# Patient Record
Sex: MALE | Race: WHITE | NOT HISPANIC OR LATINO | URBAN - METROPOLITAN AREA
[De-identification: names, ages, dates, MRNs, and addresses within clinical notes are randomized per-mention and may not be internally consistent; named-entity substitution may affect disease eponyms.]

---

## 2018-06-05 ENCOUNTER — INPATIENT (INPATIENT)
Facility: HOSPITAL | Age: 68
LOS: 1 days | Discharge: HOME | End: 2018-06-07
Attending: INTERNAL MEDICINE | Admitting: INTERNAL MEDICINE
Payer: MEDICARE

## 2018-06-05 VITALS
TEMPERATURE: 98 F | SYSTOLIC BLOOD PRESSURE: 180 MMHG | HEART RATE: 124 BPM | OXYGEN SATURATION: 100 % | RESPIRATION RATE: 16 BRPM | DIASTOLIC BLOOD PRESSURE: 111 MMHG

## 2018-06-05 DIAGNOSIS — R74.8 ABNORMAL LEVELS OF OTHER SERUM ENZYMES: ICD-10-CM

## 2018-06-05 DIAGNOSIS — E11.9 TYPE 2 DIABETES MELLITUS WITHOUT COMPLICATIONS: ICD-10-CM

## 2018-06-05 DIAGNOSIS — Z90.79 ACQUIRED ABSENCE OF OTHER GENITAL ORGAN(S): Chronic | ICD-10-CM

## 2018-06-05 DIAGNOSIS — I10 ESSENTIAL (PRIMARY) HYPERTENSION: ICD-10-CM

## 2018-06-05 DIAGNOSIS — R74.0 NONSPECIFIC ELEVATION OF LEVELS OF TRANSAMINASE AND LACTIC ACID DEHYDROGENASE [LDH]: ICD-10-CM

## 2018-06-05 DIAGNOSIS — Z29.9 ENCOUNTER FOR PROPHYLACTIC MEASURES, UNSPECIFIED: ICD-10-CM

## 2018-06-05 LAB
ALBUMIN SERPL ELPH-MCNC: 4.2 G/DL — SIGNIFICANT CHANGE UP (ref 3.3–5)
ALP SERPL-CCNC: 39 U/L — LOW (ref 40–120)
ALT FLD-CCNC: 110 U/L — HIGH (ref 4–41)
APPEARANCE UR: CLEAR — SIGNIFICANT CHANGE UP
APTT BLD: 29.4 SEC — SIGNIFICANT CHANGE UP (ref 27.5–37.4)
AST SERPL-CCNC: 46 U/L — HIGH (ref 4–40)
BASOPHILS # BLD AUTO: 0.05 K/UL — SIGNIFICANT CHANGE UP (ref 0–0.2)
BASOPHILS NFR BLD AUTO: 0.8 % — SIGNIFICANT CHANGE UP (ref 0–2)
BILIRUB SERPL-MCNC: 0.8 MG/DL — SIGNIFICANT CHANGE UP (ref 0.2–1.2)
BILIRUB UR-MCNC: NEGATIVE — SIGNIFICANT CHANGE UP
BLOOD UR QL VISUAL: NEGATIVE — SIGNIFICANT CHANGE UP
BUN SERPL-MCNC: 13 MG/DL — SIGNIFICANT CHANGE UP (ref 7–23)
CALCIUM SERPL-MCNC: 8.8 MG/DL — SIGNIFICANT CHANGE UP (ref 8.4–10.5)
CHLORIDE SERPL-SCNC: 96 MMOL/L — LOW (ref 98–107)
CK MB BLD-MCNC: 102.5 NG/ML — HIGH (ref 1–6.6)
CK MB BLD-MCNC: 4.9 — HIGH (ref 0–2.5)
CK MB BLD-MCNC: 5.4 — HIGH (ref 0–2.5)
CK MB BLD-MCNC: 83.01 NG/ML — HIGH (ref 1–6.6)
CK SERPL-CCNC: 1711 U/L — HIGH (ref 30–200)
CK SERPL-CCNC: 1883 U/L — HIGH (ref 30–200)
CO2 SERPL-SCNC: 26 MMOL/L — SIGNIFICANT CHANGE UP (ref 22–31)
COLOR SPEC: YELLOW — SIGNIFICANT CHANGE UP
CREAT SERPL-MCNC: 0.52 MG/DL — SIGNIFICANT CHANGE UP (ref 0.5–1.3)
EOSINOPHIL # BLD AUTO: 0.37 K/UL — SIGNIFICANT CHANGE UP (ref 0–0.5)
EOSINOPHIL NFR BLD AUTO: 6 % — SIGNIFICANT CHANGE UP (ref 0–6)
GLUCOSE BLDC GLUCOMTR-MCNC: 184 MG/DL — HIGH (ref 70–99)
GLUCOSE BLDC GLUCOMTR-MCNC: 215 MG/DL — HIGH (ref 70–99)
GLUCOSE BLDC GLUCOMTR-MCNC: 227 MG/DL — HIGH (ref 70–99)
GLUCOSE SERPL-MCNC: 261 MG/DL — HIGH (ref 70–99)
GLUCOSE UR-MCNC: >1000 — SIGNIFICANT CHANGE UP
HBA1C BLD-MCNC: 8.6 % — HIGH (ref 4–5.6)
HCT VFR BLD CALC: 42.7 % — SIGNIFICANT CHANGE UP (ref 39–50)
HGB BLD-MCNC: 14.9 G/DL — SIGNIFICANT CHANGE UP (ref 13–17)
IMM GRANULOCYTES # BLD AUTO: 0.04 # — SIGNIFICANT CHANGE UP
IMM GRANULOCYTES NFR BLD AUTO: 0.7 % — SIGNIFICANT CHANGE UP (ref 0–1.5)
INR BLD: 1.04 — SIGNIFICANT CHANGE UP (ref 0.88–1.17)
KETONES UR-MCNC: SIGNIFICANT CHANGE UP
LEUKOCYTE ESTERASE UR-ACNC: NEGATIVE — SIGNIFICANT CHANGE UP
LYMPHOCYTES # BLD AUTO: 1.33 K/UL — SIGNIFICANT CHANGE UP (ref 1–3.3)
LYMPHOCYTES # BLD AUTO: 21.7 % — SIGNIFICANT CHANGE UP (ref 13–44)
MCHC RBC-ENTMCNC: 30 PG — SIGNIFICANT CHANGE UP (ref 27–34)
MCHC RBC-ENTMCNC: 34.9 % — SIGNIFICANT CHANGE UP (ref 32–36)
MCV RBC AUTO: 86.1 FL — SIGNIFICANT CHANGE UP (ref 80–100)
MONOCYTES # BLD AUTO: 0.58 K/UL — SIGNIFICANT CHANGE UP (ref 0–0.9)
MONOCYTES NFR BLD AUTO: 9.5 % — SIGNIFICANT CHANGE UP (ref 2–14)
MUCOUS THREADS # UR AUTO: SIGNIFICANT CHANGE UP
NEUTROPHILS # BLD AUTO: 3.75 K/UL — SIGNIFICANT CHANGE UP (ref 1.8–7.4)
NEUTROPHILS NFR BLD AUTO: 61.3 % — SIGNIFICANT CHANGE UP (ref 43–77)
NITRITE UR-MCNC: NEGATIVE — SIGNIFICANT CHANGE UP
NON-SQ EPI CELLS # UR AUTO: <1 — SIGNIFICANT CHANGE UP
NRBC # FLD: 0 — SIGNIFICANT CHANGE UP
PH UR: 6.5 — SIGNIFICANT CHANGE UP (ref 4.6–8)
PLATELET # BLD AUTO: 214 K/UL — SIGNIFICANT CHANGE UP (ref 150–400)
PMV BLD: 10.9 FL — SIGNIFICANT CHANGE UP (ref 7–13)
POTASSIUM SERPL-MCNC: 4 MMOL/L — SIGNIFICANT CHANGE UP (ref 3.5–5.3)
POTASSIUM SERPL-SCNC: 4 MMOL/L — SIGNIFICANT CHANGE UP (ref 3.5–5.3)
PROT SERPL-MCNC: 7.1 G/DL — SIGNIFICANT CHANGE UP (ref 6–8.3)
PROT UR-MCNC: 100 MG/DL — SIGNIFICANT CHANGE UP
PROTHROM AB SERPL-ACNC: 11.5 SEC — SIGNIFICANT CHANGE UP (ref 9.8–13.1)
RBC # BLD: 4.96 M/UL — SIGNIFICANT CHANGE UP (ref 4.2–5.8)
RBC # FLD: 13.2 % — SIGNIFICANT CHANGE UP (ref 10.3–14.5)
RBC CASTS # UR COMP ASSIST: SIGNIFICANT CHANGE UP (ref 0–?)
SODIUM SERPL-SCNC: 137 MMOL/L — SIGNIFICANT CHANGE UP (ref 135–145)
SP GR SPEC: 1.02 — SIGNIFICANT CHANGE UP (ref 1–1.04)
TROPONIN T SERPL-MCNC: 0.11 NG/ML — HIGH (ref 0–0.06)
TROPONIN T SERPL-MCNC: 0.12 NG/ML — HIGH (ref 0–0.06)
UROBILINOGEN FLD QL: NORMAL MG/DL — SIGNIFICANT CHANGE UP
WBC # BLD: 6.12 K/UL — SIGNIFICANT CHANGE UP (ref 3.8–10.5)
WBC # FLD AUTO: 6.12 K/UL — SIGNIFICANT CHANGE UP (ref 3.8–10.5)
WBC UR QL: SIGNIFICANT CHANGE UP (ref 0–?)

## 2018-06-05 PROCEDURE — 71045 X-RAY EXAM CHEST 1 VIEW: CPT | Mod: 26

## 2018-06-05 PROCEDURE — 76705 ECHO EXAM OF ABDOMEN: CPT | Mod: 26

## 2018-06-05 PROCEDURE — 70450 CT HEAD/BRAIN W/O DYE: CPT | Mod: 26

## 2018-06-05 RX ORDER — GLUCAGON INJECTION, SOLUTION 0.5 MG/.1ML
1 INJECTION, SOLUTION SUBCUTANEOUS ONCE
Qty: 0 | Refills: 0 | Status: DISCONTINUED | OUTPATIENT
Start: 2018-06-05 | End: 2018-06-07

## 2018-06-05 RX ORDER — DEXTROSE 50 % IN WATER 50 %
12.5 SYRINGE (ML) INTRAVENOUS ONCE
Qty: 0 | Refills: 0 | Status: DISCONTINUED | OUTPATIENT
Start: 2018-06-05 | End: 2018-06-07

## 2018-06-05 RX ORDER — DEXTROSE 50 % IN WATER 50 %
25 SYRINGE (ML) INTRAVENOUS ONCE
Qty: 0 | Refills: 0 | Status: DISCONTINUED | OUTPATIENT
Start: 2018-06-05 | End: 2018-06-07

## 2018-06-05 RX ORDER — INSULIN LISPRO 100/ML
VIAL (ML) SUBCUTANEOUS
Qty: 0 | Refills: 0 | Status: DISCONTINUED | OUTPATIENT
Start: 2018-06-05 | End: 2018-06-07

## 2018-06-05 RX ORDER — INSULIN LISPRO 100/ML
VIAL (ML) SUBCUTANEOUS AT BEDTIME
Qty: 0 | Refills: 0 | Status: DISCONTINUED | OUTPATIENT
Start: 2018-06-05 | End: 2018-06-07

## 2018-06-05 RX ORDER — SODIUM CHLORIDE 9 MG/ML
1000 INJECTION INTRAMUSCULAR; INTRAVENOUS; SUBCUTANEOUS ONCE
Qty: 0 | Refills: 0 | Status: COMPLETED | OUTPATIENT
Start: 2018-06-05 | End: 2018-06-05

## 2018-06-05 RX ORDER — ENOXAPARIN SODIUM 100 MG/ML
40 INJECTION SUBCUTANEOUS EVERY 24 HOURS
Qty: 0 | Refills: 0 | Status: DISCONTINUED | OUTPATIENT
Start: 2018-06-05 | End: 2018-06-07

## 2018-06-05 RX ORDER — METFORMIN HYDROCHLORIDE 850 MG/1
2000 TABLET ORAL
Qty: 0 | Refills: 0 | COMMUNITY

## 2018-06-05 RX ORDER — SODIUM CHLORIDE 9 MG/ML
1000 INJECTION, SOLUTION INTRAVENOUS
Qty: 0 | Refills: 0 | Status: DISCONTINUED | OUTPATIENT
Start: 2018-06-05 | End: 2018-06-07

## 2018-06-05 RX ORDER — ASPIRIN/CALCIUM CARB/MAGNESIUM 324 MG
324 TABLET ORAL ONCE
Qty: 0 | Refills: 0 | Status: COMPLETED | OUTPATIENT
Start: 2018-06-05 | End: 2018-06-05

## 2018-06-05 RX ORDER — DEXTROSE 50 % IN WATER 50 %
15 SYRINGE (ML) INTRAVENOUS ONCE
Qty: 0 | Refills: 0 | Status: DISCONTINUED | OUTPATIENT
Start: 2018-06-05 | End: 2018-06-07

## 2018-06-05 RX ORDER — ASPIRIN/CALCIUM CARB/MAGNESIUM 324 MG
81 TABLET ORAL DAILY
Qty: 0 | Refills: 0 | Status: DISCONTINUED | OUTPATIENT
Start: 2018-06-06 | End: 2018-06-07

## 2018-06-05 RX ORDER — ASPIRIN/CALCIUM CARB/MAGNESIUM 324 MG
325 TABLET ORAL ONCE
Qty: 0 | Refills: 0 | Status: DISCONTINUED | OUTPATIENT
Start: 2018-06-05 | End: 2018-06-05

## 2018-06-05 RX ADMIN — ENOXAPARIN SODIUM 40 MILLIGRAM(S): 100 INJECTION SUBCUTANEOUS at 11:30

## 2018-06-05 RX ADMIN — Medication 1: at 18:29

## 2018-06-05 RX ADMIN — SODIUM CHLORIDE 1000 MILLILITER(S): 9 INJECTION INTRAMUSCULAR; INTRAVENOUS; SUBCUTANEOUS at 06:00

## 2018-06-05 RX ADMIN — Medication 324 MILLIGRAM(S): at 07:56

## 2018-06-05 NOTE — PHYSICAL THERAPY INITIAL EVALUATION ADULT - ADDITIONAL COMMENTS
Patient lives in a condo with wife; patient did not use an Assistive Device prior to admission but reports he is unable to ambulate long distances due to bilateral Lower Extremity weakness

## 2018-06-05 NOTE — H&P ADULT - PROBLEM SELECTOR PLAN 1
Admit to tele  Appreciate neurology consultation - MRI Head w/o contract, MRA Head Without contrast and MRA Neck with contrast  ASA/Statin, A1C, Lipid profile  Passed dysphagia screening - will have official S/S  TTE ordered  PT/OT

## 2018-06-05 NOTE — H&P ADULT - MOTOR
Strength:     [] Upper extremity                      Delt       Bicep    Tricep                                                  R         5/5 5/5 5/5 4/5  R wrist extension 2/5                                               L          5/5 5/5 5/5 5/5    [] Lower extremity                       HF          KE          KF        DF         PF                                               R        5/5 5/5 5/5 5/5 5/5                                               L         5/5 5/5 5/5 5/5 5/5  Pronator drift: none                 No dysmetria.  Tremor: No resting, postural or action tremor.  No myoclonus.    Deep Tendon Reflexes: 1+ bilateral biceps, triceps, brachioradialis, knee and ankle.

## 2018-06-05 NOTE — H&P ADULT - HISTORY OF PRESENT ILLNESS
67 y/o male pt with PmHx of leg weakness, DM, HTN and AS presents with sudden onset of right handed weakness. Pt's hand was last normal last night at 12:00am, he woke up this morning at 4:30am and he was "not able to use the right hand." Pt woke up as he normally does to urinate. Pt was unable to move his wrist and had weak  strength. Pt did not have hand or wrist pain, abnormal sensation, or spasm. Hand was wrist were erythematous, slightly edematous and in slight flexion. Weakness was non-radiating was not alleviated or aggravated by position, palpation, or use. Pt has never had this happen before and did say this weakness was different from his leg weakness. The leg weakness comes with use, the hand weakness presented acutely and is not alleviated or aggravated by rest or use. Pt presented to [facility] ED for evaluation.   Pt denies facial droop, slurred speech, confusion, ataxia, dizziness, dysphagia, CP, palpitations, SOB, syncope, fever, malaise, night sweats, weight change and trauma. 67 y/o male pt with PmHx of leg weakness, DM, HTN and AS presents with sudden onset of right handed weakness. Pt's hand was last normal last night at 12:00am, he woke up this morning at 4:30am and he was "not able to use the right hand." Pt woke up as he normally does to urinate. Pt was unable to move his wrist and had weak  strength. Pt did not have hand or wrist pain, abnormal sensation, or spasm. Hand was wrist were erythematous, slightly edematous and in slight flexion. Weakness was non-radiating was not alleviated or aggravated by position, palpation, or use. Pt has never had this happen before and did say this weakness was different from his leg weakness. The leg weakness comes with use, the hand weakness presented acutely and is not alleviated or aggravated by rest or use. Pt presented to [facility] ED for evaluation.  Of note, patient states has had chronic elevation in CPK for which has an outpatient rheumatology appointment set up, in the past was told the elevation may have been from Lipitor but medication was discontinued and and elevation remains. He denies hip weakness, shoulder weakness, problems raising from seated positions.   Pt denies facial droop, slurred speech, confusion, ataxia, dizziness, dysphagia, CP, palpitations, SOB, syncope, fever, malaise, falls, night sweats, weight change and trauma.

## 2018-06-05 NOTE — H&P ADULT - RS GEN PE MLT RESP DETAILS PC
clear to auscultation bilaterally/no intercostal retractions/good air movement/airway patent/normal/no rales/no rhonchi/no subcutaneous emphysema/no chest wall tenderness/respirations non-labored/breath sounds equal/no wheezes

## 2018-06-05 NOTE — H&P ADULT - PMH
Asthma  denies any hospitalizations, denies any intubations. Stopped taking Advair several years ago.  Diabetes mellitus  Type 2 DM  Heart murmur  AS  Hypertension    Lipoma  right posterior neck/upper back  Prostate cancer  2013- s/p TURP

## 2018-06-05 NOTE — H&P ADULT - NEGATIVE GASTROINTESTINAL SYMPTOMS
no hematochezia/no steatorrhea/no hiccoughs/no nausea/no change in bowel habits/no vomiting/no constipation/no diarrhea/no abdominal pain/no melena/no jaundice/no flatulence

## 2018-06-05 NOTE — H&P ADULT - NEGATIVE GENERAL SYMPTOMS
no fatigue/no fever/no weight loss/no polyphagia/no chills/no sweating/no anorexia/no weight gain/no polyuria/no malaise/no polydipsia

## 2018-06-05 NOTE — H&P ADULT - NEGATIVE NEUROLOGICAL SYMPTOMS
no loss of sensation/no facial palsy/no confusion/no generalized seizures/no tremors/no vertigo/no headache/no loss of consciousness/no hemiparesis/no transient paralysis/no paresthesias/no focal seizures/no syncope

## 2018-06-05 NOTE — ED PROVIDER NOTE - OBJECTIVE STATEMENT
68M hx DM, HTN, AS, right handed c/o right hand  strength deficit noticed after awakening around 5AM, last normal 12AM when he went to sleep. No associated sensory deficits, No headache, visual changes, chest pain, palpitations, recent illness. On ASA 81mg daily. Has appt for rheumatologist for elevated CK and neuromuscular weakness of the lower extremities. No noted dysarthria per family, however right arm drift noted in triage.

## 2018-06-05 NOTE — H&P ADULT - NEGATIVE GENERAL GENITOURINARY SYMPTOMS
no gas in urine/no incontinence/no dysuria/no bladder infections/normal libido/no nocturia/no flank pain L/no hematuria/normal urinary frequency/no urinary hesitancy/no flank pain R/no urine discoloration/no renal colic

## 2018-06-05 NOTE — ED PROVIDER NOTE - ENMT, MLM
Airway patent, Nasal mucosa clear. Mouth with normal mucosa. Throat has no vesicles, no oropharyngeal exudates and uvula is midline. No JVD Airway patent, Nasal mucosa clear. Mouth with normal mucosa. Throat has no vesicles, no oropharyngeal exudates and uvula is midline. No JVD, ? R bruit

## 2018-06-05 NOTE — H&P ADULT - NEGATIVE PSYCHIATRIC SYMPTOMS
no suicidal ideation/no anxiety/no visual hallucinations/no depression/no insomnia/no paranoia/no mood swings/no memory loss/no auditory hallucinations/no hyperactivity/no agitation

## 2018-06-05 NOTE — H&P ADULT - NEGATIVE MUSCULOSKELETAL SYMPTOMS
no stiffness/no neck pain/no arm pain R/no leg pain R/no arthralgia/no arthritis/no arm pain L/no back pain/no muscle cramps/no muscle weakness/no myalgia/no joint swelling/no leg pain L

## 2018-06-05 NOTE — PHYSICAL THERAPY INITIAL EVALUATION ADULT - PERTINENT HX OF CURRENT PROBLEM, REHAB EVAL
69 y/o male pt with PmHx of leg weakness, DM, HTN and AS presents with sudden onset of right handed weakness.

## 2018-06-05 NOTE — OCCUPATIONAL THERAPY INITIAL EVALUATION ADULT - PERTINENT HX OF CURRENT PROBLEM, REHAB EVAL
69 y/o male pt with PmHx of leg weakness, DM, HTN and AS presents with sudden onset of right handed weakness. CT Head: No acute hemorrhage, mass effect, or evidence for acute territorial infarct.

## 2018-06-05 NOTE — ED ADULT NURSE NOTE - OBJECTIVE STATEMENT
rec'd pt aaox3 in room 4 c/o of a sudden weakness in his R hand that started at approximately 0430pm, last seen normal at 12 midnight.  Pt.'s R hand drip significantly weaker than the L, states he has weakness to lower extremities at baseline, ambulatory w/out difficulty.  Pt. denies CP/SOB, n/v/d, recent illness, fever/chills.  Pt. hypertensive and tachycardic, states he is complaint w/ all of his meds.  Sinus tachycardia on the cardiac monitor.  20G IV saline lock placed in the L AC, labs drawn and sent as ordered.  Pt. medicated as ordered.

## 2018-06-05 NOTE — PHYSICAL THERAPY INITIAL EVALUATION ADULT - MANUAL MUSCLE TESTING RESULTS, REHAB EVAL
except right Upper Extremity  strength , wrist ext 0-1/5. elbow flexion/ext < or = to 3/5 throughout/no strength deficits were identified

## 2018-06-05 NOTE — H&P ADULT - GEN GEN HX ROS MEA POS PC
weight loss/weight gain/polydipsia/fatigue/chills/sweating/fever/anorexia/polyphagia/polyuria/malaise

## 2018-06-05 NOTE — H&P ADULT - CHARACTER OF SYSTOLIC MURMUR
murmur loudness: II/VI/Right upper sternal border/murmur loudness: I/VI Right upper sternal border/murmur loudness: I/VI

## 2018-06-05 NOTE — H&P ADULT - FAMILY HISTORY
Family history of aneurysm, Father     Child  Still living? Yes, Estimated age: Age Unknown  Family history of heart disease, Age at diagnosis: Age Unknown  Family history of early CAD, Age at diagnosis: Age Unknown     Father  Still living? No  Family history of hypertension in father, Age at diagnosis: Age Unknown

## 2018-06-05 NOTE — H&P ADULT - NEUROLOGICAL SYMPTOMS
difficulty walking/Weakness in lower extremities when walking more than 1/2 block which causes difficulty walking./weakness

## 2018-06-05 NOTE — CONSULT NOTE ADULT - SUBJECTIVE AND OBJECTIVE BOX
Neurology Consult Note    Name  ROBINSON RIVERS    HPI:  Patient is a 68 year old Male w/ PMHx DM, HTN, AS, right handed c/o right hand  strength deficit noticed after awakening around 5AM, last normal 12AM when he went to sleep. No associated sensory deficits, No headache, visual changes, chest pain, palpitations, recent illness. On ASA 81mg daily. Has appt for rheumatologist for elevated CK and neuromuscular weakness of the lower extremities          Subjective:    Review of Systems:  Constitutional:        Eyes, Ears, Mouth, Throat:   Respiratory:                            Cardiovascular:   Gastrointestinal:                                     Genitourinary:   Musculoskeletal:                                    Dermatologic:   Neurological: as per above                                                                 Psychiatric:   Endocrine:              Hematologic/Lymphatic:     MEDICATIONS  (STANDING):    MEDICATIONS  (PRN):      Allergies    No Known Allergies    Intolerances      PAST MEDICAL & SURGICAL HISTORY:  Heart murmur  Lipoma: right posterior neck/upper back  Prostate cancer: 2013- s/p TURP  Diabetes mellitus  Hypertension  Asthma: denies any hospitalizations, denies any intubations  S/P TURP: 2013    FH:  SH:    Objective:   Vital Signs Last 24 Hrs  T(C): 36.8 (05 Jun 2018 08:48), Max: 36.8 (05 Jun 2018 08:48)  T(F): 98.3 (05 Jun 2018 08:48), Max: 98.3 (05 Jun 2018 08:48)  HR: 104 (05 Jun 2018 08:48) (104 - 124)  BP: 165/87 (05 Jun 2018 08:48) (165/87 - 180/111)  BP(mean): --  RR: 18 (05 Jun 2018 08:48) (16 - 20)  SpO2: 98% (05 Jun 2018 08:48) (98% - 100%)    General Exam:   General appearance: No acute distress                   Neurological Exam:  Mental Status: Orientated to self, date and place.  Attention intact.  No dysarthria, aphasia or neglect.  Knowledge intact.  Registration intact.  Short and long term memory grossly intact.      Cranial Nerves: CN I - not tested.  PERRL, EOMI, VFF, no nystagmus or diplopia.  No APD.  Fundi not visualized bilaterally.  CN V1-3 intact to light touch and pinprick.  No facial asymmetry.  Hearing intact to finger rub bilaterally.  Tongue, uvula and palate midline.  Sternocleidomastoid and Trapezius intact bilaterally.    Motor:   Tone: normal.                  Strength:     [] Upper extremity                      Delt       Bicep    Tricep                                                  R         5/5 5/5 5/5 5/5                                               L          5/5 5/5 5/5 5/5  [] Lower extremity                       HF          KE          KF        DF         PF                                               R        5/5 5/5 5/5 5/5 5/5                                               L         5/5 5/5 5/5 5/5 5/5  Pronator drift: none                 Dysmetria: None to finger-nose-finger or heel-shin-heel  No truncal ataxia.    Tremor: No resting, postural or action tremor.  No myoclonus.    Sensation: intact to light touch, pinprick, vibration and proprioception    Deep Tendon Reflexes: 1+ bilateral biceps, triceps, brachioradialis, knee and ankle  Toes flexor bilaterally    Gait: normal and stable.        Other:                        14.9   6.12  )-----------( 214      ( 05 Jun 2018 05:50 )             42.7     06-05    137  |  96<L>  |  13  ----------------------------<  261<H>  4.0   |  26  |  0.52    Ca    8.8      05 Jun 2018 05:50    TPro  7.1  /  Alb  4.2  /  TBili  0.8  /  DBili  x   /  AST  46<H>  /  ALT  110<H>  /  AlkPhos  39<L>  06-05    LIVER FUNCTIONS - ( 05 Jun 2018 05:50 )  Alb: 4.2 g/dL / Pro: 7.1 g/dL / ALK PHOS: 39 u/L / ALT: 110 u/L / AST: 46 u/L / GGT: x           PT/INR - ( 05 Jun 2018 05:50 )   PT: 11.5 SEC;   INR: 1.04          PTT - ( 05 Jun 2018 05:50 )  PTT:29.4 SEC    Radiology    EKG:  tele:  TTE:  EEG: Neurology Consult Note    Name  ROBINSON RIVERS    HPI:  Patient is a 68 year old Male w/ PMHx DM, HTN, AS, right handed c/o right hand  strength deficit noticed after awakening around 5AM, last normal 12AM when he went to sleep. No associated sensory deficits, No headache, visual changes, chest pain, palpitations, recent illness. On ASA 81mg daily. Has appt for rheumatologist for elevated CK and neuromuscular weakness of the lower extremities          Subjective:    Review of Systems:  Constitutional:        Eyes, Ears, Mouth, Throat:   Respiratory:                            Cardiovascular:   Gastrointestinal:                                     Genitourinary:   Musculoskeletal:                                    Dermatologic:   Neurological: as per above                                                                 Psychiatric:   Endocrine:              Hematologic/Lymphatic:     MEDICATIONS  (STANDING):    MEDICATIONS  (PRN):      Allergies    No Known Allergies    Intolerances      PAST MEDICAL & SURGICAL HISTORY:  Heart murmur  Lipoma: right posterior neck/upper back  Prostate cancer: 2013- s/p TURP  Diabetes mellitus  Hypertension  Asthma: denies any hospitalizations, denies any intubations  S/P TURP: 2013    FH:  SH:    Objective:   Vital Signs Last 24 Hrs  T(C): 36.8 (05 Jun 2018 08:48), Max: 36.8 (05 Jun 2018 08:48)  T(F): 98.3 (05 Jun 2018 08:48), Max: 98.3 (05 Jun 2018 08:48)  HR: 104 (05 Jun 2018 08:48) (104 - 124)  BP: 165/87 (05 Jun 2018 08:48) (165/87 - 180/111)  BP(mean): --  RR: 18 (05 Jun 2018 08:48) (16 - 20)  SpO2: 98% (05 Jun 2018 08:48) (98% - 100%)    General Exam:   General appearance: No acute distress                   Neurological Exam:  Mental Status: Orientated to self, date and place.  Attention intact.  No dysarthria, aphasia or neglect.  Knowledge intact.  Registration intact.  Short and long term memory grossly intact.      Cranial Nerves: CN I - not tested.  PERRL, EOMI, VFF, no nystagmus or diplopia.  No APD.  Fundi not visualized bilaterally.  CN V1-3 intact to light touch and pinprick.  No facial asymmetry.  Hearing intact to finger rub bilaterally.  Tongue, uvula and palate midline.  Sternocleidomastoid and Trapezius intact bilaterally.    Motor:   Tone: normal.                  Strength:     [] Upper extremity                      Delt       Bicep    Tricep                                                  R         5/5 5/5 5/5 4/5  R wrist extension 2/5                                               L          5/5 5/5 5/5 5/5    [] Lower extremity                       HF          KE          KF        DF         PF                                               R        5/5 5/5        5/5 5/5       5/5                                               L         5/5 5/5 5/5 5/5        5/5  Pronator drift: none                 Dysmetria: None to finger-nose-finger or heel-shin-heel  No truncal ataxia.    Tremor: No resting, postural or action tremor.  No myoclonus.    Sensation: intact to light touch, pinprick, vibration and proprioception    Deep Tendon Reflexes: 1+ bilateral biceps, triceps, brachioradialis, knee and ankle  Toes flexor bilaterally        Other:                        14.9   6.12  )-----------( 214      ( 05 Jun 2018 05:50 )             42.7     06-05    137  |  96<L>  |  13  ----------------------------<  261<H>  4.0   |  26  |  0.52    Ca    8.8      05 Jun 2018 05:50    TPro  7.1  /  Alb  4.2  /  TBili  0.8  /  DBili  x   /  AST  46<H>  /  ALT  110<H>  /  AlkPhos  39<L>  06-05    LIVER FUNCTIONS - ( 05 Jun 2018 05:50 )  Alb: 4.2 g/dL / Pro: 7.1 g/dL / ALK PHOS: 39 u/L / ALT: 110 u/L / AST: 46 u/L / GGT: x           PT/INR - ( 05 Jun 2018 05:50 )   PT: 11.5 SEC;   INR: 1.04          PTT - ( 05 Jun 2018 05:50 )  PTT:29.4 SEC    Radiology    < from: CT Head No Cont (06.05.18 @ 06:50) >  No acute hemorrhage, mass effect, or evidencefor acute territorial   infarct.     < end of copied text >

## 2018-06-05 NOTE — H&P ADULT - NSHPSOCIALHISTORY_GEN_ALL_CORE
, living with wife, sexually active, retired .  Tobacco- Never.  ETOH- <1 beer/week.  Drug use- Never.

## 2018-06-05 NOTE — H&P ADULT - NEGATIVE CARDIOVASCULAR SYMPTOMS
no claudication/no dyspnea on exertion/no orthopnea/no paroxysmal nocturnal dyspnea/no chest pain/no peripheral edema/no palpitations

## 2018-06-05 NOTE — H&P ADULT - ATTENDING COMMENTS
67 y/o male pt with PmHx of leg weakness, DM, HTN and AS presents with sudden onset of right handed weakness.  Previously, rt hand was fine until the middle of the night.  He denies any other focal symptoms like slurred speech, aphasia, droopiness of face or unilateral tingling/numbness.  Exam was notable for a right hand whose fingers were all partially flexed, creating a claw-like appearance of the whole hand 69 y/o male pt with PmHx of leg weakness, DM, HTN and AS presents with sudden onset of right handed weakness.  Previously, rt hand was fine until the middle of the night.  He denies any other focal symptoms like slurred speech, aphasia, droopiness of face or unilateral tingling/numbness.  Exam was notable for a right hand whose fingers were all partially flexed, creating a claw-like appearance of the whole hand which is different from that seen with ulnar nerve palsy.  Rt wrist extension was weak compared to flexion and compared to left wrist extension.  His b/l knee flex/extension was 5/5 motor strength.  Cardiac/lung/abdominal exams were benign.  Agree with PA's assessment and plan.  Appreciate neurology input.  Await MRI brain along with MRA brain/neck.  Hgb1ac and lipid profiles were ordered.  OT.

## 2018-06-05 NOTE — H&P ADULT - NEGATIVE SKIN SYMPTOMS
no dryness/no rash/no hair loss/no itching/no change in size/color of mole/no tumor/no pitted nails/no brittle nails

## 2018-06-05 NOTE — H&P ADULT - ASSESSMENT
68 year old male pmh of DM, [place], AS presents with right hand weakness, chronic elevation of CPK and new elevated troponin admitted to tele to r/o ACS and Stroke

## 2018-06-05 NOTE — H&P ADULT - MUSCULOSKELETAL
details… detailed exam no joint swelling/no joint erythema/ROM intact/See Neuro details/decreased ROM due to pain/no joint warmth/no calf tenderness/diminished strength

## 2018-06-05 NOTE — ED ADULT NURSE NOTE - CHIEF COMPLAINT QUOTE
pt complains of R arm weakness, slight numbness and tingling. Pt unable to fully close hand and states cannot grab things. Pt states last normal was midnight, woke up with symptoms. Hx of HTN, DM, HLD. Pt has baseline lower extremity weakness for years, currently waiting to follow up with rheumatologist for elevated CPK. No change in speech, ability to ambulate, or facial droop. Pt was evaluated by MD Krishna, no stroke code called.

## 2018-06-05 NOTE — H&P ADULT - NSHPOUTPATIENTPROVIDERS_GEN_ALL_CORE
PCP- Dr. Nj Barkley  Endocrinologist- Dr. Polanco  Podiatrist- Dr. Beckwith  Ophthamologist- Jason  Cardiologist- Ralph

## 2018-06-05 NOTE — ED PROVIDER NOTE - MEDICAL DECISION MAKING DETAILS
68M with right extensor motor deficits of the hand, in radial nerve distribution without sensory loss, rule out tia vs stroke, check trops, labs, CTH, ekg sinus tachy

## 2018-06-05 NOTE — ED PROVIDER NOTE - ATTENDING CONTRIBUTION TO CARE
I was physically present for the E/M service provided. I agree with above history, physical, and plan which I have reviewed and edited where appropriate. I was physically present for the key portions of the service provided.    68M right-handed male with PMH of DM2, HTN, Aortic stenosis p/w right hand weakness which started this AM when he woke up. Last normal was 6 hours ago. Afebrile. NAD. Neurologic exam: A&O x3, speech clear, WILMAR, CN II-XII intact, motor strength +5/5 in all extremities with expection of right hand  strength +4/5, sensation equal bilaterally, finger-to-nose normal, gait steady. No neck pain. Lungs CTA. Heart tachycardia. I was physically present for the E/M service provided. I agree with above history, physical, and plan which I have reviewed and edited where appropriate. I was physically present for the key portions of the service provided.    68M retired right-handed male with PMH of DM2, HTN, Aortic stenosis p/w right hand weakness which started this AM when he woke up. Last normal was 6 hours ago. No associated vision, speech, or sensation disturbance. No recent trauma. Afebrile. NAD. Neurologic exam: A&O x3, speech clear, WILMAR, CN II-XII intact, motor strength +5/5 in all extremities with exsection of right hand  strength +4/5 more specifically weakness in wrist extension notable wrist drop on exam, sensation equal bilaterally, finger-to-nose normal, gait steady. No neck pain. Lungs CTA. Heart tachycardia. CT Head r/o acute cva vs radial neuropathy, neurology consult. IVF for tachycardia and hyperglycemia and reassess.

## 2018-06-05 NOTE — CHART NOTE - NSCHARTNOTEFT_GEN_A_CORE
Patient: ROBINSON ALBERTO    Date: 2017 10:50 AM    : 1950    Physician: CYDNEY SEPULVEDA D.O.    Echo Tech: Alex Gomez    Height (in): 70    Weight (lb): 200    Rhythm: NSR    Quality: Good    Mitral Valve Doppler Adult Normals Patient    E Wave velocity 0.75m/sec(+/- 13) 0.8    A Wave velocity 0.51m/sec(+/- 11) 1    Aortic Valve Doppler Normal Patient    Peak AV velocity (m/s) 3.3    Mean AV gradient < 25 MMHG 22    LVOT VTI (cm) 18-22 cm 30    LVOT DIAM (cm) 1.8-2.4 2.3    DEANDRE (cm^2) 1.5    DIMENSIONS Patient    Aortic Root 2.0-3.5 cm 3.2    LA diameter/area 0.9-4.0 cm 4.3    LVID diastole 3.0-5.6 cm 5.3    LVID systole 1.8-4.0 cm 3.2    Septal thickness >0.6-1.2 cm 1.1    Posterior wall 0.6-1.2 cm 1.1    DERIVED VARIABLES Patient    FS% 39%    EF% 69%    MITRAL VALVE    morphology: MILD MV SCLEROSIS WITH MILD MAC.    valve excursion: NORMAL    regurgitation: MILD    stenosis: NONE    LV    size: MILD CONCENTRIC LVH    Systolic function: NORMAL    Diastolic function: STAGE 1 DIASTOLOGY    Septal motion: NORMAL    TRICUSPID VALVE    regurgitation: MILD    PERICARDIUM: NORMAL    AORTIC VALVE    morphology: MODERATE AOV CALCIFICATION WITH REDUCED OPENING, MAX CHERIE 3.3 M/S, MEAN PG 22 MMHG AND MAX PG 43 MMHG    valve excursion: REDUCED    regurgitation: NONE    stenosis: MILD TO MODERATE    RV    size: NORMAL    function: NORMAL    regurgitation: TRACE    Conclusion:    MILD CONCENTRIC LVH. NORMAL LV SIZE WITH NORMAL SYSTOLIC FUNCTION AND WITHOUT SEGMENTAL WALL MOTION ABNORMALITLIES. ESTIMATED EF IS 69%.    STAGE 1 MITRAL INFLOW PATTERN.    NORMAL RIGHT ATRIAL AND MILD LEFT ATRIAL ENLARGEMENT.    NORMAL RV SIZE AND FUNCTION.    MODERATE AOV CALCIFICATION WITH REDUCED OPENING, MAX CHERIE 3.3 M/S, MEAN PG 22 MMHG AND MAX PG 43 MMHG. MILD TO MODERATE AS.    MILD SCLEROSIS OF MITRAL VALVE WITHOUT PROLAPSE. NORMAL MITRAL DIASTOLIC OPENING. MILD MR. MILD MAC.    NORMAL TRICUSPID VALVE. MILD TR.    NORMAL PULMONIC VALVE. TRACE PI.    NORMAL IVC DIMENSIONS WITH NORMAL RESPIRATORY VARIATION AND NO COLOR FLOW IS NOTED CROSSING THE INTERATRIAL SEPTUM. NORMAL PERICARDIUM WITHOUT EFFUSION.    NORMAL AORTIC ARCH AND AO ROOT.         NORMAL LV FUNCTION, EF 69%,    MILD CONCENTRIC LVH,    MILD MV SCLEROSIS WITH MILD MAC,    MODERATE AOV CALCIFICATION WITH REDUCED OPENING, MAX CHERIE 3.3 M/S, MEAN PG 22 MMHG AND MAX PG 43 MMHG,    MILD TO MODERATE AS,    MILD LAE,    MILD MR AND TR,    TRACE PI.                   Cydney Sepulveda D.O., FACC                                  Electronically Signed by Cydney Sepulveda D.O. on 2017 at 9:04 PM                                            CARDIOLITE ONE-DAY PERFUSION/GATED SPECT PROTOCOL    Date of Procedure: May 17, 2017    Patient: Robinson Alberto Gender: Male Phone#:[phone] :1950      Referring MD: Cydney Sepulveda D.O.    Indication: Dyspnea, Risk Stratification    Cardiac Risk: Hypertension, Hyperlipidemia, Diabetes    Medication:     LISINOPRIL 20 MG TABS (LISINOPRIL)     AMLODIPINE BESYLATE 10 MG TABS (AMLODIPINE BESYLATE)     JANUMET  MG TABS (SITAGLIPTIN-METFORMIN HCL)     ASPIRIN 81 MG TABS (ASPIRIN)       STRESS TEST: Current Weight: 200 lbs. Current Height: 70 in.    Protocol: Lexiscan     Resting HR: 91   Peak HR: 123   Resting BP: 146/82   Peak BP: 126/82       ECG At Rest: NSR    ECG At Peak: No significant ST segment changes     Arrhythmias: PVC and Mild Sinus Tachycardia were present.     Blood Pressure Response: Normal    Heart Rate Response: Normal    Symptoms during stress test: Dyspnea    Reason for Termination: End of Protocol    Conclusion: Normal         PERFUSION IMAGING:     Rest:     Approximately 45 minutes following the intravenous administration of 10.5 mCi of Tc99M Cardiolite, SPECT images of the heart were obtained. The data were reconstructed into vertical, horizontal, and short axis projections.     Data Quality: Excellent    Findings: The resting SPECT images demonstrate a small perfusion abnormality of the Inferior wall segment. The perfusion defect is of mild severity. There is normal lung activity.    Stress:     Lexiscan: 0.4 mg regadenoson was injected intravenously over 10 seconds followed by a 5 cc flush with normal saline. 32.8 mCi of Tc99M Cardiolite were injected intravenously immediately post Lexiscan infusion and 45 minutes later, SPECT images of the heart were obtained. The images were reconstructed into vertical, horizontal and short axis projections.     Data Quality: Excellent    Findings: The stress SPECT images demonstrate a small perfusion abnormality of the Inferior wall segment. The perfusin defect is of mild severity. There is evidence of normal lung activity.         Gated SPECT:     Rest EF 60%.    The left ventricular cavity is noted to be normal in size.    The right ventricular cavity is noted to be normal in size.         Stress EF 60%    The left ventricular cavity is noted to be normal in size.    The right ventricular cavity is noted to be normal in size.         A Gated SPECT myocardial wall motion and function study was obtained during the post stress imaging.     Gated wall motion analysis revealed: No Segmental Dysfunction.         IMPRESSION: Normal Myocardial Perfusion Study    Comparison of rest and stress imaging along with gated SPECT analysis reveals:    Small fixed defects in both the stress and rest images are noted consistent with diaphragmatic attenuation as analyzed in the raw data. There is normal Gated wall motion.       Stress Test Interpreted by and Electronically Signed by: Cydney Sepulveda D.O.    Date of Report: 2017                Electronically Signed by Cydney Sepulveda D.O. on 2017 at 10:07 PM

## 2018-06-05 NOTE — OCCUPATIONAL THERAPY INITIAL EVALUATION ADULT - RANGE OF MOTION EXAMINATION, UPPER EXTREMITY
Right UE--> shoulder/elbow AROM WFL, wrist /hand AAROM WFL/Left UE Active ROM was WFL (within functional limits)

## 2018-06-05 NOTE — H&P ADULT - NEGATIVE OPHTHALMOLOGIC SYMPTOMS
no lacrimation R/no blurred vision R/no discharge R/no blurred vision L/no irritation L/no scleral injection R/no pain R/no irritation R/no photophobia/no discharge L/no loss of vision L/no pain L/no loss of vision R/no scleral injection L/no diplopia/no lacrimation L

## 2018-06-05 NOTE — H&P ADULT - PROBLEM SELECTOR PLAN 2
Monitor on tele  Trend CE to r/o ACS  Likely chronically elevated in setting of mild Rhabdo/Elevated CPK  Dr. Rodríguez to be called by attending  [place] outpt follow up with Rheumatology as scheduled

## 2018-06-05 NOTE — H&P ADULT - NEGATIVE ENMT SYMPTOMS
no post-nasal discharge/no gum bleeding/no dry mouth/no throat pain/no dysphagia/no tinnitus/no sinus symptoms/no vertigo/no nasal discharge/no nasal congestion/no nasal obstruction/no nose bleeds/no recurrent cold sores/no abnormal taste sensation/no ear pain/no hearing difficulty

## 2018-06-05 NOTE — ED PROVIDER NOTE - NEUROLOGICAL, MLM
Alert and oriented, no focal deficits, no sensory deficits, CN II-XII intact, 3/5 strength right hand grib 2/5 strenght right hand extension, 5/5 strength all other extremities, no dysmetria, pronator drift right arm likely 2/2 to weakness, no dysarthria

## 2018-06-05 NOTE — H&P ADULT - NSHPLABSRESULTS_GEN_ALL_CORE
Vital Signs Last 24 Hrs  T(C): 36.8 (05 Jun 2018 08:48), Max: 36.8 (05 Jun 2018 08:48)  T(F): 98.3 (05 Jun 2018 08:48), Max: 98.3 (05 Jun 2018 08:48)  HR: 104 (05 Jun 2018 08:48) (104 - 124)  BP: 165/87 (05 Jun 2018 08:48) (165/87 - 180/111)  BP(mean): --  RR: 18 (05 Jun 2018 08:48) (16 - 20)  SpO2: 98% (05 Jun 2018 08:48) (98% - 100%)    EKG: sinus tachycardia    CXR:   IMPRESSION: Clear lungs. No pleural effusions or pneumothorax.  Faint aortic arch calcifications. Otherwise cardiac and mediastinal   silhouettes within normal limits.  Trachea midline.  Generalized osteopenia and mild spinal degenerative change.      Head/Neck CT:  FINDINGS: There is volume loss and atherosclerosis. White matter hypodensities   noted compatible with mild chronic microvascular ischemic change in this   age group. There is no midline shift, mass effect, extra axial   collection, intracranial hemorrhage, or ventriculomegaly.    The calvarium is intact. Mucosal thickening of paranasal sinuses. The   mastoid air cells are clear.    IMPRESSION: No acute hemorrhage, mass effect, or evidence for acute territorial   infarct.

## 2018-06-05 NOTE — ED PROVIDER NOTE - PMH
Asthma  denies any hospitalizations, denies any intubations  Diabetes mellitus    Heart murmur    Hypertension    Lipoma  right posterior neck/upper back  Prostate cancer  2013- s/p TURP

## 2018-06-05 NOTE — CONSULT NOTE ADULT - ASSESSMENT
68 year old Male w/ PMHx DM, HTN, AS, right handed c/o right hand  strength deficit noticed after awakening around 5AM.  Neurological exam significant for R wrist extension weakness w/ decreased  strength.  CTH showing no acute abnormalities.  In setting of patient with significant risk factors, will work up for possible stroke, NIHSS 0, MRS 0.    Plan:  - MRI brain w/o contrast  - MRA head w/o contrast. neck w/ contrast  - HbA1c, Lipid profile  - PT/OT

## 2018-06-06 LAB
ALBUMIN SERPL ELPH-MCNC: 4.3 G/DL — SIGNIFICANT CHANGE UP (ref 3.3–5)
ALP SERPL-CCNC: 40 U/L — SIGNIFICANT CHANGE UP (ref 40–120)
ALT FLD-CCNC: 106 U/L — HIGH (ref 4–41)
AST SERPL-CCNC: 42 U/L — HIGH (ref 4–40)
BILIRUB DIRECT SERPL-MCNC: 0.2 MG/DL — SIGNIFICANT CHANGE UP (ref 0.1–0.2)
BILIRUB SERPL-MCNC: 0.9 MG/DL — SIGNIFICANT CHANGE UP (ref 0.2–1.2)
BUN SERPL-MCNC: 13 MG/DL — SIGNIFICANT CHANGE UP (ref 7–23)
CALCIUM SERPL-MCNC: 8.9 MG/DL — SIGNIFICANT CHANGE UP (ref 8.4–10.5)
CHLORIDE SERPL-SCNC: 100 MMOL/L — SIGNIFICANT CHANGE UP (ref 98–107)
CHOLEST SERPL-MCNC: 165 MG/DL — SIGNIFICANT CHANGE UP (ref 120–199)
CK MB BLD-MCNC: 4.8 — HIGH (ref 0–2.5)
CK MB BLD-MCNC: 71.88 NG/ML — HIGH (ref 1–6.6)
CK SERPL-CCNC: 1512 U/L — HIGH (ref 30–200)
CO2 SERPL-SCNC: 23 MMOL/L — SIGNIFICANT CHANGE UP (ref 22–31)
CREAT SERPL-MCNC: 0.49 MG/DL — LOW (ref 0.5–1.3)
GLUCOSE BLDC GLUCOMTR-MCNC: 214 MG/DL — HIGH (ref 70–99)
GLUCOSE BLDC GLUCOMTR-MCNC: 219 MG/DL — HIGH (ref 70–99)
GLUCOSE BLDC GLUCOMTR-MCNC: 256 MG/DL — HIGH (ref 70–99)
GLUCOSE BLDC GLUCOMTR-MCNC: 262 MG/DL — HIGH (ref 70–99)
GLUCOSE SERPL-MCNC: 247 MG/DL — HIGH (ref 70–99)
HAV IGM SER-ACNC: NONREACTIVE — SIGNIFICANT CHANGE UP
HBA1C BLD-MCNC: 8.7 % — HIGH (ref 4–5.6)
HBV CORE IGM SER-ACNC: NONREACTIVE — SIGNIFICANT CHANGE UP
HBV SURFACE AG SER-ACNC: NONREACTIVE — SIGNIFICANT CHANGE UP
HCT VFR BLD CALC: 46.9 % — SIGNIFICANT CHANGE UP (ref 39–50)
HCV AB S/CO SERPL IA: 0.1 S/CO — SIGNIFICANT CHANGE UP
HCV AB SERPL-IMP: SIGNIFICANT CHANGE UP
HDLC SERPL-MCNC: 37 MG/DL — SIGNIFICANT CHANGE UP (ref 35–55)
HGB BLD-MCNC: 15.9 G/DL — SIGNIFICANT CHANGE UP (ref 13–17)
LIPID PNL WITH DIRECT LDL SERPL: 115 MG/DL — SIGNIFICANT CHANGE UP
MAGNESIUM SERPL-MCNC: 2 MG/DL — SIGNIFICANT CHANGE UP (ref 1.6–2.6)
MCHC RBC-ENTMCNC: 30.3 PG — SIGNIFICANT CHANGE UP (ref 27–34)
MCHC RBC-ENTMCNC: 33.9 % — SIGNIFICANT CHANGE UP (ref 32–36)
MCV RBC AUTO: 89.5 FL — SIGNIFICANT CHANGE UP (ref 80–100)
NRBC # FLD: 0 — SIGNIFICANT CHANGE UP
PLATELET # BLD AUTO: 219 K/UL — SIGNIFICANT CHANGE UP (ref 150–400)
PMV BLD: 10.6 FL — SIGNIFICANT CHANGE UP (ref 7–13)
POTASSIUM SERPL-MCNC: 4.2 MMOL/L — SIGNIFICANT CHANGE UP (ref 3.5–5.3)
POTASSIUM SERPL-SCNC: 4.2 MMOL/L — SIGNIFICANT CHANGE UP (ref 3.5–5.3)
PROT SERPL-MCNC: 7.2 G/DL — SIGNIFICANT CHANGE UP (ref 6–8.3)
RBC # BLD: 5.24 M/UL — SIGNIFICANT CHANGE UP (ref 4.2–5.8)
RBC # FLD: 13.1 % — SIGNIFICANT CHANGE UP (ref 10.3–14.5)
SODIUM SERPL-SCNC: 138 MMOL/L — SIGNIFICANT CHANGE UP (ref 135–145)
TRIGL SERPL-MCNC: 133 MG/DL — SIGNIFICANT CHANGE UP (ref 10–149)
TROPONIN T SERPL-MCNC: 0.15 NG/ML — HIGH (ref 0–0.06)
WBC # BLD: 5.55 K/UL — SIGNIFICANT CHANGE UP (ref 3.8–10.5)
WBC # FLD AUTO: 5.55 K/UL — SIGNIFICANT CHANGE UP (ref 3.8–10.5)

## 2018-06-06 PROCEDURE — 93306 TTE W/DOPPLER COMPLETE: CPT | Mod: 26

## 2018-06-06 PROCEDURE — 70553 MRI BRAIN STEM W/O & W/DYE: CPT | Mod: 26

## 2018-06-06 PROCEDURE — 99232 SBSQ HOSP IP/OBS MODERATE 35: CPT

## 2018-06-06 PROCEDURE — 70548 MR ANGIOGRAPHY NECK W/DYE: CPT | Mod: 26

## 2018-06-06 RX ORDER — SODIUM CHLORIDE 9 MG/ML
1000 INJECTION INTRAMUSCULAR; INTRAVENOUS; SUBCUTANEOUS
Qty: 0 | Refills: 0 | Status: DISCONTINUED | OUTPATIENT
Start: 2018-06-06 | End: 2018-06-07

## 2018-06-06 RX ORDER — AMLODIPINE BESYLATE 2.5 MG/1
10 TABLET ORAL DAILY
Qty: 0 | Refills: 0 | Status: DISCONTINUED | OUTPATIENT
Start: 2018-06-06 | End: 2018-06-07

## 2018-06-06 RX ORDER — LISINOPRIL 2.5 MG/1
20 TABLET ORAL DAILY
Qty: 0 | Refills: 0 | Status: DISCONTINUED | OUTPATIENT
Start: 2018-06-06 | End: 2018-06-07

## 2018-06-06 RX ADMIN — Medication 2: at 13:13

## 2018-06-06 RX ADMIN — Medication 3: at 17:32

## 2018-06-06 RX ADMIN — LISINOPRIL 20 MILLIGRAM(S): 2.5 TABLET ORAL at 19:44

## 2018-06-06 RX ADMIN — Medication 1: at 22:27

## 2018-06-06 RX ADMIN — ENOXAPARIN SODIUM 40 MILLIGRAM(S): 100 INJECTION SUBCUTANEOUS at 13:13

## 2018-06-06 RX ADMIN — SODIUM CHLORIDE 75 MILLILITER(S): 9 INJECTION INTRAMUSCULAR; INTRAVENOUS; SUBCUTANEOUS at 01:49

## 2018-06-06 RX ADMIN — Medication 81 MILLIGRAM(S): at 13:13

## 2018-06-06 RX ADMIN — AMLODIPINE BESYLATE 10 MILLIGRAM(S): 2.5 TABLET ORAL at 17:32

## 2018-06-06 RX ADMIN — Medication 2: at 09:03

## 2018-06-06 NOTE — CONSULT NOTE ADULT - ASSESSMENT
1. aortic stenosis moderae no sx  2. elvated CPK and troponin no evidence of acute MI angina or chf  3. right arm numbness R/O TIA symptoms resolved    Recommend  neuro eval  monitor HR  3. 2 D echo re: AS and LV function

## 2018-06-06 NOTE — PROGRESS NOTE ADULT - PROBLEM SELECTOR PLAN 1
Right hand weakness, unclear etiology  neurological vs. rheumatological cause of myopathy  (Chronically elevated, was thought to be due to Statins but been on hold for a while, awaiting outpt Rheum eval)  pending MRI Head w/o contract, MRA Head Without contrast and MRA Neck with contrast (to be done today)  ASA/Statin, A1C, Lipid profile  Passed dysphagia screening on admission.   official S/S pending  TTE pending.   PT/OT

## 2018-06-06 NOTE — SWALLOW BEDSIDE ASSESSMENT ADULT - SWALLOW EVAL: DIAGNOSIS
Functional oral and pharyngeal stages of swallowing characterized by adequate bolus collection, manipulation and transfer, timely pharyngeal trigger, adequate hyolaryngeal excursion upon digital palpation, and no overt, clinical s/s of laryngeal penetration/aspiration across all trials.

## 2018-06-06 NOTE — SWALLOW BEDSIDE ASSESSMENT ADULT - ASR SWALLOW ASPIRATION MONITOR
oral hygiene/position upright (90Y)/gurgly voice/change of breathing pattern/cough/fever/pneumonia/throat clearing/upper respiratory infection

## 2018-06-06 NOTE — PROGRESS NOTE ADULT - PROBLEM SELECTOR PLAN 2
Trend CE to r/o ACS  Likely chronically elevated in setting of mild Rhabdo/Elevated CPK  Dr. Rodríguez consulted.   Pt has outpt rheumatology appointment with Dr. Holm at [facility].   will consider consulting Rheum here based on the result of MRI.

## 2018-06-06 NOTE — SWALLOW BEDSIDE ASSESSMENT ADULT - COMMENTS
MD orders received. Chart reviewed. As per MD and 2 family members at bedside, patient presently off unit at MRI. SLP to follow up for full dysphagia evaluation as schedule permits.
Patient is a 68 year old male PMHx of DM, Asthma, AS, presents with right hand weakness, chronic elevation of CPK and new elevated troponin admitted to tele to r/o ACS and Stroke. CTH negative. MRI results pending. Patient was received alert, communicating with family members and sitting upright on edge of bed.

## 2018-06-06 NOTE — PROGRESS NOTE ADULT - SUBJECTIVE AND OBJECTIVE BOX
· Subjective and Objective: 	  Neurology Consult Note    Name  ROBINSON RIVERS    HPI:  Patient is a 68 year old Male w/ PMHx DM, HTN, AS, right handed c/o right hand  strength deficit noticed after awakening around 5AM, last normal 12AM when he went to sleep. No associated sensory deficits, No headache, visual changes, chest pain, palpitations, recent illness. On ASA 81mg daily. Has appt for rheumatologist for elevated CK and neuromuscular weakness of the lower extremities          Subjective:    Review of Systems:  Constitutional:        Eyes, Ears, Mouth, Throat:   Respiratory:                            Cardiovascular:   Gastrointestinal:                                     Genitourinary:   Musculoskeletal:                                    Dermatologic:   Neurological: as per above                                                                 Psychiatric:   Endocrine:              Hematologic/Lymphatic:     MEDICATIONS  (STANDING):    MEDICATIONS  (PRN):      Allergies    No Known Allergies    Intolerances      PAST MEDICAL & SURGICAL HISTORY:  Heart murmur  Lipoma: right posterior neck/upper back  Prostate cancer: 2013- s/p TURP  Diabetes mellitus  Hypertension  Asthma: denies any hospitalizations, denies any intubations  S/P TURP: 2013    FH:  SH:    Objective:   Vital Signs Last 24 Hrs  T(C): 36.6  HR: 804 (05 Jun 2018 08:48) (104 - 124)  BP: 95860 (05 Jun 2018 08:48) (165/87 - 180/111)  BP(mean): --  RR: 18 (05 Jun 2018 08:48) (16 - 20)  SpO2: 98% (05 Jun 2018 08:48) (98% - 100%)    General Exam:   General appearance: No acute distress                   Neurological Exam:  Mental Status: Orientated to self, date and place.  Attention intact.  No dysarthria, aphasia or neglect.  Knowledge intact.  Registration intact.  Short and long term memory grossly intact.      Cranial Nerves: CN I - not tested.  PERRL, EOMI, VFF, no nystagmus or diplopia.  No APD.  Fundi not visualized bilaterally.  CN V1-3 intact to light touch and pinprick.  No facial asymmetry.  Hearing intact to finger rub bilaterally.  Tongue, uvula and palate midline.  Sternocleidomastoid and Trapezius intact bilaterally.    Motor:   Tone: normal.                  Strength:     [] Upper extremity                      Delt       Bicep    Tricep                                                  R         5/5        5/5        5/5       4/5  R wrist extension 2/5                                               L          5/5        5/5        5/5       5/5    [] Lower extremity                       HF          KE          KF        DF         PF                                               R        5/5        5/5        5/5       5/5       5/5                                               L         5/5 5/5 5/5 5/5 5/5  Pronator drift: none                 Dysmetria: None to finger-nose-finger or heel-shin-heel  No truncal ataxia.    Tremor: No resting, postural or action tremor.  No myoclonus.    Sensation: intact to light touch, pinprick, vibration and proprioception    Deep Tendon Reflexes: 1+ bilateral biceps, triceps, brachioradialis, knee and ankle  Toes flexor bilaterally        Other:                        14.9   6.12  )-----------( 214      ( 05 Jun 2018 05:50 )             42.7     06-05    137  |  96<L>  |  13  ----------------------------<  261<H>  4.0   |  26  |  0.52    Ca    8.8      05 Jun 2018 05:50    TPro  7.1  /  Alb  4.2  /  TBili  0.8  /  DBili  x   /  AST  46<H>  /  ALT  110<H>  /  AlkPhos  39<L>  06-05    LIVER FUNCTIONS - ( 05 Jun 2018 05:50 )  Alb: 4.2 g/dL / Pro: 7.1 g/dL / ALK PHOS: 39 u/L / ALT: 110 u/L / AST: 46 u/L / GGT: x           PT/INR - ( 05 Jun 2018 05:50 )   PT: 11.5 SEC;   INR: 1.04          PTT - ( 05 Jun 2018 05:50 )  PTT:29.4 SEC    Radiology    < from: CT Head No Cont (06.05.18 @ 06:50) >  No acute hemorrhage, mass effect, or evidencefor acute territorial   infarct.     < end of copied text >      Assessment and Recommendation:   · Assessment		  68 year old Male w/ PMHx DM, HTN, AS, right handed c/o right hand  strength deficit noticed after awakening around 5AM.  Neurological exam significant for R wrist extension weakness w/ decreased  strength.  CTH showing no acute abnormalities.  In setting of patient with significant risk factors, will work up for possible stroke, NIHSS 0, MRS 0.    Plan:  - MRI brain w/o contrast  - MRA head w/o contrast. neck w/ contrast    - PT/OT    Schoenberg, Laura Gray (MD)

## 2018-06-06 NOTE — CONSULT NOTE ADULT - SUBJECTIVE AND OBJECTIVE BOX
CHIEF COMPLAINT:  right arm numbness  HISTORY OF PRESENT ILLNESS:  HPI:  67 y/o male pt with PmHx of leg weakness, DM, HTN and AS presents with sudden onset of right handed weakness. Pt's hand was last normal last night at 12:00am, he woke up this morning at 4:30am and he was "not able to use the right hand." Pt woke up as he normally does to urinate. Pt was unable to move his wrist and had weak  strength. Pt did not have hand or wrist pain, abnormal sensation, or spasm. Hand was wrist were erythematous, slightly edematous and in slight flexion. Weakness was non-radiating was not alleviated or aggravated by position, palpation, or use. Pt has never had this happen before and did say this weakness was different from his leg weakness. The leg weakness comes with use, the hand weakness presented acutely and is not alleviated or aggravated by rest or use. Pt presented to [facility] ED for evaluation.  Of note, patient states has had chronic elevation in CPK for which has an outpatient rheumatology appointment set up, in the past was told the elevation may have been from Lipitor but medication was discontinued and and elevation remains. He denies hip weakness, shoulder weakness, problems raising from seated positions.   Pt denies facial droop, slurred speech, confusion, ataxia, dizziness, dysphagia, CP, palpitations, SOB, syncope, fever, malaise, falls, night sweats, weight change and trauma.   patent has mild exertional sob and fatigue walking long distances but no cp or dizziness    PAST MEDICAL & SURGICAL HISTORY:  Heart murmur: AS  Lipoma: right posterior neck/upper back  Prostate cancer: 2013- s/p TURP  Diabetes mellitus: Type 2 DM  Hypertension  Asthma: denies any hospitalizations, denies any intubations. Stopped taking Advair several years ago.  S/P TURP: 2013          MEDICATIONS:  aspirin  chewable 81 milliGRAM(s) Oral daily  enoxaparin Injectable 40 milliGRAM(s) SubCutaneous every 24 hours            dextrose 40% Gel 15 Gram(s) Oral once PRN  dextrose 50% Injectable 12.5 Gram(s) IV Push once  dextrose 50% Injectable 25 Gram(s) IV Push once  dextrose 50% Injectable 25 Gram(s) IV Push once  glucagon  Injectable 1 milliGRAM(s) IntraMuscular once PRN  insulin lispro (HumaLOG) corrective regimen sliding scale   SubCutaneous three times a day before meals  insulin lispro (HumaLOG) corrective regimen sliding scale   SubCutaneous at bedtime    dextrose 5%. 1000 milliLiter(s) IV Continuous <Continuous>  sodium chloride 0.9%. 1000 milliLiter(s) IV Continuous <Continuous>      FAMILY HISTORY:  Family history of early CAD (Child)  Family history of hypertension in father (Father)  Family history of heart disease (Child)  Family history of aneurysm: Father      Allergies    No Known Allergies    Intolerances    Lipitor (Muscle Pain)  	    PHYSICAL EXAM:  T(C): 36.6 (06-06-18 @ 06:00), Max: 36.6 (06-06-18 @ 06:00)  HR: 98 (06-06-18 @ 06:00) (83 - 98)  BP: 177/102 (06-06-18 @ 06:00) (139/86 - 177/102)  RR: 18 (06-06-18 @ 06:00) (16 - 18)  SpO2: 98% (06-06-18 @ 06:00) (97% - 100%)  Wt(kg): --  I&O's Summary    05 Jun 2018 07:01  -  06 Jun 2018 07:00  --------------------------------------------------------  IN: 220 mL / OUT: 500 mL / NET: -280 mL        Appearance: Normal	NAD  HEENT:   Normal oral mucosa, PERRL, EOMI	  Cardiovascular: Normal S1 S2, 2/6 MINDY mid peaking no dastolic murmur No JVD,   Respiratory: Lungs clear to auscultation	  Psychiatry: A & O x 3, Mood & affect appropriate  Gastrointestinal:  Soft, Non-tender, + BS	  Skin: No rashes, No ecchymoses, No cyanosis	  Neurologic: Non-focal  Extremities: No clubbing, cyanosis or edema  Vascular: Peripheral pulses palpable 2+ bilaterally    TELEMETRY: 	    ECG:  	  RADIOLOGY:  OTHER: 	  	  LABS:	 	    CARDIAC MARKERS:  Troponin T, Serum: 0.15 ng/mL (06-05 @ 23:00)  Troponin T, Serum: 0.12 ng/mL (06-05 @ 14:50)  Troponin T, Serum: 0.11 ng/mL (06-05 @ 05:50)      CKMB: 71.88 ng/mL (06-05 @ 23:00)  CKMB: 83.01 ng/mL (06-05 @ 14:50)  CKMB: 102.50 ng/mL (06-05 @ 05:50)    CKMB Relative Index: 4.8 (06-05 @ 23:00)  CKMB Relative Index: 4.9 (06-05 @ 14:50)  CKMB Relative Index: 5.4 (06-05 @ 05:50)                            15.9   5.55  )-----------( 219      ( 06 Jun 2018 06:23 )             46.9     06-06    138  |  100  |  13  ----------------------------<  247<H>  4.2   |  23  |  0.49<L>    Ca    8.9      06 Jun 2018 06:23  Mg     2.0     06-06    TPro  7.2  /  Alb  4.3  /  TBili  0.9  /  DBili  0.2  /  AST  42<H>  /  ALT  106<H>  /  AlkPhos  40  06-06    proBNP:   Lipid Profile:   HgA1c: Hemoglobin A1C, Whole Blood: 8.7 % (06-06 @ 06:23)  Hemoglobin A1C, Whole Blood: 8.6 % (06-05 @ 14:50)  < from: CT Head No Cont (06.05.18 @ 06:50) >  INDINGS:    There is volume loss and atherosclerosis. White matter hypodensities   noted compatible with mild chronic microvascular ischemic change in this   age group. There is no midline shift, mass effect, extra axial   collection, intracranial hemorrhage, or ventriculomegaly.    The calvarium is intact. Mucosal thickening of paranasal sinuses. The   mastoid air cells are clear.    IMPRESSION:    No acute hemorrhage, mass effect, or evidencefor acute territorial   infarct.       < end of copied text >    TSH:

## 2018-06-06 NOTE — SWALLOW BEDSIDE ASSESSMENT ADULT - SWALLOW EVAL: RECOMMENDED FEEDING/EATING TECHNIQUES
maintain upright posture during/after eating for 30 mins/no straws/position upright (90 degrees)/allow for swallow between intakes/small sips/bites

## 2018-06-06 NOTE — PROGRESS NOTE ADULT - ASSESSMENT
68 year old male pmh of DM, Asthma, AS presents with right hand weakness, chronic elevation of CPK and new elevated troponin admitted to tele to r/o ACS and Stroke.

## 2018-06-06 NOTE — PROGRESS NOTE ADULT - SUBJECTIVE AND OBJECTIVE BOX
Patient is a 68y old  Male who presents with a chief complaint of Right hand weakness (2018 10:36)      SUBJECTIVE / OVERNIGHT EVENTS:  Pt seen and examined at bedside.   No overnight event.  no cp, no sob, no n/v/d.   no pain.  the son and the wife at bedside.       Vital Signs Last 24 Hrs  T(C): 36.6 (2018 06:00), Max: 36.6 (2018 06:00)  T(F): 97.8 (2018 06:00), Max: 97.8 (2018 06:00)  HR: 98 (:00) (83 - 98)  BP: 177/102 (:00) (139/86 - 177/102)  BP(mean): --  RR: 18 (2018 06:00) (16 - 18)  SpO2: 98% (2018 06:00) (97% - 100%)  I&O's Summary    2018 07:01  -  2018 07:00  --------------------------------------------------------  IN: 220 mL / OUT: 500 mL / NET: -280 mL    2018 07:01  -  2018 10:42  --------------------------------------------------------  IN: 100 mL / OUT: 0 mL / NET: 100 mL        PHYSICAL EXAM:  GENERAL: NAD, Comfortable  HEAD:  Atraumatic, Normocephalic  EYES: EOMI, PERRLA, conjunctiva and sclera clear  NECK: Supple, No JVD  CHEST/LUNG: Clear to auscultation bilaterally; No wheeze  HEART: Regular rate and rhythm; No murmurs, rubs, or gallops  ABDOMEN: Soft, Nontender, Nondistended; Bowel sounds present  Neuro: AAO x 3, no focal deficit, 5/5 b/l extremities  EXTREMITIES:  2+ Peripheral Pulses, No clubbing, cyanosis, or edema  SKIN: No rashes or lesions    LABS:                        15.9   5.55  )-----------( 219      ( 2018 06:23 )             46.9     06-    138  |  100  |  13  ----------------------------<  247<H>  4.2   |  23  |  0.49<L>    Ca    8.9      2018 06:23  Mg     2.0     -    TPro  7.2  /  Alb  4.3  /  TBili  0.9  /  DBili  0.2  /  AST  42<H>  /  ALT  106<H>  /  AlkPhos  40  06-06    PT/INR - ( 2018 05:50 )   PT: 11.5 SEC;   INR: 1.04          PTT - ( 2018 05:50 )  PTT:29.4 SEC  CAPILLARY BLOOD GLUCOSE      POCT Blood Glucose.: 214 mg/dL (2018 08:57)  POCT Blood Glucose.: 227 mg/dL (2018 21:35)  POCT Blood Glucose.: 184 mg/dL (2018 18:22)  POCT Blood Glucose.: 215 mg/dL (2018 13:30)    CARDIAC MARKERS ( 2018 23:00 )  x     / 0.15 ng/mL / 1512 u/L / 71.88 ng/mL / x      CARDIAC MARKERS ( 2018 14:50 )  x     / 0.12 ng/mL / 1711 u/L / 83.01 ng/mL / x      CARDIAC MARKERS ( 2018 05:50 )  x     / 0.11 ng/mL / 1883 u/L / 102.50 ng/mL / x          Urinalysis Basic - ( 2018 19:00 )    Color: YELLOW / Appearance: CLEAR / S.022 / pH: 6.5  Gluc: >1000 / Ketone: MODERATE  / Bili: NEGATIVE / Urobili: NORMAL mg/dL   Blood: NEGATIVE / Protein: 100 mg/dL / Nitrite: NEGATIVE   Leuk Esterase: NEGATIVE / RBC: 2-5 / WBC 0-2   Sq Epi: x / Non Sq Epi: x / Bacteria: x        RADIOLOGY & ADDITIONAL TESTS:    Imaging Personally Reviewed:  [x] YES  [ ] NO    Consultant(s) Notes Reviewed:  [x] YES  [ ] NO      MEDICATIONS  (STANDING):  aspirin  chewable 81 milliGRAM(s) Oral daily  dextrose 5%. 1000 milliLiter(s) (50 mL/Hr) IV Continuous <Continuous>  dextrose 50% Injectable 12.5 Gram(s) IV Push once  dextrose 50% Injectable 25 Gram(s) IV Push once  dextrose 50% Injectable 25 Gram(s) IV Push once  enoxaparin Injectable 40 milliGRAM(s) SubCutaneous every 24 hours  insulin lispro (HumaLOG) corrective regimen sliding scale   SubCutaneous three times a day before meals  insulin lispro (HumaLOG) corrective regimen sliding scale   SubCutaneous at bedtime  sodium chloride 0.9%. 1000 milliLiter(s) (75 mL/Hr) IV Continuous <Continuous>    MEDICATIONS  (PRN):  dextrose 40% Gel 15 Gram(s) Oral once PRN Blood Glucose LESS THAN 70 milliGRAM(s)/deciliter  glucagon  Injectable 1 milliGRAM(s) IntraMuscular once PRN Glucose LESS THAN 70 milligrams/deciliter      Care Discussed with Consultants/Other Providers [x] YES  [ ] NO    HEALTH ISSUES - PROBLEM Dx:  Prophylactic measure: Prophylactic measure  Diabetes mellitus: Diabetes mellitus  Essential hypertension: Essential hypertension  Transaminitis: Transaminitis  Elevated troponin: Elevated troponin

## 2018-06-07 ENCOUNTER — TRANSCRIPTION ENCOUNTER (OUTPATIENT)
Age: 68
End: 2018-06-07

## 2018-06-07 VITALS
SYSTOLIC BLOOD PRESSURE: 156 MMHG | HEART RATE: 87 BPM | OXYGEN SATURATION: 100 % | DIASTOLIC BLOOD PRESSURE: 89 MMHG | RESPIRATION RATE: 18 BRPM

## 2018-06-07 LAB
ALBUMIN SERPL ELPH-MCNC: 4.2 G/DL — SIGNIFICANT CHANGE UP (ref 3.3–5)
ALP SERPL-CCNC: 38 U/L — LOW (ref 40–120)
ALT FLD-CCNC: 99 U/L — HIGH (ref 4–41)
AST SERPL-CCNC: 38 U/L — SIGNIFICANT CHANGE UP (ref 4–40)
BILIRUB SERPL-MCNC: 0.8 MG/DL — SIGNIFICANT CHANGE UP (ref 0.2–1.2)
BUN SERPL-MCNC: 17 MG/DL — SIGNIFICANT CHANGE UP (ref 7–23)
BUN SERPL-MCNC: 17 MG/DL — SIGNIFICANT CHANGE UP (ref 7–23)
CALCIUM SERPL-MCNC: 9 MG/DL — SIGNIFICANT CHANGE UP (ref 8.4–10.5)
CALCIUM SERPL-MCNC: 9 MG/DL — SIGNIFICANT CHANGE UP (ref 8.4–10.5)
CHLORIDE SERPL-SCNC: 100 MMOL/L — SIGNIFICANT CHANGE UP (ref 98–107)
CHLORIDE SERPL-SCNC: 100 MMOL/L — SIGNIFICANT CHANGE UP (ref 98–107)
CK SERPL-CCNC: 1275 U/L — HIGH (ref 30–200)
CO2 SERPL-SCNC: 24 MMOL/L — SIGNIFICANT CHANGE UP (ref 22–31)
CO2 SERPL-SCNC: 24 MMOL/L — SIGNIFICANT CHANGE UP (ref 22–31)
CREAT SERPL-MCNC: 0.55 MG/DL — SIGNIFICANT CHANGE UP (ref 0.5–1.3)
CREAT SERPL-MCNC: 0.55 MG/DL — SIGNIFICANT CHANGE UP (ref 0.5–1.3)
GLUCOSE BLDC GLUCOMTR-MCNC: 212 MG/DL — HIGH (ref 70–99)
GLUCOSE BLDC GLUCOMTR-MCNC: 224 MG/DL — HIGH (ref 70–99)
GLUCOSE BLDC GLUCOMTR-MCNC: 246 MG/DL — HIGH (ref 70–99)
GLUCOSE SERPL-MCNC: 248 MG/DL — HIGH (ref 70–99)
GLUCOSE SERPL-MCNC: 248 MG/DL — HIGH (ref 70–99)
POTASSIUM SERPL-MCNC: 4.5 MMOL/L — SIGNIFICANT CHANGE UP (ref 3.5–5.3)
POTASSIUM SERPL-MCNC: 4.5 MMOL/L — SIGNIFICANT CHANGE UP (ref 3.5–5.3)
POTASSIUM SERPL-SCNC: 4.5 MMOL/L — SIGNIFICANT CHANGE UP (ref 3.5–5.3)
POTASSIUM SERPL-SCNC: 4.5 MMOL/L — SIGNIFICANT CHANGE UP (ref 3.5–5.3)
PROT SERPL-MCNC: 7.1 G/DL — SIGNIFICANT CHANGE UP (ref 6–8.3)
SODIUM SERPL-SCNC: 138 MMOL/L — SIGNIFICANT CHANGE UP (ref 135–145)
SODIUM SERPL-SCNC: 138 MMOL/L — SIGNIFICANT CHANGE UP (ref 135–145)
TROPONIN T SERPL-MCNC: 0.12 NG/ML — HIGH (ref 0–0.06)

## 2018-06-07 PROCEDURE — 99223 1ST HOSP IP/OBS HIGH 75: CPT | Mod: GC

## 2018-06-07 RX ORDER — EZETIMIBE 10 MG/1
1 TABLET ORAL
Qty: 0 | Refills: 0 | COMMUNITY

## 2018-06-07 RX ADMIN — ENOXAPARIN SODIUM 40 MILLIGRAM(S): 100 INJECTION SUBCUTANEOUS at 13:20

## 2018-06-07 RX ADMIN — Medication 2: at 13:20

## 2018-06-07 RX ADMIN — AMLODIPINE BESYLATE 10 MILLIGRAM(S): 2.5 TABLET ORAL at 05:47

## 2018-06-07 RX ADMIN — Medication 2: at 18:17

## 2018-06-07 RX ADMIN — LISINOPRIL 20 MILLIGRAM(S): 2.5 TABLET ORAL at 05:47

## 2018-06-07 RX ADMIN — Medication 2: at 08:52

## 2018-06-07 RX ADMIN — Medication 81 MILLIGRAM(S): at 13:20

## 2018-06-07 NOTE — CONSULT NOTE ADULT - ASSESSMENT
69 yo M with hx of uncontrolled DM, statin induced myopathy, now with acute R wrist drop with no sensory deficits   Wrist drop may be due to positional etiology (compression of radial nerve), vs ischemic neuropathy (given hx of vasculopathy - DM/HTN)   Although myositis such as inclusion body myositis may be asymmetrical presentation, in this patient, the acute presentation with only wrist drop without any interosseous  muscle weakness (finger weakness) makes it less likely to be due to IBM.    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    Plan:  d/c zetia - considering high risk of CVD and need for lipid lowering agent would advise to use Repatha (evolocumab) as it is not known to cause myopathy  HMGCoA ab was sent  would obtain neurology evaluation and EMG to evaluate for neuropathy - to be done as outpatient  May need a tissue bx (muscle bx) to diagnose pt with IIM - to be discussed outpatient      Dr. 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    Plan:  d/c zetia - considering high risk of CVD and need for lipid lowering agent would advise to use a newer agent Repatha (evolocumab) as it is not known to cause myopathy  HMGCoA ab was sent  would obtain neurology evaluation and EMG to evaluate for neuropathy - to be done as outpatient  May need a tissue bx (muscle bx) to diagnose pt with IIM - to be discussed outpatient    Dr. Ramon ([phone])  Rheumatology Fellow

## 2018-06-07 NOTE — DISCHARGE NOTE ADULT - ADDITIONAL INSTRUCTIONS
follow up with your PMD in one week - call for appointment  follow up with Rheumatology in one week - call for appointment

## 2018-06-07 NOTE — DISCHARGE NOTE ADULT - MEDICATION SUMMARY - MEDICATIONS TO STOP TAKING
I will STOP taking the medications listed below when I get home from the hospital:    Zetia 10 mg oral tablet  -- 1 tab(s) by mouth once a day

## 2018-06-07 NOTE — DISCHARGE NOTE ADULT - CARE PROVIDER_API CALL
Saskia Bruce (), Internal Medicine  [address] [place] [address]  Phone: [phone]  Fax: [phone]    Rheumatology,   call for appointment  Phone: (   )    -  Fax: (   )    - Nicolle Holm), Internal Medicine; Rheumatology  [address] [facility]  [address]  [place] [address]  Phone: [phone]  Fax: [phone]    Chata Barkley), Internal Medicine  [address] [place] [address] [address]  [place] [address]  Phone: [phone]  Fax: [phone]    Francis Sepulveda (), Cardiology; Internal Medicine; Nuclear Cardiology  [address] [place] [address]  Phone: [phone]  Fax: [phone]

## 2018-06-07 NOTE — DISCHARGE NOTE ADULT - CARE PROVIDERS DIRECT ADDRESSES
,DirectAddress_Unknown,DirectAddress_Unknown ,DirectAddress_Unknown,DirectAddress_Unknown,[email or web address]

## 2018-06-07 NOTE — PROGRESS NOTE ADULT - ASSESSMENT
1. Moderate aortic stenosis  asymptomatic  2. No neurologic issues  3. Hemodynamically stable    Recommend  Proceed with rheumatological workup  No further cardiac workup or therapy needed  Discharge planning as per primary team

## 2018-06-07 NOTE — CONSULT NOTE ADULT - SUBJECTIVE AND OBJECTIVE BOX
[place]  [id number]    HISTORY OF PRESENT ILLNESS:    PAST MEDICAL & SURGICAL HISTORY:  Heart murmur: AS  Lipoma: right posterior neck/upper back  Prostate cancer: 2013- s/p TURP  Diabetes mellitus: Type 2 DM  Hypertension  Asthma: denies any hospitalizations, denies any intubations. Stopped taking Advair several years ago.  S/P TURP:       Review of Systems:  Gen:  No fevers/chills, weight loss  HEENT: No blurry vision, no difficulty swallowing  CVS: No chest pain/palpitations  Resp: No SOB/wheezing  GI: No N/V/C/D/abdominal pain  MSK:  Skin: No new rashes  Neuro: No headaches    MEDICATIONS  (STANDING):  amLODIPine   Tablet 10 milliGRAM(s) Oral daily  aspirin  chewable 81 milliGRAM(s) Oral daily  dextrose 5%. 1000 milliLiter(s) (50 mL/Hr) IV Continuous <Continuous>  dextrose 50% Injectable 12.5 Gram(s) IV Push once  dextrose 50% Injectable 25 Gram(s) IV Push once  dextrose 50% Injectable 25 Gram(s) IV Push once  enoxaparin Injectable 40 milliGRAM(s) SubCutaneous every 24 hours  insulin lispro (HumaLOG) corrective regimen sliding scale   SubCutaneous three times a day before meals  insulin lispro (HumaLOG) corrective regimen sliding scale   SubCutaneous at bedtime  lisinopril 20 milliGRAM(s) Oral daily  sodium chloride 0.9%. 1000 milliLiter(s) (75 mL/Hr) IV Continuous <Continuous>    MEDICATIONS  (PRN):  dextrose 40% Gel 15 Gram(s) Oral once PRN Blood Glucose LESS THAN 70 milliGRAM(s)/deciliter  glucagon  Injectable 1 milliGRAM(s) IntraMuscular once PRN Glucose LESS THAN 70 milligrams/deciliter      Allergies    No Known Allergies    Intolerances    Lipitor (Muscle Pain)      PERTINENT MEDICATION HISTORY:    SOCIAL HISTORY:  OCCUPATION:  TRAVEL HISTORY:    FAMILY HISTORY:  Family history of early CAD (Child)  Family history of hypertension in father (Father)  Family history of heart disease (Child)  Family history of aneurysm: Father      Vital Signs Last 24 Hrs  T(C): 36.7 (2018 05:42), Max: 36.7 (2018 19:40)  T(F): 98.1 (2018 05:42), Max: 98.1 (2018 05:42)  HR: 87 (2018 13:21) (86 - 90)  BP: 156/89 (2018 13:21) (156/89 - 178/102)  BP(mean): --  RR: 18 (2018 13:21) (18 - 18)  SpO2: 100% (2018 13:21) (98% - 100%)    Daily     Daily     Physical Exam:  General: No apparent distress  HEENT: EOMI, MMM  CVS: +S1/S2, RRR, no murmurs/rubs/gallops  Resp: CTA b/l. No crackles/wheezing  GI: Soft, NT/ND +BS  MSK:  Shoulders: wnl  Elbows: wnl  Wrists: wnl  MCPs: wnl  PIPs: wnl  DIPs: wnl   Hips: wnl  Knees: wnl   Ankle: wnl  Neuro: AAOx3  Skin: no visible rashes    LABS:                        15.9   5.55  )-----------( 219      ( 2018 06:23 )             46.9     06-07    138  |  100  |  17  ----------------------------<  248<H>  4.5   |  24  |  0.55    Ca    9.0      2018 06:30  Mg     2.0     06-    TPro  7.1  /  Alb  4.2  /  TBili  0.8  /  DBili  x   /  AST  38  /  ALT  99<H>  /  AlkPhos  38<L>  06-07      Urinalysis Basic - ( 2018 19:00 )    Color: YELLOW / Appearance: CLEAR / S.022 / pH: 6.5  Gluc: >1000 / Ketone: MODERATE  / Bili: NEGATIVE / Urobili: NORMAL mg/dL   Blood: NEGATIVE / Protein: 100 mg/dL / Nitrite: NEGATIVE   Leuk Esterase: NEGATIVE / RBC: 2-5 / WBC 0-2   Sq Epi: x / Non Sq Epi: x / Bacteria: x        RADIOLOGY & ADDITIONAL STUDIES: [place]  [id number]    HISTORY OF PRESENT ILLNESS:    67 yo M with hx of uncontorlled DM, HTN now admitted for acute right hand weakness.  Pt states as of last Tuesday morning he woke up with inability to move his hand - states Monday night he went to bed well.  Denies any loss of sensation.  Denies any trauma, tick or insect bites.  Denies any joint pain, weakness of any other limbs.   On ROS, denies any recent infection, dysphagia, HA, rash, joint pain, am stiffness of any joints, chest pain, SOB, abd pain, n/v, GI or  complaints.  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  He was monitored off of therapy - his muscle strength improved but not back to his baseline - to date he still has weakness and is only able to ambulate about one city block  His CK also have been high since although he is not able to recall the general range.    States recently his cholesterol increased thus he was started on zetia.      PAST MEDICAL & SURGICAL HISTORY:  Heart murmur: AS  Lipoma: right posterior neck/upper back  Prostate cancer: 2013- s/p TURP  Diabetes mellitus: Type 2 DM  Hypertension  Asthma: denies any hospitalizations, denies any intubations. Stopped taking Advair several years ago.  S/P TURP:       Review of Systems:  Gen:  No fevers/chills, weight loss  HEENT: No blurry vision, no difficulty swallowing  CVS: No chest pain/palpitations  Resp: No SOB/wheezing  GI: No N/V/C/D/abdominal pain  MSK: right hand weakness x 3 days   Skin: No rash  Neuro: No headaches    MEDICATIONS  (STANDING):  amLODIPine   Tablet 10 milliGRAM(s) Oral daily  aspirin  chewable 81 milliGRAM(s) Oral daily  dextrose 5%. 1000 milliLiter(s) (50 mL/Hr) IV Continuous <Continuous>  dextrose 50% Injectable 12.5 Gram(s) IV Push once  dextrose 50% Injectable 25 Gram(s) IV Push once  dextrose 50% Injectable 25 Gram(s) IV Push once  enoxaparin Injectable 40 milliGRAM(s) SubCutaneous every 24 hours  insulin lispro (HumaLOG) corrective regimen sliding scale   SubCutaneous three times a day before meals  insulin lispro (HumaLOG) corrective regimen sliding scale   SubCutaneous at bedtime  lisinopril 20 milliGRAM(s) Oral daily  sodium chloride 0.9%. 1000 milliLiter(s) (75 mL/Hr) IV Continuous <Continuous>    MEDICATIONS  (PRN):  dextrose 40% Gel 15 Gram(s) Oral once PRN Blood Glucose LESS THAN 70 milliGRAM(s)/deciliter  glucagon  Injectable 1 milliGRAM(s) IntraMuscular once PRN Glucose LESS THAN 70 milligrams/deciliter      Allergies    No Known Allergies    Intolerances    Lipitor (Muscle Pain)      PERTINENT MEDICATION HISTORY:    SOCIAL HISTORY: retired    FAMILY HISTORY:  Family history of early CAD (Child)  Family history of hypertension in father (Father)  Family history of heart disease (Child)  Family history of aneurysm: Father      Vital Signs Last 24 Hrs  T(C): 36.7 (2018 05:42), Max: 36.7 (2018 19:40)  T(F): 98.1 (2018 05:42), Max: 98.1 (2018 05:42)  HR: 87 (2018 13:21) (86 - 90)  BP: 156/89 (2018 13:21) (156/89 - 178/102)  BP(mean): --  RR: 18 (2018 13:21) (18 - 18)  SpO2: 100% (2018 13:21) (98% - 100%)    Daily     Daily     Physical Exam:  General: No apparent distress  HEENT: EOMI, MMM  CVS: +S1/S2, RRR   Resp: CTA b/l   GI: Soft, NT/ND +BS  MSK: right with dec ability to extend at wrist;   3/5 in b/l leg abductors, 4/5 b/l LE flexors; otherwise MS 5/5 in UE prox and distally and LE distally  Neuro: AAOx3; normal sensation throughout  Skin: no rash    LABS:                        15.9   5.55  )-----------( 219      ( 2018 06:23 )             46.9     06-07    138  |  100  |  17  ----------------------------<  248<H>  4.5   |  24  |  0.55    Ca    9.0      2018 06:30  Mg     2.0         TPro  7.1  /  Alb  4.2  /  TBili  0.8  /  DBili  x   /  AST  38  /  ALT  99<H>  /  AlkPhos  38<L>        Urinalysis Basic - ( 2018 19:00 )    Color: YELLOW / Appearance: CLEAR / S.022 / pH: 6.5  Gluc: >1000 / Ketone: MODERATE  / Bili: NEGATIVE / Urobili: NORMAL mg/dL   Blood: NEGATIVE / Protein: 100 mg/dL / Nitrite: NEGATIVE   Leuk Esterase: NEGATIVE / RBC: 2-5 / WBC 0-2   Sq Epi: x / Non Sq Epi: x / Bacteria: x        RADIOLOGY & ADDITIONAL STUDIES:    < from: CT Head No Cont (18 @ 06:50) >  FINDINGS:    There is volume loss and atherosclerosis. White matter hypodensities   noted compatible with mild chronic microvascular ischemic change in this   age group. There is no midline shift, mass effect, extra axial   collection, intracranial hemorrhage, or ventriculomegaly.    The calvarium is intact. Mucosal thickening of paranasal sinuses. The   mastoid air cells are clear.    IMPRESSION:    No acute hemorrhage, mass effect, or evidencefor acute territorial   infarct.       < from: MR Head No Cont (18 @ 12:55) >    Brain MRI:    There is no evidence for enhancing intracranial mass, acute infarct,   acute hemorrhage, or hydrocephalus.    Minimal patchy nonenhancing increased T2 and FLAIR signal involves the   white matter, most consistent with chronic microvascular ischemic changes   given the patient's age.    A linear branching vessel is present in the right parietal lobe in a   configuration most consistent with developmental venous anomaly.    Loss of the left vertebral artery flow-void is noted most consistent with   a moderate stenosis.    Mild to moderate mucosal thickening involves the paranasal sinuses   without associated air-fluid levels.    Brain MRA:    A moderate stenosis involves the intracranial segment of the left   vertebral artery.    A right posterior communicating artery is present with associated   hypoplastic P1 segment, a normal variant. Mild stenosis involves the M1   segment of the left middle cerebral artery. There is no major vessel   occlusion. There is no gross evidence for aneurysm.    Neck MRA:    Mild stenoses involve the origins of both internal carotid arteries,   measuring roughly 30% via NASCET criteria.    The right vertebral artery is dominant. The left vertebral artery is   small in caliber but patent. Moderate stenosis of the intracranial   segment of the left vertebral artery is noted.    There is no common carotid artery stenosis.    There is no gross evidence for carotid or vertebral artery dissection.    IMPRESSION:    1. On the brain MRI, there is no evidence for acute infarct or acute   hemorrhage. Mild chronic white matter changes are present.  2. On the brain MRA, a moderate stenosis involves the intracranial   segment of the left vertebral artery.  3. On the neck MRA, mild stenoses involve the origins of the internal   carotid arteries. [place]  [id number]    HISTORY OF PRESENT ILLNESS:    69 yo M with hx of uncontorlled DM, HTN now admitted for acute right hand weakness.  Pt states as of last Tuesday morning he woke up with inability to move his hand - states Monday night he went to bed well.  Denies any loss of sensation.  Denies any trauma, tick or insect bites.  Denies any joint pain, weakness of any other limbs.   On ROS, denies any recent infection, dysphagia, HA, rash, joint pain, am stiffness of any joints, chest pain, SOB, abd pain, n/v, GI or  complaints.  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  He was monitored off of therapy - his muscle strength improved but not back to his baseline - to date he still has weakness and is only able to ambulate about one city block  His CK also have been high since although he is not able to recall the general range.    States recently his cholesterol increased thus he was started on zetia.      PAST MEDICAL & SURGICAL HISTORY:  Heart murmur: AS  Lipoma: right posterior neck/upper back  Prostate cancer: 2013- s/p TURP  Diabetes mellitus: Type 2 DM  Hypertension  Asthma: denies any hospitalizations, denies any intubations. Stopped taking Advair several years ago.  S/P TURP:       Review of Systems:  Gen:  No fevers/chills, weight loss  HEENT: No blurry vision, no difficulty swallowing  CVS: No chest pain/palpitations  Resp: No SOB/wheezing  GI: No N/V/C/D/abdominal pain  MSK: right hand weakness x 3 days   Skin: No rash  Neuro: No headaches    MEDICATIONS  (STANDING):  amLODIPine   Tablet 10 milliGRAM(s) Oral daily  aspirin  chewable 81 milliGRAM(s) Oral daily  dextrose 5%. 1000 milliLiter(s) (50 mL/Hr) IV Continuous <Continuous>  dextrose 50% Injectable 12.5 Gram(s) IV Push once  dextrose 50% Injectable 25 Gram(s) IV Push once  dextrose 50% Injectable 25 Gram(s) IV Push once  enoxaparin Injectable 40 milliGRAM(s) SubCutaneous every 24 hours  insulin lispro (HumaLOG) corrective regimen sliding scale   SubCutaneous three times a day before meals  insulin lispro (HumaLOG) corrective regimen sliding scale   SubCutaneous at bedtime  lisinopril 20 milliGRAM(s) Oral daily  sodium chloride 0.9%. 1000 milliLiter(s) (75 mL/Hr) IV Continuous <Continuous>    MEDICATIONS  (PRN):  dextrose 40% Gel 15 Gram(s) Oral once PRN Blood Glucose LESS THAN 70 milliGRAM(s)/deciliter  glucagon  Injectable 1 milliGRAM(s) IntraMuscular once PRN Glucose LESS THAN 70 milligrams/deciliter      Allergies    No Known Allergies    Intolerances    Lipitor (Muscle Pain)      PERTINENT MEDICATION HISTORY:    SOCIAL HISTORY: retired    FAMILY HISTORY:  Family history of early CAD (Child)  Family history of hypertension in father (Father)  Family history of heart disease (Child)  Family history of aneurysm: Father      Vital Signs Last 24 Hrs  T(C): 36.7 (2018 05:42), Max: 36.7 (2018 19:40)  T(F): 98.1 (2018 05:42), Max: 98.1 (2018 05:42)  HR: 87 (2018 13:21) (86 - 90)  BP: 156/89 (2018 13:21) (156/89 - 178/102)  BP(mean): --  RR: 18 (2018 13:21) (18 - 18)  SpO2: 100% (2018 13:21) (98% - 100%)    Daily     Daily     Physical Exam:  General: No apparent distress  HEENT: EOMI, MMM  CVS: +S1/S2, RRR   Resp: CTA b/l   GI: Soft, NT/ND +BS  MSK: right with dec ability to extend at wrist; weakness of wrist dorsiflexion  3/5 in b/l leg abductors, 4/5 b/l LE flexors; otherwise MS 5/5 in UE prox and distally and LE distally  Neuro: AAOx3; normal sensation throughout  Skin: no rash    LABS:                        15.9   5.55  )-----------( 219      ( 2018 06:23 )             46.9     06-07    138  |  100  |  17  ----------------------------<  248<H>  4.5   |  24  |  0.55    Ca    9.0      2018 06:30  Mg     2.0         TPro  7.1  /  Alb  4.2  /  TBili  0.8  /  DBili  x   /  AST  38  /  ALT  99<H>  /  AlkPhos  38<L>        Urinalysis Basic - ( 2018 19:00 )    Color: YELLOW / Appearance: CLEAR / S.022 / pH: 6.5  Gluc: >1000 / Ketone: MODERATE  / Bili: NEGATIVE / Urobili: NORMAL mg/dL   Blood: NEGATIVE / Protein: 100 mg/dL / Nitrite: NEGATIVE   Leuk Esterase: NEGATIVE / RBC: 2-5 / WBC 0-2   Sq Epi: x / Non Sq Epi: x / Bacteria: x        RADIOLOGY & ADDITIONAL STUDIES:    < from: CT Head No Cont (18 @ 06:50) >  FINDINGS:    There is volume loss and atherosclerosis. White matter hypodensities   noted compatible with mild chronic microvascular ischemic change in this   age group. There is no midline shift, mass effect, extra axial   collection, intracranial hemorrhage, or ventriculomegaly.    The calvarium is intact. Mucosal thickening of paranasal sinuses. The   mastoid air cells are clear.    IMPRESSION:    No acute hemorrhage, mass effect, or evidencefor acute territorial   infarct.       < from: MR Head No Cont (18 @ 12:55) >    Brain MRI:    There is no evidence for enhancing intracranial mass, acute infarct,   acute hemorrhage, or hydrocephalus.    Minimal patchy nonenhancing increased T2 and FLAIR signal involves the   white matter, most consistent with chronic microvascular ischemic changes   given the patient's age.    A linear branching vessel is present in the right parietal lobe in a   configuration most consistent with developmental venous anomaly.    Loss of the left vertebral artery flow-void is noted most consistent with   a moderate stenosis.    Mild to moderate mucosal thickening involves the paranasal sinuses   without associated air-fluid levels.    Brain MRA:    A moderate stenosis involves the intracranial segment of the left   vertebral artery.    A right posterior communicating artery is present with associated   hypoplastic P1 segment, a normal variant. Mild stenosis involves the M1   segment of the left middle cerebral artery. There is no major vessel   occlusion. There is no gross evidence for aneurysm.    Neck MRA:    Mild stenoses involve the origins of both internal carotid arteries,   measuring roughly 30% via NASCET criteria.    The right vertebral artery is dominant. The left vertebral artery is   small in caliber but patent. Moderate stenosis of the intracranial   segment of the left vertebral artery is noted.    There is no common carotid artery stenosis.    There is no gross evidence for carotid or vertebral artery dissection.    IMPRESSION:    1. On the brain MRI, there is no evidence for acute infarct or acute   hemorrhage. Mild chronic white matter changes are present.  2. On the brain MRA, a moderate stenosis involves the intracranial   segment of the left vertebral artery.  3. On the neck MRA, mild stenoses involve the origins of the internal   carotid arteries. [place]  [id number]    HISTORY OF PRESENT ILLNESS:    69 yo M with hx of uncontorlled DM, HTN now admitted for acute right hand weakness.  Pt states as of last Tuesday morning he woke up with inability to move his hand - states Monday night he went to bed well.  Denies any loss of sensation.  Denies any trauma, tick or insect bites.  Denies any joint pain, weakness of any other limbs.   On ROS, denies any recent infection, dysphagia, HA, rash, joint pain, am stiffness of any joints, chest pain, SOB, abd pain, n/v, GI or  complaints.  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  He was monitored off of therapy - his muscle strength improved but not back to his baseline - to date he still has weakness and is only able to ambulate about one city block  His CK also have been high since although he is not able to recall the general range.    States recently his cholesterol increased thus he was started on zetia.      PAST MEDICAL & SURGICAL HISTORY:  Heart murmur: AS  Lipoma: right posterior neck/upper back  Prostate cancer: 2013- s/p TURP  Diabetes mellitus: Type 2 DM  Hypertension  Asthma: denies any hospitalizations, denies any intubations. Stopped taking Advair several years ago.  S/P TURP:       Review of Systems:  Gen:  No fevers/chills, weight loss  HEENT: No blurry vision, no difficulty swallowing  CVS: No chest pain/palpitations  Resp: No SOB/wheezing  GI: No N/V/C/D/abdominal pain  MSK: right hand weakness x 3 days   Skin: No rash  Neuro: No headaches    MEDICATIONS  (STANDING):  amLODIPine   Tablet 10 milliGRAM(s) Oral daily  aspirin  chewable 81 milliGRAM(s) Oral daily  dextrose 5%. 1000 milliLiter(s) (50 mL/Hr) IV Continuous <Continuous>  dextrose 50% Injectable 12.5 Gram(s) IV Push once  dextrose 50% Injectable 25 Gram(s) IV Push once  dextrose 50% Injectable 25 Gram(s) IV Push once  enoxaparin Injectable 40 milliGRAM(s) SubCutaneous every 24 hours  insulin lispro (HumaLOG) corrective regimen sliding scale   SubCutaneous three times a day before meals  insulin lispro (HumaLOG) corrective regimen sliding scale   SubCutaneous at bedtime  lisinopril 20 milliGRAM(s) Oral daily  sodium chloride 0.9%. 1000 milliLiter(s) (75 mL/Hr) IV Continuous <Continuous>    MEDICATIONS  (PRN):  dextrose 40% Gel 15 Gram(s) Oral once PRN Blood Glucose LESS THAN 70 milliGRAM(s)/deciliter  glucagon  Injectable 1 milliGRAM(s) IntraMuscular once PRN Glucose LESS THAN 70 milligrams/deciliter      Allergies    No Known Allergies    Intolerances    Lipitor (Muscle Pain)      PERTINENT MEDICATION HISTORY:    SOCIAL HISTORY: retired    FAMILY HISTORY:  Family history of early CAD (Child)  Family history of hypertension in father (Father)  Family history of heart disease (Child)  Family history of aneurysm: Father      Vital Signs Last 24 Hrs  T(C): 36.7 (2018 05:42), Max: 36.7 (2018 19:40)  T(F): 98.1 (2018 05:42), Max: 98.1 (2018 05:42)  HR: 87 (2018 13:21) (86 - 90)  BP: 156/89 (2018 13:21) (156/89 - 178/102)  BP(mean): --  RR: 18 (2018 13:21) (18 - 18)  SpO2: 100% (2018 13:21) (98% - 100%)    Daily     Daily     Physical Exam:  General: No apparent distress  HEENT: EOMI, MMM  CVS: +S1/S2, RRR   Resp: CTA b/l   GI: Soft, NT/ND +BS  MSK: weakness of wrist extensors 2/5;  3/5 in b/l leg abductors, 4/5 b/l hip flexors; otherwise MS 5/5 in UE prox and distally and LE distally  Neuro: AAOx3; normal sensation throughout  Skin: no rash    LABS:                        15.9   5.55  )-----------( 219      ( 2018 06:23 )             46.9     06-07    138  |  100  |  17  ----------------------------<  248<H>  4.5   |  24  |  0.55    Ca    9.0      2018 06:30  Mg     2.0         TPro  7.1  /  Alb  4.2  /  TBili  0.8  /  DBili  x   /  AST  38  /  ALT  99<H>  /  AlkPhos  38<L>        Urinalysis Basic - ( 2018 19:00 )    Color: YELLOW / Appearance: CLEAR / S.022 / pH: 6.5  Gluc: >1000 / Ketone: MODERATE  / Bili: NEGATIVE / Urobili: NORMAL mg/dL   Blood: NEGATIVE / Protein: 100 mg/dL / Nitrite: NEGATIVE   Leuk Esterase: NEGATIVE / RBC: 2-5 / WBC 0-2   Sq Epi: x / Non Sq Epi: x / Bacteria: x        RADIOLOGY & ADDITIONAL STUDIES:    < from: CT Head No Cont (18 @ 06:50) >  FINDINGS:    There is volume loss and atherosclerosis. White matter hypodensities   noted compatible with mild chronic microvascular ischemic change in this   age group. There is no midline shift, mass effect, extra axial   collection, intracranial hemorrhage, or ventriculomegaly.    The calvarium is intact. Mucosal thickening of paranasal sinuses. The   mastoid air cells are clear.    IMPRESSION:    No acute hemorrhage, mass effect, or evidencefor acute territorial   infarct.       < from: MR Head No Cont (18 @ 12:55) >    Brain MRI:    There is no evidence for enhancing intracranial mass, acute infarct,   acute hemorrhage, or hydrocephalus.    Minimal patchy nonenhancing increased T2 and FLAIR signal involves the   white matter, most consistent with chronic microvascular ischemic changes   given the patient's age.    A linear branching vessel is present in the right parietal lobe in a   configuration most consistent with developmental venous anomaly.    Loss of the left vertebral artery flow-void is noted most consistent with   a moderate stenosis.    Mild to moderate mucosal thickening involves the paranasal sinuses   without associated air-fluid levels.    Brain MRA:    A moderate stenosis involves the intracranial segment of the left   vertebral artery.    A right posterior communicating artery is present with associated   hypoplastic P1 segment, a normal variant. Mild stenosis involves the M1   segment of the left middle cerebral artery. There is no major vessel   occlusion. There is no gross evidence for aneurysm.    Neck MRA:    Mild stenoses involve the origins of both internal carotid arteries,   measuring roughly 30% via NASCET criteria.    The right vertebral artery is dominant. The left vertebral artery is   small in caliber but patent. Moderate stenosis of the intracranial   segment of the left vertebral artery is noted.    There is no common carotid artery stenosis.    There is no gross evidence for carotid or vertebral artery dissection.    IMPRESSION:    1. On the brain MRI, there is no evidence for acute infarct or acute   hemorrhage. Mild chronic white matter changes are present.  2. On the brain MRA, a moderate stenosis involves the intracranial   segment of the left vertebral artery.  3. On the neck MRA, mild stenoses involve the origins of the internal   carotid arteries.

## 2018-06-07 NOTE — DISCHARGE NOTE ADULT - PROVIDER TOKENS
TOKEN:'[id number]',FREE:[LAST:[Rheumatology],PHONE:[(   )    -],FAX:[(   )    -],ADDRESS:[call for appointment]] TOKEN:'[id number]',TOKEN:'[id number]',TOKEN:'[id number]'

## 2018-06-07 NOTE — DISCHARGE NOTE ADULT - PATIENT PORTAL LINK FT
You can access the [facility] Patient Portal, offered by [facility], by registering with the following website: [email or web address]

## 2018-06-07 NOTE — PROGRESS NOTE ADULT - SUBJECTIVE AND OBJECTIVE BOX
Subjective: Patient seen and examined. No new events except as noted.   no further clumsiness  or difficult movement of his right hand  Neurologic workup unremarkable  2-D echo compatible with moderate aortic stenosis with normal LV function  MEDICATIONS:  MEDICATIONS  (STANDING):  amLODIPine   Tablet 10 milliGRAM(s) Oral daily  aspirin  chewable 81 milliGRAM(s) Oral daily  dextrose 5%. 1000 milliLiter(s) (50 mL/Hr) IV Continuous <Continuous>  dextrose 50% Injectable 12.5 Gram(s) IV Push once  dextrose 50% Injectable 25 Gram(s) IV Push once  dextrose 50% Injectable 25 Gram(s) IV Push once  enoxaparin Injectable 40 milliGRAM(s) SubCutaneous every 24 hours  insulin lispro (HumaLOG) corrective regimen sliding scale   SubCutaneous three times a day before meals  insulin lispro (HumaLOG) corrective regimen sliding scale   SubCutaneous at bedtime  lisinopril 20 milliGRAM(s) Oral daily  sodium chloride 0.9%. 1000 milliLiter(s) (75 mL/Hr) IV Continuous <Continuous>      PHYSICAL EXAM:  T(C): 36.7 (06-07-18 @ 05:42), Max: 36.7 (06-06-18 @ 19:40)  HR: 87 (06-07-18 @ 13:21) (86 - 90)  BP: 156/89 (06-07-18 @ 13:21) (156/89 - 178/102)  RR: 18 (06-07-18 @ 13:21) (18 - 18)  SpO2: 100% (06-07-18 @ 13:21) (98% - 100%)  Wt(kg): --  I&O's Summary    06 Jun 2018 07:01  -  07 Jun 2018 07:00  --------------------------------------------------------  IN: 760 mL / OUT: 300 mL / NET: 460 mL    07 Jun 2018 07:01  -  07 Jun 2018 15:14  --------------------------------------------------------  IN: 725 mL / OUT: 0 mL / NET: 725 mL          Appearance: Normal	  HEENT:   Normal oral mucosa, PERRL, EOMI	  Cardiovascular: Normal S1 S2,2/6 systolic ejection murmur at the right base no diastolic murmur No JVD, No murmurs ,  Respiratory: Lungs clear to auscultation, normal effort 	  Ext: No edema  Peripheral pulses palpable 2+ bilaterally      LABS:    CARDIAC MARKERS:  CARDIAC MARKERS ( 07 Jun 2018 06:30 )  x     / 0.12 ng/mL / 1275 u/L / x     / x      CARDIAC MARKERS ( 05 Jun 2018 23:00 )  x     / 0.15 ng/mL / 1512 u/L / 71.88 ng/mL / x      CARDIAC MARKERS ( 05 Jun 2018 14:50 )  x     / 0.12 ng/mL / 1711 u/L / 83.01 ng/mL / x      CARDIAC MARKERS ( 05 Jun 2018 05:50 )  x     / 0.11 ng/mL / 1883 u/L / 102.50 ng/mL / x                                    15.9   5.55  )-----------( 219      ( 06 Jun 2018 06:23 )             46.9     06-07    138  |  100  |  17  ----------------------------<  248<H>  4.5   |  24  |  0.55    Ca    9.0      07 Jun 2018 06:30  Mg     2.0     06-06    TPro  7.1  /  Alb  4.2  /  TBili  0.8  /  DBili  x   /  AST  38  /  ALT  99<H>  /  AlkPhos  38<L>  06-07      < from: TTE with Doppler (w/Cont) (06.06.18 @ 14:33) >  CONCLUSIONS:  1. Mitral annular calcification, otherwise normal mitral  valve. Minimal mitral regurgitation.  2. Aortic valve leaflet morphology not well visualized.  The valve is calcified. Peak transaortic valve gradient  equals 43 mm Hg, mean transaortic valve gradient equals 21  mm Hg, estimated aortic valve area equals 1.2 sqcm (by  continuity equation), consistent with moderate aortic  stenosis.  3. Mildly dilated left atrium.  LA volume index = 39 cc/m2.  4. Normal left ventricular internal dimensions and wall  thicknesses.  5. Normal left ventricular systolic function. No segmental  wall motion abnormalities.  6. Normal right ventricular size and function.  7. A bubble study was performed with the intravenous  injection of agitated saline contrast and was inconclusive  regarding the presence of an interatrial shunt.  No obvious cardiac source of embolus was identified on this  transthoracic study.  If clinical suspicion is high,  consider LYNNE for further evaluation.  ------------------------------------------------------------------------  Confirmed on  6/6/2018 - 15:49:55 by Malcolm Evans M.D.  ------------------------------------------------------------------------    < end of copied text >

## 2018-06-07 NOTE — DISCHARGE NOTE ADULT - CARE PLAN
Principal Discharge DX:	Weakness  Goal:	prevent further episodes  Assessment and plan of treatment:	follow up with Rheumatology in one week - call for appointment  Secondary Diagnosis:	Diabetes mellitus

## 2018-06-07 NOTE — DISCHARGE NOTE ADULT - HOSPITAL COURSE
68 year old male pmh of DM, Ashtma, AS presents with right hand weakness, crhonic elevation of CPK and new elevated troponin admitted to [facility] to r/o ACS and Stroke  R/O CVA - NIHS 0, MRS 0  EKG ST @ 116  CK 1883 > 1711 CKMB 102.50  > 83 Trop 0.11 > 0.12  CXR No emergent findings  CT head: No acute findings, hemorrhage or cva.   Abd US Small gallstone. Hepatic steatosis.  Swallow eval: reg with thin liquids    6/6 MRI/A head Neck: On the brain MRI, there is no evidence for acute infarct or acute   hemorrhage. Mild chronic white matter changes are present.  2. On the brain MRA, a moderate stenosis involves the intracranial   segment of the left vertebral artery.  3. On the neck MRA, mild stenoses involve the origins of the internal   carotid arteries.    6/6 Echo: Mitral annular calcification, otherwise normal mitral valve. Minimal mitral regurgitation.  2. Aortic valve leaflet morphology not well visualized. The valve is calcified. Peak transaortic valve gradient equals 43 mm Hg, mean transaortic valve gradient equals 21 mm Hg, estimated aortic valve area equals 1.2 sqcm (by continuity equation), consistent with moderate aortic stenosis.  3. Mildly dilated left atrium.  LA volume index = 39 cc/m2.  4. Normal left ventricular internal dimensions and wall thicknesses.  5. Normal left ventricular systolic function. No segmental wall motion abnormalities.  6. Normal right ventricular size and function.  7. A bubble study was performed with the intravenous injection of agitated saline contrast and was inconclusive regarding the presence of an interatrial shunt.  No obvious cardiac source of embolus was identified on this  transthoracic study.  If clinical suspicion is high,  consider LYNNE for further evaluation.    Discussed with MD - pt stable for discharge home, pt with no complaints, discharge medications reviewed with MD. 68 year old male pmh of DM, Ashtma, AS presents with right hand weakness, crhonic elevation of CPK and new elevated troponin admitted to [facility] to r/o ACS and Stroke  R/O CVA - NIHS 0, MRS 0  EKG ST @ 116  CK 1883 > 1711 CKMB 102.50  > 83 Trop 0.11 > 0.12  CXR No emergent findings  CT head: No acute findings, hemorrhage or cva.   Abd US Small gallstone. Hepatic steatosis.  Swallow eval: reg with thin liquids    6/6 MRI/A head Neck: On the brain MRI, there is no evidence for acute infarct or acute   hemorrhage. Mild chronic white matter changes are present.  2. On the brain MRA, a moderate stenosis involves the intracranial   segment of the left vertebral artery.  3. On the neck MRA, mild stenoses involve the origins of the internal   carotid arteries.  (nothing to do per neuro Dr. Schoenberg) case discussed     6/6 Echo: Mitral annular calcification, otherwise normal mitral valve. Minimal mitral regurgitation.  2. Aortic valve leaflet morphology not well visualized. The valve is calcified. Peak transaortic valve gradient equals 43 mm Hg, mean transaortic valve gradient equals 21 mm Hg, estimated aortic valve area equals 1.2 sqcm (by continuity equation), consistent with moderate aortic stenosis.  3. Mildly dilated left atrium.  LA volume index = 39 cc/m2.  4. Normal left ventricular internal dimensions and wall thicknesses.  5. Normal left ventricular systolic function. No segmental wall motion abnormalities.  6. Normal right ventricular size and function.  7. A bubble study was performed with the intravenous injection of agitated saline contrast and was inconclusive regarding the presence of an interatrial shunt.  No obvious cardiac source of embolus was identified on this  transthoracic study.  If clinical suspicion is high,  consider LYNNE for further evaluation.    Discussed with MD - pt stable for discharge home, pt with no complaints, discharge medications reviewed with MD.  Rheum work up sent.  Advised to hold cholesterol medications including Zetia give elevated CPKs.     HMG CoA reductase Ab sent, pending result.  Pt can be follow up with either house Rheum or with [facility] Reginald Holm.   Have appointment tomorrow. 68 year old male pmh of DM, Ashtma, AS presents with right hand weakness, crhonic elevation of CPK and new elevated troponin admitted to [facility] to r/o ACS and Stroke  R/O CVA - NIHS 0, MRS 0  EKG ST @ 116  CK 1883 > 1711 CKMB 102.50  > 83 Trop 0.11 > 0.12  CXR No emergent findings  CT head: No acute findings, hemorrhage or cva.   Abd US Small gallstone. Hepatic steatosis.  Swallow eval: reg with thin liquids    6/6 MRI/A head Neck: On the brain MRI, there is no evidence for acute infarct or acute   hemorrhage. Mild chronic white matter changes are present.  2. On the brain MRA, a moderate stenosis involves the intracranial   segment of the left vertebral artery.  3. On the neck MRA, mild stenoses involve the origins of the internal   carotid arteries.  (nothing to do per neuro Dr. Schoenberg) case discussed     6/6 Echo: Mitral annular calcification, otherwise normal mitral valve. Minimal mitral regurgitation.  2. Aortic valve leaflet morphology not well visualized. The valve is calcified. Peak transaortic valve gradient equals 43 mm Hg, mean transaortic valve gradient equals 21 mm Hg, estimated aortic valve area equals 1.2 sqcm (by continuity equation), consistent with moderate aortic stenosis.  3. Mildly dilated left atrium.  LA volume index = 39 cc/m2.  4. Normal left ventricular internal dimensions and wall thicknesses.  5. Normal left ventricular systolic function. No segmental wall motion abnormalities.  6. Normal right ventricular size and function.  7. A bubble study was performed with the intravenous injection of agitated saline contrast and was inconclusive regarding the presence of an interatrial shunt.  No obvious cardiac source of embolus was identified on this  transthoracic study.  If clinical suspicion is high,  consider LYNNE for further evaluation.    Discussed with MD - pt stable for discharge home, pt with no complaints, discharge medications reviewed with MD.  Rheum work up sent.  Advised to hold cholesterol medications including Zetia give elevated CPKs.     HMG CoA reductase Ab sent, pending result.  Pt can be follow up with either house Rheum or with [place] [facility] Holm.   Have appointment tomorrow.     I discussed with pt, pt's wife and the son at bedside. all questions answered.  I discussed with House Rheum.

## 2018-06-07 NOTE — DISCHARGE NOTE ADULT - MEDICATION SUMMARY - MEDICATIONS TO TAKE
I will START or STAY ON the medications listed below when I get home from the hospital:    aspirin 81 mg oral tablet, chewable  -- 1 tab(s) by mouth once a day  -- Indication: For CAD prevention    lisinopril 20 mg oral tablet  -- 1 tab(s) by mouth once a day  -- Indication: For HTN    metFORMIN  -- 1000 milligram(s) by mouth 2 times a day. 1000 mg in morning and 1000 mg with dinner.  -- Indication: For DM    amLODIPine 10 mg oral tablet  -- 1 tab(s) by mouth once a day  -- Indication: For HTN    Vitamin D3 2000 intl units oral capsule  -- 1 cap(s) by mouth once a day  -- Indication: For supplement

## 2018-06-19 LAB — MISCELLANEOUS - CHEM: SIGNIFICANT CHANGE UP

## 2020-11-21 ENCOUNTER — INPATIENT (INPATIENT)
Facility: HOSPITAL | Age: 70
LOS: 2 days | Discharge: HOME | End: 2020-11-24
Attending: INTERNAL MEDICINE | Admitting: INTERNAL MEDICINE
Payer: MEDICARE

## 2020-11-21 VITALS
TEMPERATURE: 98 F | HEART RATE: 104 BPM | HEIGHT: 70 IN | DIASTOLIC BLOOD PRESSURE: 89 MMHG | SYSTOLIC BLOOD PRESSURE: 154 MMHG | RESPIRATION RATE: 16 BRPM | OXYGEN SATURATION: 99 %

## 2020-11-21 DIAGNOSIS — J45.20 MILD INTERMITTENT ASTHMA, UNCOMPLICATED: ICD-10-CM

## 2020-11-21 DIAGNOSIS — I10 ESSENTIAL (PRIMARY) HYPERTENSION: ICD-10-CM

## 2020-11-21 DIAGNOSIS — I35.0 NONRHEUMATIC AORTIC (VALVE) STENOSIS: ICD-10-CM

## 2020-11-21 DIAGNOSIS — R09.89 OTHER SPECIFIED SYMPTOMS AND SIGNS INVOLVING THE CIRCULATORY AND RESPIRATORY SYSTEMS: ICD-10-CM

## 2020-11-21 DIAGNOSIS — E11.65 TYPE 2 DIABETES MELLITUS WITH HYPERGLYCEMIA: ICD-10-CM

## 2020-11-21 DIAGNOSIS — K62.5 HEMORRHAGE OF ANUS AND RECTUM: ICD-10-CM

## 2020-11-21 DIAGNOSIS — Z90.79 ACQUIRED ABSENCE OF OTHER GENITAL ORGAN(S): Chronic | ICD-10-CM

## 2020-11-21 DIAGNOSIS — R60.0 LOCALIZED EDEMA: ICD-10-CM

## 2020-11-21 DIAGNOSIS — Z29.9 ENCOUNTER FOR PROPHYLACTIC MEASURES, UNSPECIFIED: ICD-10-CM

## 2020-11-21 LAB
ALBUMIN SERPL ELPH-MCNC: 4 G/DL — SIGNIFICANT CHANGE UP (ref 3.3–5)
ALP SERPL-CCNC: 28 U/L — LOW (ref 40–120)
ALT FLD-CCNC: 50 U/L — HIGH (ref 4–41)
ANION GAP SERPL CALC-SCNC: 14 MMO/L — SIGNIFICANT CHANGE UP (ref 7–14)
AST SERPL-CCNC: 31 U/L — SIGNIFICANT CHANGE UP (ref 4–40)
BASE EXCESS BLDV CALC-SCNC: 2.1 MMOL/L — SIGNIFICANT CHANGE UP
BASOPHILS # BLD AUTO: 0.04 K/UL — SIGNIFICANT CHANGE UP (ref 0–0.2)
BASOPHILS NFR BLD AUTO: 0.5 % — SIGNIFICANT CHANGE UP (ref 0–2)
BILIRUB SERPL-MCNC: 0.4 MG/DL — SIGNIFICANT CHANGE UP (ref 0.2–1.2)
BLD GP AB SCN SERPL QL: NEGATIVE — SIGNIFICANT CHANGE UP
BLOOD GAS VENOUS - CREATININE: 0.48 MG/DL — LOW (ref 0.5–1.3)
BLOOD GAS VENOUS - FIO2: 21 — SIGNIFICANT CHANGE UP
BUN SERPL-MCNC: 18 MG/DL — SIGNIFICANT CHANGE UP (ref 7–23)
CALCIUM SERPL-MCNC: 9.3 MG/DL — SIGNIFICANT CHANGE UP (ref 8.4–10.5)
CHLORIDE BLDV-SCNC: 106 MMOL/L — SIGNIFICANT CHANGE UP (ref 96–108)
CHLORIDE SERPL-SCNC: 100 MMOL/L — SIGNIFICANT CHANGE UP (ref 98–107)
CO2 SERPL-SCNC: 23 MMOL/L — SIGNIFICANT CHANGE UP (ref 22–31)
CREAT SERPL-MCNC: 0.48 MG/DL — LOW (ref 0.5–1.3)
EOSINOPHIL # BLD AUTO: 0.23 K/UL — SIGNIFICANT CHANGE UP (ref 0–0.5)
EOSINOPHIL NFR BLD AUTO: 2.6 % — SIGNIFICANT CHANGE UP (ref 0–6)
GAS PNL BLDV: 136 MMOL/L — SIGNIFICANT CHANGE UP (ref 136–146)
GLUCOSE BLDC GLUCOMTR-MCNC: 189 MG/DL — HIGH (ref 70–99)
GLUCOSE BLDV-MCNC: 222 MG/DL — HIGH (ref 70–99)
GLUCOSE SERPL-MCNC: 253 MG/DL — HIGH (ref 70–99)
HCO3 BLDV-SCNC: 26 MMOL/L — SIGNIFICANT CHANGE UP (ref 20–27)
HCT VFR BLD CALC: 33.9 % — LOW (ref 39–50)
HCT VFR BLD CALC: 35.3 % — LOW (ref 39–50)
HCT VFR BLDV CALC: 37.9 % — LOW (ref 39–51)
HGB BLD-MCNC: 11.4 G/DL — LOW (ref 13–17)
HGB BLD-MCNC: 11.7 G/DL — LOW (ref 13–17)
HGB BLDV-MCNC: 12.3 G/DL — LOW (ref 13–17)
IMM GRANULOCYTES NFR BLD AUTO: 0.5 % — SIGNIFICANT CHANGE UP (ref 0–1.5)
LACTATE BLDV-MCNC: 1.7 MMOL/L — SIGNIFICANT CHANGE UP (ref 0.5–2)
LYMPHOCYTES # BLD AUTO: 1.27 K/UL — SIGNIFICANT CHANGE UP (ref 1–3.3)
LYMPHOCYTES # BLD AUTO: 14.5 % — SIGNIFICANT CHANGE UP (ref 13–44)
MCHC RBC-ENTMCNC: 29.6 PG — SIGNIFICANT CHANGE UP (ref 27–34)
MCHC RBC-ENTMCNC: 29.8 PG — SIGNIFICANT CHANGE UP (ref 27–34)
MCHC RBC-ENTMCNC: 33.1 % — SIGNIFICANT CHANGE UP (ref 32–36)
MCHC RBC-ENTMCNC: 33.6 % — SIGNIFICANT CHANGE UP (ref 32–36)
MCV RBC AUTO: 88.7 FL — SIGNIFICANT CHANGE UP (ref 80–100)
MCV RBC AUTO: 89.4 FL — SIGNIFICANT CHANGE UP (ref 80–100)
MONOCYTES # BLD AUTO: 0.72 K/UL — SIGNIFICANT CHANGE UP (ref 0–0.9)
MONOCYTES NFR BLD AUTO: 8.2 % — SIGNIFICANT CHANGE UP (ref 2–14)
NEUTROPHILS # BLD AUTO: 6.47 K/UL — SIGNIFICANT CHANGE UP (ref 1.8–7.4)
NEUTROPHILS NFR BLD AUTO: 73.7 % — SIGNIFICANT CHANGE UP (ref 43–77)
NRBC # FLD: 0 K/UL — SIGNIFICANT CHANGE UP (ref 0–0)
NRBC # FLD: 0 K/UL — SIGNIFICANT CHANGE UP (ref 0–0)
OB PNL STL: POSITIVE — SIGNIFICANT CHANGE UP
PCO2 BLDV: 39 MMHG — LOW (ref 41–51)
PH BLDV: 7.44 PH — HIGH (ref 7.32–7.43)
PLATELET # BLD AUTO: 238 K/UL — SIGNIFICANT CHANGE UP (ref 150–400)
PLATELET # BLD AUTO: 250 K/UL — SIGNIFICANT CHANGE UP (ref 150–400)
PMV BLD: 11.3 FL — SIGNIFICANT CHANGE UP (ref 7–13)
PMV BLD: 11.5 FL — SIGNIFICANT CHANGE UP (ref 7–13)
PO2 BLDV: 55 MMHG — HIGH (ref 35–40)
POTASSIUM BLDV-SCNC: 3.8 MMOL/L — SIGNIFICANT CHANGE UP (ref 3.4–4.5)
POTASSIUM SERPL-MCNC: 4.1 MMOL/L — SIGNIFICANT CHANGE UP (ref 3.5–5.3)
POTASSIUM SERPL-SCNC: 4.1 MMOL/L — SIGNIFICANT CHANGE UP (ref 3.5–5.3)
PROT SERPL-MCNC: 7 G/DL — SIGNIFICANT CHANGE UP (ref 6–8.3)
RBC # BLD: 3.82 M/UL — LOW (ref 4.2–5.8)
RBC # BLD: 3.95 M/UL — LOW (ref 4.2–5.8)
RBC # FLD: 13.7 % — SIGNIFICANT CHANGE UP (ref 10.3–14.5)
RBC # FLD: 13.7 % — SIGNIFICANT CHANGE UP (ref 10.3–14.5)
RH IG SCN BLD-IMP: POSITIVE — SIGNIFICANT CHANGE UP
SAO2 % BLDV: 88.7 % — HIGH (ref 60–85)
SARS-COV-2 RNA SPEC QL NAA+PROBE: SIGNIFICANT CHANGE UP
SODIUM SERPL-SCNC: 137 MMOL/L — SIGNIFICANT CHANGE UP (ref 135–145)
WBC # BLD: 8.77 K/UL — SIGNIFICANT CHANGE UP (ref 3.8–10.5)
WBC # BLD: 8.88 K/UL — SIGNIFICANT CHANGE UP (ref 3.8–10.5)
WBC # FLD AUTO: 8.77 K/UL — SIGNIFICANT CHANGE UP (ref 3.8–10.5)
WBC # FLD AUTO: 8.88 K/UL — SIGNIFICANT CHANGE UP (ref 3.8–10.5)

## 2020-11-21 PROCEDURE — 99223 1ST HOSP IP/OBS HIGH 75: CPT

## 2020-11-21 PROCEDURE — 99285 EMERGENCY DEPT VISIT HI MDM: CPT

## 2020-11-21 PROCEDURE — 93970 EXTREMITY STUDY: CPT | Mod: 26

## 2020-11-21 RX ORDER — ALBUTEROL 90 UG/1
2 AEROSOL, METERED ORAL EVERY 6 HOURS
Refills: 0 | Status: DISCONTINUED | OUTPATIENT
Start: 2020-11-21 | End: 2020-11-24

## 2020-11-21 RX ORDER — SODIUM CHLORIDE 9 MG/ML
500 INJECTION INTRAMUSCULAR; INTRAVENOUS; SUBCUTANEOUS ONCE
Refills: 0 | Status: COMPLETED | OUTPATIENT
Start: 2020-11-21 | End: 2020-11-21

## 2020-11-21 RX ORDER — SODIUM CHLORIDE 9 MG/ML
1000 INJECTION, SOLUTION INTRAVENOUS
Refills: 0 | Status: DISCONTINUED | OUTPATIENT
Start: 2020-11-21 | End: 2020-11-24

## 2020-11-21 RX ORDER — DEXTROSE 50 % IN WATER 50 %
25 SYRINGE (ML) INTRAVENOUS ONCE
Refills: 0 | Status: DISCONTINUED | OUTPATIENT
Start: 2020-11-21 | End: 2020-11-24

## 2020-11-21 RX ORDER — DEXTROSE 50 % IN WATER 50 %
12.5 SYRINGE (ML) INTRAVENOUS ONCE
Refills: 0 | Status: DISCONTINUED | OUTPATIENT
Start: 2020-11-21 | End: 2020-11-24

## 2020-11-21 RX ORDER — METOPROLOL TARTRATE 50 MG
25 TABLET ORAL DAILY
Refills: 0 | Status: DISCONTINUED | OUTPATIENT
Start: 2020-11-21 | End: 2020-11-24

## 2020-11-21 RX ORDER — AMLODIPINE BESYLATE 2.5 MG/1
5 TABLET ORAL DAILY
Refills: 0 | Status: DISCONTINUED | OUTPATIENT
Start: 2020-11-21 | End: 2020-11-24

## 2020-11-21 RX ORDER — LISINOPRIL 2.5 MG/1
20 TABLET ORAL DAILY
Refills: 0 | Status: DISCONTINUED | OUTPATIENT
Start: 2020-11-21 | End: 2020-11-24

## 2020-11-21 RX ORDER — DEXTROSE 50 % IN WATER 50 %
15 SYRINGE (ML) INTRAVENOUS ONCE
Refills: 0 | Status: DISCONTINUED | OUTPATIENT
Start: 2020-11-21 | End: 2020-11-24

## 2020-11-21 RX ORDER — METFORMIN HYDROCHLORIDE 850 MG/1
1000 TABLET ORAL
Qty: 0 | Refills: 0 | DISCHARGE

## 2020-11-21 RX ORDER — ASPIRIN/CALCIUM CARB/MAGNESIUM 324 MG
81 TABLET ORAL DAILY
Refills: 0 | Status: DISCONTINUED | OUTPATIENT
Start: 2020-11-21 | End: 2020-11-24

## 2020-11-21 RX ORDER — INSULIN LISPRO 100/ML
VIAL (ML) SUBCUTANEOUS EVERY 6 HOURS
Refills: 0 | Status: DISCONTINUED | OUTPATIENT
Start: 2020-11-21 | End: 2020-11-23

## 2020-11-21 RX ORDER — GLUCAGON INJECTION, SOLUTION 0.5 MG/.1ML
1 INJECTION, SOLUTION SUBCUTANEOUS ONCE
Refills: 0 | Status: DISCONTINUED | OUTPATIENT
Start: 2020-11-21 | End: 2020-11-24

## 2020-11-21 RX ORDER — PANTOPRAZOLE SODIUM 20 MG/1
40 TABLET, DELAYED RELEASE ORAL
Refills: 0 | Status: DISCONTINUED | OUTPATIENT
Start: 2020-11-21 | End: 2020-11-24

## 2020-11-21 RX ADMIN — SODIUM CHLORIDE 500 MILLILITER(S): 9 INJECTION INTRAMUSCULAR; INTRAVENOUS; SUBCUTANEOUS at 22:50

## 2020-11-21 NOTE — ED PROVIDER NOTE - OBJECTIVE STATEMENT
71yo male with pmh of prostate ca, asthma, htn, dM presents from pcp for rectal bleeding. States he has had a few episodes of brbpr went to pcp and was guiac positive. Sent to ER for evaluation. Denies hx of GI bleed, anticoagluation use, hx of pud, hx of daily dirnking, sob, pierre, palpitations, dizziness and other associated sx. 69yo male with pmh of prostate ca, asthma, htn, dM presents from pcp for rectal bleeding. States he has had a few episodes of brbpr went to pcp and was guiac positive. Sent to ER for evaluation. Denies hx of GI bleed, anticoagluation use, hx of pud, hx of daily dirnking, sob, pierre, palpitations, dizziness and other associated sx.    Attendinyo male presents with rectal bleeding.  had bright red blood in the stool since last night.  no blood thinner use.  no shortness of breath or near syncope or syncope.

## 2020-11-21 NOTE — ED ADULT NURSE REASSESSMENT NOTE - NS ED NURSE REASSESS COMMENT FT1
Patient is awake, A&O x 3, NAD, ambulates with cane, presents to ED for rectal bleeding.  Experiencing several episodes of rectal bleeding, seen by PCP and referred to ED for further eval.  Initial hemoglobin 11.7 and repeat hemoglobin 11.4.  Denies SOB, dizziness, weakness or chest pain.  Vitals taken, admitted to med and awaiting a bed.  Will continue to monitor patient.

## 2020-11-21 NOTE — H&P ADULT - NSICDXPASTMEDICALHX_GEN_ALL_CORE_FT
PAST MEDICAL HISTORY:  Asthma denies any hospitalizations, denies any intubations. Stopped taking Advair several years ago.    Diabetes mellitus Type 2 DM    Heart murmur AS    Hypertension     Lipoma right posterior neck/upper back    Prostate cancer 2013- s/p TURP

## 2020-11-21 NOTE — H&P ADULT - NSHPPHYSICALEXAM_GEN_ALL_CORE
Physical exam:  Vital signs reviewed as below:  T(C): 36.9 (11-21-20 @ 17:27), Max: 36.9 (11-21-20 @ 17:27)  HR: 116 (11-21-20 @ 17:47) (104 - 116)  BP: 176/92 (11-21-20 @ 17:47) (154/89 - 176/92)  RR: 18 (11-21-20 @ 17:47) (16 - 18)  SpO2: 99% (11-21-20 @ 17:47) (99% - 99%)  Constitutional-awake, alert, not in acute distress  Neuro-awake, alert, appropriately interactive, moving all extremities,   face symmetric  Eyes-pupils equally round and reactive to light, non icteric, no discharge noted  Ears, nose, mouth, throat-no abnormal discharge, moist mucous membranes, oropharynx clear without noted exudate  CV-regular rate and rhythm, 3/6 holosystolic, LLSB, possible radiation to axilla, 1+ b/l lower extremity edema slightly worse on right  Resp-clear to auscultation bilaterally, normal respiratory effort,   GI-abdomen soft and nontender, no rebound or guarding present  -not examined  MSK-normal range of motion of bilateral elbows and knees, no obvious deformities   Skin-warm, dry, no rash appreciated  Psych-calm, cooperative, normal affect, not attending to internal stimuli    Rectal performed by ED and not repeated. Per ED note, melanotic stool, positive rectal tone.

## 2020-11-21 NOTE — ED PROVIDER NOTE - CLINICAL SUMMARY MEDICAL DECISION MAKING FREE TEXT BOX
71yo male with pmh of prostate ca, asthma, htn, dM presents from pcp for rectal bleeding. no anemia symptoms. No AC. Concerning for GI bled. Will get basic labs and guiac. Consider admission if guiac positive.

## 2020-11-21 NOTE — H&P ADULT - ASSESSMENT
70 year old man with a history of prostate cancer s/p prostatectomy, asthma, HTN, DM who presents for rectal bleeding.

## 2020-11-21 NOTE — ED PROVIDER NOTE - NS ED ROS FT
Constitutional: (-) Fever, (-) Anorexia, (-) Generalized Malaise  Eyes: (-)Discharge, (-) Irritation,  (-) Visual changes  EARS: (-) Ear Pain, (-) Apparent hearing changes  NOSE: (-) Congestion, (-) Bloody nose  MOUTH/THROAT: (-) Vocal Changes, (-) Drooling, (-) Sore throat  NECK: (-) Lumps, (-) Stiffness, (-) Pain  CV: (-) Chest Pain, (-) Palpitations, (-) Edema   RESP:  (-) Cough, (-) SOB, (-) WITT,  (-) Wheezing  GI: (-) Nausea, (-) Vomiting, (-) Abdominal Pain, (-) Diarrhea, (-) Constipation, (+) Bloody stools  : (-) Dysuria, (-) Frequency, (-) Hematuria, (-) Incontinence  MSK: (-) Joint Pain, (-) Back Pain, (-) Deformities  SKIN: (-) Wounds, (-) Color change, (-)Rash, (-) Swelling  NEURO:(-) Headache, (-) Dizziness, (-) Numbness/Tingling,  (-)LOC

## 2020-11-21 NOTE — H&P ADULT - PROBLEM SELECTOR PLAN 2
-Incidentally noted asymmetric lower extremity edema R>L. Patient himself denies edema so likely long standing.   -Will obtain lower extremity dopplers to rule out clot.

## 2020-11-21 NOTE — ED ADULT NURSE NOTE - OBJECTIVE STATEMENT
Pt aa&ox4 PMH DM, HTN, Prostate CA presenting to ED for 2 episodes BRBPR. Pt states he was seen a urgent care and told to come to ED for further evaluation. Pt denies abdominal pain, sob, weakness, fatigue. Pt denies AC usage. 20g IV placed in RT AC labs sent. Will monitor

## 2020-11-21 NOTE — H&P ADULT - HISTORY OF PRESENT ILLNESS
Owen Alberto is a 70 year old man with a history of prostate cancer s/p prostatectomy, asthma, HTN, DM who presents for rectal bleeding.    He had one episode of bright red blood per rectum yesterday. He did not have any associated abdominal pain and reports a moderate amount in the toilet bowl and on the toilet paper. He denies any black tarry stools. He takes a baby aspirin for possible prior CVA. Today, he had 2 more episodes, so presented to [facility] and was then directed to [facility] ED.     In the ED, he was afebrile, hypertensive, mildly tachycardic to 110s, with otherwise normal vital signs. Diagnostics revealed a hgb of 11.7 and positive FOBT. Of note, ED provider performed rectal exam with noted "melanotic stool." He was admitted for further management without medications given by the ED.     On evaluation, he gave the above history. He denied any chest pain, shortness of breath, lightheadedness, dizziness, cough, fevers, chills. He has never had a colonoscopy before. Owen Alberto is a 70 year old man with a history of prostate cancer s/p prostatectomy, asthma, HTN, DM who presents for rectal bleeding.    He had one episode of bright red blood per rectum yesterday. He did not have any associated abdominal pain and reports a moderate amount in the toilet bowl and on the toilet paper. He denies any black tarry stools. He takes a baby aspirin for possible prior CVA. Today, he had 2 more episodes, so presented to [facility] and was then directed to [facility] ED.     In the ED, he was afebrile, hypertensive, mildly tachycardic to 110s, with otherwise normal vital signs. Diagnostics revealed a hgb of 11.7 and positive FOBT. Of note, ED provider performed rectal exam with noted "melanotic stool." He was admitted for further management without medications given by the ED.     On evaluation, he gave the above history. He denied any chest pain, shortness of breath, lightheadedness, dizziness, cough, fevers, chills. He has never had a colonoscopy before.  He denies new lower extremity swelling.

## 2020-11-21 NOTE — ED PROVIDER NOTE - PHYSICAL EXAMINATION
PE:   GEN: Awake, alert, interactive, NAD, non-toxic appearing.   HEAD AND NECK: NC/AT. Airway patent. Neck supple.   EYES: Clear b/l. PERRL  CARDIAC: RRR. S1, S2. No evident pedal edema.    RESP: Normal respiratory effort with no use of accessory muscles or retractions. Clear throughout on auscultation.  ABD: soft, non-distended, non-tender. No rebound, no guarding.   RECTAL: Melanotic stool, no lesions. + rectal tone.   NEURO: AOx3, CN II-XII grossly intact, no focal deficits.   MSK: Moving all extremities with no apparent deformities.   SKIN: Warm, dry, intact normal color

## 2020-11-21 NOTE — H&P ADULT - PROBLEM SELECTOR PLAN 1
-Patient reports 3 episodes of bright red blood per rectum and denies melena. No BUN elevation. Overall higher suspicion for lower GI bleed. Differential includes diverticuli, hemorrhoids, AVM, malignancy.   -Of note, ED rectal exam with noted "melanotic stool" though.  -Given ED report of possible melena, will place on IV PPI BID for possible Upper GI bleed as well.  -Type and screen, consented for transfusion, transfuse for hgb<7, maintain PIV x2, serial CBC, SCDs and avoid pharmacologic anticoagulation. Continue ASA for now though given possible prior CVA.  -Consult GI for possible colonoscopy (email sent overnight)  -NPO at midnight

## 2020-11-21 NOTE — H&P ADULT - NSICDXFAMILYHX_GEN_ALL_CORE_FT
FAMILY HISTORY:  Family history of aneurysm, Father    Father  Still living? No  Family history of hypertension in father, Age at diagnosis: Age Unknown    Child  Still living? Yes, Estimated age: Age Unknown  Family history of early CAD, Age at diagnosis: Age Unknown  Family history of heart disease, Age at diagnosis: Age Unknown

## 2020-11-21 NOTE — H&P ADULT - NSHPREVIEWOFSYSTEMS_GEN_ALL_CORE
ROS:  Constitutional:  (+) none; (-) fevers, chills, sweats, unintentional weight loss, fatigue, sick contacts, recent travel, trauma  Ears/Nose/Mouth/Throat: (+) none; (-) throat pain, rhinorrhea, dysphagia/odynophagia  CV: (+) none; (-) chest pain, palpitations, lower extremity edema, orthopnea, PND  Resp: (+) none; (-) shortness of breath, cough  GI: (+) hematochezia, denied melena but ED rectal reported melena(-) abdominal pain, nausea, vomiting, diarrhea, constipation, melena,   : (+) none; (-) dysuria, hematuria  MSK: (+) none; (-) joint pain, joint swelling  Skin: (+) none; (-) rash, new yellowing/darkening of skin  Neuro: (+) none; (-) HA, vision changes, weakness, confusion, lightheadedness/dizziness  Endo: (+) none; (-) heat/cold intolerance, recent skin/hair/nail changes  Heme/Lymph: (+) none; (-) swollen lymph nodes, night sweats

## 2020-11-21 NOTE — H&P ADULT - NSHPLABSRESULTS_GEN_ALL_CORE
Diagnostics reviewed and notable for:  CBC w/ Hgb 11.7  FOBT Positive  CMP w/ glucose 253, alk phos 28, ALT 50  COVID pending  EKG personally reviewed and sinus tachycardia to 109, occasional PVC, TWI in I, AVL, . TWI new compared to 2018    Repeat CBC. T/S, VBG ordered but not yet obtained. Diagnostics reviewed and notable for:  CBC w/ Hgb 11.7  FOBT Positive  CMP w/ glucose 253, alk phos 28, ALT 50  COVID pending  EKG personally reviewed and sinus tachycardia to 109, occasional PVC, TWI in I, AVL, . TWI new compared to 2018    Repeat CBC. T/S, VBG ordered but not yet obtained.    TTE 2018 normal EF, moderate AS.

## 2020-11-22 LAB
ALBUMIN SERPL ELPH-MCNC: 4 G/DL — SIGNIFICANT CHANGE UP (ref 3.3–5)
ALP SERPL-CCNC: 25 U/L — LOW (ref 40–120)
ALT FLD-CCNC: 48 U/L — HIGH (ref 4–41)
ANION GAP SERPL CALC-SCNC: 11 MMO/L — SIGNIFICANT CHANGE UP (ref 7–14)
AST SERPL-CCNC: 29 U/L — SIGNIFICANT CHANGE UP (ref 4–40)
BILIRUB SERPL-MCNC: 0.6 MG/DL — SIGNIFICANT CHANGE UP (ref 0.2–1.2)
BUN SERPL-MCNC: 21 MG/DL — SIGNIFICANT CHANGE UP (ref 7–23)
CALCIUM SERPL-MCNC: 9.1 MG/DL — SIGNIFICANT CHANGE UP (ref 8.4–10.5)
CHLORIDE SERPL-SCNC: 103 MMOL/L — SIGNIFICANT CHANGE UP (ref 98–107)
CO2 SERPL-SCNC: 25 MMOL/L — SIGNIFICANT CHANGE UP (ref 22–31)
CREAT SERPL-MCNC: 0.47 MG/DL — LOW (ref 0.5–1.3)
GLUCOSE BLDC GLUCOMTR-MCNC: 155 MG/DL — HIGH (ref 70–99)
GLUCOSE BLDC GLUCOMTR-MCNC: 169 MG/DL — HIGH (ref 70–99)
GLUCOSE BLDC GLUCOMTR-MCNC: 201 MG/DL — HIGH (ref 70–99)
GLUCOSE BLDC GLUCOMTR-MCNC: 310 MG/DL — HIGH (ref 70–99)
GLUCOSE SERPL-MCNC: 199 MG/DL — HIGH (ref 70–99)
HBA1C BLD-MCNC: 7.3 % — HIGH (ref 4–5.6)
HCT VFR BLD CALC: 33 % — LOW (ref 39–50)
HCT VFR BLD CALC: 34.8 % — LOW (ref 39–50)
HCV AB S/CO SERPL IA: 0.15 S/CO — SIGNIFICANT CHANGE UP (ref 0–0.99)
HCV AB SERPL-IMP: SIGNIFICANT CHANGE UP
HGB BLD-MCNC: 10.8 G/DL — LOW (ref 13–17)
HGB BLD-MCNC: 11.6 G/DL — LOW (ref 13–17)
MAGNESIUM SERPL-MCNC: 1.8 MG/DL — SIGNIFICANT CHANGE UP (ref 1.6–2.6)
MCHC RBC-ENTMCNC: 29.4 PG — SIGNIFICANT CHANGE UP (ref 27–34)
MCHC RBC-ENTMCNC: 29.9 PG — SIGNIFICANT CHANGE UP (ref 27–34)
MCHC RBC-ENTMCNC: 32.7 % — SIGNIFICANT CHANGE UP (ref 32–36)
MCHC RBC-ENTMCNC: 33.3 % — SIGNIFICANT CHANGE UP (ref 32–36)
MCV RBC AUTO: 89.7 FL — SIGNIFICANT CHANGE UP (ref 80–100)
MCV RBC AUTO: 89.9 FL — SIGNIFICANT CHANGE UP (ref 80–100)
NRBC # FLD: 0 K/UL — SIGNIFICANT CHANGE UP (ref 0–0)
NRBC # FLD: 0 K/UL — SIGNIFICANT CHANGE UP (ref 0–0)
PHOSPHATE SERPL-MCNC: 3.6 MG/DL — SIGNIFICANT CHANGE UP (ref 2.5–4.5)
PLATELET # BLD AUTO: 265 K/UL — SIGNIFICANT CHANGE UP (ref 150–400)
PLATELET # BLD AUTO: 283 K/UL — SIGNIFICANT CHANGE UP (ref 150–400)
PMV BLD: 11 FL — SIGNIFICANT CHANGE UP (ref 7–13)
PMV BLD: 11.4 FL — SIGNIFICANT CHANGE UP (ref 7–13)
POTASSIUM SERPL-MCNC: 3.9 MMOL/L — SIGNIFICANT CHANGE UP (ref 3.5–5.3)
POTASSIUM SERPL-SCNC: 3.9 MMOL/L — SIGNIFICANT CHANGE UP (ref 3.5–5.3)
PROT SERPL-MCNC: 7.1 G/DL — SIGNIFICANT CHANGE UP (ref 6–8.3)
RBC # BLD: 3.67 M/UL — LOW (ref 4.2–5.8)
RBC # BLD: 3.88 M/UL — LOW (ref 4.2–5.8)
RBC # FLD: 13.7 % — SIGNIFICANT CHANGE UP (ref 10.3–14.5)
RBC # FLD: 13.9 % — SIGNIFICANT CHANGE UP (ref 10.3–14.5)
SARS-COV-2 IGG SERPL QL IA: NEGATIVE — SIGNIFICANT CHANGE UP
SARS-COV-2 IGM SERPL IA-ACNC: 0.1 INDEX — SIGNIFICANT CHANGE UP
SODIUM SERPL-SCNC: 139 MMOL/L — SIGNIFICANT CHANGE UP (ref 135–145)
WBC # BLD: 10.17 K/UL — SIGNIFICANT CHANGE UP (ref 3.8–10.5)
WBC # BLD: 8.33 K/UL — SIGNIFICANT CHANGE UP (ref 3.8–10.5)
WBC # FLD AUTO: 10.17 K/UL — SIGNIFICANT CHANGE UP (ref 3.8–10.5)
WBC # FLD AUTO: 8.33 K/UL — SIGNIFICANT CHANGE UP (ref 3.8–10.5)

## 2020-11-22 PROCEDURE — 99222 1ST HOSP IP/OBS MODERATE 55: CPT | Mod: GC

## 2020-11-22 RX ORDER — SOD SULF/SODIUM/NAHCO3/KCL/PEG
2000 SOLUTION, RECONSTITUTED, ORAL ORAL
Refills: 0 | Status: COMPLETED | OUTPATIENT
Start: 2020-11-22 | End: 2020-11-22

## 2020-11-22 RX ORDER — SODIUM CHLORIDE 9 MG/ML
1000 INJECTION INTRAMUSCULAR; INTRAVENOUS; SUBCUTANEOUS
Refills: 0 | Status: DISCONTINUED | OUTPATIENT
Start: 2020-11-22 | End: 2020-11-24

## 2020-11-22 RX ADMIN — Medication 1: at 00:00

## 2020-11-22 RX ADMIN — Medication 81 MILLIGRAM(S): at 12:27

## 2020-11-22 RX ADMIN — Medication 2000 MILLILITER(S): at 21:01

## 2020-11-22 RX ADMIN — Medication 4: at 12:27

## 2020-11-22 RX ADMIN — PANTOPRAZOLE SODIUM 40 MILLIGRAM(S): 20 TABLET, DELAYED RELEASE ORAL at 17:50

## 2020-11-22 RX ADMIN — PANTOPRAZOLE SODIUM 40 MILLIGRAM(S): 20 TABLET, DELAYED RELEASE ORAL at 05:48

## 2020-11-22 RX ADMIN — Medication 2: at 05:53

## 2020-11-22 RX ADMIN — Medication 2000 MILLILITER(S): at 17:50

## 2020-11-22 RX ADMIN — Medication 1: at 23:19

## 2020-11-22 RX ADMIN — Medication 1: at 17:55

## 2020-11-22 RX ADMIN — AMLODIPINE BESYLATE 5 MILLIGRAM(S): 2.5 TABLET ORAL at 05:48

## 2020-11-22 RX ADMIN — LISINOPRIL 20 MILLIGRAM(S): 2.5 TABLET ORAL at 05:48

## 2020-11-22 RX ADMIN — Medication 20 MILLIGRAM(S): at 21:02

## 2020-11-22 RX ADMIN — Medication 25 MILLIGRAM(S): at 05:48

## 2020-11-22 NOTE — PATIENT PROFILE ADULT - NSPRONUTRITIONRISK_GEN_A_NUR
Quality 431: Preventive Care And Screening: Unhealthy Alcohol Use - Screening: Patient screened for unhealthy alcohol use using a single question and scores less than 2 times per year Detail Level: Detailed No indicators present Quality 110: Preventive Care And Screening: Influenza Immunization: Influenza Immunization previously received during influenza season Quality 130: Documentation Of Current Medications In The Medical Record: Current Medications Documented Quality 111:Pneumonia Vaccination Status For Older Adults: Pneumococcal Vaccination not Administered or Previously Received, Reason not Otherwise Specified Quality 226: Preventive Care And Screening: Tobacco Use: Screening And Cessation Intervention: Patient screened for tobacco and never smoked

## 2020-11-22 NOTE — CONSULT NOTE ADULT - ASSESSMENT
70M w/ hx of prostate cancer s/p prostatectomy w/o radiation or brachytherapy, possible CVA on ASA, statin induced myositis on IVIG infusion, ibuprofen ingestion, presenting w/ hematochezia. VS stable, Hb stable in 11s. No prior hx of endoscopy.    Impression:  #Hematochezia - d/dx diverticular bleed, hemorrhoids, bleeding colon polyp, colon cancer, UGI source less likely, angioectasia possible  #Prostate cancer - denies hx of radiation which would make radiation proctitis more likely  #Statin induced myositis  #Possible CVA on ASA    Recommendations:  - trend Hb, transfuse if Hb < 7  - IV PPI BID  - clear liquid diet today  - prep ordered  - NPO @ MN  - Colonoscopy +/- EGD tomorrow    William Osborn  Gastroenterology Fellow  NON-URGENT CONSULTS:  Please email [email or web address]  AT NIGHT AND ON WEEKENDS:  Contact on-call GI fellow via answering service ([phone]) from 5pm-8am and on weekends/holidays  MONDAY-FRIDAY 8AM-5PM:  Available via [employer] Teams  Pager# [phone] ([facility]) or [phone] ([facility])  GI Phone# [phone] ([facility])

## 2020-11-22 NOTE — PATIENT PROFILE ADULT - DOES PATIENT HAVE ADVANCE DIRECTIVE
Calling patient per Julianne López CNM to let know of negative results of recent GC/CT.  Patient verbally acknowledged understanding of these results.   No

## 2020-11-22 NOTE — CONSULT NOTE ADULT - SUBJECTIVE AND OBJECTIVE BOX
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 Pt was feeling well - denies pain, light-headedness, syncope. Never happened before.    GI ROS negative for weight loss, f/c, dysphagia, odynophagia, early satiety, poor PO intake, abd pain, nausea, vomiting, diarrhea, change in stool caliber, family history of GI cancers.    No prior hx of EGD/Colonoscopy.     PMHX/PSHX:  Heart murmur    Lipoma    Prostate cancer    Diabetes mellitus    Hypertension    Asthma    S/P TURP      Allergies:  Lipitor (Muscle Pain)  No Known Allergies      Home Medications: reviewed  Hospital Medications:  ALBUTerol    90 MICROgram(s) HFA Inhaler 2 Puff(s) Inhalation every 6 hours PRN  amLODIPine   Tablet 5 milliGRAM(s) Oral daily  aspirin enteric coated 81 milliGRAM(s) Oral daily  dextrose 40% Gel 15 Gram(s) Oral once  dextrose 5%. 1000 milliLiter(s) IV Continuous <Continuous>  dextrose 5%. 1000 milliLiter(s) IV Continuous <Continuous>  dextrose 50% Injectable 25 Gram(s) IV Push once  dextrose 50% Injectable 12.5 Gram(s) IV Push once  dextrose 50% Injectable 25 Gram(s) IV Push once  glucagon  Injectable 1 milliGRAM(s) IntraMuscular once  insulin lispro (ADMELOG) corrective regimen sliding scale   SubCutaneous every 6 hours  lisinopril 20 milliGRAM(s) Oral daily  metoprolol succinate ER 25 milliGRAM(s) Oral daily  pantoprazole  Injectable 40 milliGRAM(s) IV Push two times a day      Social History:   Tob: Denies  EtOH: Denies  Illicit Drugs: Denies    Family history:  Family history of early CAD (Child)    Family history of hypertension in father (Father)    Family history of heart disease (Child)    Family history of aneurysm      Denies family history of colon cancer/polyps, stomach cancer/polyps, pancreatic cancer/masses, liver cancer/disease, ovarian cancer and endometrial cancer.    ROS:   General:  No  fevers, chills, night sweats, fatigue  Eyes:  Good vision, no reported pain  ENT:  No sore throat, pain, runny nose  CV:  No pain, palpitations  Pulm:  No dyspnea, cough  GI:  See HPI, otherwise negative  :  No  incontinence, nocturia  Muscle:  No pain, weakness  Neuro:  No memory problems  Psych:  No insomnia, mood problems, depression  Endocrine:  No polyuria, polydipsia, cold/heat intolerance  Heme:  No petechiae, ecchymosis, easy bruisability  Skin:  No rash    PHYSICAL EXAM:   Vital Signs:  Vital Signs Last 24 Hrs  T(C): 36.7 (22 Nov 2020 05:37), Max: 36.9 (21 Nov 2020 17:27)  T(F): 98 (22 Nov 2020 05:37), Max: 98.4 (21 Nov 2020 17:27)  HR: 88 (22 Nov 2020 05:37) (81 - 116)  BP: 138/62 (22 Nov 2020 05:37) (135/87 - 176/92)  BP(mean): --  RR: 16 (22 Nov 2020 05:37) (16 - 18)  SpO2: 99% (22 Nov 2020 05:37) (97% - 99%)  Daily Height in cm: 177.8 (22 Nov 2020 00:48)    Daily     GENERAL: no acute distress  NEURO: alert, no asterixis  HEENT: anicteric sclera, no conjunctival pallor appreciated  CHEST: no respiratory distress, no accessory muscle use  CARDIAC: regular rate, rhythm  ABDOMEN: soft, non-tender, non-distended, no rebound or guarding  EXTREMITIES: warm, well perfused, no edema  SKIN: no lesions noted  Red blood w/ clots in toilet    LABS: reviewed                        11.6   8.33  )-----------( 265      ( 22 Nov 2020 06:28 )             34.8     11-22    139  |  103  |  21  ----------------------------<  199<H>  3.9   |  25  |  0.47<L>    Ca    9.1      22 Nov 2020 06:25  Phos  3.6     11-22  Mg     1.8     11-22    TPro  7.1  /  Alb  4.0  /  TBili  0.6  /  DBili  x   /  AST  29  /  ALT  48<H>  /  AlkPhos  25<L>  11-22    LIVER FUNCTIONS - ( 22 Nov 2020 06:25 )  Alb: 4.0 g/dL / Pro: 7.1 g/dL / ALK PHOS: 25 u/L / ALT: 48 u/L / AST: 29 u/L / GGT: x               Diagnostic Studies: see sunrise for full report         Chief Complaint:  Patient is a 70y old  Male who presents with a chief complaint of CC: Rectal bleeding (21 Nov 2020 20:15)      HPI:ROBINSON RVIERS is a 70y Male w/ hx of prostate cancer s/p prostatectomy (denies XRT/brachytherapy), on ASA for possible hx of CVA, 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 Pt was feeling well - denies pain, light-headedness, syncope. Never happened before.    GI ROS negative for weight loss, f/c, dysphagia, odynophagia, early satiety, poor PO intake, abd pain, nausea, vomiting, diarrhea, change in stool caliber, family history of GI cancers. He denies any radiation or seeds therapy for the prostate CA.    No prior hx of EGD/Colonoscopy.     PMHX/PSHX:  Heart murmur    Lipoma    Prostate cancer    Diabetes mellitus    Hypertension    Asthma    S/P TURP      Allergies:  Lipitor (Muscle Pain)  No Known Allergies      Home Medications: reviewed  Hospital Medications:  ALBUTerol    90 MICROgram(s) HFA Inhaler 2 Puff(s) Inhalation every 6 hours PRN  amLODIPine   Tablet 5 milliGRAM(s) Oral daily  aspirin enteric coated 81 milliGRAM(s) Oral daily  dextrose 40% Gel 15 Gram(s) Oral once  dextrose 5%. 1000 milliLiter(s) IV Continuous <Continuous>  dextrose 5%. 1000 milliLiter(s) IV Continuous <Continuous>  dextrose 50% Injectable 25 Gram(s) IV Push once  dextrose 50% Injectable 12.5 Gram(s) IV Push once  dextrose 50% Injectable 25 Gram(s) IV Push once  glucagon  Injectable 1 milliGRAM(s) IntraMuscular once  insulin lispro (ADMELOG) corrective regimen sliding scale   SubCutaneous every 6 hours  lisinopril 20 milliGRAM(s) Oral daily  metoprolol succinate ER 25 milliGRAM(s) Oral daily  pantoprazole  Injectable 40 milliGRAM(s) IV Push two times a day      Social History:   Tob: Denies  EtOH: Denies  Illicit Drugs: Denies    Family history:  Family history of early CAD (Child)    Family history of hypertension in father (Father)    Family history of heart disease (Child)    Family history of aneurysm      Denies family history of colon cancer/polyps, stomach cancer/polyps, pancreatic cancer/masses, liver cancer/disease, ovarian cancer and endometrial cancer.    ROS:   General:  No  fevers, chills, night sweats, fatigue  Eyes:  Good vision, no reported pain  ENT:  No sore throat, pain, runny nose  CV:  No pain, palpitations  Pulm:  No dyspnea, cough  GI:  See HPI, otherwise negative  :  No  incontinence, nocturia  Muscle:  No pain, weakness  Neuro:  No memory problems  Psych:  No insomnia, mood problems, depression  Endocrine:  No polyuria, polydipsia, cold/heat intolerance  Heme:  No petechiae, ecchymosis, easy bruisability  Skin:  No rash    PHYSICAL EXAM:   Vital Signs:  Vital Signs Last 24 Hrs  T(C): 36.7 (22 Nov 2020 05:37), Max: 36.9 (21 Nov 2020 17:27)  T(F): 98 (22 Nov 2020 05:37), Max: 98.4 (21 Nov 2020 17:27)  HR: 88 (22 Nov 2020 05:37) (81 - 116)  BP: 138/62 (22 Nov 2020 05:37) (135/87 - 176/92)  BP(mean): --  RR: 16 (22 Nov 2020 05:37) (16 - 18)  SpO2: 99% (22 Nov 2020 05:37) (97% - 99%)  Daily Height in cm: 177.8 (22 Nov 2020 00:48)    Daily     GENERAL: no acute distress  NEURO: alert, no asterixis  HEENT: anicteric sclera, no conjunctival pallor appreciated  CHEST: no respiratory distress, no accessory muscle use  CARDIAC: regular rate, rhythm  ABDOMEN: soft, non-tender, non-distended, no rebound or guarding  EXTREMITIES: warm, well perfused, no edema  SKIN: no lesions noted  Red blood w/ clots in toilet    LABS: reviewed                        11.6   8.33  )-----------( 265      ( 22 Nov 2020 06:28 )             34.8     11-22    139  |  103  |  21  ----------------------------<  199<H>  3.9   |  25  |  0.47<L>    Ca    9.1      22 Nov 2020 06:25  Phos  3.6     11-22  Mg     1.8     11-22    TPro  7.1  /  Alb  4.0  /  TBili  0.6  /  DBili  x   /  AST  29  /  ALT  48<H>  /  AlkPhos  25<L>  11-22    LIVER FUNCTIONS - ( 22 Nov 2020 06:25 )  Alb: 4.0 g/dL / Pro: 7.1 g/dL / ALK PHOS: 25 u/L / ALT: 48 u/L / AST: 29 u/L / GGT: x               Diagnostic Studies: see sunrise for full report

## 2020-11-22 NOTE — PROGRESS NOTE ADULT - SUBJECTIVE AND OBJECTIVE BOX
Reports seeing BRBPR in toilet everytime he goes  No melena or hematemesis  No dizziness, palpitations, CP or SOB    Vital Signs Last 24 Hrs  T(C): 36.7 (22 Nov 2020 05:37), Max: 36.9 (21 Nov 2020 17:27)  T(F): 98 (22 Nov 2020 05:37), Max: 98.4 (21 Nov 2020 17:27)  HR: 88 (22 Nov 2020 05:37) (81 - 116)  BP: 138/62 (22 Nov 2020 05:37) (135/87 - 176/92)  BP(mean): --  RR: 16 (22 Nov 2020 05:37) (16 - 18)  SpO2: 99% (22 Nov 2020 05:37) (97% - 99%)    I&O's Summary      Constitutional-awake, alert, not in acute distress  Neuro-awake, alert, appropriately interactive, moving all extremities,   face symmetric  Eyes-pupils equally round and reactive to light, non icteric, no discharge noted  Ears, nose, mouth, throat-no abnormal discharge, moist mucous membranes, oropharynx clear without noted exudate  CV-regular rate and rhythm, 3/6 holosystolic, LLSB, possible radiation to axilla, 1+ b/l lower extremity edema slightly worse on right  Resp-clear to auscultation bilaterally, normal respiratory effort,   GI-abdomen soft and nontender, no rebound or guarding present  MSK-normal range of motion of bilateral elbows and knees, no obvious deformities   Skin-warm, dry, no rash appreciated  Psych-calm, cooperative, normal affect, not attending to internal stimuli    LABS:                        11.6   8.33  )-----------( 265      ( 22 Nov 2020 06:28 )             34.8     11-22    139  |  103  |  21  ----------------------------<  199<H>  3.9   |  25  |  0.47<L>    Ca    9.1      22 Nov 2020 06:25  Phos  3.6     11-22  Mg     1.8     11-22    TPro  7.1  /  Alb  4.0  /  TBili  0.6  /  DBili  x   /  AST  29  /  ALT  48<H>  /  AlkPhos  25<L>  11-22      CAPILLARY BLOOD GLUCOSE      POCT Blood Glucose.: 201 mg/dL (22 Nov 2020 05:52)  POCT Blood Glucose.: 189 mg/dL (21 Nov 2020 23:45)            RADIOLOGY & ADDITIONAL TESTS:    Imaging Personally Reviewed:  [x] YES  [ ] NO    Consultant(s) Notes Reviewed:  [x] YES  [ ] NO

## 2020-11-22 NOTE — PROGRESS NOTE ADULT - PROBLEM SELECTOR PLAN 1
-Differential includes diverticuli, hemorrhoids, AVM, malignancy.   -Of note, ED rectal exam with noted "melanotic stool" though.  -IV PPi bid  -Type and screen, consented for transfusion, transfuse for hgb<7, maintain PIV x2, serial CBC, SCDs and avoid pharmacologic anticoagulation. Continue ASA for now though given possible prior CVA.  -CBC bid  -Can have clears today  -NPO after midnight for colonoscopy 11/23/20  -Appreciate GI

## 2020-11-23 ENCOUNTER — RESULT REVIEW (OUTPATIENT)
Age: 70
End: 2020-11-23

## 2020-11-23 LAB
ALBUMIN SERPL ELPH-MCNC: 3.6 G/DL — SIGNIFICANT CHANGE UP (ref 3.3–5)
ALP SERPL-CCNC: 21 U/L — LOW (ref 40–120)
ALT FLD-CCNC: 51 U/L — HIGH (ref 4–41)
ANION GAP SERPL CALC-SCNC: 11 MMO/L — SIGNIFICANT CHANGE UP (ref 7–14)
APTT BLD: 27.7 SEC — SIGNIFICANT CHANGE UP (ref 27–36.3)
AST SERPL-CCNC: 32 U/L — SIGNIFICANT CHANGE UP (ref 4–40)
BILIRUB SERPL-MCNC: 0.6 MG/DL — SIGNIFICANT CHANGE UP (ref 0.2–1.2)
BLD GP AB SCN SERPL QL: NEGATIVE — SIGNIFICANT CHANGE UP
BUN SERPL-MCNC: 16 MG/DL — SIGNIFICANT CHANGE UP (ref 7–23)
CALCIUM SERPL-MCNC: 8.3 MG/DL — LOW (ref 8.4–10.5)
CHLORIDE SERPL-SCNC: 103 MMOL/L — SIGNIFICANT CHANGE UP (ref 98–107)
CO2 SERPL-SCNC: 24 MMOL/L — SIGNIFICANT CHANGE UP (ref 22–31)
CREAT SERPL-MCNC: 0.43 MG/DL — LOW (ref 0.5–1.3)
GLUCOSE BLDC GLUCOMTR-MCNC: 176 MG/DL — HIGH (ref 70–99)
GLUCOSE BLDC GLUCOMTR-MCNC: 183 MG/DL — HIGH (ref 70–99)
GLUCOSE BLDC GLUCOMTR-MCNC: 271 MG/DL — HIGH (ref 70–99)
GLUCOSE SERPL-MCNC: 163 MG/DL — HIGH (ref 70–99)
HCT VFR BLD CALC: 27.2 % — LOW (ref 39–50)
HGB BLD-MCNC: 9 G/DL — LOW (ref 13–17)
INR BLD: 1.21 — HIGH (ref 0.88–1.16)
MAGNESIUM SERPL-MCNC: 1.8 MG/DL — SIGNIFICANT CHANGE UP (ref 1.6–2.6)
MCHC RBC-ENTMCNC: 29.9 PG — SIGNIFICANT CHANGE UP (ref 27–34)
MCHC RBC-ENTMCNC: 33.1 % — SIGNIFICANT CHANGE UP (ref 32–36)
MCV RBC AUTO: 90.4 FL — SIGNIFICANT CHANGE UP (ref 80–100)
NRBC # FLD: 0 K/UL — SIGNIFICANT CHANGE UP (ref 0–0)
PHOSPHATE SERPL-MCNC: 3.1 MG/DL — SIGNIFICANT CHANGE UP (ref 2.5–4.5)
PLATELET # BLD AUTO: 207 K/UL — SIGNIFICANT CHANGE UP (ref 150–400)
PMV BLD: 11.3 FL — SIGNIFICANT CHANGE UP (ref 7–13)
POTASSIUM SERPL-MCNC: 3.3 MMOL/L — LOW (ref 3.5–5.3)
POTASSIUM SERPL-SCNC: 3.3 MMOL/L — LOW (ref 3.5–5.3)
PROT SERPL-MCNC: 6.2 G/DL — SIGNIFICANT CHANGE UP (ref 6–8.3)
PROTHROM AB SERPL-ACNC: 13.7 SEC — HIGH (ref 10.6–13.6)
RBC # BLD: 3.01 M/UL — LOW (ref 4.2–5.8)
RBC # FLD: 13.7 % — SIGNIFICANT CHANGE UP (ref 10.3–14.5)
RH IG SCN BLD-IMP: POSITIVE — SIGNIFICANT CHANGE UP
SODIUM SERPL-SCNC: 138 MMOL/L — SIGNIFICANT CHANGE UP (ref 135–145)
WBC # BLD: 6.41 K/UL — SIGNIFICANT CHANGE UP (ref 3.8–10.5)
WBC # FLD AUTO: 6.41 K/UL — SIGNIFICANT CHANGE UP (ref 3.8–10.5)

## 2020-11-23 PROCEDURE — 93306 TTE W/DOPPLER COMPLETE: CPT | Mod: 26

## 2020-11-23 PROCEDURE — 71260 CT THORAX DX C+: CPT | Mod: 26

## 2020-11-23 PROCEDURE — 88305 TISSUE EXAM BY PATHOLOGIST: CPT | Mod: 26

## 2020-11-23 PROCEDURE — 45380 COLONOSCOPY AND BIOPSY: CPT | Mod: GC

## 2020-11-23 PROCEDURE — 74177 CT ABD & PELVIS W/CONTRAST: CPT | Mod: 26

## 2020-11-23 RX ORDER — POTASSIUM CHLORIDE 20 MEQ
10 PACKET (EA) ORAL
Refills: 0 | Status: DISCONTINUED | OUTPATIENT
Start: 2020-11-23 | End: 2020-11-23

## 2020-11-23 RX ORDER — INSULIN LISPRO 100/ML
VIAL (ML) SUBCUTANEOUS
Refills: 0 | Status: DISCONTINUED | OUTPATIENT
Start: 2020-11-23 | End: 2020-11-24

## 2020-11-23 RX ADMIN — Medication 3: at 21:03

## 2020-11-23 RX ADMIN — PANTOPRAZOLE SODIUM 40 MILLIGRAM(S): 20 TABLET, DELAYED RELEASE ORAL at 05:15

## 2020-11-23 RX ADMIN — Medication 25 MILLIGRAM(S): at 05:15

## 2020-11-23 RX ADMIN — PANTOPRAZOLE SODIUM 40 MILLIGRAM(S): 20 TABLET, DELAYED RELEASE ORAL at 19:23

## 2020-11-23 RX ADMIN — SODIUM CHLORIDE 75 MILLILITER(S): 9 INJECTION INTRAMUSCULAR; INTRAVENOUS; SUBCUTANEOUS at 00:00

## 2020-11-23 RX ADMIN — Medication 1: at 05:15

## 2020-11-23 NOTE — CHART NOTE - NSCHARTNOTEFT_GEN_A_CORE
PT w/ K of 3.3, PT refused IV and po K. Patient states his wife felt ill after taking it and he would like to recheck K after he eats.

## 2020-11-23 NOTE — PROVIDER CONTACT NOTE (OTHER) - ASSESSMENT
Patient has been NPO since midnight for a colonoscopy. His BP is 163/95, HR is 107. He is scheduled to receive Amlodipine, Lisinopril, and Metoprolol. Should he receive them despite being NPO?

## 2020-11-23 NOTE — PROVIDER CONTACT NOTE (OTHER) - ACTION/TREATMENT ORDERED:
Will recheck BMP
Give the Metoprolol for his HR. Hold the Amlodipine and Lisinopril; if the patient's HR and BP don't improve, give those meds

## 2020-11-24 ENCOUNTER — TRANSCRIPTION ENCOUNTER (OUTPATIENT)
Age: 70
End: 2020-11-24

## 2020-11-24 VITALS
HEART RATE: 92 BPM | OXYGEN SATURATION: 99 % | TEMPERATURE: 99 F | SYSTOLIC BLOOD PRESSURE: 147 MMHG | RESPIRATION RATE: 18 BRPM | DIASTOLIC BLOOD PRESSURE: 80 MMHG

## 2020-11-24 DIAGNOSIS — N28.9 DISORDER OF KIDNEY AND URETER, UNSPECIFIED: ICD-10-CM

## 2020-11-24 LAB
ANION GAP SERPL CALC-SCNC: 12 MMO/L — SIGNIFICANT CHANGE UP (ref 7–14)
BUN SERPL-MCNC: 9 MG/DL — SIGNIFICANT CHANGE UP (ref 7–23)
CALCIUM SERPL-MCNC: 8.7 MG/DL — SIGNIFICANT CHANGE UP (ref 8.4–10.5)
CHLORIDE SERPL-SCNC: 103 MMOL/L — SIGNIFICANT CHANGE UP (ref 98–107)
CO2 SERPL-SCNC: 22 MMOL/L — SIGNIFICANT CHANGE UP (ref 22–31)
CREAT SERPL-MCNC: 0.42 MG/DL — LOW (ref 0.5–1.3)
GLUCOSE BLDC GLUCOMTR-MCNC: 203 MG/DL — HIGH (ref 70–99)
GLUCOSE BLDC GLUCOMTR-MCNC: 240 MG/DL — HIGH (ref 70–99)
GLUCOSE SERPL-MCNC: 188 MG/DL — HIGH (ref 70–99)
HCT VFR BLD CALC: 27.6 % — LOW (ref 39–50)
HGB BLD-MCNC: 9.1 G/DL — LOW (ref 13–17)
MAGNESIUM SERPL-MCNC: 1.8 MG/DL — SIGNIFICANT CHANGE UP (ref 1.6–2.6)
MCHC RBC-ENTMCNC: 29.8 PG — SIGNIFICANT CHANGE UP (ref 27–34)
MCHC RBC-ENTMCNC: 33 % — SIGNIFICANT CHANGE UP (ref 32–36)
MCV RBC AUTO: 90.5 FL — SIGNIFICANT CHANGE UP (ref 80–100)
NRBC # FLD: 0 K/UL — SIGNIFICANT CHANGE UP (ref 0–0)
PHOSPHATE SERPL-MCNC: 2.7 MG/DL — SIGNIFICANT CHANGE UP (ref 2.5–4.5)
PLATELET # BLD AUTO: 219 K/UL — SIGNIFICANT CHANGE UP (ref 150–400)
PMV BLD: 11.2 FL — SIGNIFICANT CHANGE UP (ref 7–13)
POTASSIUM SERPL-MCNC: 3.4 MMOL/L — LOW (ref 3.5–5.3)
POTASSIUM SERPL-SCNC: 3.4 MMOL/L — LOW (ref 3.5–5.3)
RBC # BLD: 3.05 M/UL — LOW (ref 4.2–5.8)
RBC # FLD: 13.6 % — SIGNIFICANT CHANGE UP (ref 10.3–14.5)
SODIUM SERPL-SCNC: 137 MMOL/L — SIGNIFICANT CHANGE UP (ref 135–145)
WBC # BLD: 8.6 K/UL — SIGNIFICANT CHANGE UP (ref 3.8–10.5)
WBC # FLD AUTO: 8.6 K/UL — SIGNIFICANT CHANGE UP (ref 3.8–10.5)

## 2020-11-24 PROCEDURE — 99232 SBSQ HOSP IP/OBS MODERATE 35: CPT | Mod: GC

## 2020-11-24 RX ORDER — ASPIRIN/CALCIUM CARB/MAGNESIUM 324 MG
1 TABLET ORAL
Qty: 0 | Refills: 0 | DISCHARGE

## 2020-11-24 RX ORDER — METOPROLOL TARTRATE 50 MG
1 TABLET ORAL
Qty: 0 | Refills: 0 | DISCHARGE

## 2020-11-24 RX ORDER — METOPROLOL TARTRATE 50 MG
1 TABLET ORAL
Qty: 30 | Refills: 0
Start: 2020-11-24 | End: 2020-12-23

## 2020-11-24 RX ORDER — SITAGLIPTIN AND METFORMIN HYDROCHLORIDE 500; 50 MG/1; MG/1
1 TABLET, FILM COATED ORAL
Qty: 60 | Refills: 0
Start: 2020-11-24 | End: 2020-12-23

## 2020-11-24 RX ORDER — SITAGLIPTIN AND METFORMIN HYDROCHLORIDE 500; 50 MG/1; MG/1
1 TABLET, FILM COATED ORAL
Qty: 0 | Refills: 0 | DISCHARGE

## 2020-11-24 RX ORDER — ALBUTEROL 90 UG/1
2 AEROSOL, METERED ORAL
Qty: 0 | Refills: 0 | DISCHARGE
Start: 2020-11-24

## 2020-11-24 RX ORDER — LISINOPRIL 2.5 MG/1
1 TABLET ORAL
Qty: 30 | Refills: 0
Start: 2020-11-24 | End: 2020-12-23

## 2020-11-24 RX ORDER — AMLODIPINE BESYLATE 2.5 MG/1
1 TABLET ORAL
Qty: 30 | Refills: 0
Start: 2020-11-24 | End: 2020-12-23

## 2020-11-24 RX ORDER — AMLODIPINE BESYLATE 2.5 MG/1
1 TABLET ORAL
Qty: 0 | Refills: 0 | DISCHARGE

## 2020-11-24 RX ORDER — ASPIRIN/CALCIUM CARB/MAGNESIUM 324 MG
1 TABLET ORAL
Qty: 30 | Refills: 0
Start: 2020-11-24 | End: 2020-12-23

## 2020-11-24 RX ORDER — POTASSIUM CHLORIDE 20 MEQ
40 PACKET (EA) ORAL EVERY 4 HOURS
Refills: 0 | Status: COMPLETED | OUTPATIENT
Start: 2020-11-24 | End: 2020-11-24

## 2020-11-24 RX ORDER — LISINOPRIL 2.5 MG/1
1 TABLET ORAL
Qty: 0 | Refills: 0 | DISCHARGE

## 2020-11-24 RX ADMIN — LISINOPRIL 20 MILLIGRAM(S): 2.5 TABLET ORAL at 05:32

## 2020-11-24 RX ADMIN — Medication 40 MILLIEQUIVALENT(S): at 14:39

## 2020-11-24 RX ADMIN — Medication 2: at 12:31

## 2020-11-24 RX ADMIN — PANTOPRAZOLE SODIUM 40 MILLIGRAM(S): 20 TABLET, DELAYED RELEASE ORAL at 05:32

## 2020-11-24 RX ADMIN — Medication 40 MILLIEQUIVALENT(S): at 11:07

## 2020-11-24 RX ADMIN — Medication 81 MILLIGRAM(S): at 11:07

## 2020-11-24 RX ADMIN — Medication 25 MILLIGRAM(S): at 05:32

## 2020-11-24 RX ADMIN — Medication 2: at 08:20

## 2020-11-24 RX ADMIN — AMLODIPINE BESYLATE 5 MILLIGRAM(S): 2.5 TABLET ORAL at 05:32

## 2020-11-24 NOTE — DISCHARGE NOTE PROVIDER - NSDCFUADDAPPT_GEN_ALL_CORE_FT
***Repeat labs with your primary care doctor Dr. Barkley within 1 week (CBC, BMP, LFTs).   If you are in need of a general medicine physician and post-discharge medical follow-up for further care/recommendations you may contact the [facility] for an appointment [phone]    ***You will need follow up with Colorectal Surgery Dr. Jesus Madrigal next week, call for an appointment *******    **Close follow up with your cardiologist Dr. Gonzalez for cardiac clearance and to discuss results about echo.

## 2020-11-24 NOTE — DISCHARGE NOTE PROVIDER - NSDCCPCAREPLAN_GEN_ALL_CORE_FT
PRINCIPAL DISCHARGE DIAGNOSIS  Diagnosis: Rectal bleeding  Assessment and Plan of Treatment: You were found to have a gastrointestinal bleed for which your blood counts were monitored closely. Your blood counts are stable.   You were evaluated by the gastroentrologist (GI) in the hospital.   You underwent a colonoscopy on 11/23/2020 which showed malignant tumor in ascending colon, which was biopsied. Also One 4 mm polyp which was removed; Non-bleeding internal hemorrhoids GI doctor will contact you with pathology results*****  -Your CAT scan was performed for staging showing masslike wall thickening in the proximal ascending colon. You were evaluated by colorectal surgery doctor - who recommend that you closely follow up with Dr. Jesus Madrigal next week for further evaluation and management, Please CALL office to make appointment for next week, [phone]***  You will need Cardiology clearance from your outpatient cardiologist Dr. Gonzalez if any procedure planned, please follow up with Dr. Gonzalez at your scheduled appt. **  **Please follow-up with gastrointestinal medicine as outpatient for further surveillance (your outpatient [facility] or GI Clinic [phone]). If you have persistent bleeding resulting in dark/black stools, bright red blood per rectum, or vomiting blood you should return to the emergency department. Monitor your blood counts with your primary care provider as outpatient.  *****You should see your PCP Dr. Chata Barkley within 1 week to repeat bloodwork***      SECONDARY DISCHARGE DIAGNOSES  Diagnosis: Nonrheumatic aortic valve stenosis  Assessment and Plan of Treatment: Your repeat echocardiogram showed ejection fraction of 61%, normal left/right ventricular function, moderate pulmonary hypertension, Severe aortic stenosis**   *****You will need close follow up with outpatient cardiologist Dr. Coyd within 1-2 weeks and for cardiac clearance if you need any colorectal procedure**    Diagnosis: Renal lesion  Assessment and Plan of Treatment: Your CAT scan showed an Indeterminant right renal lesion.  **Closely follow up with your PCP Dr. Barkley for further evaluation and if you would need further imaging (such as MRI or renal ultrasound)****    Diagnosis: Mild intermittent asthma without complication  Assessment and Plan of Treatment: stable Continue your inhalers and annual pulmonary function tests with your outpatient provider. Monitor for asthma exacerbation, such as, shortness of breath, hyperventilation, or any other difficulties breathing and report to the emergency room in the event that your rescue inhaler has not resolved your symptoms.  ***Your CAT scan Chest revealed Subcentimeter subpleural pulmonary nodules and cardiophrenic lymph nodes which are nonspecific. You will need close follow up with your outpatient PCP/pulmonologist for further management ****    Diagnosis: Type 2 diabetes mellitus with hyperglycemia, without long-term current use of insulin  Assessment and Plan of Treatment: A1C 7.2, Continue your medication regimen and a consistent carbohydrate diet (Meaning eating the same amount of carbohydrates at the same time each day). Monitor blood glucose levels throughout the day before meals and at bedtime. Record blood sugars and bring to outpatient providers appointment in order to be reviewed by your doctor for management modifications. If your sugars are more than 400 or less than 70 you should contact your PCP immediately. Monitor for signs/symptoms of low blood glucose, such as, dizziness, altered mental status, or cool/clammy skin. In addition, monitor for signs/symptoms of high blood glucose, such as, feeling hot, dry, fatigued, or with increased thirst/urination. Make regular podiatry appointments in order to have feet checked for wounds and uncontrolled toe nail growth to prevent infections, as well as, appointments with an ophthalmologist to monitor your vision.    Diagnosis: Lower extremity edema  Assessment and Plan of Treatment: duplex ultrasound negative for clot   elevate legs    Diagnosis: Essential hypertension  Assessment and Plan of Treatment: stable Continue blood pressure medication regimen as directed. Follow a low salt/cholesterol diet. Monitor for any visual changes, headaches or dizziness.  Monitor blood pressure regularly.  Follow up with your primary care provider for further management for high blood pressure.

## 2020-11-24 NOTE — PROGRESS NOTE ADULT - ASSESSMENT
70M w/ hx of prostate cancer s/p prostatectomy w/o radiation or brachytherapy, possible CVA on ASA, statin induced myositis on IVIG infusion, ibuprofen ingestion, presenting w/ hematochezia. VS stable, Hb stable in 11s. No prior hx of endoscopy.    Impression:  #Hematochezia 2nd to colon cancer s/p colonoscopy with finding ascending colon mass s/p biopsies  - Likely malignant tumor in the proximal ascending colon. Biopsied. Tattooed.- One 4 mm polyp in the cecum, removed with a cold biopsy forceps. Resected and retrieved.  #Prostate cancer - denies hx of radiation which would make radiation proctitis more likely  #Statin induced myositis  #Possible CVA on ASA    Recommendations:  - Follow up path result   - Colorectal surgery consult   - Call us back with questions       Janessa Samano   Gastroenterology Fellow  Pager: [phone]  Please call answering service [phone] / on-call GI fellow after 5pm and before 8am, and on weekends.

## 2020-11-24 NOTE — DISCHARGE NOTE PROVIDER - CARE PROVIDERS DIRECT ADDRESSES
[email or web address]

## 2020-11-24 NOTE — DISCHARGE NOTE NURSING/CASE MANAGEMENT/SOCIAL WORK - NSTRANSFERBELONGINGSDISPO_GEN_A_NUR
with patient H Plasty Text: Given the location of the defect, shape of the defect and the proximity to free margins a H-plasty was deemed most appropriate for repair.  Using a sterile surgical marker, the appropriate advancement arms of the H-plasty were drawn incorporating the defect and placing the expected incisions within the relaxed skin tension lines where possible. The area thus outlined was incised deep to adipose tissue with a #15 scalpel blade. The skin margins were undermined to an appropriate distance in all directions utilizing iris scissors.  The opposing advancement arms were then advanced into place in opposite direction and anchored with interrupted buried subcutaneous sutures.

## 2020-11-24 NOTE — DISCHARGE NOTE PROVIDER - NSDCACTIVITY_GEN_ALL_CORE
Do not drive or operate machinery/No heavy lifting/straining/Showering allowed/Bathing allowed/Walking - Indoors allowed/Walking - Outdoors allowed

## 2020-11-24 NOTE — CONSULT NOTE ADULT - SUBJECTIVE AND OBJECTIVE BOX
CC: 70y old Male admitted with a chief complaint of CC: Rectal bleeding, now s/p colonoscopy with mass in the ascending colon found    Patient seen and examined at bedside, resting comfortably denies pain or any other complaints at this time    PMHx: Heart murmur    Lipoma    Prostate cancer    Diabetes mellitus    Hypertension    Asthma      PSHx: S/P TURP      Medications (inpatient): amLODIPine   Tablet 5 milliGRAM(s) Oral daily  aspirin enteric coated 81 milliGRAM(s) Oral daily  dextrose 40% Gel 15 Gram(s) Oral once  dextrose 5%. 1000 milliLiter(s) IV Continuous <Continuous>  dextrose 5%. 1000 milliLiter(s) IV Continuous <Continuous>  dextrose 50% Injectable 25 Gram(s) IV Push once  dextrose 50% Injectable 12.5 Gram(s) IV Push once  dextrose 50% Injectable 25 Gram(s) IV Push once  glucagon  Injectable 1 milliGRAM(s) IntraMuscular once  insulin lispro (ADMELOG) corrective regimen sliding scale   SubCutaneous Before meals and at bedtime  lisinopril 20 milliGRAM(s) Oral daily  metoprolol succinate ER 25 milliGRAM(s) Oral daily  pantoprazole  Injectable 40 milliGRAM(s) IV Push two times a day  potassium chloride    Tablet ER 40 milliEquivalent(s) Oral every 4 hours  sodium chloride 0.9%. 1000 milliLiter(s) IV Continuous <Continuous>    Medications (PRN):ALBUTerol    90 MICROgram(s) HFA Inhaler 2 Puff(s) Inhalation every 6 hours PRN    Allergies: No Known Allergies  (Intolerances: Lipitor (Muscle Pain)  )  Social Hx:   Family Hx: Family history of early CAD (Child)    Family history of hypertension in father (Father)    Family history of heart disease (Child)    Family history of aneurysm  Father        Physical Exam  T(C): 37  HR: 94 (94 - 103)  BP: 140/74 (140/74 - 153/70)  RR: 18 (18 - 18)  SpO2: 99% (97% - 99%)  Tmax: T(C): , Max: 37 (11-24-20 @ 05:32)    General: well developed, well nourished, NAD  Abdomen: soft, nontender, nondistended      Labs:                        9.1    8.60  )-----------( 219      ( 24 Nov 2020 06:34 )             27.6     PT/INR - ( 23 Nov 2020 06:30 )   PT: 13.7 SEC;   INR: 1.21          PTT - ( 23 Nov 2020 06:30 )  PTT:27.7 SEC  11-24    137  |  103  |  9   ----------------------------<  188<H>  3.4<L>   |  22  |  0.42<L>    Ca    8.7      24 Nov 2020 06:34  Phos  2.7     11-24  Mg     1.8     11-24    TPro  6.2  /  Alb  3.6  /  TBili  0.6  /  DBili  x   /  AST  32  /  ALT  51<H>  /  AlkPhos  21<L>  11-23      Imaging and other studies:  colonoscopy: Impression:          - Likely malignant tumor in the proximal ascending colon. Biopsied. Tattooed.                       - One 4 mm polyp in the cecum, removed with a cold biopsy forceps. Resected and retrieved.                       - Non-bleeding internal hemorrhoids.  Recommendation:      - [facility] for [facility] care.                       - Resume regular diet.                       - Perform CT scan (computed tomography) of the chest,                        abdomen and pelvis with contrast for staging.                       - Consult colorectal surgery team.

## 2020-11-24 NOTE — DISCHARGE NOTE PROVIDER - NSDCMRMEDTOKEN_GEN_ALL_CORE_FT
albuterol 90 mcg/inh inhalation aerosol: 2 puff(s) inhaled every 6 hours, As needed, Shortness of Breath and/or Wheezing  amLODIPine 10 mg oral tablet: 1 tab(s) orally once a day  hold for SBP&lt;110   aspirin 81 mg oral tablet, chewable: 1 tab(s) orally once a day  Janumet 50 mg-1000 mg oral tablet: 1 tab(s) orally 2 times a day  lisinopril 20 mg oral tablet: 1 tab(s) orally once a day  hold for SBP&lt;110  metoprolol succinate 25 mg oral tablet, extended release: 1 tab(s) orally once a day  hold for SBP&lt;110 or HR&lt;60  Vitamin B12 1000 mcg oral tablet: 1 tab(s) orally once a day  Vitamin D3 2000 intl units oral capsule: 1 cap(s) orally once a day

## 2020-11-24 NOTE — CHART NOTE - NSCHARTNOTEFT_GEN_A_CORE
Colorectal surgery called this morning concerning colonoscopy results- as patient insists on being discharged-> patient can follow up next week with Dr. Madrigal.  GI to contact patient for pathology results, no need for PPI per attending.     CT results, labs, echo results discussed with Dr. Romero, patient is medically cleared and optimized for discharge home today. All medications were reviewed with attending, and sent to mutually agreed upon pharmacy (patient states he has all home meds).   Patient informed to closely follow up with his [facility], PCP Dr. Barkley within 1 week, and Colorectal Surgery Dr. Eckert in 1 week.   Patient needs close follow up Cardiologist Dr. Gonzalez for severe aortic stenosis on Echo, patient states he has upcoming appt soon, will need cardiac clearance per colorectal surgery if procedure planned. Patient verbalized understanding; wife to provide transport. Discussed plan of care with [facility]

## 2020-11-24 NOTE — PROGRESS NOTE ADULT - SUBJECTIVE AND OBJECTIVE BOX
Interval Events:   No abdominal pain  No nausea /vomiting / diarrhea   No melena / bloody bm     Hospital Medications:  ALBUTerol    90 MICROgram(s) HFA Inhaler 2 Puff(s) Inhalation every 6 hours PRN  amLODIPine   Tablet 5 milliGRAM(s) Oral daily  aspirin enteric coated 81 milliGRAM(s) Oral daily  dextrose 40% Gel 15 Gram(s) Oral once  dextrose 5%. 1000 milliLiter(s) IV Continuous <Continuous>  dextrose 5%. 1000 milliLiter(s) IV Continuous <Continuous>  dextrose 50% Injectable 25 Gram(s) IV Push once  dextrose 50% Injectable 12.5 Gram(s) IV Push once  dextrose 50% Injectable 25 Gram(s) IV Push once  glucagon  Injectable 1 milliGRAM(s) IntraMuscular once  insulin lispro (ADMELOG) corrective regimen sliding scale   SubCutaneous Before meals and at bedtime  lisinopril 20 milliGRAM(s) Oral daily  metoprolol succinate ER 25 milliGRAM(s) Oral daily  pantoprazole  Injectable 40 milliGRAM(s) IV Push two times a day  potassium chloride    Tablet ER 40 milliEquivalent(s) Oral every 4 hours  sodium chloride 0.9%. 1000 milliLiter(s) IV Continuous <Continuous>        ROS:   General:  No fevers, chills or night sweats  ENT:  No sore throat or dysphagia  CV:  No pain or palpitations  Resp:  No dyspnea, cough or  wheezing  GI:  as above  Skin:  No rash or edema  Neuro: no weakness   Hematologic: no bleeding  Musculoskeletal: no muscle pain or join pain  Psych: no agitation      PHYSICAL EXAM:   Vital Signs:  Vital Signs Last 24 Hrs  T(C): 37 (24 Nov 2020 05:32), Max: 37 (24 Nov 2020 05:32)  T(F): 98.6 (24 Nov 2020 05:32), Max: 98.6 (24 Nov 2020 05:32)  HR: 94 (24 Nov 2020 05:32) (85 - 105)  BP: 140/74 (24 Nov 2020 05:32) (136/74 - 154/77)  BP(mean): --  RR: 18 (24 Nov 2020 05:32) (18 - 22)  SpO2: 99% (24 Nov 2020 05:32) (97% - 100%)  Daily     Daily     GENERAL:  NAD, Appears stated age  HEENT:  NC/AT,  conjunctivae clear and pink, sclera -anicteric  CHEST:  Normal Effort, Breath sounds clear  HEART:  RRR, S1 + S2, no murmurs  ABDOMEN:  Soft, non-tender, non-distended, normoactive bowel sounds,  no masses  EXTREMITIES:  no cyanosis or edema  SKIN:  Warm & Dry. No rash or erythema  NEURO:  Alert, oriented, no focal deficit    LABS:                        9.1    8.60  )-----------( 219      ( 24 Nov 2020 06:34 )             27.6     Mean Cell Volume: 90.5 fL (11-24-20 @ 06:34)    11-24    137  |  103  |  9   ----------------------------<  188<H>  3.4<L>   |  22  |  0.42<L>    Ca    8.7      24 Nov 2020 06:34  Phos  2.7     11-24  Mg     1.8     11-24    TPro  6.2  /  Alb  3.6  /  TBili  0.6  /  DBili  x   /  AST  32  /  ALT  51<H>  /  AlkPhos  21<L>  11-23    LIVER FUNCTIONS - ( 23 Nov 2020 06:30 )  Alb: 3.6 g/dL / Pro: 6.2 g/dL / ALK PHOS: 21 u/L / ALT: 51 u/L / AST: 32 u/L / GGT: x           PT/INR - ( 23 Nov 2020 06:30 )   PT: 13.7 SEC;   INR: 1.21          PTT - ( 23 Nov 2020 06:30 )  PTT:27.7 SEC                            9.1    8.60  )-----------( 219      ( 24 Nov 2020 06:34 )             27.6                         9.0    6.41  )-----------( 207      ( 23 Nov 2020 06:30 )             27.2                         10.8   10.17 )-----------( 283      ( 22 Nov 2020 19:46 )             33.0                         11.6   8.33  )-----------( 265      ( 22 Nov 2020 06:28 )             34.8                         11.4   8.88  )-----------( 250      ( 21 Nov 2020 20:43 )             33.9       Imaging:

## 2020-11-24 NOTE — DISCHARGE NOTE NURSING/CASE MANAGEMENT/SOCIAL WORK - NSDCFUADDAPPT_GEN_ALL_CORE_FT
***Repeat labs with your primary care doctor Dr. Barkley within 1 week (CBC, BMP, LFTs).   If you are in need of a general medicine physician and post-discharge medical follow-up for further care/recommendations you may contact the [facility] for an appointment [phone]    ***You will need follow up with Colorectal Surgery Dr. Jesus Madrigal next week, call for an appointment *******    **Close follow up with your cardiologist Dr. Gonzalez for cardiac clearance and to discuss results about echo.

## 2020-11-24 NOTE — DISCHARGE NOTE NURSING/CASE MANAGEMENT/SOCIAL WORK - PATIENT PORTAL LINK FT
You can access the FollowMyHealth Patient Portal offered by [facility] by registering at the following website: [email or web address]. By joining [facility]’s FollowMyHealth portal, you will also be able to view your health information using other applications (apps) compatible with our system.

## 2020-11-24 NOTE — CONSULT NOTE ADULT - ASSESSMENT
A/P 70M with ascending colon mass seen on colonoscopy, biopsy pending  - f/u as an outpatient with Dr. Madrigal next week, call for an appointment   A/P 70M with ascending colon mass seen on colonoscopy, biopsy pending  - f/u as an outpatient with Dr. Madrigal next week, call for an appointment

## 2020-11-24 NOTE — DISCHARGE NOTE PROVIDER - HOSPITAL COURSE
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 No prior hx of endoscopy. GI consulted, s/p colonoscopy on 11/23 with malignant tumor, biopsy/pathology pending. Patient tolerating diet well. Colorectal surgery consulted, patient will need outpatient follow up with Dr. Madrigal next week, CT C/A/P performed for staging. Patient is medically cleared for discharge, close follow up with Cardiologist, PCP, [facility] Dr. Madrigal.     Hospital Course:   Rectal bleeding likely 2/2 colon cancer   -FOBT positive   -monitor CBC, transfuse for hgb<7, maintain PIV x2  -Continue ASA for now though given possible prior CVA.   -GI consutled  -s/p colonoscopy on 11/23- - Likely malignant tumor in the proximal ascending colon. Biopsied. Tattooed. One 4 mm polyp in the cecum, removed. Non-bleeding internal hemorrhoids; follow up pathology---   -CT scan C/A/P for staging 11/23- Subcentimeter subpleural pulmonary nodules and cardiophrenic lymph nodes are nonspecific.Masslike wall thickening of the proximal ascending colon likely corresponds to mass seen on colonoscopy.Indeterminant right renal lesion. MR or renal ultrasound can be performed for further evaluation.**  -colorectal surgery team-  f/u as an outpatient with Dr. Jesus Madrigal next week, call for an appointment ***  -Will need Cardiology clearance from his cardiologist Dr. Cody (has appt in 2-3 weeks)   -PCP Dr. Chata Barkley in 1 week  -GI clinic/Dr. Janice Bo or his GI doctor [facility]    Lower extremity edema.    -R>L-Duplex 11/22- neg    Type 2 diabetes mellitus   -A1c 7.2  -Hold home oral medications  -SSI, acck.     Nonrheumatic aortic valve stenosis.    -2018 Echo with moderate aortic stenosis  -Repeat TTE 11/23 -->EF- 61%, normal LV/RV function, mild MR, severe aortic stenosis*, mild AR, , mild - mod TR,  mod pulm HTN  -close follow up with outpatient cardiologist Dr. Cody (has upcoming appt)     Asthma   -stable, Albuterol prn.   -CT Chest with subpleural pulmonary nodules and cardiophrenic lymph nodes are nonspecific; close follow up with your outpatient pulmonologist for further management     Indeterminant right renal lesion.  -on CT; MR or renal ultrasound can be performed for further evaluation- outpatient FU    Essential hypertension.   -lisinopril, metoprolol. amlodipine  -c/w aspirin     Dispo: home    On 11/24/2020, case was discussed with Dr. Romero, patient is medically cleared and optimized for discharge home today. All medications were reviewed with attending, and sent to mutually agreed upon pharmacy (patient states he has all home meds, per GI/MD- no need for PPI). Discussed with colorectal surgery and GI- outpatient follow up with [facility] GI, PCP Dr. Barkley, Cardiologist Dr. Gonzalez and Colorectal Surgery Dr. Eckert in 1 week.

## 2020-11-24 NOTE — CHART NOTE - NSCHARTNOTEFT_GEN_A_CORE
Colorectal Surgery consult called for evaluation of ?malignant tumor in colon on Colonoscopy, will await official recs  CT C/A/P performed, pending results   Discussed with attending and RN

## 2020-11-24 NOTE — DISCHARGE NOTE PROVIDER - CARE PROVIDER_API CALL
Beni Gonzalez  CARDIOVASCULAR DISEASE  [address] [place] [address]  Phone: [phone]  Fax: [phone]  Established Patient  Follow Up Time: 1 week    Chata Barkley)  Internal Medicine  [address] [facility], [address]  [place] [address]  Phone: [phone]  Fax: [phone]  Established Patient  Follow Up Time: 1 week    Janice Bo)  Gastroenterology; Internal Medicine  [address], [address]  [place] [address]  Phone: [phone]  Fax: [phone]  Follow Up Time:     Jesus Madrigal  COLON/RECTAL SURGERY  [address], [address] [facility] [address]  [place] [address]  Phone: [phone]  Fax: [phone]  Established Patient  Follow Up Time: 1 week

## 2020-11-24 NOTE — DISCHARGE NOTE PROVIDER - NSFOLLOWUPCLINICS_GEN_ALL_ED_FT
[facility]  Gastroenterology  [address] [place] [address]  Phone: [phone]  Fax:   Follow Up Time:

## 2020-11-24 NOTE — DISCHARGE NOTE PROVIDER - PROVIDER TOKENS
PROVIDER:[TOKEN:[[id number]],FOLLOWUP:[1 week],ESTABLISHEDPATIENT:[T]],PROVIDER:[TOKEN:[[id number]],FOLLOWUP:[1 week],ESTABLISHEDPATIENT:[T]],PROVIDER:[TOKEN:[[id number]]],PROVIDER:[TOKEN:[[id number]],FOLLOWUP:[1 week],ESTABLISHEDPATIENT:[T]]

## 2020-11-25 LAB — SURGICAL PATHOLOGY STUDY: SIGNIFICANT CHANGE UP

## 2020-12-01 ENCOUNTER — NON-APPOINTMENT (OUTPATIENT)
Age: 70
End: 2020-12-01

## 2020-12-05 ENCOUNTER — APPOINTMENT (OUTPATIENT)
Dept: DISASTER EMERGENCY | Facility: CLINIC | Age: 70
End: 2020-12-05

## 2020-12-06 LAB — SARS-COV-2 N GENE NPH QL NAA+PROBE: NOT DETECTED

## 2020-12-08 ENCOUNTER — OUTPATIENT (OUTPATIENT)
Dept: OUTPATIENT SERVICES | Facility: HOSPITAL | Age: 70
LOS: 1 days | End: 2020-12-08
Payer: MEDICARE

## 2020-12-08 VITALS
TEMPERATURE: 98 F | HEART RATE: 104 BPM | RESPIRATION RATE: 18 BRPM | OXYGEN SATURATION: 99 % | WEIGHT: 199.96 LBS | HEIGHT: 70 IN | DIASTOLIC BLOOD PRESSURE: 93 MMHG | SYSTOLIC BLOOD PRESSURE: 183 MMHG

## 2020-12-08 VITALS
HEART RATE: 76 BPM | OXYGEN SATURATION: 96 % | RESPIRATION RATE: 16 BRPM | DIASTOLIC BLOOD PRESSURE: 80 MMHG | SYSTOLIC BLOOD PRESSURE: 164 MMHG

## 2020-12-08 DIAGNOSIS — Z90.79 ACQUIRED ABSENCE OF OTHER GENITAL ORGAN(S): Chronic | ICD-10-CM

## 2020-12-08 DIAGNOSIS — I35.0 NONRHEUMATIC AORTIC (VALVE) STENOSIS: ICD-10-CM

## 2020-12-08 LAB
ANION GAP SERPL CALC-SCNC: 13 MMOL/L — SIGNIFICANT CHANGE UP (ref 5–17)
BUN SERPL-MCNC: 11 MG/DL — SIGNIFICANT CHANGE UP (ref 7–23)
CALCIUM SERPL-MCNC: 9.3 MG/DL — SIGNIFICANT CHANGE UP (ref 8.4–10.5)
CHLORIDE SERPL-SCNC: 104 MMOL/L — SIGNIFICANT CHANGE UP (ref 96–108)
CO2 SERPL-SCNC: 24 MMOL/L — SIGNIFICANT CHANGE UP (ref 22–31)
CREAT SERPL-MCNC: 0.41 MG/DL — LOW (ref 0.5–1.3)
GLUCOSE BLDC GLUCOMTR-MCNC: 184 MG/DL — HIGH (ref 70–99)
GLUCOSE SERPL-MCNC: 181 MG/DL — HIGH (ref 70–99)
HCT VFR BLD CALC: 36 % — LOW (ref 39–50)
HGB BLD-MCNC: 11.7 G/DL — LOW (ref 13–17)
MCHC RBC-ENTMCNC: 29.7 PG — SIGNIFICANT CHANGE UP (ref 27–34)
MCHC RBC-ENTMCNC: 32.5 GM/DL — SIGNIFICANT CHANGE UP (ref 32–36)
MCV RBC AUTO: 91.4 FL — SIGNIFICANT CHANGE UP (ref 80–100)
NRBC # BLD: 0 /100 WBCS — SIGNIFICANT CHANGE UP (ref 0–0)
PLATELET # BLD AUTO: 315 K/UL — SIGNIFICANT CHANGE UP (ref 150–400)
POTASSIUM SERPL-MCNC: 3.9 MMOL/L — SIGNIFICANT CHANGE UP (ref 3.5–5.3)
POTASSIUM SERPL-SCNC: 3.9 MMOL/L — SIGNIFICANT CHANGE UP (ref 3.5–5.3)
RBC # BLD: 3.94 M/UL — LOW (ref 4.2–5.8)
RBC # FLD: 13.9 % — SIGNIFICANT CHANGE UP (ref 10.3–14.5)
SODIUM SERPL-SCNC: 141 MMOL/L — SIGNIFICANT CHANGE UP (ref 135–145)
WBC # BLD: 6.31 K/UL — SIGNIFICANT CHANGE UP (ref 3.8–10.5)
WBC # FLD AUTO: 6.31 K/UL — SIGNIFICANT CHANGE UP (ref 3.8–10.5)

## 2020-12-08 PROCEDURE — C1894: CPT

## 2020-12-08 PROCEDURE — 99152 MOD SED SAME PHYS/QHP 5/>YRS: CPT

## 2020-12-08 PROCEDURE — 82962 GLUCOSE BLOOD TEST: CPT

## 2020-12-08 PROCEDURE — 93460 R&L HRT ART/VENTRICLE ANGIO: CPT | Mod: 26

## 2020-12-08 PROCEDURE — 85027 COMPLETE CBC AUTOMATED: CPT

## 2020-12-08 PROCEDURE — 80048 BASIC METABOLIC PNL TOTAL CA: CPT

## 2020-12-08 PROCEDURE — 93010 ELECTROCARDIOGRAM REPORT: CPT

## 2020-12-08 PROCEDURE — C1887: CPT

## 2020-12-08 PROCEDURE — 93460 R&L HRT ART/VENTRICLE ANGIO: CPT

## 2020-12-08 PROCEDURE — 93005 ELECTROCARDIOGRAM TRACING: CPT

## 2020-12-08 PROCEDURE — C1769: CPT

## 2020-12-08 RX ORDER — SITAGLIPTIN AND METFORMIN HYDROCHLORIDE 500; 50 MG/1; MG/1
1 TABLET, FILM COATED ORAL
Qty: 60 | Refills: 0
Start: 2020-12-08 | End: 2021-01-06

## 2020-12-08 RX ORDER — SODIUM CHLORIDE 9 MG/ML
10 INJECTION INTRAMUSCULAR; INTRAVENOUS; SUBCUTANEOUS
Refills: 0 | Status: DISCONTINUED | OUTPATIENT
Start: 2020-12-08 | End: 2020-12-22

## 2020-12-08 NOTE — H&P CARDIOLOGY - PMH
Aortic valve stenosis, etiology of cardiac valve disease unspecified    Asthma  denies any hospitalizations, denies any intubations. Stopped taking Advair several years ago.  Diabetes mellitus  Type 2 DM  Fatty liver    Hyperlipidemia, unspecified hyperlipidemia type    Hypertension    Malignant neoplasm of colon, unspecified part of colon    Prostate cancer  2013- s/p TURP

## 2020-12-08 NOTE — H&P CARDIOLOGY - HISTORY OF PRESENT ILLNESS
69 y/o  male (denies implanted cardiac devices) with PMHx of Prostate CA s/p prostatectomy in 2013, OA, Fatty liver, HTN, T2DM (A1C 8.6), HLD (unable to tolerate meds), recent diagnosis colon cancer which may require surgery and known AS now progressed to severe on TTE - DEANDRE 0.7 who is now under evaluation for AVR prior to colon surgery.  Patient presents today for R/L HC.  Patient denies CP/dyspnea/syncope but endorses that he is very limited in his activity level.

## 2020-12-08 NOTE — H&P CARDIOLOGY - GASTROINTESTINAL DETAILS
no masses palpable/soft/bowel sounds normal/nontender/no distention bowel sounds normal/nontender/no masses palpable/soft

## 2020-12-08 NOTE — ASU DISCHARGE PLAN (ADULT/PEDIATRIC) - CARE PROVIDER_API CALL
Bandar Artis J  INTERVENTIONAL CARDIOLOGY  [address] [place] [address]  Phone: [phone]  Fax: [phone]  Follow Up Time:

## 2020-12-15 ENCOUNTER — APPOINTMENT (OUTPATIENT)
Dept: CARDIOTHORACIC SURGERY | Facility: CLINIC | Age: 70
End: 2020-12-15
Payer: MEDICARE

## 2020-12-15 ENCOUNTER — APPOINTMENT (OUTPATIENT)
Dept: CARDIOTHORACIC SURGERY | Facility: CLINIC | Age: 70
End: 2020-12-15

## 2020-12-15 ENCOUNTER — LABORATORY RESULT (OUTPATIENT)
Age: 70
End: 2020-12-15

## 2020-12-15 ENCOUNTER — NON-APPOINTMENT (OUTPATIENT)
Age: 70
End: 2020-12-15

## 2020-12-15 ENCOUNTER — APPOINTMENT (OUTPATIENT)
Dept: CARDIOLOGY | Facility: CLINIC | Age: 70
End: 2020-12-15
Payer: MEDICARE

## 2020-12-15 VITALS
TEMPERATURE: 97.6 F | RESPIRATION RATE: 13 BRPM | HEART RATE: 94 BPM | OXYGEN SATURATION: 99 % | SYSTOLIC BLOOD PRESSURE: 166 MMHG | DIASTOLIC BLOOD PRESSURE: 94 MMHG

## 2020-12-15 VITALS — WEIGHT: 195 LBS | BODY MASS INDEX: 28.88 KG/M2 | HEIGHT: 69 IN

## 2020-12-15 DIAGNOSIS — Z01.818 ENCOUNTER FOR OTHER PREPROCEDURAL EXAMINATION: ICD-10-CM

## 2020-12-15 DIAGNOSIS — R53.83 OTHER FATIGUE: ICD-10-CM

## 2020-12-15 DIAGNOSIS — E78.00 PURE HYPERCHOLESTEROLEMIA, UNSPECIFIED: ICD-10-CM

## 2020-12-15 PROBLEM — E78.5 HYPERLIPIDEMIA, UNSPECIFIED: Chronic | Status: ACTIVE | Noted: 2020-12-08

## 2020-12-15 PROBLEM — K76.0 FATTY (CHANGE OF) LIVER, NOT ELSEWHERE CLASSIFIED: Chronic | Status: ACTIVE | Noted: 2020-12-08

## 2020-12-15 PROBLEM — I35.0 NONRHEUMATIC AORTIC (VALVE) STENOSIS: Chronic | Status: ACTIVE | Noted: 2020-12-08

## 2020-12-15 PROCEDURE — 93000 ELECTROCARDIOGRAM COMPLETE: CPT

## 2020-12-15 PROCEDURE — 99205 OFFICE O/P NEW HI 60 MIN: CPT

## 2020-12-15 NOTE — REVIEW OF SYSTEMS
[Feeling Tired] : feeling tired [Heartburn] : heartburn [Melena] : melshawna [Joint Pain] : joint pain [Easy Bleeding] : a tendency for easy bleeding [Negative] : Endocrine [Eyesight Problems] : eyesight problems [Chest Pain] : no chest pain [Palpitations] : no palpitations [Lower Ext Edema] : no extremity edema [Shortness Of Breath] : no shortness of breath [SOB on Exertion] : no shortness of breath during exertion [Dizziness] : no dizziness [Fainting] : no fainting

## 2020-12-15 NOTE — CONSULT LETTER
[Dear  ___] : Dear ~HAZEL, [Courtesy Letter:] : I had the pleasure of seeing your patient, [unfilled], in my office today. [Please see my note below.] : Please see my note below. [Consult Closing:] : Thank you very much for allowing me to participate in the care of this patient.  If you have any questions, please do not hesitate to contact me. [Sincerely,] : Sincerely, [FreeTextEntry2] : Dr. Beni Gonzalez\par [address] [facility]\par [facility] [id number] [FreeTextEntry3] : Pantera Young MD\par \par Cardiovascular & Thoracic Surgery\par Professor\par [facility]\par \par

## 2020-12-15 NOTE — HISTORY OF PRESENT ILLNESS
[FreeTextEntry1] : Mr. Alberto is a 70 year old male with past medical history which includes remote history of asthma, DMT2, HTN, HLD, CVA, prostate cancer s/p radical prostatectomy in 2013, statin induced myositis (treated with IVIG x 3 years, now on hold for colon cancer) and aortic valve stenosis. \par \par He was hospitalized at [facility] from 11/21/2020-11/24/2020 for rectal bleeding. Inpatient colonoscopy revealed an ascending colon mass, with pathology reporting invasive adenocarcinoma. TTE while hospitalized demonstrated severe AS.  He was referred by Dr. Sanchez to valve clinic for consideration for LEEANNA.  He denies angina, SOB, WITT, or syncope. \par

## 2020-12-15 NOTE — PHYSICAL EXAM
[General Appearance - Alert] : alert [General Appearance - In No Acute Distress] : in no acute distress [Outer Ear] : the ears and nose were normal in appearance [Jugular Venous Distention Increased] : there was no jugular-venous distention [Respiration, Rhythm And Depth] : normal respiratory rhythm and effort [Auscultation Breath Sounds / Voice Sounds] : lungs were clear to auscultation bilaterally [III] : a grade 3 [No Pitting Edema] : no pitting edema present [Examination Of The Chest] : the chest was normal in appearance [Chest Visual Inspection Thoracic Asymmetry] : no chest asymmetry [Breast Appearance] : normal in appearance [Bowel Sounds] : normal bowel sounds [Abdomen Soft] : soft [Cervical Lymph Nodes Enlarged Posterior Bilaterally] : posterior cervical [Cervical Lymph Nodes Enlarged Anterior Bilaterally] : anterior cervical [No CVA Tenderness] : no ~M costovertebral angle tenderness [Abnormal Walk] : normal gait [Involuntary Movements] : no involuntary movements were seen [Skin Color & Pigmentation] : normal skin color and pigmentation [No Focal Deficits] : no focal deficits [Oriented To Time, Place, And Person] : oriented to person, place, and time [Impaired Insight] : insight and judgment were intact [Right Carotid Bruit] : no bruit heard over the right carotid [Left Carotid Bruit] : no bruit heard over the left carotid [FreeTextEntry1] : deferred

## 2020-12-15 NOTE — ASSESSMENT
[FreeTextEntry1] : Mr. Alberto is a 70 year old male with past medical history includes, asthma, DMT2, HTN, HLD, CVA, prostate cancer s/p radical prostatectomy in 2013, statin induced myositis (treated with IVIG) and aortic valve stenosis. He was hospitalized at [facility] from 11/21-11/24 for rectal bleeding. Inpatient colonoscopy revealed an ascending colon mass, with pathology reporting invasive adenocarcinoma. He has not followed up yet on the colon cancer. As per his wife, they would like to stay being treated in [facility] (Dr. Pierre). He was referred by Dr. Sanchez to valve clinic for consideration for LEEANNA. He denies angina, SOB, WITT or syncope. He reports fatigue and napping during the day.\par \par TTE reviewed.  Clearly severe AS.  Patient agreeable for a transcatheter valve so that he maqy pursue necessary colon surgery sooner.  Will need CTA prior to scheduling

## 2020-12-15 NOTE — DATA REVIEWED
[FreeTextEntry1] : Cardiac Cath: \par 12/8/2020\par The aortic valve leaflets exhibited mild calcification.\par The coronary circulation is right dominant. LM: Angiography showed minor luminal irregularities with no flow limiting lesions. LAD: Angiography showed mild atherosclerosis with no flow limiting lesions.-- D1: CX: -- Proximal circumflex: Normal.-- OM1: Angiography showed minor luminal irregularities with no flow limiting lesions.RCA: -- Proximal RCA: There was a 60 % stenosis.-- Distal RCA: There was a 30 % stenosis. \par Mod-severe RCA disease. Mild LCA disease. By cath gradients, DEANDRE approximately 1.1

## 2020-12-16 PROBLEM — R53.83 FATIGUE: Status: ACTIVE | Noted: 2020-12-16

## 2020-12-16 NOTE — ASSESSMENT
[FreeTextEntry1] : Severe Aortic Valve Stenosis\par --The patient has severe symptomatic aortic valve stenosis.  He was recently diagnosed with colon cancer.  Prior to any intervention for his malignancy it has been recommended by his medical team that he undergoes correction of his aortic valve stenosis.  Explained what is aortic valve stenosis.  The associated signs/symptoms were reviewed.  The natural pathophysiology of untreated severe aortic valve stenosis was explained.  The different treatment options were gone over including medical therapy, aortic balloon valvuloplasty, TAVR and SAVR.  The pros/cons and the risks/benefits of the different approaches was explained.  Due to the patients and comorbidities he is considered to be an appropriate patient to be worked up for TAVR.  The necessary workup was gone over which includes TAVR CTA, PFTs, carotid US and cardiac catheterization (completed on 12/8/2020, images were personally reviewed with 60% stenosis in [facility]).  Indications for the studies/procedure and their risks/benefits was reviewed.   Indications and details of the TAVR procedure were gone over.  Benefits and risks were reviewed.  Risks include but are not limited to infection, bleeding, arrhythmia/pacemaker, TIA/stroke, vascular injury, hemodynamic instability and death.  The patient would like to proceed.\par \par All questions and concerns of the patient and his wife were addressed.\par

## 2020-12-16 NOTE — REVIEW OF SYSTEMS
[Fever] : no fever [Headache] : no headache [Recent Weight Gain (___ Lbs)] : no recent weight gain [Chills] : no chills [Recent Weight Loss (___ Lbs)] : no recent weight loss [Blurry Vision] : no blurred vision [Seeing Double (Diplopia)] : no diplopia [Shortness Of Breath] : shortness of breath [Chest  Pressure] : no chest pressure [Chest Pain] : no chest pain [Lower Ext Edema] : no extremity edema [Leg Claudication] : no intermittent leg claudication [Palpitations] : no palpitations [Abdominal Pain] : no abdominal pain [Nausea] : no nausea [Vomiting] : no vomiting [Heartburn] : no heartburn [Change in Appetite] : no change in appetite [Change In The Stool] : no change in stool [Dysphagia] : no dysphagia [Joint Swelling] : no joint swelling [Joint Stiffness] : joint stiffness [Muscle Cramps] : no muscle cramps [Limb Weakness (Paresis)] : no limb weakness [Skin: A Rash] : no rash: [Skin Lesions] : no skin lesions [Dizziness] : no dizziness [Tremor] : no tremor was seen [Numbness (Hypesthesia)] : no numbness [Convulsions] : no convulsions [Tingling (Paresthesia)] : no tingling [Confusion] : no confusion was observed [Memory Lapses Or Loss] : no memory lapses or loss [Anxiety] : no anxiety [Under Stress] : not under stress [Suicidal] : not suicidal [Easy Bleeding] : no tendency for easy bleeding [Swollen Glands] : no swollen glands [Easy Bruising] : no tendency for easy bruising [Swollen Glands In The Neck] : no swollen glands in the neck

## 2020-12-16 NOTE — HISTORY OF PRESENT ILLNESS
[FreeTextEntry1] : Mr. Alberto is a 70 year old male, referred by Dr. Sanchez for evaluation of aortic stenosis. He was hospitalized at [facility] from 11/21-11/24 for rectal bleeding. Inpatient colonoscopy revealed an ascending colon mass, with pathology reporting invasive adenocarcinoma. TTE while inpatient demonstrated severe aortic stenosis. He was advised to be evaluated by our team in valve clinic prior to undergoing treatment for colon cancer. \par \par He reports feeling well, with the exception of dyspnea upon exertion and fatigue, although he admits he is not very active. He has no angina, PND, orthopnea, dizziness, or LE edema. His past medical history includes, asthma, T2DM, HTN, HLD, CVA, prostate cancer s/p radical prostatectomy in 2013, and statin induced myositis three years ago which has been treated with IVIG, treatment is now on hold due to his colon cancer diagnosis.\par \par The patient and his wife (who is a 1 year breast cancer survivor) report that Mr. Alberto has been experiencing increasing shortness of breath upon exertion and fatigue.  He ambulates with a cane.  It is noted that he is walking slower.  He denies any chest pain/tightness/discomfort, fevers, chills, sweats, light-headed sensation, dizziness or palpitations.

## 2020-12-16 NOTE — PHYSICAL EXAM
[Well Groomed] : well groomed [General Appearance - In No Acute Distress] : no acute distress [Normal S1] : normal S1 [Normal S2] : normal S2 [S3] : no S3 [S4] : no S4 [Click] : no click [Distant] : the heart sounds were ~L not distant [Pericardial Rub] : no pericardial rub [Right Carotid Bruit] : no bruit heard over the right carotid [Left Carotid Bruit] : no bruit heard over the left carotid [2+] : left 2+ [Rt] : no varicose veins of the right leg [Lt] : no varicose veins of the left leg [Nail Clubbing] : no clubbing of the fingernails [Nail Splinter Hemorrhages] : no splinter hemorrhages of the nails [Affect] : the affect was normal [Mood] : the mood was normal [Memory Recent] : recent memory was not impaired

## 2021-01-04 ENCOUNTER — OUTPATIENT (OUTPATIENT)
Dept: OUTPATIENT SERVICES | Facility: HOSPITAL | Age: 71
LOS: 1 days | End: 2021-01-04
Payer: MEDICARE

## 2021-01-04 ENCOUNTER — APPOINTMENT (OUTPATIENT)
Dept: CARDIOLOGY | Facility: CLINIC | Age: 71
End: 2021-01-04

## 2021-01-04 ENCOUNTER — RESULT REVIEW (OUTPATIENT)
Age: 71
End: 2021-01-04

## 2021-01-04 DIAGNOSIS — Z00.00 ENCOUNTER FOR GENERAL ADULT MEDICAL EXAMINATION WITHOUT ABNORMAL FINDINGS: ICD-10-CM

## 2021-01-04 DIAGNOSIS — Z90.79 ACQUIRED ABSENCE OF OTHER GENITAL ORGAN(S): Chronic | ICD-10-CM

## 2021-01-04 DIAGNOSIS — I35.9 NONRHEUMATIC AORTIC VALVE DISORDER, UNSPECIFIED: ICD-10-CM

## 2021-01-04 PROCEDURE — 75572 CT HRT W/3D IMAGE: CPT | Mod: 26

## 2021-01-04 PROCEDURE — 75572 CT HRT W/3D IMAGE: CPT

## 2021-01-06 ENCOUNTER — APPOINTMENT (OUTPATIENT)
Dept: ULTRASOUND IMAGING | Facility: HOSPITAL | Age: 71
End: 2021-01-06

## 2021-01-06 ENCOUNTER — RESULT REVIEW (OUTPATIENT)
Age: 71
End: 2021-01-06

## 2021-01-06 ENCOUNTER — OUTPATIENT (OUTPATIENT)
Dept: OUTPATIENT SERVICES | Facility: HOSPITAL | Age: 71
LOS: 1 days | End: 2021-01-06

## 2021-01-06 ENCOUNTER — OUTPATIENT (OUTPATIENT)
Dept: OUTPATIENT SERVICES | Facility: HOSPITAL | Age: 71
LOS: 1 days | End: 2021-01-06
Payer: MEDICARE

## 2021-01-06 VITALS
TEMPERATURE: 98 F | OXYGEN SATURATION: 97 % | RESPIRATION RATE: 16 BRPM | HEART RATE: 104 BPM | HEIGHT: 70 IN | SYSTOLIC BLOOD PRESSURE: 92 MMHG | DIASTOLIC BLOOD PRESSURE: 60 MMHG | WEIGHT: 197.98 LBS

## 2021-01-06 DIAGNOSIS — Z98.890 OTHER SPECIFIED POSTPROCEDURAL STATES: Chronic | ICD-10-CM

## 2021-01-06 DIAGNOSIS — Z29.9 ENCOUNTER FOR PROPHYLACTIC MEASURES, UNSPECIFIED: ICD-10-CM

## 2021-01-06 DIAGNOSIS — E11.9 TYPE 2 DIABETES MELLITUS WITHOUT COMPLICATIONS: ICD-10-CM

## 2021-01-06 DIAGNOSIS — Z20.828 CONTACT WITH AND (SUSPECTED) EXPOSURE TO OTHER VIRAL COMMUNICABLE DISEASES: ICD-10-CM

## 2021-01-06 DIAGNOSIS — I35.0 NONRHEUMATIC AORTIC (VALVE) STENOSIS: ICD-10-CM

## 2021-01-06 DIAGNOSIS — Z90.79 ACQUIRED ABSENCE OF OTHER GENITAL ORGAN(S): Chronic | ICD-10-CM

## 2021-01-06 DIAGNOSIS — Z01.818 ENCOUNTER FOR OTHER PREPROCEDURAL EXAMINATION: ICD-10-CM

## 2021-01-06 LAB
A1C WITH ESTIMATED AVERAGE GLUCOSE RESULT: 6.7 % — HIGH (ref 4–5.6)
ANION GAP SERPL CALC-SCNC: 16 MMOL/L — SIGNIFICANT CHANGE UP (ref 5–17)
BLD GP AB SCN SERPL QL: NEGATIVE — SIGNIFICANT CHANGE UP
BUN SERPL-MCNC: 12 MG/DL — SIGNIFICANT CHANGE UP (ref 7–23)
CALCIUM SERPL-MCNC: 9.9 MG/DL — SIGNIFICANT CHANGE UP (ref 8.4–10.5)
CHLORIDE SERPL-SCNC: 100 MMOL/L — SIGNIFICANT CHANGE UP (ref 96–108)
CO2 SERPL-SCNC: 22 MMOL/L — SIGNIFICANT CHANGE UP (ref 22–31)
CREAT SERPL-MCNC: 0.47 MG/DL — LOW (ref 0.5–1.3)
ESTIMATED AVERAGE GLUCOSE: 146 MG/DL — HIGH (ref 68–114)
GLUCOSE SERPL-MCNC: 224 MG/DL — HIGH (ref 70–99)
HCT VFR BLD CALC: 41.6 % — SIGNIFICANT CHANGE UP (ref 39–50)
HGB BLD-MCNC: 13.2 G/DL — SIGNIFICANT CHANGE UP (ref 13–17)
MCHC RBC-ENTMCNC: 27.5 PG — SIGNIFICANT CHANGE UP (ref 27–34)
MCHC RBC-ENTMCNC: 31.7 GM/DL — LOW (ref 32–36)
MCV RBC AUTO: 86.7 FL — SIGNIFICANT CHANGE UP (ref 80–100)
MRSA PCR RESULT.: SIGNIFICANT CHANGE UP
NRBC # BLD: 0 /100 WBCS — SIGNIFICANT CHANGE UP (ref 0–0)
PLATELET # BLD AUTO: 297 K/UL — SIGNIFICANT CHANGE UP (ref 150–400)
POTASSIUM SERPL-MCNC: 4.2 MMOL/L — SIGNIFICANT CHANGE UP (ref 3.5–5.3)
POTASSIUM SERPL-SCNC: 4.2 MMOL/L — SIGNIFICANT CHANGE UP (ref 3.5–5.3)
RBC # BLD: 4.8 M/UL — SIGNIFICANT CHANGE UP (ref 4.2–5.8)
RBC # FLD: 13.7 % — SIGNIFICANT CHANGE UP (ref 10.3–14.5)
RH IG SCN BLD-IMP: POSITIVE — SIGNIFICANT CHANGE UP
S AUREUS DNA NOSE QL NAA+PROBE: DETECTED
SARS-COV-2 RNA SPEC QL NAA+PROBE: SIGNIFICANT CHANGE UP
SODIUM SERPL-SCNC: 138 MMOL/L — SIGNIFICANT CHANGE UP (ref 135–145)
WBC # BLD: 8.41 K/UL — SIGNIFICANT CHANGE UP (ref 3.8–10.5)
WBC # FLD AUTO: 8.41 K/UL — SIGNIFICANT CHANGE UP (ref 3.8–10.5)

## 2021-01-06 PROCEDURE — 93880 EXTRACRANIAL BILAT STUDY: CPT | Mod: 26

## 2021-01-06 PROCEDURE — 71046 X-RAY EXAM CHEST 2 VIEWS: CPT | Mod: 26

## 2021-01-06 RX ORDER — CEFUROXIME AXETIL 250 MG
1500 TABLET ORAL ONCE
Refills: 0 | Status: DISCONTINUED | OUTPATIENT
Start: 2021-01-08 | End: 2021-01-12

## 2021-01-06 NOTE — H&P PST ADULT - ASSESSMENT
CAPRINI SCORE [CLOT updated 18]    AGE RELATED RISK FACTORS                                                       MOBILITY RELATED FACTORS  [ ] Age 41-60 years                                            (1 Point)                    [ ] Bed rest                                                        (1 Point)  [ ] Age: 61-74 years                                           (2 Points)                  [ ] Plaster cast                                                   (2 Points)  [ ] Age= 75 years                                              (3 Points)                    [ ] Bed bound for more than 72 hours                 (2 Points)    DISEASE RELATED RISK FACTORS                                               GENDER SPECIFIC FACTORS  [ ] Edema in the lower extremities                       (1 Point)              [ ] Pregnancy                                                     (1 Point)  [ ] Varicose veins                                               (1 Point)                     [ ] Post-partum < 6 weeks                                   (1 Point)             [ ] BMI > 25 Kg/m2                                            (1 Point)                     [ ] Hormonal therapy  or oral contraception          (1 Point)                 [ ] Sepsis (in the previous month)                        (1 Point)               [ ] History of pregnancy complications                 (1 point)  [ ] Pneumonia or serious lung disease                                               [ ] Unexplained or recurrent                     (1 Point)           (in the previous month)                               (1 Point)  [ ] Abnormal pulmonary function test                     (1 Point)                 SURGERY RELATED RISK FACTORS  [ ] Acute myocardial infarction                              (1 Point)               [ ]  Section                                             (1 Point)  [ ] Congestive heart failure (in the previous month)  (1 Point)      [ ] Minor surgery                                                  (1 Point)   [ ] Inflammatory bowel disease                             (1 Point)               [ ] Arthroscopic surgery                                        (2 Points)  [ ] Central venous access                                      (2 Points)                [ ] General surgery lasting more than 45 minutes (2 points)  [ ] Present or previous malignancy                     (2 Points)                [ ] Elective arthroplasty                                         (5 points)    [ ] Stroke (in the previous month)                          (5 Points)                                                                                                                                                           HEMATOLOGY RELATED FACTORS                                                 TRAUMA RELATED RISK FACTORS  [ ] Prior episodes of VTE                                     (3 Points)                [ ] Fracture of the hip, pelvis, or leg                       (5 Points)  [ ] Positive family history for VTE                         (3 Points)             [ ] Acute spinal cord injury (in the previous month)  (5 Points)  [ ] Prothrombin 08480 A                                     (3 Points)               [ ] Paralysis  (less than 1 month)                             (5 Points)  [ ] Factor V Leiden                                             (3 Points)                  [ ] Multiple Trauma within 1 month                        (5 Points)  [ ] Lupus anticoagulants                                     (3 Points)                                                           [ ] Anticardiolipin antibodies                               (3 Points)                                                       [ ] High homocysteine in the blood                      (3 Points)                                             [ ] Other congenital or acquired thrombophilia      (3 Points)                                                [ ] Heparin induced thrombocytopenia                  (3 Points)                                     Total Score [   7       ]

## 2021-01-06 NOTE — H&P PST ADULT - OTHER CARE PROVIDERS
Dr. Sanchez (cards)    Dr. Rosenthal (neuro) last seen in 11/2020   Dr. Alcantar (GI)   Dr. Alfaro (uro)

## 2021-01-06 NOTE — H&P PST ADULT - NEGATIVE MUSCULOSKELETAL SYMPTOMS
no arthralgia/no arthritis/no joint swelling/no myalgia/no muscle cramps/no stiffness/no neck pain/no arm pain L/no arm pain R/no back pain/no leg pain L/no leg pain R

## 2021-01-06 NOTE — H&P PST ADULT - NSICDXPROBLEM_GEN_ALL_CORE_FT
PROBLEM DIAGNOSES  Problem: Aortic valve stenosis, etiology of cardiac valve disease unspecified  Assessment and Plan: Scheduled TAVR  will take amlodipine with DOS  sent for chest xray and carotids after PST  nasal swabs collected      Problem: T2DM (type 2 diabetes mellitus)  Assessment and Plan: Last dose janumet in the AM on 1/7  fingerstick on admission  A1c ordered with PST labs     Problem: Need for prophylactic measure  Assessment and Plan: The Caprini score indicates that this patient is at high risk for a VTE event (score 6 or greater). Surgical patients in this group will benefit from both pharmacologic prophylaxis and intermittent compression devices.  The surgical team will determine the balance between VTE risk and bleeding risk, and other clinical considerations

## 2021-01-06 NOTE — H&P PST ADULT - HISTORY OF PRESENT ILLNESS
71 y/o male with pmhx of T2DM, HTN, HLD, prostate cancer s/p radical prostatectomy, statin induced myositis started IVIg infusions in 2018 with neuro, Dr. Rosenthal,  71 y/o male with pmhx of T2DM, HTN, HLD, prostate cancer s/p radical prostatectomy, statin induced myositis started IVIg infusions in 2018, with recently diagnosed colon cancer after an episode of hematochezia in 11/2020 and now with progression of known aortic stenosis. TTE, cath on 12/8 reveal severe AS. Pt reports feeling excessively tired and fatigued over the last year but denies CP, WITT, syncope, PND. Will require surgical intervention for colon cancer but needs to address valve issue first. Presents now for scheduled TAVR.     *covid 1/6 at [facility] 69 y/o male with pmhx of T2DM, HTN, HLD, prostate cancer s/p radical prostatectomy, statin induced myositis started IVIg infusions in 2018, with recently diagnosed colon cancer after an episode of rectal bleeding in 11/2020 and now with progression of known aortic stenosis. TTE, cath on 12/8 reveal severe AS. Pt reports feeling excessively tired and fatigued over the last year but denies CP, WITT, syncope, PND. Will require surgical intervention for colon cancer but needs to address valve issue first. Presents now for scheduled TAVR.     *covid 1/6 at [facility]

## 2021-01-07 ENCOUNTER — NON-APPOINTMENT (OUTPATIENT)
Age: 71
End: 2021-01-07

## 2021-01-07 DIAGNOSIS — Z22.321 CARRIER OR SUSPECTED CARRIER OF METHICILLIN SUSCEPTIBLE STAPHYLOCOCCUS AUREUS: ICD-10-CM

## 2021-01-07 PROBLEM — C18.9 MALIGNANT NEOPLASM OF COLON, UNSPECIFIED: Chronic | Status: ACTIVE | Noted: 2020-12-08

## 2021-01-07 PROBLEM — M60.9 MYOSITIS, UNSPECIFIED: Chronic | Status: ACTIVE | Noted: 2021-01-06

## 2021-01-07 PROBLEM — E11.9 TYPE 2 DIABETES MELLITUS WITHOUT COMPLICATIONS: Chronic | Status: ACTIVE | Noted: 2021-01-06

## 2021-01-08 ENCOUNTER — INPATIENT (INPATIENT)
Facility: HOSPITAL | Age: 71
LOS: 3 days | Discharge: HOME | DRG: 267 | End: 2021-01-12
Attending: THORACIC SURGERY (CARDIOTHORACIC VASCULAR SURGERY) | Admitting: THORACIC SURGERY (CARDIOTHORACIC VASCULAR SURGERY)
Payer: MEDICARE

## 2021-01-08 ENCOUNTER — APPOINTMENT (OUTPATIENT)
Dept: CARDIOTHORACIC SURGERY | Facility: HOSPITAL | Age: 71
End: 2021-01-08

## 2021-01-08 VITALS
DIASTOLIC BLOOD PRESSURE: 76 MMHG | OXYGEN SATURATION: 97 % | RESPIRATION RATE: 18 BRPM | WEIGHT: 197.98 LBS | SYSTOLIC BLOOD PRESSURE: 177 MMHG | HEART RATE: 93 BPM | HEIGHT: 70 IN | TEMPERATURE: 98 F

## 2021-01-08 DIAGNOSIS — I35.0 NONRHEUMATIC AORTIC (VALVE) STENOSIS: ICD-10-CM

## 2021-01-08 DIAGNOSIS — Z95.2 PRESENCE OF PROSTHETIC HEART VALVE: ICD-10-CM

## 2021-01-08 DIAGNOSIS — Z90.79 ACQUIRED ABSENCE OF OTHER GENITAL ORGAN(S): Chronic | ICD-10-CM

## 2021-01-08 DIAGNOSIS — Z98.890 OTHER SPECIFIED POSTPROCEDURAL STATES: Chronic | ICD-10-CM

## 2021-01-08 LAB
ALBUMIN SERPL ELPH-MCNC: 3.6 G/DL — SIGNIFICANT CHANGE UP (ref 3.3–5)
ALP SERPL-CCNC: 30 U/L — LOW (ref 40–120)
ALT FLD-CCNC: 48 U/L — HIGH (ref 10–45)
ANION GAP SERPL CALC-SCNC: 9 MMOL/L — SIGNIFICANT CHANGE UP (ref 5–17)
APTT BLD: 31.1 SEC — SIGNIFICANT CHANGE UP (ref 27.5–35.5)
AST SERPL-CCNC: 30 U/L — SIGNIFICANT CHANGE UP (ref 10–40)
BASOPHILS # BLD AUTO: 0.06 K/UL — SIGNIFICANT CHANGE UP (ref 0–0.2)
BASOPHILS NFR BLD AUTO: 0.9 % — SIGNIFICANT CHANGE UP (ref 0–2)
BILIRUB SERPL-MCNC: 0.4 MG/DL — SIGNIFICANT CHANGE UP (ref 0.2–1.2)
BUN SERPL-MCNC: 13 MG/DL — SIGNIFICANT CHANGE UP (ref 7–23)
CALCIUM SERPL-MCNC: 8.3 MG/DL — LOW (ref 8.4–10.5)
CHLORIDE SERPL-SCNC: 104 MMOL/L — SIGNIFICANT CHANGE UP (ref 96–108)
CO2 SERPL-SCNC: 28 MMOL/L — SIGNIFICANT CHANGE UP (ref 22–31)
CREAT SERPL-MCNC: 0.47 MG/DL — LOW (ref 0.5–1.3)
EOSINOPHIL # BLD AUTO: 0.23 K/UL — SIGNIFICANT CHANGE UP (ref 0–0.5)
EOSINOPHIL NFR BLD AUTO: 3.5 % — SIGNIFICANT CHANGE UP (ref 0–6)
GLUCOSE BLDC GLUCOMTR-MCNC: 164 MG/DL — HIGH (ref 70–99)
GLUCOSE BLDC GLUCOMTR-MCNC: 172 MG/DL — HIGH (ref 70–99)
GLUCOSE BLDC GLUCOMTR-MCNC: 178 MG/DL — HIGH (ref 70–99)
GLUCOSE BLDC GLUCOMTR-MCNC: 203 MG/DL — HIGH (ref 70–99)
GLUCOSE BLDC GLUCOMTR-MCNC: 226 MG/DL — HIGH (ref 70–99)
GLUCOSE SERPL-MCNC: 226 MG/DL — HIGH (ref 70–99)
HCT VFR BLD CALC: 33.7 % — LOW (ref 39–50)
HGB BLD-MCNC: 10.8 G/DL — LOW (ref 13–17)
IMM GRANULOCYTES NFR BLD AUTO: 0.8 % — SIGNIFICANT CHANGE UP (ref 0–1.5)
INR BLD: 1.13 RATIO — SIGNIFICANT CHANGE UP (ref 0.88–1.16)
LYMPHOCYTES # BLD AUTO: 0.92 K/UL — LOW (ref 1–3.3)
LYMPHOCYTES # BLD AUTO: 13.9 % — SIGNIFICANT CHANGE UP (ref 13–44)
MCHC RBC-ENTMCNC: 27.3 PG — SIGNIFICANT CHANGE UP (ref 27–34)
MCHC RBC-ENTMCNC: 32 GM/DL — SIGNIFICANT CHANGE UP (ref 32–36)
MCV RBC AUTO: 85.3 FL — SIGNIFICANT CHANGE UP (ref 80–100)
MONOCYTES # BLD AUTO: 0.51 K/UL — SIGNIFICANT CHANGE UP (ref 0–0.9)
MONOCYTES NFR BLD AUTO: 7.7 % — SIGNIFICANT CHANGE UP (ref 2–14)
NEUTROPHILS # BLD AUTO: 4.87 K/UL — SIGNIFICANT CHANGE UP (ref 1.8–7.4)
NEUTROPHILS NFR BLD AUTO: 73.2 % — SIGNIFICANT CHANGE UP (ref 43–77)
NRBC # BLD: 0 /100 WBCS — SIGNIFICANT CHANGE UP (ref 0–0)
PLATELET # BLD AUTO: 232 K/UL — SIGNIFICANT CHANGE UP (ref 150–400)
POTASSIUM SERPL-MCNC: 4 MMOL/L — SIGNIFICANT CHANGE UP (ref 3.5–5.3)
POTASSIUM SERPL-SCNC: 4 MMOL/L — SIGNIFICANT CHANGE UP (ref 3.5–5.3)
PROT SERPL-MCNC: 6.3 G/DL — SIGNIFICANT CHANGE UP (ref 6–8.3)
PROTHROM AB SERPL-ACNC: 13.5 SEC — SIGNIFICANT CHANGE UP (ref 10.6–13.6)
RBC # BLD: 3.95 M/UL — LOW (ref 4.2–5.8)
RBC # FLD: 13.4 % — SIGNIFICANT CHANGE UP (ref 10.3–14.5)
RH IG SCN BLD-IMP: POSITIVE — SIGNIFICANT CHANGE UP
SODIUM SERPL-SCNC: 141 MMOL/L — SIGNIFICANT CHANGE UP (ref 135–145)
WBC # BLD: 6.64 K/UL — SIGNIFICANT CHANGE UP (ref 3.8–10.5)
WBC # FLD AUTO: 6.64 K/UL — SIGNIFICANT CHANGE UP (ref 3.8–10.5)

## 2021-01-08 PROCEDURE — 93306 TTE W/DOPPLER COMPLETE: CPT | Mod: 26

## 2021-01-08 PROCEDURE — 93010 ELECTROCARDIOGRAM REPORT: CPT

## 2021-01-08 PROCEDURE — 93010 ELECTROCARDIOGRAM REPORT: CPT | Mod: 77

## 2021-01-08 PROCEDURE — 33361 REPLACE AORTIC VALVE PERQ: CPT | Mod: 62,Q0

## 2021-01-08 PROCEDURE — 33361 REPLACE AORTIC VALVE PERQ: CPT | Mod: Q0,62

## 2021-01-08 RX ORDER — CEFUROXIME AXETIL 250 MG
1500 TABLET ORAL EVERY 8 HOURS
Refills: 0 | Status: COMPLETED | OUTPATIENT
Start: 2021-01-08 | End: 2021-01-08

## 2021-01-08 RX ORDER — ONDANSETRON 8 MG/1
4 TABLET, FILM COATED ORAL ONCE
Refills: 0 | Status: DISCONTINUED | OUTPATIENT
Start: 2021-01-08 | End: 2021-01-12

## 2021-01-08 RX ORDER — SODIUM CHLORIDE 9 MG/ML
1000 INJECTION, SOLUTION INTRAVENOUS
Refills: 0 | Status: DISCONTINUED | OUTPATIENT
Start: 2021-01-08 | End: 2021-01-12

## 2021-01-08 RX ORDER — HYDRALAZINE HCL 50 MG
5 TABLET ORAL ONCE
Refills: 0 | Status: COMPLETED | OUTPATIENT
Start: 2021-01-08 | End: 2021-01-08

## 2021-01-08 RX ORDER — INSULIN LISPRO 100/ML
VIAL (ML) SUBCUTANEOUS
Refills: 0 | Status: DISCONTINUED | OUTPATIENT
Start: 2021-01-08 | End: 2021-01-08

## 2021-01-08 RX ORDER — DEXTROSE 50 % IN WATER 50 %
15 SYRINGE (ML) INTRAVENOUS ONCE
Refills: 0 | Status: DISCONTINUED | OUTPATIENT
Start: 2021-01-08 | End: 2021-01-12

## 2021-01-08 RX ORDER — ACETAMINOPHEN 500 MG
1000 TABLET ORAL ONCE
Refills: 0 | Status: DISCONTINUED | OUTPATIENT
Start: 2021-01-08 | End: 2021-01-12

## 2021-01-08 RX ORDER — INSULIN LISPRO 100/ML
3 VIAL (ML) SUBCUTANEOUS
Refills: 0 | Status: DISCONTINUED | OUTPATIENT
Start: 2021-01-08 | End: 2021-01-12

## 2021-01-08 RX ORDER — LISINOPRIL 2.5 MG/1
20 TABLET ORAL DAILY
Refills: 0 | Status: DISCONTINUED | OUTPATIENT
Start: 2021-01-08 | End: 2021-01-12

## 2021-01-08 RX ORDER — GLUCAGON INJECTION, SOLUTION 0.5 MG/.1ML
1 INJECTION, SOLUTION SUBCUTANEOUS ONCE
Refills: 0 | Status: DISCONTINUED | OUTPATIENT
Start: 2021-01-08 | End: 2021-01-12

## 2021-01-08 RX ORDER — DEXTROSE 50 % IN WATER 50 %
12.5 SYRINGE (ML) INTRAVENOUS ONCE
Refills: 0 | Status: DISCONTINUED | OUTPATIENT
Start: 2021-01-08 | End: 2021-01-12

## 2021-01-08 RX ORDER — DEXTROSE 50 % IN WATER 50 %
25 SYRINGE (ML) INTRAVENOUS ONCE
Refills: 0 | Status: DISCONTINUED | OUTPATIENT
Start: 2021-01-08 | End: 2021-01-12

## 2021-01-08 RX ORDER — ASPIRIN/CALCIUM CARB/MAGNESIUM 324 MG
81 TABLET ORAL DAILY
Refills: 0 | Status: DISCONTINUED | OUTPATIENT
Start: 2021-01-08 | End: 2021-01-12

## 2021-01-08 RX ORDER — CHLORHEXIDINE GLUCONATE 213 G/1000ML
1 SOLUTION TOPICAL ONCE
Refills: 0 | Status: DISCONTINUED | OUTPATIENT
Start: 2021-01-08 | End: 2021-01-08

## 2021-01-08 RX ORDER — LABETALOL HCL 100 MG
10 TABLET ORAL ONCE
Refills: 0 | Status: COMPLETED | OUTPATIENT
Start: 2021-01-08 | End: 2021-01-08

## 2021-01-08 RX ORDER — AMLODIPINE BESYLATE 2.5 MG/1
10 TABLET ORAL DAILY
Refills: 0 | Status: DISCONTINUED | OUTPATIENT
Start: 2021-01-08 | End: 2021-01-12

## 2021-01-08 RX ORDER — INSULIN LISPRO 100/ML
VIAL (ML) SUBCUTANEOUS AT BEDTIME
Refills: 0 | Status: DISCONTINUED | OUTPATIENT
Start: 2021-01-08 | End: 2021-01-08

## 2021-01-08 RX ORDER — TAMSULOSIN HYDROCHLORIDE 0.4 MG/1
0.4 CAPSULE ORAL AT BEDTIME
Refills: 0 | Status: DISCONTINUED | OUTPATIENT
Start: 2021-01-09 | End: 2021-01-12

## 2021-01-08 RX ORDER — FENTANYL CITRATE 50 UG/ML
25 INJECTION INTRAVENOUS
Refills: 0 | Status: DISCONTINUED | OUTPATIENT
Start: 2021-01-08 | End: 2021-01-10

## 2021-01-08 RX ORDER — LIDOCAINE HCL 20 MG/ML
0.2 VIAL (ML) INJECTION ONCE
Refills: 0 | Status: DISCONTINUED | OUTPATIENT
Start: 2021-01-08 | End: 2021-01-08

## 2021-01-08 RX ORDER — INSULIN LISPRO 100/ML
VIAL (ML) SUBCUTANEOUS
Refills: 0 | Status: DISCONTINUED | OUTPATIENT
Start: 2021-01-08 | End: 2021-01-12

## 2021-01-08 RX ORDER — SODIUM CHLORIDE 9 MG/ML
3 INJECTION INTRAMUSCULAR; INTRAVENOUS; SUBCUTANEOUS EVERY 8 HOURS
Refills: 0 | Status: DISCONTINUED | OUTPATIENT
Start: 2021-01-08 | End: 2021-01-08

## 2021-01-08 RX ORDER — TAMSULOSIN HYDROCHLORIDE 0.4 MG/1
0.8 CAPSULE ORAL ONCE
Refills: 0 | Status: COMPLETED | OUTPATIENT
Start: 2021-01-08 | End: 2021-01-08

## 2021-01-08 RX ADMIN — Medication 10 MILLIGRAM(S): at 14:05

## 2021-01-08 RX ADMIN — Medication 100 MILLIGRAM(S): at 22:52

## 2021-01-08 RX ADMIN — AMLODIPINE BESYLATE 10 MILLIGRAM(S): 2.5 TABLET ORAL at 17:17

## 2021-01-08 RX ADMIN — TAMSULOSIN HYDROCHLORIDE 0.8 MILLIGRAM(S): 0.4 CAPSULE ORAL at 18:57

## 2021-01-08 RX ADMIN — Medication 81 MILLIGRAM(S): at 17:17

## 2021-01-08 RX ADMIN — LISINOPRIL 20 MILLIGRAM(S): 2.5 TABLET ORAL at 17:17

## 2021-01-08 RX ADMIN — Medication 1: at 17:17

## 2021-01-08 RX ADMIN — Medication 5 MILLIGRAM(S): at 18:57

## 2021-01-08 RX ADMIN — Medication 100 MILLIGRAM(S): at 17:17

## 2021-01-08 NOTE — PRE-OP CHECKLIST - IDENTIFICATION BAND VERIFIED
Key Peripheral Vascular Disease Meds             warfarin (COUMADIN) 2.5 mg tablet (Taking) 1 to 2 tabs daily    warfarin (COUMADIN) 5 mg tablet (Taking) TAKE ONE TABLET BY MOUTH ONCE DAILY        Lab Results   Component Value Date/Time    WBC 5.3 09/05/2019 10:57 AM    HGB 14.3 09/05/2019 10:57 AM    HCT 42.8 09/05/2019 10:57 AM    PLATELET 155 59/53/4809 10:57 AM    Creatinine 1.00 09/05/2019 10:57 AM    Creatinine (POC) 1.0 09/05/2019 04:09 PM    BUN 19 09/05/2019 10:57 AM    INR 1.6 11/07/2018 01:20 PM    INR POC 3.1 02/11/2020 10:31 AM    Prothrombin time 15.7 11/07/2018 01:20 PM    Cholesterol, total 234 09/05/2019 10:57 AM    HDL Cholesterol 59 09/05/2019 10:57 AM    LDL, calculated 148 09/05/2019 10:57 AM    Triglyceride 133 09/05/2019 10:57 AM done

## 2021-01-08 NOTE — CHART NOTE - NSCHARTNOTEFT_GEN_A_CORE
Pt s/p TAVR with Perclose in RFA with slight oozing from site serosanguinous.  Pressure held for 5 min redressed no hematoma.    ECG 2 hours post procedure with QRS 80ms TVP removed by Dr. Rawls 8 FR venous sheath removed by undersigned with no hematoma/bleeding after 15 min manual pressure and DSD and Tegaderm placed.  2+ pedal pulses.  Pt tachycardia with distended bladder Tele  with occ PACs and PVCs.  James placed with ~600 cc clear yellow urine bladder decompressed.  Ajmes removed accurate I&O to be charted by RN.      Discussed with Dr. Rawls Labetalol 10mg IVP given now and will resume home medications.     Gisela Hernandez, [facility]-BC  Cardiology Pt s/p TAVR with Perclose in RFA with slight oozing from site serosanguinous.  Pressure held for 5 min redressed no hematoma.    ECG 2 hours post procedure with QRS 80ms TVP removed by Dr. Rawls 8 FR venous sheath removed by undersigned with no hematoma/bleeding after 15 min manual pressure and DSD and Tegaderm placed.  2+ pedal pulses.  Pt tachycardia with distended bladder Tele  with occ PACs and PVCs.  James placed with ~600 cc clear yellow urine bladder decompressed.  Accurate I&O to be charted by RN.      Discussed with Dr. Rawls Labetalol 10mg IVP given now and will resume home medications.     Reported to [facility] NP will maintain James and service will start Flomax to aid in successful trial void.  Pt to transfer to bed 273W on CM with RN    Gisela Hernandez, [facility]-BC  Cardiology

## 2021-01-08 NOTE — PROGRESS NOTE ADULT - SUBJECTIVE AND OBJECTIVE BOX
Subjective " Hello I feel pretty good'    VITAL SIGNS    Telemetry: NSR 82     Vital Signs Last 24 Hrs  T(C): 36.7 (01-08-21 @ 14:45), Max: 36.8 (01-08-21 @ 06:32)  T(F): 98.1 (01-08-21 @ 14:45), Max: 98.2 (01-08-21 @ 06:32)  HR: 92 (01-08-21 @ 14:45) (67 - 103)  BP: 172/84 (01-08-21 @ 14:45) (108/58 - 177/76)  RR: 18 (01-08-21 @ 14:45) (16 - 19)  SpO2: 97% (01-08-21 @ 14:45) (93% - 98%)           01-08 @ 07:01  -  01-08 @ 17:33  --------------------------------------------------------  IN: 0 mL / OUT: 800 mL / NET: -800 mL    MEDICATIONS  (STANDING):  acetaminophen  IVPB .. 1000 milliGRAM(s) IV Intermittent once  amLODIPine   Tablet 10 milliGRAM(s) Oral daily  aspirin enteric coated 81 milliGRAM(s) Oral daily  cefuroxime  IVPB 1500 milliGRAM(s) IV Intermittent once  cefuroxime  IVPB 1500 milliGRAM(s) IV Intermittent every 8 hours  glucagon  Injectable 1 milliGRAM(s) IntraMuscular once  insulin lispro (ADMELOG) corrective regimen sliding scale   SubCutaneous three times a day before meals  insulin lispro Injectable (ADMELOG) 3 Unit(s) SubCutaneous before breakfast  insulin lispro Injectable (ADMELOG) 3 Unit(s) SubCutaneous before lunch  insulin lispro Injectable (ADMELOG) 3 Unit(s) SubCutaneous before dinner  lisinopril 20 milliGRAM(s) Oral daily  tamsulosin 0.8 milliGRAM(s) Oral once    MEDICATIONS  (PRN):  fentaNYL    Injectable 25 MICROGram(s) IV Push every 5 minutes PRN Moderate Pain (4 - 6)  ondansetron Injectable 4 milliGRAM(s) IV Push once PRN Nausea and/or Vomiting  )    CAPILLARY BLOOD GLUCOSE      POCT Blood Glucose.: 164 mg/dL (08 Jan 2021 17:13)  POCT Blood Glucose.: 172 mg/dL (08 Jan 2021 13:48)  POCT Blood Glucose.: 226 mg/dL (08 Jan 2021 09:31)  POCT Blood Glucose.: 203 mg/dL (08 Jan 2021 06:26)      PHYSICAL EXAM  Neurology: alert and oriented x 3, nonfocal, no gross deficits  CV : S1 S2 RRR  Lungs: B/l breath sounds on room air      Abdomen: soft, nontender, nondistended, positive bowel sounds, last bowel movement preoo    :  danielson for urinary retention placed 1/8             Extremities:  equal strength throughout   B/lle Warm  well perfused+ DP    Right groin nontender CDI no hemotoma  left groin benign CDI no hemotoma                                          Discussed with Cardiothoracic Team at  rounds.

## 2021-01-08 NOTE — PROGRESS NOTE ADULT - PROBLEM SELECTOR PLAN 1
1/8 TAVR JESSI   No PLAVIX risk of for Bleed  ECHO in am  EKG in am   James for urinary retention-  Flomax  started  TOV in am  Consider  TOPROL in am  Disposition to home with KELLY Sunday 1/8 TAVR JESSI   No PLAVIX risk of for Bleed  Norvasc 20 qd  Lisinopril  20 mg qd  ECHO in am  EKG in am   James for urinary retention-  Flomax  started  TOV in am  Consider  TOPROL in am  Disposition to home with [facility] Sunday

## 2021-01-08 NOTE — BRIEF OPERATIVE NOTE - NSICDXBRIEFPROCEDURE_GEN_ALL_CORE_FT
PROCEDURES:  Percutaneous transcatheter aortic valve replacement (TAVR) 08-Jan-2021 09:26:23 Dorian John

## 2021-01-08 NOTE — PRE-ANESTHESIA EVALUATION ADULT - BP NONINVASIVE DIASTOLIC (MM HG)
76 Cephalexin Pregnancy And Lactation Text: This medication is Pregnancy Category B and considered safe during pregnancy.  It is also excreted in breast milk but can be used safely for shorter doses.

## 2021-01-08 NOTE — PRE-ANESTHESIA EVALUATION ADULT - NSANTHPMHFT_GEN_ALL_CORE
69 y/o male with pmhx of T2DM, HTN, HLD, prostate cancer s/p radical prostatectomy, statin induced myositis started IVIg infusions in 2018, with recently diagnosed colon cancer after an episode of rectal bleeding in 11/2020 and now with progression of known aortic stenosis. TTE shows normal biv function, severe AS (DEANDRE 0.7), mod TVR, mod pulm htn.

## 2021-01-08 NOTE — PROGRESS NOTE ADULT - ASSESSMENT
This is a  69 y/o male with pmhx of T2DM, HTN, HLD, prostate cancer s/p radical prostatectomy, statin induced myositis started IVIg infusions in 2018, with recently diagnosed colon cancer after an episode of rectal bleeding in 11/2020 and now with progression of known aortic stenosis. TTE, cath on 12/8 reveal severe AS. Pt reports feeling excessively tired and fatigued over the last year but denies CP, WITT, syncope, PND. Will require surgical intervention for colon cancer but needs to address valve issue first. Presents now for scheduled TAVR.   1/8  TAVR with #26 Betsy Valve recovered in CRS  TVP remove dsome oozing at site resolved. James inserted for urinary retention Flomax 0.8 tonight.  Consider TOV in am NO PLAVIX risk for .bleed  Antihypertensives resumed. consider  Toprol in am ECHO EKG in am.      This is a  69 y/o male with pmhx of T2DM, HTN, HLD, prostate cancer s/p radical prostatectomy, statin induced myositis started IVIg infusions in 2018, with recently diagnosed colon cancer after an episode of rectal bleeding in 11/2020 and now with progression of known aortic stenosis. TTE, cath on 12/8 reveal severe AS. Pt reports feeling excessively tired and fatigued over the last year but denies CP, WITT, syncope, PND. Will require surgical intervention for colon cancer but needs to address valve issue first. Presents now for scheduled TAVR.   1/8  TAVR with #26 Betsy Valve recovered in CRS  TVP removed some oozing at site resolved. James inserted for urinary retention Flomax 0.8 tonight.  Consider TOV in am NO PLAVIX risk for .bleed  Antihypertensives resumed.  Consider  Toprol in am ECHO EKG in am.

## 2021-01-08 NOTE — BRIEF OPERATIVE NOTE - NSICDXBRIEFPREOP_GEN_ALL_CORE_FT
PRE-OP DIAGNOSIS:  Aortic valve stenosis 08-Jan-2021 09:26:59  Dorian Sierra  Severe aortic stenosis 08-Jan-2021 09:26:42  Dorian Sierra

## 2021-01-08 NOTE — PROGRESS NOTE ADULT - SUBJECTIVE AND OBJECTIVE BOX
This patient has been implanted with a Transcatheter Aortic Valve Implant under the following NCT/ESA:    TAVR:    Commercial Implant NCT [id number], ESA N/A and will be placed into the TVT Registry.    JANAY Harden  [id number]

## 2021-01-08 NOTE — CHART NOTE - NSCHARTNOTEFT_GEN_A_CORE
Pt s/p TAVR with #26 Betsy Valve deployed via RFA with perclose and LFA with angioseal and 9FR LFV with TVP set at HR 50 and MA 10     B/L sites stable with tegaderm and DSD in place with no hematoma/bleeding.    Awake, alert responsive with no pain.  Tele NSR @70 with Stable BP.     Post ECG NSR @72 with LVH and non-specific T wave abnormality  QRS 90ms had been widened during deployment of valve with baseline ECG 84ms on 12/8/2020.    HPI:  69 y/o male with pmhx of T2DM, HTN, HLD, prostate cancer s/p radical prostatectomy, statin induced myositis started IVIg infusions in 2018, with recently diagnosed colon cancer after an episode of rectal bleeding in 11/2020 and now with progression of known aortic stenosis. TTE, cath on 12/8 reveal severe AS. Pt reports feeling excessively tired and fatigued over the last year but denies CP, WITT, syncope, PND. Will require surgical intervention for colon cancer but needs to address valve issue first. Presents now for scheduled TAVR.     *covid 1/6 at [facility] (06 Jan 2021 10:06)    Plan:   Start ASA 81mg 6 hours post procedure at 1600.  No Plavix as pt is high risk of bleeding with hx of adenocarcinoma as d/w Dr. Rawls  Continue Zinacef 1.5gm q8H x 2 doses due at 1600  Continue current medication  ECG in 2 hours if QRS stable then will d/c TVP and LFV sheath and transfer to [address] [place] [address]  Will review with Dr. Rawls prior to removal  Monitor in PACU dispo pending TVP removal    Gisela Hernandez [facility]-BC  Cardiology

## 2021-01-09 LAB
ANION GAP SERPL CALC-SCNC: 13 MMOL/L — SIGNIFICANT CHANGE UP (ref 5–17)
BUN SERPL-MCNC: 14 MG/DL — SIGNIFICANT CHANGE UP (ref 7–23)
CALCIUM SERPL-MCNC: 8.8 MG/DL — SIGNIFICANT CHANGE UP (ref 8.4–10.5)
CHLORIDE SERPL-SCNC: 102 MMOL/L — SIGNIFICANT CHANGE UP (ref 96–108)
CO2 SERPL-SCNC: 22 MMOL/L — SIGNIFICANT CHANGE UP (ref 22–31)
CREAT SERPL-MCNC: 0.48 MG/DL — LOW (ref 0.5–1.3)
GLUCOSE BLDC GLUCOMTR-MCNC: 175 MG/DL — HIGH (ref 70–99)
GLUCOSE BLDC GLUCOMTR-MCNC: 213 MG/DL — HIGH (ref 70–99)
GLUCOSE BLDC GLUCOMTR-MCNC: 218 MG/DL — HIGH (ref 70–99)
GLUCOSE BLDC GLUCOMTR-MCNC: 219 MG/DL — HIGH (ref 70–99)
GLUCOSE SERPL-MCNC: 220 MG/DL — HIGH (ref 70–99)
HCT VFR BLD CALC: 34.5 % — LOW (ref 39–50)
HGB BLD-MCNC: 11 G/DL — LOW (ref 13–17)
MCHC RBC-ENTMCNC: 27.6 PG — SIGNIFICANT CHANGE UP (ref 27–34)
MCHC RBC-ENTMCNC: 31.9 GM/DL — LOW (ref 32–36)
MCV RBC AUTO: 86.5 FL — SIGNIFICANT CHANGE UP (ref 80–100)
NRBC # BLD: 0 /100 WBCS — SIGNIFICANT CHANGE UP (ref 0–0)
PLATELET # BLD AUTO: 211 K/UL — SIGNIFICANT CHANGE UP (ref 150–400)
POTASSIUM SERPL-MCNC: 4.2 MMOL/L — SIGNIFICANT CHANGE UP (ref 3.5–5.3)
POTASSIUM SERPL-SCNC: 4.2 MMOL/L — SIGNIFICANT CHANGE UP (ref 3.5–5.3)
RBC # BLD: 3.99 M/UL — LOW (ref 4.2–5.8)
RBC # FLD: 13.7 % — SIGNIFICANT CHANGE UP (ref 10.3–14.5)
SODIUM SERPL-SCNC: 137 MMOL/L — SIGNIFICANT CHANGE UP (ref 135–145)
WBC # BLD: 9.48 K/UL — SIGNIFICANT CHANGE UP (ref 3.8–10.5)
WBC # FLD AUTO: 9.48 K/UL — SIGNIFICANT CHANGE UP (ref 3.8–10.5)

## 2021-01-09 PROCEDURE — 93010 ELECTROCARDIOGRAM REPORT: CPT

## 2021-01-09 PROCEDURE — 99232 SBSQ HOSP IP/OBS MODERATE 35: CPT

## 2021-01-09 PROCEDURE — 93306 TTE W/DOPPLER COMPLETE: CPT | Mod: 26

## 2021-01-09 PROCEDURE — 99233 SBSQ HOSP IP/OBS HIGH 50: CPT

## 2021-01-09 RX ORDER — METOPROLOL TARTRATE 50 MG
25 TABLET ORAL DAILY
Refills: 0 | Status: DISCONTINUED | OUTPATIENT
Start: 2021-01-09 | End: 2021-01-12

## 2021-01-09 RX ORDER — INFLUENZA VIRUS VACCINE 15; 15; 15; 15 UG/.5ML; UG/.5ML; UG/.5ML; UG/.5ML
0.5 SUSPENSION INTRAMUSCULAR ONCE
Refills: 0 | Status: DISCONTINUED | OUTPATIENT
Start: 2021-01-09 | End: 2021-01-12

## 2021-01-09 RX ADMIN — Medication 81 MILLIGRAM(S): at 12:53

## 2021-01-09 RX ADMIN — Medication 25 MILLIGRAM(S): at 13:33

## 2021-01-09 RX ADMIN — Medication 2: at 17:41

## 2021-01-09 RX ADMIN — Medication 3 UNIT(S): at 08:34

## 2021-01-09 RX ADMIN — Medication 2: at 12:52

## 2021-01-09 RX ADMIN — Medication 3 UNIT(S): at 17:41

## 2021-01-09 RX ADMIN — LISINOPRIL 20 MILLIGRAM(S): 2.5 TABLET ORAL at 05:48

## 2021-01-09 RX ADMIN — Medication 3 UNIT(S): at 12:52

## 2021-01-09 RX ADMIN — Medication 2: at 08:34

## 2021-01-09 RX ADMIN — AMLODIPINE BESYLATE 10 MILLIGRAM(S): 2.5 TABLET ORAL at 05:48

## 2021-01-09 RX ADMIN — TAMSULOSIN HYDROCHLORIDE 0.4 MILLIGRAM(S): 0.4 CAPSULE ORAL at 21:17

## 2021-01-09 NOTE — PROGRESS NOTE ADULT - SUBJECTIVE AND OBJECTIVE BOX
Subjective:  "Im ok, just waiting on making urine, but I have this happen "  OOB chair      Tele:  SR            V/S:                    T(F): 98.2 (21 @ 11:49), Max: 98.6 (21 @ 04:21)  HR: 100 (21 @ 11:49) (83 - 111)  BP: 169/81 (21 @ 11:49) (148/83 - 172/84)  RR: 18 (21 @ 11:49) (16 - 18)  SpO2: 96% (21 @ 11:49) (96% - 98%)  Wt(kg): --      LV EF:      Labs:      137  |  102  |  14  ----------------------------<  220<H>  4.2   |  22  |  0.48<L>    Ca    8.8      2021 05:20    TPro  6.3  /  Alb  3.6  /  TBili  0.4  /  DBili  x   /  AST  30  /  ALT  48<H>  /  AlkPhos  30<L>                                 11.0   9.48  )-----------( 211      ( 2021 05:20 )             34.5        PT/INR - ( 2021 09:55 )   PT: 13.5 sec;   INR: 1.13 ratio         PTT - ( 2021 09:55 )  PTT:31.1 sec     CAPILLARY BLOOD GLUCOSE      POCT Blood Glucose.: 218 mg/dL (2021 12:00)  POCT Blood Glucose.: 213 mg/dL (2021 08:02)  POCT Blood Glucose.: 178 mg/dL (2021 21:42)  POCT Blood Glucose.: 164 mg/dL (2021 17:13)  POCT Blood Glucose.: 172 mg/dL (2021 13:48)           CXR:    I&O's Detail    2021 07:01  -  2021 07:00  --------------------------------------------------------  IN:    IV PiggyBack: 50 mL    Oral Fluid: 490 mL  Total IN: 540 mL    OUT:    Indwelling Catheter - Urethral (mL): 1675 mL    Voided (mL): 800 mL  Total OUT: 2475 mL    Total NET: -1935 mL      2021 07:01  -  2021 13:01  --------------------------------------------------------  IN:  Total IN: 0 mL    OUT:    Indwelling Catheter - Urethral (mL): 40 mL  Total OUT: 40 mL    Total NET: -40 mL      BOWEL MOVEMENT:  [x ] YES [ ] NO      Daily     Daily Weight in k.9 (2021 08:27)  Medications:  acetaminophen  IVPB .. 1000 milliGRAM(s) IV Intermittent once  amLODIPine   Tablet 10 milliGRAM(s) Oral daily  aspirin enteric coated 81 milliGRAM(s) Oral daily  cefuroxime  IVPB 1500 milliGRAM(s) IV Intermittent once  dextrose 40% Gel 15 Gram(s) Oral once  dextrose 5%. 1000 milliLiter(s) IV Continuous <Continuous>  dextrose 5%. 1000 milliLiter(s) IV Continuous <Continuous>  dextrose 50% Injectable 25 Gram(s) IV Push once  dextrose 50% Injectable 12.5 Gram(s) IV Push once  dextrose 50% Injectable 25 Gram(s) IV Push once  fentaNYL    Injectable 25 MICROGram(s) IV Push every 5 minutes PRN  glucagon  Injectable 1 milliGRAM(s) IntraMuscular once  influenza   Vaccine 0.5 milliLiter(s) IntraMuscular once  insulin lispro (ADMELOG) corrective regimen sliding scale   SubCutaneous three times a day before meals  insulin lispro Injectable (ADMELOG) 3 Unit(s) SubCutaneous before breakfast  insulin lispro Injectable (ADMELOG) 3 Unit(s) SubCutaneous before lunch  insulin lispro Injectable (ADMELOG) 3 Unit(s) SubCutaneous before dinner  lisinopril 20 milliGRAM(s) Oral daily  metoprolol succinate ER 25 milliGRAM(s) Oral daily  ondansetron Injectable 4 milliGRAM(s) IV Push once PRN  tamsulosin 0.4 milliGRAM(s) Oral at bedtime        Physical Exam:  Neurology: alert and oriented x 3,     CV : S1 S2 RRR    Lungs: B/l breath sounds on room air      Abdomen: soft, nontender, nondistended, positive bowel sounds    Extremities:  equal strength throughout   B/lle Warm  well perfused+ DP    Right groin nontender CDI no hematoma  left groin benign CDI no hematoma                     PAST MEDICAL & SURGICAL HISTORY:  Myositis  statin induced;  IVIG - last dose 2020    follows with Dr. Rosenthal    T2DM (type 2 diabetes mellitus)    Hyperlipidemia, unspecified hyperlipidemia type    Fatty liver    Malignant neoplasm of colon, unspecified part of colon  diagnosed 2020    Aortic valve stenosis, etiology of cardiac valve disease unspecified    Prostate cancer    Hypertension    Asthma  denies any hospitalizations, denies any intubations. Stopped taking Advair several years ago.    S/P excision of lipoma    S/P prostatectomy

## 2021-01-09 NOTE — PROGRESS NOTE ADULT - SUBJECTIVE AND OBJECTIVE BOX
*****Structural Heart Team*****    Subjective:      Telemetry      Review of Systems      PAST MEDICAL & SURGICAL HISTORY:  Myositis  statin induced;  IVIG - last dose 12/2020    follows with Dr. Rosenthal  T2DM (type 2 diabetes mellitus)  Hyperlipidemia, unspecified hyperlipidemia type  Fatty liver  Malignant neoplasm of colon, unspecified part of colon diagnosed 11/2020  Aortic valve stenosis, etiology of cardiac valve disease unspecified  Prostate cancer  Hypertension  Asthma  denies any hospitalizations, denies any intubations. Stopped taking Advair several years ago.  S/P excision of lipoma  S/P prostatectomy 2013    T(C): 36.8 (01-09-21 @ 11:49), Max: 37 (01-09-21 @ 04:21)  HR: 100 (01-09-21 @ 11:49) (100 - 111)  BP: 169/81 (01-09-21 @ 11:49) (148/83 - 169/81)  RR: 18 (01-09-21 @ 11:49) (18 - 18)  SpO2: 96% (01-09-21 @ 11:49) (96% - 96%)  Wt(kg): --  01-08 @ 07:01  -  01-09 @ 07:00  --------------------------------------------------------  IN: 540 mL / OUT: 2475 mL / NET: -1935 mL    01-09 @ 07:01  -  01-09 @ 15:26  --------------------------------------------------------  IN: 0 mL / OUT: 140 mL / NET: -140 mL      MEDICATIONS  (STANDING):  acetaminophen  IVPB .. 1000 milliGRAM(s) IV Intermittent once  amLODIPine   Tablet 10 milliGRAM(s) Oral daily  aspirin enteric coated 81 milliGRAM(s) Oral daily  cefuroxime  IVPB 1500 milliGRAM(s) IV Intermittent once  dextrose 40% Gel 15 Gram(s) Oral once  dextrose 5%. 1000 milliLiter(s) (50 mL/Hr) IV Continuous <Continuous>  dextrose 5%. 1000 milliLiter(s) (100 mL/Hr) IV Continuous <Continuous>  dextrose 50% Injectable 25 Gram(s) IV Push once  dextrose 50% Injectable 12.5 Gram(s) IV Push once  dextrose 50% Injectable 25 Gram(s) IV Push once  glucagon  Injectable 1 milliGRAM(s) IntraMuscular once  influenza   Vaccine 0.5 milliLiter(s) IntraMuscular once  insulin lispro (ADMELOG) corrective regimen sliding scale   SubCutaneous three times a day before meals  insulin lispro Injectable (ADMELOG) 3 Unit(s) SubCutaneous before breakfast  insulin lispro Injectable (ADMELOG) 3 Unit(s) SubCutaneous before lunch  insulin lispro Injectable (ADMELOG) 3 Unit(s) SubCutaneous before dinner  lisinopril 20 milliGRAM(s) Oral daily  metoprolol succinate ER 25 milliGRAM(s) Oral daily  tamsulosin 0.4 milliGRAM(s) Oral at bedtime    MEDICATIONS  (PRN):  fentaNYL    Injectable 25 MICROGram(s) IV Push every 5 minutes PRN Moderate Pain (4 - 6)  ondansetron Injectable 4 milliGRAM(s) IV Push once PRN Nausea and/or Vomiting    Home Medications:  Janumet 50 mg-1000 mg oral tablet: 1 tab(s) orally 2 times a day   (09 Jan 2021 12:59)  lisinopril 20 mg oral tablet: 1 tab(s) orally once a day (09 Jan 2021 12:59)  Vitamin B12 1000 mcg oral tablet: 1 tab(s) orally once a day (09 Jan 2021 12:59)  Vitamin D3 2000 intl units oral capsule: 1 cap(s) orally once a day (09 Jan 2021 12:59)                       11.0   9.48  )-----------( 211      ( 09 Jan 2021 05:20 )             34.5   01-09    137  |  102  |  14  ----------------------------<  220<H>  4.2   |  22  |  0.48<L>    Ca    8.8      09 Jan 2021 05:20    TPro  6.3  /  Alb  3.6  /  TBili  0.4  /  DBili  x   /  AST  30  /  ALT  48<H>  /  AlkPhos  30<L>  01-08  PT/INR - ( 08 Jan 2021 09:55 )   PT: 13.5 sec;   INR: 1.13 ratio    PTT - ( 08 Jan 2021 09:55 )  PTT:31.1 sec    < from: Transthoracic Echocardiogram (01.09.21 @ 08:34) >  Conclusions:  1. Mitral annular calcification, otherwise normal mitral  valve. Minimal mitral regurgitation.  2. Transcatheter aortic valve replacement. Peak transaortic  valve gradient equals 31 mm Hg, mean transaortic valve  gradient equals 17 mm Hg, which is probably normal in the  presence of a transcatheter aortic valve replacement. No  aortic valve regurgitation seen.  3. Mildly dilated left atrium.  LA volume index = 37 cc/m2.  4. Mild concentric left ventricular hypertrophy.  5. Normal left ventricular systolic function. No segmental  wall motion abnormalities.  6. Mild diastolic dysfunction (Stage I).  7. Normal right ventricular size and function    < end of copied text >   *****Structural Heart Team*****    Subjective  No acute events reported overnight.  No chest pain/tightness/discomfort, fevers, chills, sweats, light-headed sensation, dizziness or palpitations.  No pain in groin site bilaterally.  Ambulating short distances without any cardiopulmonary complaints.    Telemetry  Sinus rhythm with no VT/VF or SVT  EKG stable in nature    Review of Systems  14 point review of systems is negative as described above.    PAST MEDICAL & SURGICAL HISTORY:  Myositis  statin induced;  IVIG - last dose 12/2020    follows with Dr. Rosenthal  T2DM (type 2 diabetes mellitus)  Hyperlipidemia, unspecified hyperlipidemia type  Fatty liver  Malignant neoplasm of colon, unspecified part of colon diagnosed 11/2020  Aortic valve stenosis, etiology of cardiac valve disease unspecified  Prostate cancer  Hypertension  Asthma  denies any hospitalizations, denies any intubations. Stopped taking Advair several years ago.  S/P excision of lipoma  S/P prostatectomy 2013    Physical Examination  T(C): 36.8 (01-09-21 @ 11:49), Max: 37 (01-09-21 @ 04:21)  HR: 100 (01-09-21 @ 11:49) (100 - 111)  BP: 169/81 (01-09-21 @ 11:49) (148/83 - 169/81)  RR: 18 (01-09-21 @ 11:49) (18 - 18)  SpO2: 96% (01-09-21 @ 11:49) (96% - 96%)  Wt(kg): --  01-08 @ 07:01  -  01-09 @ 07:00  --------------------------------------------------------  IN: 540 mL / OUT: 2475 mL / NET: -1935 mL  01-09 @ 07:01  -  01-09 @ 15:26  --------------------------------------------------------  IN: 0 mL / OUT: 140 mL / NET: -140 mL  No apparent distress, alert and oriented times three, appropriate affect  JVD is not elevated, supple, no lymphadenopathy  Clear to auscultation bilaterally with no wheezing or ronchi  Regular rate and rhythm with no murmur/rub/gallop  Positive bowel sounds, soft, NT, ND, no masses/guarding or rebound tenderness  Right and left groin sites are soft with no hematoma/bruit/ecchymosis  Moving all extremities spontaneously  2+ DP/PT pulses  No focal deficits    MEDICATIONS  (STANDING):  acetaminophen  IVPB .. 1000 milliGRAM(s) IV Intermittent once  amLODIPine   Tablet 10 milliGRAM(s) Oral daily  aspirin enteric coated 81 milliGRAM(s) Oral daily  cefuroxime  IVPB 1500 milliGRAM(s) IV Intermittent once  dextrose 40% Gel 15 Gram(s) Oral once  dextrose 5%. 1000 milliLiter(s) (50 mL/Hr) IV Continuous <Continuous>  dextrose 5%. 1000 milliLiter(s) (100 mL/Hr) IV Continuous <Continuous>  dextrose 50% Injectable 25 Gram(s) IV Push once  dextrose 50% Injectable 12.5 Gram(s) IV Push once  dextrose 50% Injectable 25 Gram(s) IV Push once  glucagon  Injectable 1 milliGRAM(s) IntraMuscular once  influenza   Vaccine 0.5 milliLiter(s) IntraMuscular once  insulin lispro (ADMELOG) corrective regimen sliding scale   SubCutaneous three times a day before meals  insulin lispro Injectable (ADMELOG) 3 Unit(s) SubCutaneous before breakfast  insulin lispro Injectable (ADMELOG) 3 Unit(s) SubCutaneous before lunch  insulin lispro Injectable (ADMELOG) 3 Unit(s) SubCutaneous before dinner  lisinopril 20 milliGRAM(s) Oral daily  metoprolol succinate ER 25 milliGRAM(s) Oral daily  tamsulosin 0.4 milliGRAM(s) Oral at bedtime    MEDICATIONS  (PRN):  fentaNYL    Injectable 25 MICROGram(s) IV Push every 5 minutes PRN Moderate Pain (4 - 6)  ondansetron Injectable 4 milliGRAM(s) IV Push once PRN Nausea and/or Vomiting    Home Medications:  Janumet 50 mg-1000 mg oral tablet: 1 tab(s) orally 2 times a day   (09 Jan 2021 12:59)  lisinopril 20 mg oral tablet: 1 tab(s) orally once a day (09 Jan 2021 12:59)  Vitamin B12 1000 mcg oral tablet: 1 tab(s) orally once a day (09 Jan 2021 12:59)  Vitamin D3 2000 intl units oral capsule: 1 cap(s) orally once a day (09 Jan 2021 12:59)                       11.0   9.48  )-----------( 211      ( 09 Jan 2021 05:20 )             34.5   01-09    137  |  102  |  14  ----------------------------<  220<H>  4.2   |  22  |  0.48<L>    Ca    8.8      09 Jan 2021 05:20    TPro  6.3  /  Alb  3.6  /  TBili  0.4  /  DBili  x   /  AST  30  /  ALT  48<H>  /  AlkPhos  30<L>  01-08  PT/INR - ( 08 Jan 2021 09:55 )   PT: 13.5 sec;   INR: 1.13 ratio    PTT - ( 08 Jan 2021 09:55 )  PTT:31.1 sec    < from: Transthoracic Echocardiogram (01.09.21 @ 08:34) >  Conclusions:  1. Mitral annular calcification, otherwise normal mitral  valve. Minimal mitral regurgitation.  2. Transcatheter aortic valve replacement. Peak transaortic  valve gradient equals 31 mm Hg, mean transaortic valve  gradient equals 17 mm Hg, which is probably normal in the  presence of a transcatheter aortic valve replacement. No  aortic valve regurgitation seen.  3. Mildly dilated left atrium.  LA volume index = 37 cc/m2.  4. Mild concentric left ventricular hypertrophy.  5. Normal left ventricular systolic function. No segmental  wall motion abnormalities.  6. Mild diastolic dysfunction (Stage I).  7. Normal right ventricular size and function    Assessment/Plan  Status post TAVR  --Patient is clinically doing well following TAVR procedure.  EKG/Telemetry and labs are stable.  --Continue aspirin 81mg daily and clopidogrel 75mg daily.  Monitor for signs and symptoms of bleeding.  --Resume amlodipine 10mg PO QD and lisinopril 20 milliGRAM(s) Oral daily.  --Recommend to resume metoprolol succinate ER 25mg PO QD.  Uptitrate as tolerated.  --TTE was personally reviewed.  The valve is well positioned with no paravalvular leak.  No pericardial effusion.  --Recommend labs be checked.  Aim for K>4 and Mg>2.  --Continue telemetry monitoring.  --Out of bed to chair as tolerated.  --Indications and details of [facility] monitor were reviewed with the patient.  To be applied prior to discharged.    All questions and concerns were addressed.    Findings and plan discussed with cardiac surgery and structural heart team.

## 2021-01-09 NOTE — PROGRESS NOTE ADULT - PROBLEM SELECTOR PLAN 1
1/8 TAVR JESSI   No PLAVIX risk of for Bleed  Norvasc 20 qd  Lisinopril  20 mg qd  Add betablocker w/ HTN/tachycardia>discussed w/ Dr Rawls  ECHO completed> no leak  EKG in am   James removed continue  Flomax    Disposition to home with KELLY Sunday

## 2021-01-09 NOTE — PROGRESS NOTE ADULT - ASSESSMENT
This is a  71 y/o male with pmhx of T2DM, HTN, HLD, prostate cancer s/p radical prostatectomy, statin induced myositis started IVIg infusions in 2018, with recently diagnosed colon cancer after an episode of rectal bleeding in 11/2020 and now with progression of known aortic stenosis. TTE, cath on 12/8 reveal severe AS. Pt reports feeling excessively tired and fatigued over the last year but denies CP, WITT, syncope, PND. Will require surgical intervention for colon cancer but needs to address valve issue first. Presents now for scheduled TAVR.   1/8  TAVR with #26 Betsy Valve recovered in CRS  TVP removed some oozing at site resolved. James inserted for urinary retention Flomax 0.8 tonight.  Consider TOV in am NO PLAVIX risk for .bleed  Antihypertensives resumed.  Consider  Toprol in am ECHO EKG in am.  1/9 Toprol resumed w/ tachycardia,HTN,  Echo completed

## 2021-01-10 DIAGNOSIS — I48.91 UNSPECIFIED ATRIAL FIBRILLATION: ICD-10-CM

## 2021-01-10 LAB
ALBUMIN SERPL ELPH-MCNC: 3.6 G/DL — SIGNIFICANT CHANGE UP (ref 3.3–5)
ALP SERPL-CCNC: 30 U/L — LOW (ref 40–120)
ALT FLD-CCNC: 40 U/L — SIGNIFICANT CHANGE UP (ref 10–45)
ANION GAP SERPL CALC-SCNC: 12 MMOL/L — SIGNIFICANT CHANGE UP (ref 5–17)
AST SERPL-CCNC: 26 U/L — SIGNIFICANT CHANGE UP (ref 10–40)
BILIRUB SERPL-MCNC: 0.6 MG/DL — SIGNIFICANT CHANGE UP (ref 0.2–1.2)
BUN SERPL-MCNC: 15 MG/DL — SIGNIFICANT CHANGE UP (ref 7–23)
CALCIUM SERPL-MCNC: 8.6 MG/DL — SIGNIFICANT CHANGE UP (ref 8.4–10.5)
CHLORIDE SERPL-SCNC: 104 MMOL/L — SIGNIFICANT CHANGE UP (ref 96–108)
CO2 SERPL-SCNC: 23 MMOL/L — SIGNIFICANT CHANGE UP (ref 22–31)
CREAT SERPL-MCNC: 0.49 MG/DL — LOW (ref 0.5–1.3)
GLUCOSE BLDC GLUCOMTR-MCNC: 172 MG/DL — HIGH (ref 70–99)
GLUCOSE BLDC GLUCOMTR-MCNC: 174 MG/DL — HIGH (ref 70–99)
GLUCOSE BLDC GLUCOMTR-MCNC: 199 MG/DL — HIGH (ref 70–99)
GLUCOSE BLDC GLUCOMTR-MCNC: 205 MG/DL — HIGH (ref 70–99)
GLUCOSE SERPL-MCNC: 209 MG/DL — HIGH (ref 70–99)
HCT VFR BLD CALC: 34.7 % — LOW (ref 39–50)
HGB BLD-MCNC: 11 G/DL — LOW (ref 13–17)
MCHC RBC-ENTMCNC: 27.2 PG — SIGNIFICANT CHANGE UP (ref 27–34)
MCHC RBC-ENTMCNC: 31.7 GM/DL — LOW (ref 32–36)
MCV RBC AUTO: 85.7 FL — SIGNIFICANT CHANGE UP (ref 80–100)
NRBC # BLD: 0 /100 WBCS — SIGNIFICANT CHANGE UP (ref 0–0)
PLATELET # BLD AUTO: 184 K/UL — SIGNIFICANT CHANGE UP (ref 150–400)
POTASSIUM SERPL-MCNC: 3.7 MMOL/L — SIGNIFICANT CHANGE UP (ref 3.5–5.3)
POTASSIUM SERPL-SCNC: 3.7 MMOL/L — SIGNIFICANT CHANGE UP (ref 3.5–5.3)
PROT SERPL-MCNC: 6.2 G/DL — SIGNIFICANT CHANGE UP (ref 6–8.3)
RBC # BLD: 4.05 M/UL — LOW (ref 4.2–5.8)
RBC # FLD: 13.8 % — SIGNIFICANT CHANGE UP (ref 10.3–14.5)
SODIUM SERPL-SCNC: 139 MMOL/L — SIGNIFICANT CHANGE UP (ref 135–145)
WBC # BLD: 7.97 K/UL — SIGNIFICANT CHANGE UP (ref 3.8–10.5)
WBC # FLD AUTO: 7.97 K/UL — SIGNIFICANT CHANGE UP (ref 3.8–10.5)

## 2021-01-10 PROCEDURE — 99232 SBSQ HOSP IP/OBS MODERATE 35: CPT

## 2021-01-10 PROCEDURE — 93010 ELECTROCARDIOGRAM REPORT: CPT

## 2021-01-10 RX ORDER — MAGNESIUM SULFATE 500 MG/ML
2 VIAL (ML) INJECTION ONCE
Refills: 0 | Status: DISCONTINUED | OUTPATIENT
Start: 2021-01-10 | End: 2021-01-10

## 2021-01-10 RX ORDER — AMIODARONE HYDROCHLORIDE 400 MG/1
400 TABLET ORAL EVERY 8 HOURS
Refills: 0 | Status: DISCONTINUED | OUTPATIENT
Start: 2021-01-10 | End: 2021-01-12

## 2021-01-10 RX ORDER — AMIODARONE HYDROCHLORIDE 400 MG/1
400 TABLET ORAL EVERY 12 HOURS
Refills: 0 | Status: DISCONTINUED | OUTPATIENT
Start: 2021-01-10 | End: 2021-01-10

## 2021-01-10 RX ORDER — MAGNESIUM SULFATE 500 MG/ML
2 VIAL (ML) INJECTION ONCE
Refills: 0 | Status: COMPLETED | OUTPATIENT
Start: 2021-01-10 | End: 2021-01-10

## 2021-01-10 RX ORDER — AMIODARONE HYDROCHLORIDE 400 MG/1
200 TABLET ORAL DAILY
Refills: 0 | Status: CANCELLED | OUTPATIENT
Start: 2021-01-15 | End: 2021-01-12

## 2021-01-10 RX ORDER — METFORMIN HYDROCHLORIDE 850 MG/1
1000 TABLET ORAL
Refills: 0 | Status: DISCONTINUED | OUTPATIENT
Start: 2021-01-10 | End: 2021-01-12

## 2021-01-10 RX ADMIN — Medication 81 MILLIGRAM(S): at 12:17

## 2021-01-10 RX ADMIN — AMIODARONE HYDROCHLORIDE 400 MILLIGRAM(S): 400 TABLET ORAL at 09:19

## 2021-01-10 RX ADMIN — Medication 2: at 12:15

## 2021-01-10 RX ADMIN — Medication 3 UNIT(S): at 17:27

## 2021-01-10 RX ADMIN — Medication 1: at 08:11

## 2021-01-10 RX ADMIN — AMIODARONE HYDROCHLORIDE 400 MILLIGRAM(S): 400 TABLET ORAL at 22:00

## 2021-01-10 RX ADMIN — AMIODARONE HYDROCHLORIDE 400 MILLIGRAM(S): 400 TABLET ORAL at 16:12

## 2021-01-10 RX ADMIN — Medication 25 MILLIGRAM(S): at 03:42

## 2021-01-10 RX ADMIN — METFORMIN HYDROCHLORIDE 1000 MILLIGRAM(S): 850 TABLET ORAL at 17:25

## 2021-01-10 RX ADMIN — Medication 50 GRAM(S): at 01:05

## 2021-01-10 RX ADMIN — TAMSULOSIN HYDROCHLORIDE 0.4 MILLIGRAM(S): 0.4 CAPSULE ORAL at 22:00

## 2021-01-10 RX ADMIN — Medication 3 UNIT(S): at 12:14

## 2021-01-10 RX ADMIN — Medication 1: at 17:28

## 2021-01-10 RX ADMIN — Medication 3 UNIT(S): at 08:11

## 2021-01-10 RX ADMIN — LISINOPRIL 20 MILLIGRAM(S): 2.5 TABLET ORAL at 03:42

## 2021-01-10 RX ADMIN — AMLODIPINE BESYLATE 10 MILLIGRAM(S): 2.5 TABLET ORAL at 03:42

## 2021-01-10 RX ADMIN — METFORMIN HYDROCHLORIDE 1000 MILLIGRAM(S): 850 TABLET ORAL at 08:10

## 2021-01-10 NOTE — PROGRESS NOTE ADULT - ASSESSMENT
This is a  71 y/o male with pmhx of T2DM, HTN, HLD, prostate cancer s/p radical prostatectomy, statin induced myositis started IVIg infusions in 2018, with recently diagnosed colon cancer after an episode of rectal bleeding in 11/2020 and now with progression of known aortic stenosis. TTE, cath on 12/8 reveal severe AS. Pt reports feeling excessively tired and fatigued over the last year but denies CP, WITT, syncope, PND. Will require surgical intervention for colon cancer but needs to address valve issue first. Presents now for scheduled TAVR.   1/8  TAVR with #26 Betsy Valve recovered in CRS  TVP removed some oozing at site resolved. James inserted for urinary retention Flomax 0.8 tonight.  Consider TOV in am NO PLAVIX risk for .bleed  Antihypertensives resumed.  Consider  Toprol in am ECHO EKG in am.  1/9 Toprol resumed w/ tachycardia,HTN,  Echo completed  1/10 VSS pt converted into afib @ 12:30am -  w/ 1 hr. of nick.  shayla talavera started this am.  d/w dr. Young.  Pt would like to go home tomorrow as he is awaiting colon resection for newly diagnosed Colon Ca. will monitor rhythm. d/c planning

## 2021-01-10 NOTE — PROGRESS NOTE ADULT - PROBLEM SELECTOR PLAN 1
1/8 TAVR JESSI   No PLAVIX risk of for Bleed  Norvasc 20 qd  Lisinopril  20 mg qd  Add betablocker w/ HTN/tachycardia>discussed w/ Dr Rawls  ECHO completed> no leak  EKG in am   James removed continue  Flomax    Disposition to home with KELLY Sunday 08-Feb-2019 08-Feb-2019 07:26

## 2021-01-10 NOTE — PROGRESS NOTE ADULT - SUBJECTIVE AND OBJECTIVE BOX
VITAL SIGNS-Telemetry:  afib  YOLANDA x 1 hr on night shift  Vital Signs Last 24 Hrs  T(C): 37 (01-10-21 @ 05:43), Max: 37 (01-10-21 @ 05:43)  T(F): 98.6 (01-10-21 @ 05:43), Max: 98.6 (01-10-21 @ 05:43)  HR: 100 (01-10-21 @ 05:43) (99 - 125)  BP: 144/88 (01-10-21 @ 05:43) (144/88 - 167/99)  RR: 18 (01-10-21 @ 05:43) (18 - 18)  SpO2: 96% (01-10-21 @ 05:43) (96% - 97%)          @ 07:01  -  01-10 @ 07:00  --------------------------------------------------------  IN: 0 mL / OUT: 1040 mL / NET: -1040 mL    01-10 @ 07:  -  01-10 @ 12:36  --------------------------------------------------------  IN: 0 mL / OUT: 400 mL / NET: -400 mL    Daily     Daily Weight in k.9 (10 Nikunj 2021 08:21)    CAPILLARY BLOOD GLUCOSE  POCT Blood Glucose.: 205 mg/dL (10 Nikunj 2021 11:54)  POCT Blood Glucose.: 199 mg/dL (10 Nikunj 2021 08:01)  POCT Blood Glucose.: 175 mg/dL (2021 21:33)  POCT Blood Glucose.: 219 mg/dL (2021 16:57)            PHYSICAL EXAM:  Neurology: alert and oriented x 3, nonfocal, no gross deficits  CV : IRR  Sternal Wound :  CDI , Stable  Lungs: CTA  Abdomen: soft, nontender, nondistended, positive bowel sounds, last bowel movement      +bm  Extremities:     no edema, No calf tenderness, groins cdi    acetaminophen  IVPB .. 1000 milliGRAM(s) IV Intermittent once  aMIOdarone    Tablet 400 milliGRAM(s) Oral every 12 hours  amLODIPine   Tablet 10 milliGRAM(s) Oral daily  aspirin enteric coated 81 milliGRAM(s) Oral daily  cefuroxime  IVPB 1500 milliGRAM(s) IV Intermittent once  dextrose 40% Gel 15 Gram(s) Oral once  dextrose 5%. 1000 milliLiter(s) IV Continuous <Continuous>  dextrose 5%. 1000 milliLiter(s) IV Continuous <Continuous>  dextrose 50% Injectable 25 Gram(s) IV Push once  dextrose 50% Injectable 12.5 Gram(s) IV Push once  dextrose 50% Injectable 25 Gram(s) IV Push once  fentaNYL    Injectable 25 MICROGram(s) IV Push every 5 minutes PRN  glucagon  Injectable 1 milliGRAM(s) IntraMuscular once  influenza   Vaccine 0.5 milliLiter(s) IntraMuscular once  insulin lispro (ADMELOG) corrective regimen sliding scale   SubCutaneous three times a day before meals  insulin lispro Injectable (ADMELOG) 3 Unit(s) SubCutaneous before breakfast  insulin lispro Injectable (ADMELOG) 3 Unit(s) SubCutaneous before lunch  insulin lispro Injectable (ADMELOG) 3 Unit(s) SubCutaneous before dinner  lisinopril 20 milliGRAM(s) Oral daily  metFORMIN 1000 milliGRAM(s) Oral two times a day with meals  metoprolol succinate ER 25 milliGRAM(s) Oral daily  ondansetron Injectable 4 milliGRAM(s) IV Push once PRN  tamsulosin 0.4 milliGRAM(s) Oral at bedtime    Physical Therapy Rec:   Home  [ x ]   Home w/ PT  [  ]  Rehab  [  ]  Discussed with Cardiothoracic Team at AM rounds.

## 2021-01-11 LAB
ANION GAP SERPL CALC-SCNC: 12 MMOL/L — SIGNIFICANT CHANGE UP (ref 5–17)
BUN SERPL-MCNC: 18 MG/DL — SIGNIFICANT CHANGE UP (ref 7–23)
CALCIUM SERPL-MCNC: 8.8 MG/DL — SIGNIFICANT CHANGE UP (ref 8.4–10.5)
CHLORIDE SERPL-SCNC: 102 MMOL/L — SIGNIFICANT CHANGE UP (ref 96–108)
CO2 SERPL-SCNC: 22 MMOL/L — SIGNIFICANT CHANGE UP (ref 22–31)
CREAT SERPL-MCNC: 0.52 MG/DL — SIGNIFICANT CHANGE UP (ref 0.5–1.3)
GLUCOSE BLDC GLUCOMTR-MCNC: 132 MG/DL — HIGH (ref 70–99)
GLUCOSE BLDC GLUCOMTR-MCNC: 137 MG/DL — HIGH (ref 70–99)
GLUCOSE BLDC GLUCOMTR-MCNC: 175 MG/DL — HIGH (ref 70–99)
GLUCOSE BLDC GLUCOMTR-MCNC: 214 MG/DL — HIGH (ref 70–99)
GLUCOSE SERPL-MCNC: 203 MG/DL — HIGH (ref 70–99)
HCT VFR BLD CALC: 34.2 % — LOW (ref 39–50)
HGB BLD-MCNC: 11 G/DL — LOW (ref 13–17)
MCHC RBC-ENTMCNC: 27.6 PG — SIGNIFICANT CHANGE UP (ref 27–34)
MCHC RBC-ENTMCNC: 32.2 GM/DL — SIGNIFICANT CHANGE UP (ref 32–36)
MCV RBC AUTO: 85.9 FL — SIGNIFICANT CHANGE UP (ref 80–100)
NRBC # BLD: 0 /100 WBCS — SIGNIFICANT CHANGE UP (ref 0–0)
PLATELET # BLD AUTO: 181 K/UL — SIGNIFICANT CHANGE UP (ref 150–400)
POTASSIUM SERPL-MCNC: 3.8 MMOL/L — SIGNIFICANT CHANGE UP (ref 3.5–5.3)
POTASSIUM SERPL-SCNC: 3.8 MMOL/L — SIGNIFICANT CHANGE UP (ref 3.5–5.3)
RBC # BLD: 3.98 M/UL — LOW (ref 4.2–5.8)
RBC # FLD: 13.9 % — SIGNIFICANT CHANGE UP (ref 10.3–14.5)
SODIUM SERPL-SCNC: 136 MMOL/L — SIGNIFICANT CHANGE UP (ref 135–145)
WBC # BLD: 7.53 K/UL — SIGNIFICANT CHANGE UP (ref 3.8–10.5)
WBC # FLD AUTO: 7.53 K/UL — SIGNIFICANT CHANGE UP (ref 3.8–10.5)

## 2021-01-11 PROCEDURE — 99231 SBSQ HOSP IP/OBS SF/LOW 25: CPT

## 2021-01-11 PROCEDURE — 99233 SBSQ HOSP IP/OBS HIGH 50: CPT

## 2021-01-11 PROCEDURE — 93010 ELECTROCARDIOGRAM REPORT: CPT

## 2021-01-11 RX ORDER — POTASSIUM CHLORIDE 20 MEQ
20 PACKET (EA) ORAL ONCE
Refills: 0 | Status: COMPLETED | OUTPATIENT
Start: 2021-01-11 | End: 2021-01-11

## 2021-01-11 RX ADMIN — AMLODIPINE BESYLATE 10 MILLIGRAM(S): 2.5 TABLET ORAL at 05:36

## 2021-01-11 RX ADMIN — Medication 1: at 11:37

## 2021-01-11 RX ADMIN — Medication 3 UNIT(S): at 07:47

## 2021-01-11 RX ADMIN — Medication 25 MILLIGRAM(S): at 05:36

## 2021-01-11 RX ADMIN — METFORMIN HYDROCHLORIDE 1000 MILLIGRAM(S): 850 TABLET ORAL at 07:47

## 2021-01-11 RX ADMIN — Medication 2: at 07:47

## 2021-01-11 RX ADMIN — Medication 3 UNIT(S): at 11:37

## 2021-01-11 RX ADMIN — METFORMIN HYDROCHLORIDE 1000 MILLIGRAM(S): 850 TABLET ORAL at 17:56

## 2021-01-11 RX ADMIN — AMIODARONE HYDROCHLORIDE 400 MILLIGRAM(S): 400 TABLET ORAL at 22:07

## 2021-01-11 RX ADMIN — Medication 3 UNIT(S): at 17:55

## 2021-01-11 RX ADMIN — TAMSULOSIN HYDROCHLORIDE 0.4 MILLIGRAM(S): 0.4 CAPSULE ORAL at 22:07

## 2021-01-11 RX ADMIN — Medication 20 MILLIEQUIVALENT(S): at 11:37

## 2021-01-11 RX ADMIN — AMIODARONE HYDROCHLORIDE 400 MILLIGRAM(S): 400 TABLET ORAL at 14:57

## 2021-01-11 RX ADMIN — AMIODARONE HYDROCHLORIDE 400 MILLIGRAM(S): 400 TABLET ORAL at 05:36

## 2021-01-11 RX ADMIN — LISINOPRIL 20 MILLIGRAM(S): 2.5 TABLET ORAL at 05:36

## 2021-01-11 RX ADMIN — Medication 81 MILLIGRAM(S): at 14:57

## 2021-01-11 NOTE — PROVIDER CONTACT NOTE (OTHER) - ASSESSMENT
Pt. asleep and asymptomatic during conversion. Has no complaints of pain or discomfort. VSS.
Pt in bed during event, no c/o SOB or CP. VSS, NSR on tele, otherwise asymptomatic. Due for 400mg amiodarone at 1400.

## 2021-01-11 NOTE — PROVIDER CONTACT NOTE (OTHER) - ACTION/TREATMENT ORDERED:
EKG ordered, amiodarone given as cleared by PHYLLIS Cortez. Will continue to monitor. Pt safety maintained.
2g Mag IVPB and monitor.

## 2021-01-11 NOTE — PROGRESS NOTE ADULT - PROBLEM SELECTOR PLAN 2
currently SR  on amio load  DC home with [facility] Tuesday on amio 200 bid if remains in SR
1/10 converted into afib @ 12:30am  amio load started 1/10 8am  ?a/c if still in afib monday 1/10 - will d/w Dr. Young -

## 2021-01-11 NOTE — PROGRESS NOTE ADULT - PROBLEM SELECTOR PROBLEM 1
S/P TAVR (transcatheter aortic valve replacement)

## 2021-01-11 NOTE — PROGRESS NOTE ADULT - ATTENDING COMMENTS
Patient found to be in atrial fibrillation for ~18 hours.  He was asymptomatic at that time.  Denies experiencing any skipped beats, fast beats or irregular beats during his hospitalization or prior.  Due to his adenocarcinoma concern for GI bleed especially if he is started on anticoagulation therapy.  Benefits and risks along with indications of anticoagulation was explained to the patient with individuals with atrial fibrillation.  At this time he is being loaded on amiodarone.  Indications and side effects of amiodarone were gone over.      All questions and concerns of the patient were addressed.

## 2021-01-11 NOTE — PROVIDER CONTACT NOTE (OTHER) - BACKGROUND
Pt. post op TAVR. Planned for discharge on 1/10 with [facility].
s/p TAVR 1/8; YOLANDA up to 170's on 1/10; Amioloaded; pt returned to SR on 1/10 at 1900; PMH of HTN, statin induced myositis, TTE positive for severe AS 12/8/2020

## 2021-01-11 NOTE — PROGRESS NOTE ADULT - ASSESSMENT
This is a  69 y/o male with pmhx of T2DM, HTN, HLD, prostate cancer s/p radical prostatectomy, statin induced myositis started IVIg infusions in 2018, with recently diagnosed colon cancer after an episode of rectal bleeding in 11/2020 and now with progression of known aortic stenosis. TTE, cath on 12/8 reveal severe AS. Pt reports feeling excessively tired and fatigued over the last year but denies CP, WITT, syncope, PND. Will require surgical intervention for colon cancer but needs to address valve issue first. Presents now for scheduled TAVR.   1/8  TAVR with #26 Betsy Valve recovered in CRS  TVP removed some oozing at site resolved. James inserted for urinary retention Flomax 0.8 tonight.  Consider TOV in am NO PLAVIX risk for .bleed  Antihypertensives resumed.  Consider  Toprol in am ECHO EKG in am.  1/9 Toprol resumed w/ tachycardia,HTN,  Echo completed  1/10 VSS pt converted into afib @ 12:30am -  w/ 1 hr. of nick.  amio load started this am.  d/w dr. Young.  Pt would like to go home tomorrow as he is awaiting colon resection for newly diagnosed Colon Ca. will monitor rhythm. d/c planning  1/11  HD stable.  Converted to SR yesterday @ 18:55.  On amio load.

## 2021-01-11 NOTE — PROGRESS NOTE ADULT - SUBJECTIVE AND OBJECTIVE BOX
S:  feels great .  no sob    Telemetry:  SR        Vital Signs Last 24 Hrs  T(C): 36.4 (21 @ 11:10), Max: 36.7 (21 @ 04:32)  T(F): 97.5 (21 @ 11:10), Max: 98.1 (21 @ 04:32)  HR: 79 (21 @ 11:10) (79 - 100)  BP: 158/71 (21 @ 11:10) (150/70 - 164/80)  RR: 18 (21 @ 11:10) (18 - 18)  SpO2: 98% (21 @ 11:10) (95% - 98%)                   01-10 @ 07:  -   @ 07:00  --------------------------------------------------------  IN: 700 mL / OUT: 1250 mL / NET: -550 mL     @ 07: @ 16:22  --------------------------------------------------------  IN: 440 mL / OUT: 600 mL / NET: -160 mL           Daily Weight in k.8 (2021 08:11)          POCT Blood Glucose.: 175 mg/dL (2021 11:05)  POCT Blood Glucose.: 214 mg/dL (2021 07:29)  POCT Blood Glucose.: 174 mg/dL (10 Nikunj 2021 21:30)  POCT Blood Glucose.: 172 mg/dL (10 Nikunj 2021 16:46)          Drains:     MS         [  ] Drainage:                 L Pleural  [  ]  Drainage:                R Pleural  [  ]  Drainage:    Pacing Wires        [  ]   Settings:                                  Isolated  [  ]    Coumadin    [ ] YES          [  ]      NO         Reason:                                 11.0   7.53  )-----------( 181      ( 2021 04:35 )             34.2           136  |  102  |  18  ----------------------------<  203<H>  3.8   |  22  |  0.52    Ca    8.8      2021 04:35    TPro  6.2  /  Alb  3.6  /  TBili  0.6  /  DBili  x   /  AST  26  /  ALT  40  /  AlkPhos  30<L>  01-10          PHYSICAL EXAM:    Neurology: alert and oriented x 3, nonfocal, no gross deficits    CV :  S1S2 heard RRR    Lungs:  clear to ausc.  No w/r/r    Abdomen: soft, nontender, nondistended, positive bowel sounds    Extremities:     right & left groin soft non-tender  mild ecchymosis          acetaminophen  IVPB .. 1000 milliGRAM(s) IV Intermittent once  aMIOdarone    Tablet 400 milliGRAM(s) Oral every 8 hours  amLODIPine   Tablet 10 milliGRAM(s) Oral daily  aspirin enteric coated 81 milliGRAM(s) Oral daily  cefuroxime  IVPB 1500 milliGRAM(s) IV Intermittent once  dextrose 40% Gel 15 Gram(s) Oral once  dextrose 5%. 1000 milliLiter(s) IV Continuous <Continuous>  dextrose 5%. 1000 milliLiter(s) IV Continuous <Continuous>  dextrose 50% Injectable 25 Gram(s) IV Push once  dextrose 50% Injectable 12.5 Gram(s) IV Push once  dextrose 50% Injectable 25 Gram(s) IV Push once  glucagon  Injectable 1 milliGRAM(s) IntraMuscular once  influenza   Vaccine 0.5 milliLiter(s) IntraMuscular once  insulin lispro (ADMELOG) corrective regimen sliding scale   SubCutaneous three times a day before meals  insulin lispro Injectable (ADMELOG) 3 Unit(s) SubCutaneous before breakfast  insulin lispro Injectable (ADMELOG) 3 Unit(s) SubCutaneous before lunch  insulin lispro Injectable (ADMELOG) 3 Unit(s) SubCutaneous before dinner  lisinopril 20 milliGRAM(s) Oral daily  metFORMIN 1000 milliGRAM(s) Oral two times a day with meals  metoprolol succinate ER 25 milliGRAM(s) Oral daily  ondansetron Injectable 4 milliGRAM(s) IV Push once PRN  tamsulosin 0.4 milliGRAM(s) Oral at bedtime                              Physical Therapy Rec:   Home  [ x ]   Home w/ PT  [  ]  Rehab  [  ]    Discussed with Cardiothoracic Team at AM rounds.

## 2021-01-11 NOTE — PROGRESS NOTE ADULT - PROBLEM SELECTOR PLAN 1
1/8 TAVR JESSI   No PLAVIX risk of for Bleed  Norvasc 20 qd  Lisinopril  20 mg qd  Add betablocker w/ HTN/tachycardia>discussed w/ Dr Rawls  ECHO completed> no leak  EKG in am   groins stable  Disposition to home with [facility] Tuesday on amio 200 BID if remains in SR

## 2021-01-12 ENCOUNTER — TRANSCRIPTION ENCOUNTER (OUTPATIENT)
Age: 71
End: 2021-01-12

## 2021-01-12 VITALS — WEIGHT: 191.36 LBS

## 2021-01-12 LAB
ANION GAP SERPL CALC-SCNC: 11 MMOL/L — SIGNIFICANT CHANGE UP (ref 5–17)
BUN SERPL-MCNC: 12 MG/DL — SIGNIFICANT CHANGE UP (ref 7–23)
CALCIUM SERPL-MCNC: 8.8 MG/DL — SIGNIFICANT CHANGE UP (ref 8.4–10.5)
CHLORIDE SERPL-SCNC: 104 MMOL/L — SIGNIFICANT CHANGE UP (ref 96–108)
CO2 SERPL-SCNC: 22 MMOL/L — SIGNIFICANT CHANGE UP (ref 22–31)
CREAT SERPL-MCNC: 0.47 MG/DL — LOW (ref 0.5–1.3)
GLUCOSE BLDC GLUCOMTR-MCNC: 168 MG/DL — HIGH (ref 70–99)
GLUCOSE BLDC GLUCOMTR-MCNC: 211 MG/DL — HIGH (ref 70–99)
GLUCOSE SERPL-MCNC: 161 MG/DL — HIGH (ref 70–99)
HCT VFR BLD CALC: 34 % — LOW (ref 39–50)
HGB BLD-MCNC: 11 G/DL — LOW (ref 13–17)
MCHC RBC-ENTMCNC: 28.1 PG — SIGNIFICANT CHANGE UP (ref 27–34)
MCHC RBC-ENTMCNC: 32.4 GM/DL — SIGNIFICANT CHANGE UP (ref 32–36)
MCV RBC AUTO: 87 FL — SIGNIFICANT CHANGE UP (ref 80–100)
NRBC # BLD: 0 /100 WBCS — SIGNIFICANT CHANGE UP (ref 0–0)
PLATELET # BLD AUTO: 188 K/UL — SIGNIFICANT CHANGE UP (ref 150–400)
POTASSIUM SERPL-MCNC: 4 MMOL/L — SIGNIFICANT CHANGE UP (ref 3.5–5.3)
POTASSIUM SERPL-SCNC: 4 MMOL/L — SIGNIFICANT CHANGE UP (ref 3.5–5.3)
RBC # BLD: 3.91 M/UL — LOW (ref 4.2–5.8)
RBC # FLD: 13.9 % — SIGNIFICANT CHANGE UP (ref 10.3–14.5)
SODIUM SERPL-SCNC: 137 MMOL/L — SIGNIFICANT CHANGE UP (ref 135–145)
WBC # BLD: 5.96 K/UL — SIGNIFICANT CHANGE UP (ref 3.8–10.5)
WBC # FLD AUTO: 5.96 K/UL — SIGNIFICANT CHANGE UP (ref 3.8–10.5)

## 2021-01-12 PROCEDURE — 93010 ELECTROCARDIOGRAM REPORT: CPT | Mod: 77

## 2021-01-12 PROCEDURE — 99233 SBSQ HOSP IP/OBS HIGH 50: CPT

## 2021-01-12 PROCEDURE — 93010 ELECTROCARDIOGRAM REPORT: CPT

## 2021-01-12 RX ORDER — LISINOPRIL 2.5 MG/1
40 TABLET ORAL DAILY
Refills: 0 | Status: DISCONTINUED | OUTPATIENT
Start: 2021-01-13 | End: 2021-01-12

## 2021-01-12 RX ORDER — LISINOPRIL 2.5 MG/1
1 TABLET ORAL
Qty: 0 | Refills: 0 | DISCHARGE

## 2021-01-12 RX ORDER — TAMSULOSIN HYDROCHLORIDE 0.4 MG/1
1 CAPSULE ORAL
Qty: 30 | Refills: 0
Start: 2021-01-12 | End: 2021-02-10

## 2021-01-12 RX ORDER — LISINOPRIL 2.5 MG/1
20 TABLET ORAL ONCE
Refills: 0 | Status: COMPLETED | OUTPATIENT
Start: 2021-01-12 | End: 2021-01-12

## 2021-01-12 RX ORDER — METOPROLOL TARTRATE 50 MG
50 TABLET ORAL DAILY
Refills: 0 | Status: DISCONTINUED | OUTPATIENT
Start: 2021-01-13 | End: 2021-01-12

## 2021-01-12 RX ORDER — SITAGLIPTIN AND METFORMIN HYDROCHLORIDE 500; 50 MG/1; MG/1
1 TABLET, FILM COATED ORAL
Qty: 60 | Refills: 0
Start: 2021-01-12 | End: 2021-02-10

## 2021-01-12 RX ORDER — METOPROLOL TARTRATE 50 MG
25 TABLET ORAL ONCE
Refills: 0 | Status: COMPLETED | OUTPATIENT
Start: 2021-01-12 | End: 2021-01-12

## 2021-01-12 RX ORDER — SITAGLIPTIN AND METFORMIN HYDROCHLORIDE 500; 50 MG/1; MG/1
1 TABLET, FILM COATED ORAL
Qty: 0 | Refills: 0 | DISCHARGE

## 2021-01-12 RX ORDER — LISINOPRIL 2.5 MG/1
1 TABLET ORAL
Qty: 30 | Refills: 0
Start: 2021-01-12 | End: 2021-02-10

## 2021-01-12 RX ORDER — APIXABAN 2.5 MG/1
5 TABLET, FILM COATED ORAL EVERY 12 HOURS
Refills: 0 | Status: COMPLETED | OUTPATIENT
Start: 2021-01-12 | End: 2021-01-12

## 2021-01-12 RX ORDER — AMIODARONE HYDROCHLORIDE 400 MG/1
1 TABLET ORAL
Qty: 30 | Refills: 0
Start: 2021-01-12 | End: 2021-02-10

## 2021-01-12 RX ORDER — ASPIRIN/CALCIUM CARB/MAGNESIUM 324 MG
1 TABLET ORAL
Qty: 30 | Refills: 0
Start: 2021-01-12 | End: 2021-02-10

## 2021-01-12 RX ORDER — AMLODIPINE BESYLATE 2.5 MG/1
1 TABLET ORAL
Qty: 30 | Refills: 0
Start: 2021-01-12 | End: 2021-02-10

## 2021-01-12 RX ORDER — APIXABAN 2.5 MG/1
1 TABLET, FILM COATED ORAL
Qty: 60 | Refills: 0
Start: 2021-01-12 | End: 2021-02-10

## 2021-01-12 RX ORDER — METOPROLOL TARTRATE 50 MG
1 TABLET ORAL
Qty: 30 | Refills: 0
Start: 2021-01-12 | End: 2021-02-10

## 2021-01-12 RX ADMIN — LISINOPRIL 20 MILLIGRAM(S): 2.5 TABLET ORAL at 06:30

## 2021-01-12 RX ADMIN — Medication 1: at 08:07

## 2021-01-12 RX ADMIN — APIXABAN 5 MILLIGRAM(S): 2.5 TABLET, FILM COATED ORAL at 12:40

## 2021-01-12 RX ADMIN — Medication 25 MILLIGRAM(S): at 05:53

## 2021-01-12 RX ADMIN — LISINOPRIL 20 MILLIGRAM(S): 2.5 TABLET ORAL at 05:53

## 2021-01-12 RX ADMIN — AMLODIPINE BESYLATE 10 MILLIGRAM(S): 2.5 TABLET ORAL at 05:54

## 2021-01-12 RX ADMIN — AMIODARONE HYDROCHLORIDE 400 MILLIGRAM(S): 400 TABLET ORAL at 05:53

## 2021-01-12 RX ADMIN — Medication 3 UNIT(S): at 08:07

## 2021-01-12 RX ADMIN — Medication 2: at 12:06

## 2021-01-12 RX ADMIN — Medication 25 MILLIGRAM(S): at 06:30

## 2021-01-12 RX ADMIN — METFORMIN HYDROCHLORIDE 1000 MILLIGRAM(S): 850 TABLET ORAL at 08:00

## 2021-01-12 RX ADMIN — Medication 3 UNIT(S): at 12:07

## 2021-01-12 RX ADMIN — Medication 81 MILLIGRAM(S): at 12:08

## 2021-01-12 NOTE — DISCHARGE NOTE NURSING/CASE MANAGEMENT/SOCIAL WORK - PATIENT PORTAL LINK FT
You can access the FollowMyHealth Patient Portal offered by [facility] by registering at the following website: [email or web address]. By joining [facility]’s FollowMyHealth portal, you will also be able to view your health information using other applications (apps) compatible with our system.

## 2021-01-12 NOTE — DISCHARGE NOTE PROVIDER - CARE PROVIDER_API CALL
Pantera Young)  Thoracic and Cardiac Surgery  [address] [place] [id number]  Phone: [phone]  Fax: [phone]  Follow Up Time:     Chata Barkley)  Internal Medicine  [address] [place] [address], [address] [address]  Phone: [phone]  Fax: [phone]  Follow Up Time:

## 2021-01-12 NOTE — DISCHARGE NOTE NURSING/CASE MANAGEMENT/SOCIAL WORK - NSDCFUADDAPPT_GEN_ALL_CORE_FT
follow up appt with Dr. Pantera Young on Tuesday, January 19 @ 9:15am  follow up appt with your Cardiologist, Dr. Sanchez in 2 -3 weeks  follow up appt with your PCP DR. Chata Barkley in 1 month

## 2021-01-12 NOTE — DISCHARGE NOTE PROVIDER - NSDCMRMEDTOKEN_GEN_ALL_CORE_FT
amiodarone 200 mg oral tablet: 1 tab(s) orally once a day  amLODIPine 10 mg oral tablet: 1 tab(s) orally once a day  hold for SBP&lt;110   aspirin 81 mg oral delayed release tablet: 1 tab(s) orally once a day  Janumet 50 mg-1000 mg oral tablet: 1 tab(s) orally 2 times a day    lisinopril 40 mg oral tablet: 1 tab(s) orally once a day  metoprolol succinate 25 mg oral tablet, extended release: 1 tab(s) orally once a day    tamsulosin 0.4 mg oral capsule: 1 cap(s) orally once a day (at bedtime)  Vitamin B12 1000 mcg oral tablet: 1 tab(s) orally once a day  Vitamin D3 2000 intl units oral capsule: 1 cap(s) orally once a day   amLODIPine 10 mg oral tablet: 1 tab(s) orally once a day  hold for SBP&lt;110   aspirin 81 mg oral delayed release tablet: 1 tab(s) orally once a day  Eliquis 5 mg oral tablet: 1 tab(s) orally 2 times a day   Janumet 50 mg-1000 mg oral tablet: 1 tab(s) orally 2 times a day    lisinopril 40 mg oral tablet: 1 tab(s) orally once a day  metoprolol succinate 25 mg oral tablet, extended release: 1 tab(s) orally once a day    tamsulosin 0.4 mg oral capsule: 1 cap(s) orally once a day (at bedtime)  Vitamin B12 1000 mcg oral tablet: 1 tab(s) orally once a day  Vitamin D3 2000 intl units oral capsule: 1 cap(s) orally once a day

## 2021-01-12 NOTE — DISCHARGE NOTE PROVIDER - NSDCPNSUBOBJ_GEN_ALL_CORE
PHYSICAL EXAM:  neuro: a& o x 3 hunt  cor S1S2  lungs cta  abd soft nt/nd + bs + bm  ext no edema, no calf tenderness. b/l groins cdi    35 min spent on this discharge

## 2021-01-12 NOTE — DISCHARGE NOTE PROVIDER - HOSPITAL COURSE
This is a  69 y/o male with pmhx of T2DM, HTN, HLD, prostate cancer s/p radical prostatectomy, statin induced myositis started IVIg infusions in 2018, with recently diagnosed colon cancer after an episode of rectal bleeding in 11/2020 and now with progression of known aortic stenosis. TTE, cath on 12/8 reveal severe AS. Pt reports feeling excessively tired and fatigued over the last year but denies CP, WITT, syncope, PND. Will require surgical intervention for colon cancer but needs to address valve issue first. Presents now for scheduled TAVR.   1/8  TAVR with #26 Betsy Valve recovered in CRS  TVP removed some oozing at site resolved. James inserted for urinary retention Flomax 0.8 tonight.  Consider TOV in am NO PLAVIX risk for .bleed  Antihypertensives resumed.  Consider  Toprol in am ECHO EKG in am.  1/9 Toprol resumed w/ tachycardia,HTN,  Echo completed  1/10 VSS pt converted into afib @ 12:30am -  w/ 1 hr. of nick.  amio load started this am.  d/w dr. Young.  Pt would like to go home tomorrow as he is awaiting colon resection for newly diagnosed Colon Ca. will monitor rhythm. d/c planning  1/11  HD stable.  Converted to SR yesterday @ 18:55.  On amio load.    1/12 . VSS - bp slighty elevated - will increase Lisinopril to 40 daily.  pt remains in SR x 40 hrs.  will d/c home on MCOT.  This is a  71 y/o male with pmhx of T2DM, HTN, HLD, prostate cancer s/p radical prostatectomy, statin induced myositis started IVIg infusions in 2018, with recently diagnosed colon cancer after an episode of rectal bleeding in 11/2020 and now with progression of known aortic stenosis. TTE, cath on 12/8 reveal severe AS. Pt reports feeling excessively tired and fatigued over the last year but denies CP, WITT, syncope, PND. Will require surgical intervention for colon cancer but needs to address valve issue first. Presents now for scheduled TAVR.   1/8  TAVR with #26 Betsy Valve recovered in CRS  TVP removed some oozing at site resolved. James inserted for urinary retention Flomax 0.8 tonight.  Consider TOV in am NO PLAVIX risk for .bleed  Antihypertensives resumed.  Consider  Toprol in am ECHO EKG in am.  1/9 Toprol resumed w/ tachycardia,HTN,  Echo completed  1/10 VSS pt converted into afib @ 12:30am -  w/ 1 hr. of nick.  amio load started this am.  d/w dr. Young.  Pt would like to go home tomorrow as he is awaiting colon resection for newly diagnosed Colon Ca. will monitor rhythm. d/c planning  1/11  HD stable.  Converted to SR yesterday @ 18:55.  On amio load.    1/12 . VSS - bp slighty elevated - will increase Lisinopril to 40 daily.  pt remains in SR x 40 hrs.  will d/c home on MCOT.  - amio d/c'd started on Eliquis 5mg po bid in case pt coverts back into afib

## 2021-01-12 NOTE — DISCHARGE NOTE NURSING/CASE MANAGEMENT/SOCIAL WORK - NSDCPEELIQUIS_GEN_ALL_CORE
Progress Notes by Eddie Chavis MD at 17 09:15 AM     Author:  Eddie Chavis MD Service:  (none) Author Type:  Physician     Filed:  17 02:18 PM Encounter Date:  2017 Status:  Signed     :  Eddie Chavis MD (Physician)            Dear[GS1.1T] Dr Annita Almonte, DO[GS1.2M],    Today I saw Mady Funez for a visit to review her ultrasound. As you know, patient is a 35 year old at 30w1d gestation with pregnancy complicated by[GS1.1T] advanced maternal age, h/o PTD at 35 W, and increased BMI[GS1.1C] .  Today she is without complaints and review of systems is negative.      Vitals today were:[GS1.1T]  BP[GS1.1M] 118/64[GS1.2M]  Weight[GS1.1M] 261 pounds[GS1.2M]  LMP 2016     Her ultrasound today showed EFW[GS1.1T] 1639[GS1.1M] grams[GS1.1T] (62%)[GS1.1M], DVP[GS1.1T] 5.1[GS1.1M] cm, and FHR[GS1.1T] 143[GS1.1M]  bpm. A detailed US report will be provided.    Fetal growth is appropriate for gestational age.[GS1.1T] The fetus is in breech position.[GS1.2M]     There is right fetal renal pelviectasis of 0.54 cm.[GS1.1M] During today's visit I counseled Ms. Funez that pelviectasis may be secondary to physiological causes, reflux, UPJ junction obstruction, or blockage further downstream in the genitourinary system.  In most cases unilateral pelviectasis does resolve; however if it does persist there needs to be a  ultrasound after delivery.  We discussed that pelviectasis does increase the risk of chromosomal abnormality such as Down Syndrome.   Pelviectasis increases the risk by[GS1.1T] a[GS1.1M]  likelihood factor of 1:8.  The patient[GS1.1T] did not desire cell free DNA testing or amniocentesis[GS1.2M]    Recommendations:  1. Repeat US in[GS1.1T] 6 weeks.  2. Fetal kick counts daily   3. Weekly NST and DONNY starting at 36 W  4. 17-P weekly injections until 35 W[GS1.2M]      All questions and concerns were addressed and patient stated full understanding.  Thank you for allowing me  to participate in the care of this patient. Please feel free to contact me with any further questions.  This was a 15 minute visit with 50% of time spent face-to-face discussing plan of care.      Eddie Chavis MD  Maternal Fetal Medicine    Revision History        User Key Date/Time User Provider Type Action    > GS1.2 02/27/17 02:18 PM Eddie Chavis MD Physician Sign     GS1.1 02/27/17 09:15 AM Eddie Chavis MD Physician     C - Copied, M - Manual, T - Template             Apixaban/Eliquis - Compliance/Apixaban/Eliquis - Dietary Advice/Apixaban/Eliquis - Follow up monitoring/Apixaban/Eliquis - Potential for adverse drug reactions and interactions

## 2021-01-12 NOTE — PROGRESS NOTE ADULT - PROVIDER SPECIALTY LIST ADULT
Structural Heart
CT Surgery
CT Surgery
Structural Heart
CT Surgery
CT Surgery

## 2021-01-12 NOTE — DISCHARGE NOTE PROVIDER - NSDCCPCAREPLAN_GEN_ALL_CORE_FT
PRINCIPAL DISCHARGE DIAGNOSIS  Diagnosis: S/P TAVR (transcatheter aortic valve replacement)  Assessment and Plan of Treatment: Refer to your TAVR post-discharge instructions  shower daily- keep groin area dry  follow up appt with DR. Pantera Young on Tuesday, January 19 @ 9:15am  follow up appt with your Cardiologist and Pcp in 3-4 weeks  take medications as prescribed  maintain glucose levels < 150  diabetic diet

## 2021-01-12 NOTE — PROGRESS NOTE ADULT - SUBJECTIVE AND OBJECTIVE BOX
Subjective  No acute events.  The patient is not having any cardiopulmonary complaints at rest or upon exertion.  No chest pain/tightness/discomfort, shortness of breath, light-headed sensation, fevers, chills, sweats, dizziness or palpitations.    Review of Systems  14 point review of systems is negative except what is described above in the history of present illness    Telemetry   Sinus rhythm with rates in the 70s to 80s  QTc demonstrates prolongation   Frequent PVCs seen on telemetry/EKGs    Physical Examination  No apparent distress, alert and oriented times three, appropriate affect  JVD is not elevated, supple, no lymphadenopathy  Clear to auscultation bilaterally with no wheezing or ronchi  Regular rate and rhythm with no murmur/rub/gallop  Positive bowel sounds, soft, NT, ND, no masses/guarding or rebound tenderness  Right and left groin sites are soft with no hematoma/bruit/ecchymosis  Moving all extremities spontaneously  2+ DP/PT pulses  No focal deficits  T(C): 36.9 (01-12-21 @ 05:56), Max: 36.9 (01-12-21 @ 05:56)  HR: 52 (01-12-21 @ 05:56) (52 - 89)  BP: 167/88 (01-12-21 @ 05:56) (167/88 - 176/80)  RR: 18 (01-12-21 @ 05:56) (18 - 18)  SpO2: 96% (01-12-21 @ 05:56) (96% - 98%)  Wt(kg): --  01-11 @ 07:01 - 01-12 @ 07:00  --------------------------------------------------------  IN: 1580 mL / OUT: 1275 mL / NET: 305 mL    01-12 @ 07:01  -  01-12 @ 16:52  --------------------------------------------------------  IN: 240 mL / OUT: 500 mL / NET: -260 mL      MEDICATIONS  (STANDING):  acetaminophen  IVPB .. 1000 milliGRAM(s) IV Intermittent once  aMIOdarone    Tablet 400 milliGRAM(s) Oral every 8 hours  amLODIPine   Tablet 10 milliGRAM(s) Oral daily  aspirin enteric coated 81 milliGRAM(s) Oral daily  cefuroxime  IVPB 1500 milliGRAM(s) IV Intermittent once  dextrose 40% Gel 15 Gram(s) Oral once  dextrose 5%. 1000 milliLiter(s) (50 mL/Hr) IV Continuous <Continuous>  dextrose 5%. 1000 milliLiter(s) (100 mL/Hr) IV Continuous <Continuous>  dextrose 50% Injectable 25 Gram(s) IV Push once  dextrose 50% Injectable 12.5 Gram(s) IV Push once  dextrose 50% Injectable 25 Gram(s) IV Push once  glucagon  Injectable 1 milliGRAM(s) IntraMuscular once  influenza   Vaccine 0.5 milliLiter(s) IntraMuscular once  insulin lispro (ADMELOG) corrective regimen sliding scale   SubCutaneous three times a day before meals  insulin lispro Injectable (ADMELOG) 3 Unit(s) SubCutaneous before dinner  insulin lispro Injectable (ADMELOG) 3 Unit(s) SubCutaneous before breakfast  insulin lispro Injectable (ADMELOG) 3 Unit(s) SubCutaneous before lunch  metFORMIN 1000 milliGRAM(s) Oral two times a day with meals  tamsulosin 0.4 milliGRAM(s) Oral at bedtime    MEDICATIONS  (PRN):  ondansetron Injectable 4 milliGRAM(s) IV Push once PRN Nausea and/or Vomiting    Home Medications:  Vitamin B12 1000 mcg oral tablet: 1 tab(s) orally once a day (09 Jan 2021 12:59)  Vitamin D3 2000 intl units oral capsule: 1 cap(s) orally once a day (09 Jan 2021 12:59)                        11.0   5.96  )-----------( 188      ( 12 Jan 2021 06:11 )             34.0   01-12    137  |  104  |  12  ----------------------------<  161<H>  4.0   |  22  |  0.47<L>    Ca    8.8      12 Jan 2021 06:10      Assesment/Plan:  .Mr Alberto is POD 4 s/p TF TAVR (Betsy) for severe symptomatic AS  - afib two days ago for ~18 hours.  The patient will be transitioned to Eliquis 5mg PO BID.  Side effects were reviewed.  - due to increased risk of bleeding aspirin will be discontinued  - amiodarone discontinued secondary to prolonged QTc  - post TAVR TTE was done and personally reviewed  - patient cleared for discharge later today  - continue amlodipine  - will d/c home with an MCOT to monitor for post TAVR conduction delays and arrhythmias for 30 days.  Indications and details of monitor were discussed with the patient.    - Discharge plan: follow up with Dr. Young in one week and follow up with Structural Heart Team in one month.  Echo will be done at 1 month follow up visit.  Plan discussed with patient     All questions and concerns of the patient were addressed.    Findings and plan discussed with cardiac surgery/Dr. Young and structural heart team.

## 2021-01-13 ENCOUNTER — NON-APPOINTMENT (OUTPATIENT)
Age: 71
End: 2021-01-13

## 2021-01-13 RX ORDER — CHOLECALCIFEROL (VITAMIN D3) 50 MCG
50 MCG TABLET ORAL DAILY
Refills: 0 | Status: ACTIVE | COMMUNITY
Start: 2020-12-15

## 2021-01-13 RX ORDER — SITAGLIPTIN AND METFORMIN HYDROCHLORIDE 50; 1000 MG/1; MG/1
50-1000 TABLET, FILM COATED ORAL TWICE DAILY
Qty: 60 | Refills: 0 | Status: ACTIVE | COMMUNITY
Start: 2020-12-15

## 2021-01-13 RX ORDER — CYANOCOBALAMIN (VITAMIN B-12) 100 MCG
100 TABLET ORAL DAILY
Refills: 0 | Status: ACTIVE | COMMUNITY
Start: 2020-12-15

## 2021-01-14 ENCOUNTER — APPOINTMENT (OUTPATIENT)
Dept: CARE COORDINATION | Facility: HOME HEALTH | Age: 71
End: 2021-01-14

## 2021-01-14 NOTE — PHYSICAL EXAM
[Sclera] : the sclera and conjunctiva were normal [Neck Appearance] : the appearance of the neck was normal [] : no respiratory distress [Abnormal Walk] : normal gait [Skin Color & Pigmentation] : normal skin color and pigmentation [FreeTextEntry1] : no visualized hematoma or bleeding to groins [No Focal Deficits] : no focal deficits [Oriented To Time, Place, And Person] : oriented to person, place, and time

## 2021-01-14 NOTE — ADDENDUM
[FreeTextEntry1] : given hx radical prostatectomy- assured pt he can stop flomax and follow up with PCP/Urology

## 2021-01-14 NOTE — ASSESSMENT
[FreeTextEntry1] : This is a  69 y/o male with pmhx of T2DM, HTN, HLD, prostate cancer s/p radical prostatectomy, statin induced myositis started IVIg infusions in 2018, with recently diagnosed colon cancer after an episode of rectal bleeding in \par 11/2020\par On 1/8  TAVR with #26 Betsy Valve with Dr Young/Chandra\par post op afib/rona cardia-s/p amio load.\par Discharged off amio>sent home with KELLY, on Eliquis

## 2021-01-18 RX ORDER — LISINOPRIL AND HYDROCHLOROTHIAZIDE TABLETS 20; 12.5 MG/1; MG/1
20-12.5 TABLET ORAL
Qty: 90 | Refills: 0 | Status: DISCONTINUED | COMMUNITY
Start: 2020-12-26

## 2021-01-18 RX ORDER — CLOTRIMAZOLE AND BETAMETHASONE DIPROPIONATE 10; .5 MG/G; MG/G
1-0.05 CREAM TOPICAL
Qty: 15 | Refills: 0 | Status: DISCONTINUED | COMMUNITY
Start: 2020-09-29

## 2021-01-18 RX ORDER — AMIODARONE HYDROCHLORIDE 200 MG/1
200 TABLET ORAL
Qty: 30 | Refills: 0 | Status: DISCONTINUED | COMMUNITY
Start: 2021-01-12

## 2021-01-18 RX ORDER — MUPIROCIN 20 MG/G
2 OINTMENT TOPICAL TWICE DAILY
Qty: 1 | Refills: 0 | Status: COMPLETED | COMMUNITY
Start: 2021-01-07 | End: 2021-01-18

## 2021-01-18 RX ORDER — IMMUNE GLOBULIN (HUMAN) 10 G/100ML
5 INJECTION INTRAVENOUS; SUBCUTANEOUS
Qty: 1050 | Refills: 0 | Status: DISCONTINUED | COMMUNITY
Start: 2020-10-27

## 2021-01-19 ENCOUNTER — APPOINTMENT (OUTPATIENT)
Dept: CARDIOTHORACIC SURGERY | Facility: CLINIC | Age: 71
End: 2021-01-19
Payer: MEDICARE

## 2021-01-19 VITALS
OXYGEN SATURATION: 98 % | TEMPERATURE: 98 F | RESPIRATION RATE: 13 BRPM | HEART RATE: 90 BPM | DIASTOLIC BLOOD PRESSURE: 82 MMHG | SYSTOLIC BLOOD PRESSURE: 176 MMHG

## 2021-01-19 VITALS — BODY MASS INDEX: 28.88 KG/M2 | HEIGHT: 69 IN | WEIGHT: 195 LBS

## 2021-01-19 PROCEDURE — 99214 OFFICE O/P EST MOD 30 MIN: CPT

## 2021-01-20 PROCEDURE — 86850 RBC ANTIBODY SCREEN: CPT

## 2021-01-20 PROCEDURE — 87640 STAPH A DNA AMP PROBE: CPT

## 2021-01-20 PROCEDURE — 93306 TTE W/DOPPLER COMPLETE: CPT

## 2021-01-20 PROCEDURE — 80053 COMPREHEN METABOLIC PANEL: CPT

## 2021-01-20 PROCEDURE — 85027 COMPLETE CBC AUTOMATED: CPT

## 2021-01-20 PROCEDURE — 86901 BLOOD TYPING SEROLOGIC RH(D): CPT

## 2021-01-20 PROCEDURE — C9803: CPT

## 2021-01-20 PROCEDURE — 85610 PROTHROMBIN TIME: CPT

## 2021-01-20 PROCEDURE — 86900 BLOOD TYPING SEROLOGIC ABO: CPT

## 2021-01-20 PROCEDURE — G0463: CPT

## 2021-01-20 PROCEDURE — 82962 GLUCOSE BLOOD TEST: CPT

## 2021-01-20 PROCEDURE — C1887: CPT

## 2021-01-20 PROCEDURE — 87641 MR-STAPH DNA AMP PROBE: CPT

## 2021-01-20 PROCEDURE — 93005 ELECTROCARDIOGRAM TRACING: CPT

## 2021-01-20 PROCEDURE — C1889: CPT

## 2021-01-20 PROCEDURE — C1760: CPT

## 2021-01-20 PROCEDURE — U0003: CPT

## 2021-01-20 PROCEDURE — 93880 EXTRACRANIAL BILAT STUDY: CPT

## 2021-01-20 PROCEDURE — 83036 HEMOGLOBIN GLYCOSYLATED A1C: CPT

## 2021-01-20 PROCEDURE — 85025 COMPLETE CBC W/AUTO DIFF WBC: CPT

## 2021-01-20 PROCEDURE — 76000 FLUOROSCOPY <1 HR PHYS/QHP: CPT

## 2021-01-20 PROCEDURE — 80048 BASIC METABOLIC PNL TOTAL CA: CPT

## 2021-01-20 PROCEDURE — 71046 X-RAY EXAM CHEST 2 VIEWS: CPT

## 2021-01-20 PROCEDURE — 86923 COMPATIBILITY TEST ELECTRIC: CPT

## 2021-01-20 PROCEDURE — U0005: CPT

## 2021-01-20 PROCEDURE — C1894: CPT

## 2021-01-20 PROCEDURE — C1769: CPT

## 2021-01-20 PROCEDURE — 85730 THROMBOPLASTIN TIME PARTIAL: CPT

## 2021-01-21 ENCOUNTER — TRANSCRIPTION ENCOUNTER (OUTPATIENT)
Age: 71
End: 2021-01-21

## 2021-02-12 ENCOUNTER — TRANSCRIPTION ENCOUNTER (OUTPATIENT)
Age: 71
End: 2021-02-12

## 2021-02-18 RX ORDER — METOPROLOL SUCCINATE 50 MG/1
50 TABLET, EXTENDED RELEASE ORAL DAILY
Qty: 90 | Refills: 2 | Status: ACTIVE | COMMUNITY
Start: 2021-01-19 | End: 1900-01-01

## 2021-02-18 RX ORDER — LISINOPRIL 40 MG/1
40 TABLET ORAL DAILY
Qty: 90 | Refills: 3 | Status: ACTIVE | COMMUNITY
Start: 1900-01-01 | End: 1900-01-01

## 2021-02-25 ENCOUNTER — OUTPATIENT (OUTPATIENT)
Dept: OUTPATIENT SERVICES | Facility: HOSPITAL | Age: 71
LOS: 1 days | End: 2021-02-25
Payer: MEDICARE

## 2021-02-25 ENCOUNTER — APPOINTMENT (OUTPATIENT)
Dept: CARDIOTHORACIC SURGERY | Facility: CLINIC | Age: 71
End: 2021-02-25
Payer: MEDICARE

## 2021-02-25 ENCOUNTER — NON-APPOINTMENT (OUTPATIENT)
Age: 71
End: 2021-02-25

## 2021-02-25 VITALS
OXYGEN SATURATION: 97 % | HEART RATE: 84 BPM | HEIGHT: 70 IN | WEIGHT: 200 LBS | SYSTOLIC BLOOD PRESSURE: 164 MMHG | TEMPERATURE: 98.1 F | RESPIRATION RATE: 16 BRPM | BODY MASS INDEX: 28.63 KG/M2 | DIASTOLIC BLOOD PRESSURE: 84 MMHG

## 2021-02-25 DIAGNOSIS — I35.0 NONRHEUMATIC AORTIC (VALVE) STENOSIS: ICD-10-CM

## 2021-02-25 DIAGNOSIS — Z98.890 OTHER SPECIFIED POSTPROCEDURAL STATES: Chronic | ICD-10-CM

## 2021-02-25 DIAGNOSIS — Z90.79 ACQUIRED ABSENCE OF OTHER GENITAL ORGAN(S): Chronic | ICD-10-CM

## 2021-02-25 LAB
ALBUMIN SERPL ELPH-MCNC: 4.5 G/DL
ALP BLD-CCNC: 37 U/L
ALT SERPL-CCNC: 49 U/L
ANION GAP SERPL CALC-SCNC: 12 MMOL/L
AST SERPL-CCNC: 31 U/L
BASOPHILS # BLD AUTO: 0.06 K/UL
BASOPHILS NFR BLD AUTO: 0.9 %
BILIRUB SERPL-MCNC: 0.3 MG/DL
BUN SERPL-MCNC: 13 MG/DL
CALCIUM SERPL-MCNC: 9.7 MG/DL
CHLORIDE SERPL-SCNC: 102 MMOL/L
CO2 SERPL-SCNC: 23 MMOL/L
CREAT SERPL-MCNC: 0.44 MG/DL
EOSINOPHIL # BLD AUTO: 0.26 K/UL
EOSINOPHIL NFR BLD AUTO: 3.9 %
GLUCOSE SERPL-MCNC: 212 MG/DL
HCT VFR BLD CALC: 37.3 %
HGB BLD-MCNC: 11.8 G/DL
LYMPHOCYTES # BLD AUTO: 1.09 K/UL
LYMPHOCYTES NFR BLD AUTO: 16.5 %
MAN DIFF?: NORMAL
MCHC RBC-ENTMCNC: 28.6 PG
MCHC RBC-ENTMCNC: 31.6 GM/DL
MCV RBC AUTO: 90.3 FL
MONOCYTES # BLD AUTO: 0.63 K/UL
MONOCYTES NFR BLD AUTO: 9.5 %
NEUTROPHILS # BLD AUTO: 4.54 K/UL
NEUTROPHILS NFR BLD AUTO: 68.6 %
PLATELET # BLD AUTO: 287 K/UL
POTASSIUM SERPL-SCNC: 3.8 MMOL/L
PROT SERPL-MCNC: 7.4 G/DL
RBC # BLD: 4.13 M/UL
RBC # FLD: 13.6 %
SODIUM SERPL-SCNC: 137 MMOL/L
WBC # FLD AUTO: 6.62 K/UL

## 2021-02-25 PROCEDURE — 99213 OFFICE O/P EST LOW 20 MIN: CPT

## 2021-02-25 PROCEDURE — 93000 ELECTROCARDIOGRAM COMPLETE: CPT

## 2021-02-25 PROCEDURE — 93306 TTE W/DOPPLER COMPLETE: CPT

## 2021-02-25 PROCEDURE — 93306 TTE W/DOPPLER COMPLETE: CPT | Mod: 26

## 2021-02-25 RX ORDER — TAMSULOSIN HYDROCHLORIDE 0.4 MG/1
0.4 CAPSULE ORAL
Refills: 0 | Status: DISCONTINUED | COMMUNITY
End: 2021-02-25

## 2021-02-25 RX ORDER — ASPIRIN 81 MG
81 TABLET, DELAYED RELEASE (ENTERIC COATED) ORAL
Refills: 0 | Status: DISCONTINUED | COMMUNITY
End: 2021-02-25

## 2021-02-25 RX ORDER — FERROUS SULFATE 325(65) MG
325 (65 FE) TABLET ORAL DAILY
Refills: 0 | Status: ACTIVE | COMMUNITY
Start: 2021-02-25

## 2021-02-25 RX ORDER — METOPROLOL SUCCINATE 25 MG/1
25 TABLET, EXTENDED RELEASE ORAL DAILY
Refills: 0 | Status: DISCONTINUED | COMMUNITY
Start: 2020-12-15 | End: 2021-02-25

## 2021-02-25 RX ORDER — APIXABAN 5 MG/1
5 TABLET, FILM COATED ORAL
Refills: 0 | Status: DISCONTINUED | COMMUNITY
End: 2021-02-25

## 2021-02-26 LAB
ANION GAP SERPL CALC-SCNC: 13 MMOL/L
BASOPHILS # BLD AUTO: 0.09 K/UL
BASOPHILS NFR BLD AUTO: 1.2 %
BUN SERPL-MCNC: 14 MG/DL
CALCIUM SERPL-MCNC: 9.7 MG/DL
CHLORIDE SERPL-SCNC: 100 MMOL/L
CO2 SERPL-SCNC: 25 MMOL/L
CREAT SERPL-MCNC: 0.56 MG/DL
EOSINOPHIL # BLD AUTO: 0.43 K/UL
EOSINOPHIL NFR BLD AUTO: 5.6 %
GLUCOSE SERPL-MCNC: 167 MG/DL
HCT VFR BLD CALC: 39.6 %
HGB BLD-MCNC: 12.7 G/DL
IMM GRANULOCYTES NFR BLD AUTO: 0.7 %
LYMPHOCYTES # BLD AUTO: 1.31 K/UL
LYMPHOCYTES NFR BLD AUTO: 17.2 %
MAN DIFF?: NORMAL
MCHC RBC-ENTMCNC: 26.6 PG
MCHC RBC-ENTMCNC: 32.1 GM/DL
MCV RBC AUTO: 83 FL
MONOCYTES # BLD AUTO: 0.69 K/UL
MONOCYTES NFR BLD AUTO: 9 %
NEUTROPHILS # BLD AUTO: 5.06 K/UL
NEUTROPHILS NFR BLD AUTO: 66.3 %
PLATELET # BLD AUTO: 265 K/UL
POTASSIUM SERPL-SCNC: 4.2 MMOL/L
RBC # BLD: 4.77 M/UL
RBC # FLD: 16.5 %
SODIUM SERPL-SCNC: 139 MMOL/L
WBC # FLD AUTO: 7.63 K/UL

## 2021-02-26 NOTE — HISTORY OF PRESENT ILLNESS
[FreeTextEntry1] : ROBINSON RIVERS is a 70 year old male here on 02/25/2021, 7 weeks after undergoing a transfemoral transcatheter aortic valve implantation using a 26 mm Betsy 3 bioprosthesis on 1/8/2021.  Post-operative course was significant for new LBBB, AF treated with Amiodarone and Eliquis and urinary retention with insertion of danielson catheter and FLomax. He also was newly diagnosed with colon cancer. He was discharged with an MCOT. \par \par He  comes to the office today with a new TTE reporting feeling "good" overall. He reports improvement in his pre-LEEANNA symptoms of fatigue and shortness of breath. Prior to the LEEANNA, he used to fall asleep while watching TV and now doesn't do it as much. He is able to do his activities of daily living without difficulty. He drives wherever he wants to go. He currently denies fever, chills, loss of smell or taste, cough, palpitations, angina, dyspnea, dizziness, lightheadedness, syncope, or peripheral edema. His energy level and appetite are good. Denies bladder problems. He has regular BM's with no blood. \par \par He stopped taking the Eliquis about 10 days ago when he ran out of pills as he thought he was told to stop after one month.  \par \par 5 meter walk: unable to do due to chronic myositis\par NYHA II\par PCP: Dr. Katty Barkley\par CARD: Dr. Sanchez \par Surgeon: Dr. Delcid ([facility])\par

## 2021-02-26 NOTE — PHYSICAL EXAM
[General Appearance - Well Developed] : well developed [Normal Appearance] : normal appearance [Well Groomed] : well groomed [General Appearance - Well Nourished] : well nourished [No Deformities] : no deformities [General Appearance - In No Acute Distress] : no acute distress [Normal Conjunctiva] : the conjunctiva exhibited no abnormalities [Normal Oral Mucosa] : normal oral mucosa [No Oral Pallor] : no oral pallor [No Oral Cyanosis] : no oral cyanosis [Normal Jugular Venous A Waves Present] : normal jugular venous A waves present [Normal Jugular Venous V Waves Present] : normal jugular venous V waves present [No Jugular Venous Rosen A Waves] : no jugular venous rosen A waves [Respiration, Rhythm And Depth] : normal respiratory rhythm and effort [Exaggerated Use Of Accessory Muscles For Inspiration] : no accessory muscle use [Auscultation Breath Sounds / Voice Sounds] : lungs were clear to auscultation bilaterally [Normal Rate] : normal [Heart Rate ___] : [unfilled] bpm [Rhythm Regular] : regular [Normal S1] : normal S1 [Normal S2] : normal S2 [No Gallop] : no gallop heard [Prosthetic Aortic Valve] : prosthetic aortic valve heard [III] : a grade 3 [2+] : left 2+ [No Abnormalities] : the abdominal aorta was not enlarged and no bruit was heard [___+] : [unfilled]U+ pretibial pitting edema on the right [Bowel Sounds] : normal bowel sounds [Abdomen Soft] : soft [Abdomen Tenderness] : non-tender [Abnormal Walk] : normal gait [Nail Clubbing] : no clubbing of the fingernails [Cyanosis, Localized] : no localized cyanosis [Petechial Hemorrhages (___cm)] : no petechial hemorrhages [Skin Color & Pigmentation] : normal skin color and pigmentation [Skin Turgor] : normal skin turgor [] : no rash [No Venous Stasis] : no venous stasis [Oriented To Time, Place, And Person] : oriented to person, place, and time [Impaired Insight] : insight and judgment were intact [Affect] : the affect was normal [Mood] : the mood was normal [Memory Recent] : recent memory was not impaired [Memory Remote] : remote memory was not impaired [No Anxiety] : not feeling anxious [Click] : no click [Distant] : the heart sounds were ~L not distant [Pericardial Rub] : no pericardial rub [Right Carotid Bruit] : no bruit heard over the right carotid [Left Carotid Bruit] : no bruit heard over the left carotid [Bruit] : no bruit heard

## 2021-02-26 NOTE — REVIEW OF SYSTEMS
[Eyeglasses] : currently wearing eyeglasses [Negative] : Heme/Lymph [Fever] : no fever [Chills] : no chills [Feeling Fatigued] : not feeling fatigued [Shortness Of Breath] : no shortness of breath [Dyspnea on exertion] : not dyspnea during exertion [Chest  Pressure] : no chest pressure [Chest Pain] : no chest pain [Lower Ext Edema] : no extremity edema [Palpitations] : no palpitations [Cough] : no cough [Dizziness] : no dizziness

## 2021-02-26 NOTE — ADDENDUM
[FreeTextEntry1] : 2/26/2021: reviewed visit, ECG, MCOT and TTE with Dr. Rawls. Add HCTZ 12.5 mg daily for BP control in addition to current medications. Continue to take Metoprolol as MCOT showing 5% PVC burden. Continue daily ASA 81 mg. TTE demonstrated valve is well seated with excellent function. OK to proceed with colon surgery from valve standpoint. Follow-up with Dr. Sanchez for BP check and labs after starting HCTZ. Is to see Dr. Sanchez for cardiac clearance in next few weeks. I called Lew Remy and went over everything with him and he verbalized understanding and was appreciative.

## 2021-02-26 NOTE — REASON FOR VISIT
[Follow-Up - Clinic] : a clinic follow-up of [Aortic Stenosis] : aortic stenosis [Medication Management] : Medication management [FreeTextEntry1] : 1/8/2021 LEEANNA using a 26 mm Betsy 3 bioprosthesis

## 2021-02-26 NOTE — DISCUSSION/SUMMARY
[FreeTextEntry1] : Plan:\par 1) Doing well 7 weeks s/p LEENANA\par 2) BP is above goal - Medication changes: none.  States BP usually 140's systolic. Will see cardiologist and have BP taken again in the next few weeks. He feels that his BP is unusually high as he was upset his wife couldn't come in.  \par 3) MCOT - will check for report and will review with EP/Dr. Rawls. Discuss Eliquis/ASA.\par 4) TTE and labs done today - results pending. Will review with Dr. Rawls and call pt. with results. Discuss clearance for upcoming colon surgery (no date set yet). \par 5) To follow-up with cardiology as scheduled\par 6) To follow-up with PCP as scheduled\par 7) To follow-up with structural heart clinic yearly with TTE or sooner if needed.\par 8) Follow-up with surgeon regarding colon cancer surgery\par \par \par

## 2021-04-02 ENCOUNTER — APPOINTMENT (OUTPATIENT)
Age: 71
End: 2021-04-02
Payer: MEDICARE

## 2021-04-02 PROCEDURE — 0002A: CPT

## 2021-05-04 ENCOUNTER — RESULT REVIEW (OUTPATIENT)
Age: 71
End: 2021-05-04

## 2021-08-25 ENCOUNTER — RX RENEWAL (OUTPATIENT)
Age: 71
End: 2021-08-25

## 2021-08-26 ENCOUNTER — RX RENEWAL (OUTPATIENT)
Age: 71
End: 2021-08-26

## 2021-11-12 ENCOUNTER — INPATIENT (INPATIENT)
Facility: HOSPITAL | Age: 71
LOS: 2 days | Discharge: HOME HEALTH | DRG: 229 | End: 2021-11-15
Attending: HOSPITALIST | Admitting: STUDENT IN AN ORGANIZED HEALTH CARE EDUCATION/TRAINING PROGRAM
Payer: MEDICARE

## 2021-11-12 VITALS
TEMPERATURE: 98 F | RESPIRATION RATE: 16 BRPM | SYSTOLIC BLOOD PRESSURE: 215 MMHG | HEART RATE: 43 BPM | HEIGHT: 70 IN | OXYGEN SATURATION: 99 % | WEIGHT: 190.04 LBS | DIASTOLIC BLOOD PRESSURE: 76 MMHG

## 2021-11-12 DIAGNOSIS — Z90.79 ACQUIRED ABSENCE OF OTHER GENITAL ORGAN(S): Chronic | ICD-10-CM

## 2021-11-12 DIAGNOSIS — Z98.890 OTHER SPECIFIED POSTPROCEDURAL STATES: Chronic | ICD-10-CM

## 2021-11-12 DIAGNOSIS — R00.1 BRADYCARDIA, UNSPECIFIED: ICD-10-CM

## 2021-11-12 LAB
ALBUMIN SERPL ELPH-MCNC: 4.5 G/DL — SIGNIFICANT CHANGE UP (ref 3.3–5)
ALP SERPL-CCNC: 46 U/L — SIGNIFICANT CHANGE UP (ref 40–120)
ALT FLD-CCNC: 38 U/L — SIGNIFICANT CHANGE UP (ref 10–45)
ANION GAP SERPL CALC-SCNC: 19 MMOL/L — HIGH (ref 5–17)
APTT BLD: 30.6 SEC — SIGNIFICANT CHANGE UP (ref 27.5–35.5)
AST SERPL-CCNC: 27 U/L — SIGNIFICANT CHANGE UP (ref 10–40)
BASE EXCESS BLDV CALC-SCNC: -1.3 MMOL/L — SIGNIFICANT CHANGE UP (ref -2–2)
BASOPHILS # BLD AUTO: 0.04 K/UL — SIGNIFICANT CHANGE UP (ref 0–0.2)
BASOPHILS NFR BLD AUTO: 0.6 % — SIGNIFICANT CHANGE UP (ref 0–2)
BILIRUB SERPL-MCNC: 0.7 MG/DL — SIGNIFICANT CHANGE UP (ref 0.2–1.2)
BUN SERPL-MCNC: 19 MG/DL — SIGNIFICANT CHANGE UP (ref 7–23)
CA-I SERPL-SCNC: 1.26 MMOL/L — SIGNIFICANT CHANGE UP (ref 1.15–1.33)
CALCIUM SERPL-MCNC: 10.3 MG/DL — SIGNIFICANT CHANGE UP (ref 8.4–10.5)
CHLORIDE BLDV-SCNC: 102 MMOL/L — SIGNIFICANT CHANGE UP (ref 96–108)
CHLORIDE SERPL-SCNC: 102 MMOL/L — SIGNIFICANT CHANGE UP (ref 96–108)
CO2 BLDV-SCNC: 22 MMOL/L — SIGNIFICANT CHANGE UP (ref 22–26)
CO2 SERPL-SCNC: 16 MMOL/L — LOW (ref 22–31)
CREAT SERPL-MCNC: 0.61 MG/DL — SIGNIFICANT CHANGE UP (ref 0.5–1.3)
EOSINOPHIL # BLD AUTO: 0.19 K/UL — SIGNIFICANT CHANGE UP (ref 0–0.5)
EOSINOPHIL NFR BLD AUTO: 2.7 % — SIGNIFICANT CHANGE UP (ref 0–6)
GAS PNL BLDV: 136 MMOL/L — SIGNIFICANT CHANGE UP (ref 136–145)
GAS PNL BLDV: SIGNIFICANT CHANGE UP
GAS PNL BLDV: SIGNIFICANT CHANGE UP
GLUCOSE BLDV-MCNC: 236 MG/DL — HIGH (ref 70–99)
GLUCOSE SERPL-MCNC: 227 MG/DL — HIGH (ref 70–99)
HCO3 BLDV-SCNC: 22 MMOL/L — SIGNIFICANT CHANGE UP (ref 22–29)
HCT VFR BLD CALC: 42 % — SIGNIFICANT CHANGE UP (ref 39–50)
HCT VFR BLDA CALC: 50 % — SIGNIFICANT CHANGE UP (ref 39–51)
HGB BLD CALC-MCNC: 16.7 G/DL — SIGNIFICANT CHANGE UP (ref 12.6–17.4)
HGB BLD-MCNC: 14.3 G/DL — SIGNIFICANT CHANGE UP (ref 13–17)
IMM GRANULOCYTES NFR BLD AUTO: 0.8 % — SIGNIFICANT CHANGE UP (ref 0–1.5)
INR BLD: 1.12 RATIO — SIGNIFICANT CHANGE UP (ref 0.88–1.16)
LACTATE BLDV-MCNC: 3.3 MMOL/L — HIGH (ref 0.7–2)
LYMPHOCYTES # BLD AUTO: 1.11 K/UL — SIGNIFICANT CHANGE UP (ref 1–3.3)
LYMPHOCYTES # BLD AUTO: 15.6 % — SIGNIFICANT CHANGE UP (ref 13–44)
MAGNESIUM SERPL-MCNC: 1.8 MG/DL — SIGNIFICANT CHANGE UP (ref 1.6–2.6)
MCHC RBC-ENTMCNC: 31 PG — SIGNIFICANT CHANGE UP (ref 27–34)
MCHC RBC-ENTMCNC: 34 GM/DL — SIGNIFICANT CHANGE UP (ref 32–36)
MCV RBC AUTO: 91.1 FL — SIGNIFICANT CHANGE UP (ref 80–100)
MONOCYTES # BLD AUTO: 0.87 K/UL — SIGNIFICANT CHANGE UP (ref 0–0.9)
MONOCYTES NFR BLD AUTO: 12.2 % — SIGNIFICANT CHANGE UP (ref 2–14)
NEUTROPHILS # BLD AUTO: 4.86 K/UL — SIGNIFICANT CHANGE UP (ref 1.8–7.4)
NEUTROPHILS NFR BLD AUTO: 68.1 % — SIGNIFICANT CHANGE UP (ref 43–77)
NRBC # BLD: 0 /100 WBCS — SIGNIFICANT CHANGE UP (ref 0–0)
NT-PROBNP SERPL-SCNC: 218 PG/ML — SIGNIFICANT CHANGE UP (ref 0–300)
PCO2 BLDV: 31 MMHG — LOW (ref 42–55)
PH BLDV: 7.45 — HIGH (ref 7.32–7.43)
PLATELET # BLD AUTO: 165 K/UL — SIGNIFICANT CHANGE UP (ref 150–400)
PO2 BLDV: 48 MMHG — HIGH (ref 25–45)
POTASSIUM BLDV-SCNC: 3.9 MMOL/L — SIGNIFICANT CHANGE UP (ref 3.5–5.1)
POTASSIUM SERPL-MCNC: 4.3 MMOL/L — SIGNIFICANT CHANGE UP (ref 3.5–5.3)
POTASSIUM SERPL-SCNC: 4.3 MMOL/L — SIGNIFICANT CHANGE UP (ref 3.5–5.3)
PROT SERPL-MCNC: 7 G/DL — SIGNIFICANT CHANGE UP (ref 6–8.3)
PROTHROM AB SERPL-ACNC: 13.4 SEC — SIGNIFICANT CHANGE UP (ref 10.6–13.6)
RBC # BLD: 4.61 M/UL — SIGNIFICANT CHANGE UP (ref 4.2–5.8)
RBC # FLD: 15.3 % — HIGH (ref 10.3–14.5)
SAO2 % BLDV: 81.9 % — SIGNIFICANT CHANGE UP (ref 67–88)
SODIUM SERPL-SCNC: 137 MMOL/L — SIGNIFICANT CHANGE UP (ref 135–145)
TROPONIN T, HIGH SENSITIVITY RESULT: 53 NG/L — HIGH (ref 0–51)
WBC # BLD: 7.13 K/UL — SIGNIFICANT CHANGE UP (ref 3.8–10.5)
WBC # FLD AUTO: 7.13 K/UL — SIGNIFICANT CHANGE UP (ref 3.8–10.5)

## 2021-11-12 PROCEDURE — 71045 X-RAY EXAM CHEST 1 VIEW: CPT | Mod: 26

## 2021-11-12 PROCEDURE — 99291 CRITICAL CARE FIRST HOUR: CPT | Mod: GC

## 2021-11-12 NOTE — CONSULT NOTE ADULT - SUBJECTIVE AND OBJECTIVE BOX
HPI    72 y/o M PMHx of Prostate CA s/p prostatectomy in 2013, OA, Fatty liver, HTN, T2DM (A1C 8.6), HLD (unable to tolerate meds 2/2 myopathy), AS s/p TAVR in 1/2021 c/b a-fib for which he remains on eliquis and amiodarone (dose decreased from 200-100mg 2 months ago), colon CA just finished 5th round of chemo with 5-FU and leucovorin 2 weeks ago, here with dizziness and bradycardia for 4 days which acutely worsened tonight. Found to be rona to 40s with BP in 200s upon arrival. Question of AV dissociation on EKG with possible CHB.     Presently pt SR in 80s, BP 160s feeling well. Episodes are intermittent and associated with the bradycardia on monitor. No recent illness, denies cp, sob, loc, abd pain, n/v.       Cardiologist: Dr. Sanchez  TAVR performed by Dr. Young and Dr Rawls in Jan 2021  ICU Vital Signs Last 24 Hrs  T(C): 36.7 (12 Nov 2021 19:56), Max: 36.7 (12 Nov 2021 19:56)  T(F): 98.1 (12 Nov 2021 19:56), Max: 98.1 (12 Nov 2021 19:56)  HR: 54 (12 Nov 2021 20:10) (43 - 54)  BP: 167/61 (12 Nov 2021 20:10) (156/120 - 215/76)  BP(mean): 90 (12 Nov 2021 20:10) (90 - 131)  ABP: --  ABP(mean): --  RR: 18 (12 Nov 2021 20:10) (16 - 18)  SpO2: 98% (12 Nov 2021 20:10) (98% - 99%)      .  LABS:                         14.3   7.13  )-----------( 165      ( 12 Nov 2021 20:50 )             42.0     11-12    137  |  102  |  19  ----------------------------<  227<H>  4.3   |  16<L>  |  0.61    Ca    10.3      12 Nov 2021 20:50  Mg     1.8     11-12    TPro  7.0  /  Alb  4.5  /  TBili  0.7  /  DBili  x   /  AST  27  /  ALT  38  /  AlkPhos  46  11-12    PT/INR - ( 12 Nov 2021 20:50 )   PT: 13.4 sec;   INR: 1.12 ratio         PTT - ( 12 Nov 2021 20:50 )  PTT:30.6 sec    Serum Pro-Brain Natriuretic Peptide: 218 pg/mL (11-12 @ 20:50)        RADIOLOGY, EKG & ADDITIONAL TESTS: Reviewed.      HPI    72 y/o M PMHx of Prostate CA s/p prostatectomy in 2013, OA, Fatty liver, HTN, T2DM (A1C 8.6), HLD (unable to tolerate meds 2/2 myopathy), AS s/p TAVR in 1/2021 c/b a-fib for which he remains on eliquis and amiodarone (dose decreased from 200-100mg 2 months ago), colon CA just finished 5th round of chemo with 5-FU and leucovorin 2 weeks ago (has never been on anthracyclines), here with dizziness and bradycardia for 4 days which acutely worsened tonight. Found to be rona to 40s with BP in 200s upon arrival. Question of AV dissociation on EKG with possible CHB.     Presently pt SR in 80s, BP 160s feeling well. Episodes are intermittent and associated with the bradycardia on monitor. No recent illness, denies cp, sob, loc, abd pain, n/v.       Vitals: I have reviewed the patients vital signs  General: Well dressed, well appearing, no acute distress  HEENT: Atraumatic, normocephalic, airway patent  Eyes: EOMI, tracking appropriately  Neck: no tracheal deviation, no JVD, strong carotid pulse  Chest/Lungs: no trauma, symmetric chest rise, speaking in complete sentences, no WOB, CTAB  Heart: skin and extremities well perfused, regular rate and rhythm, mild PL LE edema  Neuro: A+Ox3, CN grossly intact, no FND  MSK: strength at baseline in all extremities, no muscle wasting or atrophy  Skin: no cyanosis, no jaundice, no new emergent lesions     Cardiologist: Dr. Sanchez  TAVR performed by Dr. Young and Dr Rawls in Jan 2021      ICU Vital Signs Last 24 Hrs  T(C): 36.7 (12 Nov 2021 19:56), Max: 36.7 (12 Nov 2021 19:56)  T(F): 98.1 (12 Nov 2021 19:56), Max: 98.1 (12 Nov 2021 19:56)  HR: 54 (12 Nov 2021 20:10) (43 - 54)  BP: 167/61 (12 Nov 2021 20:10) (156/120 - 215/76)  BP(mean): 90 (12 Nov 2021 20:10) (90 - 131)  ABP: --  ABP(mean): --  RR: 18 (12 Nov 2021 20:10) (16 - 18)  SpO2: 98% (12 Nov 2021 20:10) (98% - 99%)      .  LABS:                         14.3   7.13  )-----------( 165      ( 12 Nov 2021 20:50 )             42.0     11-12    137  |  102  |  19  ----------------------------<  227<H>  4.3   |  16<L>  |  0.61    Ca    10.3      12 Nov 2021 20:50  Mg     1.8     11-12    TPro  7.0  /  Alb  4.5  /  TBili  0.7  /  DBili  x   /  AST  27  /  ALT  38  /  AlkPhos  46  11-12    PT/INR - ( 12 Nov 2021 20:50 )   PT: 13.4 sec;   INR: 1.12 ratio         PTT - ( 12 Nov 2021 20:50 )  PTT:30.6 sec    Serum Pro-Brain Natriuretic Peptide: 218 pg/mL (11-12 @ 20:50)        RADIOLOGY, EKG & ADDITIONAL TESTS: Reviewed.

## 2021-11-12 NOTE — ED PROVIDER NOTE - CLINICAL SUMMARY MEDICAL DECISION MAKING FREE TEXT BOX
Concern for sxs bradycardia - ? 3rd degree AV block. Eval for thryoid disease, electrolyte abnoram. Labs, ekg, cards. Pt is placed on pads. Plan to admit.   Pt is not ill appearing at this time. Concern for sxs bradycardia - ? 3rd degree AV block. Eval for thryoid disease, electrolyte abnoram. Labs, ekg, cards. Pt is placed on pads. Plan to admit.   Pt is not ill appearing at this time.    TAMAR Mobley MD: Agree with resident MDM, assessment and plan as above. Pt with bradycardia into 40's with 2nd/3rd degree block, going in and out of sinus rhythm. Zoll pads on pt. Will consult EP, cardiac eval, pt TBA.

## 2021-11-12 NOTE — CONSULT NOTE ADULT - ASSESSMENT
72 y/o M hx TAVR this year c/b afib on eliquis and amio (dose decreased from 200-100 2 months ago) just finished 5th round of chemo for colon CA with 5-FU and leucovorin 2 weeks ago, here with bradycardia and dizziness.     #Bradycardia    -  70 y/o M hx TAVR this year c/b afib on eliquis and amio (dose decreased from 200-100 2 months ago) just finished 5th round of chemo for colon CA with 5-FU and leucovorin 2 weeks ago, here with bradycardia and dizziness.     #Bradycardia    - Now in SR in 80s, when symptomatic appears to be between 2:1 - 4:1 AV block with PVCs vs escape beats, with one episode of sinus pause ~10seconds   - Admit to tele  - TTE in AM   - Hold metoprolol and amiodarone   - Avoid AV renee blocking agents  - EP to follow

## 2021-11-12 NOTE — ED PROVIDER NOTE - PROGRESS NOTE DETAILS
Yeimy Campos, DO PGY-3: left a message for harman payne Yeimy Campos,  PGY-3: discussed case with house cards - will come see the patient - informed there is a concern for third degree block Yeimy Campos,  PGY-3: discussed case with hospitalist - agrees with admission Yeimy Campos DO PGY-3: discussed case with [facility] hospitalist- agrees with admission

## 2021-11-12 NOTE — ED ADULT TRIAGE NOTE - HEIGHT IN FEET
Physical Therapy Discharge Summary    Arik Bobo  MRN: [id number]   H/O autologous stem cell transplant   Patient Discharged from acute Physical Therapy on 10/10/17.  Please refer to prior PT noted date on 10/10/17 for functional status.     Assessment:   Patient was discharge unexpectedly.  Information required to complete and accurate discharge summary is unknown.  Refer to therapy initial evaluation and last progress note for initial and most recent functional status and goal achievement.  Recommendations made may be found in medical record.  GOALS:    Physical Therapy Goals        Problem: Physical Therapy Goal    Goal Priority Disciplines Outcome Goal Variances Interventions   Physical Therapy Goal     PT/OT, PT Ongoing (interventions implemented as appropriate)     Description:  Goals to be met by: 10/26/17     Patient will increase functional independence with mobility by performin. Sit to stand transfer with Webb  2. Bed to chair transfer with Webb   3. Gait  x 500 feet with Webb   4. Ascend/descend 3 stair with right Handrails Modified Webb                  Reasons for Discontinuation of Therapy Services  Transfer to alternate level of care.      Plan:  Patient Discharged to: OPPT.    Luana Delatorre DPT, PT  10/17/2017           5

## 2021-11-12 NOTE — ED ADULT NURSE NOTE - OBJECTIVE STATEMENT
Pt 71 year old male, A/O x3. Pt came in vi EMS due to dizziness and bradycardiac. PMH- Myositis, DM2, HLD, prostate cancer, HTN, afib (on eliquis). As per pt, pt has been experiencing dizziness for a couple days. Today, had severe sudden onset of dizziness with episode of diaphoresis. En route with EMS, HR in 50s, HTN, and . Pt finished chemotherapy 2 weeks ago. Upon assessment, pt denies pain/ discomfort, dizziness, SOB, states "I feel fine". HR 47. Denies chest pain, SOB, fever, chills, N/V/D, dysuria, numbness/tingling, HA, lightheadedness.

## 2021-11-12 NOTE — ED PROVIDER NOTE - PHYSICAL EXAMINATION
Gen: non toxic appearing, NAD   Head: NC/NT  Eyes:  anicteric  ENT: airway patent, mmm   CV: brdaycardia, +S1/S2   Resp: CTAB, symmetric breath sounds, no W/R/R  GI:   abdomen soft non-distended, NTTP   Back: no CVA tenderness  Extremities -  no edema  Neuro: A&Ox4

## 2021-11-12 NOTE — ED PROVIDER NOTE - OBJECTIVE STATEMENT
72 yo M with  pmhx of colon ca, kidney ca, prsotate ca, last chemo 2 weeks of for colon ca, afib, TAVR in 01/21, presents with intermittent lightheadedness, worsened today.  No hx of bradycardia. Pt is on amiodarone and eliquis for afib. recently reduced the dose of amiodarone from 200 to 100. Denies chest pain, shortness of breath, headache, cough, URI sxs, abd pain, nausea, vomiting, diarrhea.

## 2021-11-13 DIAGNOSIS — C18.9 MALIGNANT NEOPLASM OF COLON, UNSPECIFIED: ICD-10-CM

## 2021-11-13 DIAGNOSIS — I48.0 PAROXYSMAL ATRIAL FIBRILLATION: ICD-10-CM

## 2021-11-13 DIAGNOSIS — I10 ESSENTIAL (PRIMARY) HYPERTENSION: ICD-10-CM

## 2021-11-13 DIAGNOSIS — E87.2 ACIDOSIS: ICD-10-CM

## 2021-11-13 DIAGNOSIS — R00.1 BRADYCARDIA, UNSPECIFIED: ICD-10-CM

## 2021-11-13 DIAGNOSIS — E11.9 TYPE 2 DIABETES MELLITUS WITHOUT COMPLICATIONS: ICD-10-CM

## 2021-11-13 DIAGNOSIS — Z95.2 PRESENCE OF PROSTHETIC HEART VALVE: ICD-10-CM

## 2021-11-13 LAB
A1C WITH ESTIMATED AVERAGE GLUCOSE RESULT: 8 % — HIGH (ref 4–5.6)
ALBUMIN SERPL ELPH-MCNC: 4.2 G/DL — SIGNIFICANT CHANGE UP (ref 3.3–5)
ALBUMIN SERPL ELPH-MCNC: 4.3 G/DL — SIGNIFICANT CHANGE UP (ref 3.3–5)
ALP SERPL-CCNC: 39 U/L — LOW (ref 40–120)
ALP SERPL-CCNC: 43 U/L — SIGNIFICANT CHANGE UP (ref 40–120)
ALT FLD-CCNC: 31 U/L — SIGNIFICANT CHANGE UP (ref 10–45)
ALT FLD-CCNC: 35 U/L — SIGNIFICANT CHANGE UP (ref 10–45)
ANION GAP SERPL CALC-SCNC: 15 MMOL/L — SIGNIFICANT CHANGE UP (ref 5–17)
ANION GAP SERPL CALC-SCNC: 15 MMOL/L — SIGNIFICANT CHANGE UP (ref 5–17)
AST SERPL-CCNC: 16 U/L — SIGNIFICANT CHANGE UP (ref 10–40)
AST SERPL-CCNC: 20 U/L — SIGNIFICANT CHANGE UP (ref 10–40)
BASE EXCESS BLDV CALC-SCNC: 0.3 MMOL/L — SIGNIFICANT CHANGE UP (ref -2–2)
BILIRUB SERPL-MCNC: 0.8 MG/DL — SIGNIFICANT CHANGE UP (ref 0.2–1.2)
BILIRUB SERPL-MCNC: 0.8 MG/DL — SIGNIFICANT CHANGE UP (ref 0.2–1.2)
BLD GP AB SCN SERPL QL: NEGATIVE — SIGNIFICANT CHANGE UP
BUN SERPL-MCNC: 15 MG/DL — SIGNIFICANT CHANGE UP (ref 7–23)
BUN SERPL-MCNC: 17 MG/DL — SIGNIFICANT CHANGE UP (ref 7–23)
CA-I SERPL-SCNC: 1.15 MMOL/L — SIGNIFICANT CHANGE UP (ref 1.15–1.33)
CALCIUM SERPL-MCNC: 9.2 MG/DL — SIGNIFICANT CHANGE UP (ref 8.4–10.5)
CALCIUM SERPL-MCNC: 9.8 MG/DL — SIGNIFICANT CHANGE UP (ref 8.4–10.5)
CHLORIDE BLDV-SCNC: 106 MMOL/L — SIGNIFICANT CHANGE UP (ref 96–108)
CHLORIDE SERPL-SCNC: 102 MMOL/L — SIGNIFICANT CHANGE UP (ref 96–108)
CHLORIDE SERPL-SCNC: 102 MMOL/L — SIGNIFICANT CHANGE UP (ref 96–108)
CO2 BLDV-SCNC: 24 MMOL/L — SIGNIFICANT CHANGE UP (ref 22–26)
CO2 SERPL-SCNC: 20 MMOL/L — LOW (ref 22–31)
CO2 SERPL-SCNC: 21 MMOL/L — LOW (ref 22–31)
CREAT SERPL-MCNC: 0.56 MG/DL — SIGNIFICANT CHANGE UP (ref 0.5–1.3)
CREAT SERPL-MCNC: 0.57 MG/DL — SIGNIFICANT CHANGE UP (ref 0.5–1.3)
ESTIMATED AVERAGE GLUCOSE: 183 MG/DL — HIGH (ref 68–114)
GAS PNL BLDV: 135 MMOL/L — LOW (ref 136–145)
GAS PNL BLDV: SIGNIFICANT CHANGE UP
GAS PNL BLDV: SIGNIFICANT CHANGE UP
GLUCOSE BLDC GLUCOMTR-MCNC: 183 MG/DL — HIGH (ref 70–99)
GLUCOSE BLDC GLUCOMTR-MCNC: 272 MG/DL — HIGH (ref 70–99)
GLUCOSE BLDV-MCNC: 268 MG/DL — HIGH (ref 70–99)
GLUCOSE SERPL-MCNC: 214 MG/DL — HIGH (ref 70–99)
GLUCOSE SERPL-MCNC: 225 MG/DL — HIGH (ref 70–99)
HCO3 BLDV-SCNC: 23 MMOL/L — SIGNIFICANT CHANGE UP (ref 22–29)
HCT VFR BLD CALC: 39.1 % — SIGNIFICANT CHANGE UP (ref 39–50)
HCT VFR BLDA CALC: 41 % — SIGNIFICANT CHANGE UP (ref 39–51)
HGB BLD CALC-MCNC: 13.6 G/DL — SIGNIFICANT CHANGE UP (ref 12.6–17.4)
HGB BLD-MCNC: 14.1 G/DL — SIGNIFICANT CHANGE UP (ref 13–17)
HOROWITZ INDEX BLDV+IHG-RTO: SIGNIFICANT CHANGE UP
LACTATE BLDV-MCNC: 1.9 MMOL/L — SIGNIFICANT CHANGE UP (ref 0.7–2)
LACTATE SERPL-SCNC: 1.3 MMOL/L — SIGNIFICANT CHANGE UP (ref 0.7–2)
MAGNESIUM SERPL-MCNC: 1.8 MG/DL — SIGNIFICANT CHANGE UP (ref 1.6–2.6)
MAGNESIUM SERPL-MCNC: 1.9 MG/DL — SIGNIFICANT CHANGE UP (ref 1.6–2.6)
MCHC RBC-ENTMCNC: 32.3 PG — SIGNIFICANT CHANGE UP (ref 27–34)
MCHC RBC-ENTMCNC: 36.1 GM/DL — HIGH (ref 32–36)
MCV RBC AUTO: 89.5 FL — SIGNIFICANT CHANGE UP (ref 80–100)
NRBC # BLD: 0 /100 WBCS — SIGNIFICANT CHANGE UP (ref 0–0)
PCO2 BLDV: 32 MMHG — LOW (ref 42–55)
PH BLDV: 7.47 — HIGH (ref 7.32–7.43)
PHOSPHATE SERPL-MCNC: 2.2 MG/DL — LOW (ref 2.5–4.5)
PHOSPHATE SERPL-MCNC: 3.1 MG/DL — SIGNIFICANT CHANGE UP (ref 2.5–4.5)
PLATELET # BLD AUTO: 186 K/UL — SIGNIFICANT CHANGE UP (ref 150–400)
PO2 BLDV: 39 MMHG — SIGNIFICANT CHANGE UP (ref 25–45)
POTASSIUM BLDV-SCNC: 3.7 MMOL/L — SIGNIFICANT CHANGE UP (ref 3.5–5.1)
POTASSIUM SERPL-MCNC: 3.8 MMOL/L — SIGNIFICANT CHANGE UP (ref 3.5–5.3)
POTASSIUM SERPL-MCNC: 3.9 MMOL/L — SIGNIFICANT CHANGE UP (ref 3.5–5.3)
POTASSIUM SERPL-SCNC: 3.8 MMOL/L — SIGNIFICANT CHANGE UP (ref 3.5–5.3)
POTASSIUM SERPL-SCNC: 3.9 MMOL/L — SIGNIFICANT CHANGE UP (ref 3.5–5.3)
PROT SERPL-MCNC: 6.4 G/DL — SIGNIFICANT CHANGE UP (ref 6–8.3)
PROT SERPL-MCNC: 6.9 G/DL — SIGNIFICANT CHANGE UP (ref 6–8.3)
RBC # BLD: 4.37 M/UL — SIGNIFICANT CHANGE UP (ref 4.2–5.8)
RBC # FLD: 15.5 % — HIGH (ref 10.3–14.5)
RH IG SCN BLD-IMP: POSITIVE — SIGNIFICANT CHANGE UP
SAO2 % BLDV: 72.7 % — SIGNIFICANT CHANGE UP (ref 67–88)
SARS-COV-2 RNA SPEC QL NAA+PROBE: SIGNIFICANT CHANGE UP
SODIUM SERPL-SCNC: 137 MMOL/L — SIGNIFICANT CHANGE UP (ref 135–145)
SODIUM SERPL-SCNC: 138 MMOL/L — SIGNIFICANT CHANGE UP (ref 135–145)
TROPONIN T, HIGH SENSITIVITY RESULT: 89 NG/L — HIGH (ref 0–51)
TSH SERPL-MCNC: 2.97 UIU/ML — SIGNIFICANT CHANGE UP (ref 0.27–4.2)
TSH SERPL-MCNC: 3.33 UIU/ML — SIGNIFICANT CHANGE UP (ref 0.27–4.2)
WBC # BLD: 4.94 K/UL — SIGNIFICANT CHANGE UP (ref 3.8–10.5)
WBC # FLD AUTO: 4.94 K/UL — SIGNIFICANT CHANGE UP (ref 3.8–10.5)

## 2021-11-13 PROCEDURE — 99223 1ST HOSP IP/OBS HIGH 75: CPT

## 2021-11-13 PROCEDURE — 71045 X-RAY EXAM CHEST 1 VIEW: CPT | Mod: 26

## 2021-11-13 PROCEDURE — 99291 CRITICAL CARE FIRST HOUR: CPT

## 2021-11-13 PROCEDURE — 93306 TTE W/DOPPLER COMPLETE: CPT | Mod: 26

## 2021-11-13 RX ORDER — INSULIN LISPRO 100/ML
VIAL (ML) SUBCUTANEOUS
Refills: 0 | Status: DISCONTINUED | OUTPATIENT
Start: 2021-11-13 | End: 2021-11-15

## 2021-11-13 RX ORDER — ACETAMINOPHEN 500 MG
650 TABLET ORAL EVERY 6 HOURS
Refills: 0 | Status: DISCONTINUED | OUTPATIENT
Start: 2021-11-13 | End: 2021-11-15

## 2021-11-13 RX ORDER — INSULIN LISPRO 100/ML
VIAL (ML) SUBCUTANEOUS AT BEDTIME
Refills: 0 | Status: DISCONTINUED | OUTPATIENT
Start: 2021-11-13 | End: 2021-11-15

## 2021-11-13 RX ORDER — GLUCAGON INJECTION, SOLUTION 0.5 MG/.1ML
1 INJECTION, SOLUTION SUBCUTANEOUS ONCE
Refills: 0 | Status: DISCONTINUED | OUTPATIENT
Start: 2021-11-13 | End: 2021-11-14

## 2021-11-13 RX ORDER — LISINOPRIL 2.5 MG/1
40 TABLET ORAL DAILY
Refills: 0 | Status: DISCONTINUED | OUTPATIENT
Start: 2021-11-13 | End: 2021-11-15

## 2021-11-13 RX ORDER — CEFAZOLIN SODIUM 1 G
2000 VIAL (EA) INJECTION ONCE
Refills: 0 | Status: DISCONTINUED | OUTPATIENT
Start: 2021-11-13 | End: 2021-11-14

## 2021-11-13 RX ORDER — HYDROCHLOROTHIAZIDE 25 MG
12.5 TABLET ORAL DAILY
Refills: 0 | Status: DISCONTINUED | OUTPATIENT
Start: 2021-11-13 | End: 2021-11-14

## 2021-11-13 RX ORDER — SODIUM CHLORIDE 9 MG/ML
1000 INJECTION, SOLUTION INTRAVENOUS
Refills: 0 | Status: DISCONTINUED | OUTPATIENT
Start: 2021-11-13 | End: 2021-11-14

## 2021-11-13 RX ORDER — PREGABALIN 225 MG/1
1000 CAPSULE ORAL DAILY
Refills: 0 | Status: DISCONTINUED | OUTPATIENT
Start: 2021-11-13 | End: 2021-11-15

## 2021-11-13 RX ORDER — APIXABAN 2.5 MG/1
5 TABLET, FILM COATED ORAL EVERY 12 HOURS
Refills: 0 | Status: DISCONTINUED | OUTPATIENT
Start: 2021-11-13 | End: 2021-11-13

## 2021-11-13 RX ORDER — AMLODIPINE BESYLATE 2.5 MG/1
10 TABLET ORAL DAILY
Refills: 0 | Status: DISCONTINUED | OUTPATIENT
Start: 2021-11-13 | End: 2021-11-14

## 2021-11-13 RX ORDER — SODIUM CHLORIDE 9 MG/ML
250 INJECTION, SOLUTION INTRAVENOUS ONCE
Refills: 0 | Status: COMPLETED | OUTPATIENT
Start: 2021-11-13 | End: 2021-11-13

## 2021-11-13 RX ORDER — DEXTROSE 50 % IN WATER 50 %
25 SYRINGE (ML) INTRAVENOUS ONCE
Refills: 0 | Status: DISCONTINUED | OUTPATIENT
Start: 2021-11-13 | End: 2021-11-14

## 2021-11-13 RX ORDER — DEXTROSE 50 % IN WATER 50 %
15 SYRINGE (ML) INTRAVENOUS ONCE
Refills: 0 | Status: DISCONTINUED | OUTPATIENT
Start: 2021-11-13 | End: 2021-11-14

## 2021-11-13 RX ORDER — INSULIN GLARGINE 100 [IU]/ML
5 INJECTION, SOLUTION SUBCUTANEOUS AT BEDTIME
Refills: 0 | Status: DISCONTINUED | OUTPATIENT
Start: 2021-11-13 | End: 2021-11-13

## 2021-11-13 RX ADMIN — SODIUM CHLORIDE 125 MILLILITER(S): 9 INJECTION, SOLUTION INTRAVENOUS at 06:43

## 2021-11-13 RX ADMIN — Medication 6: at 17:24

## 2021-11-13 RX ADMIN — AMLODIPINE BESYLATE 10 MILLIGRAM(S): 2.5 TABLET ORAL at 06:42

## 2021-11-13 RX ADMIN — APIXABAN 5 MILLIGRAM(S): 2.5 TABLET, FILM COATED ORAL at 06:42

## 2021-11-13 RX ADMIN — Medication 4: at 07:43

## 2021-11-13 RX ADMIN — LISINOPRIL 40 MILLIGRAM(S): 2.5 TABLET ORAL at 06:42

## 2021-11-13 RX ADMIN — PREGABALIN 1000 MICROGRAM(S): 225 CAPSULE ORAL at 15:21

## 2021-11-13 RX ADMIN — Medication 12.5 MILLIGRAM(S): at 06:42

## 2021-11-13 NOTE — H&P ADULT - NSHPADDITIONALINFOADULT_GEN_ALL_CORE
-Unclear if patient still taking Aspirin and Tamsulosin, please reconcile in AM.    DVT Ppx: Eliquis  Diet: Cardiac    I have seen and examined the patient.  I have personally reviewed all labs, imaging, and EKG.    Didier Niño DO  Hospitalist, Department of Medicine  Pager: [phone] (available 8PM-8AM)

## 2021-11-13 NOTE — H&P ADULT - ASSESSMENT
71-year-old male with medical history of DM2, colon cancer (s/p C5 5-FU and leucovorin 2 wks ago), renal cancer (per pt, slow growing), prostate cancer s/p prostatectomy in 2013, HTN, AS s/p TAVR (1/2021), A. 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 for myositis which were held for chemotherapy) who presented to the hospital for dizziness. Symptoms likely secondary to bradycardia. Telemetry with concern for AV block and sinus pause.

## 2021-11-13 NOTE — H&P ADULT - NSHPLABSRESULTS_GEN_ALL_CORE
Personally reviewed old records.  Personally reviewed labs.  Personally reviewed imaging- CXR with clear lungs.  Personally reviewed EKG with sinus bradycardia, HR 52 bpm, QTc 446 ms, no acute ST changes significant for acute infarction.                          14.3   7.13  )-----------( 165      ( 12 Nov 2021 20:50 )             42.0       11-12    137  |  102  |  19  ----------------------------<  227<H>  4.3   |  16<L>  |  0.61    Ca    10.3      12 Nov 2021 20:50  Mg     1.8     11-12    TPro  7.0  /  Alb  4.5  /  TBili  0.7  /  DBili  x   /  AST  27  /  ALT  38  /  AlkPhos  46  11-12            LIVER FUNCTIONS - ( 12 Nov 2021 20:50 )  Alb: 4.5 g/dL / Pro: 7.0 g/dL / ALK PHOS: 46 U/L / ALT: 38 U/L / AST: 27 U/L / GGT: x             PT/INR - ( 12 Nov 2021 20:50 )   PT: 13.4 sec;   INR: 1.12 ratio         PTT - ( 12 Nov 2021 20:50 )  PTT:30.6 sec      < from: Xray Chest 1 View- PORTABLE-Urgent (11.12.21 @ 21:40) >    IMPRESSION:  Clear lungs.    < end of copied text >

## 2021-11-13 NOTE — H&P ADULT - EXTREMITIES
[facility] Call Center    Phone Message    May a detailed message be left on voicemail: yes     Reason for Call: Other: . Per Patient states she was supposed to get back to Dr. Jessica with medications. Patient states Dr. Jessica is no longer in network with the insurance company and needing to find a new provider. Please advise.     Action Taken: Message routed to:  [facility] ([facility]): Endo    Travel Screening: Not Applicable                                                                       detailed exam

## 2021-11-13 NOTE — H&P ADULT - PROBLEM SELECTOR PLAN 1
-EKG with sinus bradycardia  -Telemetry with concern for AV block and sinus pause.   -Holding Amiodarone and Metoprolol  -TTE ordered, follow-up. Prev TTE from 2/21 reviewed and with EF 65%, minimal LV concentric hypertrophy, normal Right-heart function; s/p TAVR.  -EP reccs appreciated, possible PPM placement  -Continuous telemetry monitoring

## 2021-11-13 NOTE — PROGRESS NOTE ADULT - ATTENDING COMMENTS
71yrs, HTN, lipids. DM2. fatty liver disease.   s/p TAVR Jan 2021. PAF on amnio/epixiban  s.p prostatectomy 2013.   undergoing chemo for colon ca 5FU, most recent session 2 weeks. right sided port.   presented dizzyness. 3 days. intermittently. blurry vision. diahrrea.   telemetry sinus rona pauses 6 secs.   seen by EP: hold amnio and toprol.   overnight further pauses. this am symptomatic. longest pause 9 secs.   move to CCU this am. TVP placed RIJ. thresholds 3MA.   meds: epixiban held, ancef/cefazolin proph, lisinopril 40, amlodipine 10, HCTZ 12.5,   NOW HR 60SR, 150/70, 98  11-13  138  |  102  |  17  ----------------------------<  214<H>  3.8   |  21<L>  |  0.56  Ca    9.2      13 Nov 2021 06:59  Phos  3.1     11-13  Mg     1.8     11-13  TPro  6.4  /  Alb  4.3  /  TBili  0.8  /  DBili  x   /  AST  16  /  ALT  31  /  AlkPhos  39<L>  11-13                        14.1   4.94  )-----------( 186      ( 13 Nov 2021 07:20 )             39.1 71yrs, HTN, lipids. DM2. fatty liver disease.   s/p TAVR Jan 2021. PAF on amnio/epixiban  s.p prostatectomy 2013.   undergoing chemo for colon ca 5FU, most recent session 2 weeks. right sided port.   presented dizzyness. 3 days. intermittently. blurry vision. diahrrea.   telemetry sinus rona pauses 6 secs.   seen by EP: hold amnio and toprol.   overnight further pauses. this am symptomatic. longest pause 9 secs.   move to CCU this am. TVP placed RIJ. thresholds 3MA.   meds: epixiban held, ancef/cefazolin proph, lisinopril 40, amlodipine 10, HCTZ 12.5,   NOW HR 60SR, 150/70, 98  11-13  138  |  102  |  17  ----------------------------<  214<H>  3.8   |  21<L>  |  0.56  Ca    9.2      13 Nov 2021 06:59  Phos  3.1     11-13  Mg     1.8     11-13  TPro  6.4  /  Alb  4.3  /  TBili  0.8  /  DBili  x   /  AST  16  /  ALT  31  /  AlkPhos  39<L>  11-13                        14.1   4.94  )-----------( 186      ( 13 Nov 2021 07:20 )             39.1  TTE: LA 4.8, normal TAVR gradients. normal function.   71yrs admitted with sinus pauses and concern for AV block for PPM tomorrow.   continue supportive care.   Bob Monzon

## 2021-11-13 NOTE — H&P ADULT - HISTORY OF PRESENT ILLNESS
Chief Complaint: Dizziness    HPI: 71-year-old male with medical history of DM2, colon cancer (s/p C5 5-FU and leucovorin 2 wks ago), renal cancer (per pt, slow growing), prostate cancer s/p prostatectomy in 2013, HTN, AS s/p TAVR (1/2021), A. 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 for myositis which were held for chemotherapy) who presented to the hospital for dizziness.    The patient began experiencing dizziness 3 days ago and described it as intermittent, lasting a few seconds, and worsened in severity today after dinner. He has never experienced this dizziness before. His neighbor is an RN, who felt his pulse to be low and recommended he come to the ED.     Admits to changes in vision (blurry and not worse during episodes of dizziness) and diarrhea, both of which he attributes to chemotherapy. The diarrhea lasted 1-2 days (3 BM/day) and resolved 2-3 days ago.    Denies recent illnesses, fever/chills, loss of appetite, headaches, tinnitus, syncope, fall, chest pain, shortness of breath, palpitations, abdominal pain, nausea/vomiting, and constipation.     Notably, the patient had his dose of dose of amiodarone reduced from 200 mg to 100 mg, 2 months ago.

## 2021-11-13 NOTE — H&P ADULT - NSHPPHYSICALEXAM_GEN_ALL_CORE
ICU Vital Signs Last 24 Hrs  T(C): 36.4 (13 Nov 2021 05:13), Max: 36.8 (12 Nov 2021 23:25)  T(F): 97.5 (13 Nov 2021 05:13), Max: 98.2 (12 Nov 2021 23:25)  HR: 73 (13 Nov 2021 05:13) (43 - 75)  BP: 168/87 (13 Nov 2021 05:13) (130/78 - 215/76)  BP(mean): 95 (13 Nov 2021 00:30) (90 - 131)  ABP: --  ABP(mean): --  RR: 18 (13 Nov 2021 05:13) (16 - 18)  SpO2: 96% (13 Nov 2021 05:13) (96% - 100%)

## 2021-11-13 NOTE — PROGRESS NOTE ADULT - SUBJECTIVE AND OBJECTIVE BOX
This is a 70yo Male with PMHx of HTN, HLD, DM, NAFLD, sAS (s/p TAVR 01/2021), Afib (on amio, apixaban), prostate cancer (s/p prostatectomy 2013), colon cancer (undergoing active chemo 5-FU, most recently 2 weeks ago) presented with dizziness, found to have sinus pauses (>6 sec) and high degree AV block. Further symptomatic pauses this morning requiring transfer to CICU and emergent TVP placement.     - CXR to verify TVP position  - TTE  - NPO at MN  - Hold Apixiban (last dose 11/13 AM)  - Plan for permanent PPM tomorrow         Maurice Padilla MD  Cardiology Fellow - PGY 4  Text or Call: [phone]  For all New Consults and Questions:  [email or web address]   Login: [email or web address] This is a 72yo Male with PMHx of HTN, HLD, DM, NAFLD, sAS (s/p TAVR 01/2021), Afib (on amio, apixaban), prostate cancer (s/p prostatectomy 2013), colon cancer (undergoing active chemo 5-FU, most recently 2 weeks ago) presented with dizziness, found to have sinus pauses (>6 sec) and high degree AV block. Further symptomatic pauses this morning requiring transfer to CICU and emergent TVP placement.     - CXR to verify TVP position  - TTE  - NPO at MN  - Hold Apixiban (last dose 11/13 AM)  - Plan for permanent PPM tomorrow     See Attending attestation below.    Maurice Padilla MD  Cardiology Fellow - PGY 4  Text or Call: [phone]  For all New Consults and Questions:  [email or web address]   Login: "[employer] This is a 72yo Male with PMHx of HTN, HLD, DM, NAFLD, sAS (s/p TAVR 01/2021), Afib (on amio, apixaban), prostate cancer (s/p prostatectomy 2013), colon cancer (undergoing active chemo 5-FU, most recently 2 weeks ago) presented with dizziness, found to have sinus pauses (>6 sec) and high degree AV block. Further symptomatic pauses this morning requiring transfer to CICU and emergent TVP placement.     - CXR to verify TVP position  - TTE  - NPO at MN  - Hold Apixiban (last dose 11/13 AM)  - Hold AV renee blocking meds  - Plan for permanent PPM tomorrow     See Attending attestation below.    Maurice Padilla MD  Cardiology Fellow - PGY 4  Text or Call: [phone]  For all New Consults and Questions:  [email or web address]   Login: [employer]

## 2021-11-13 NOTE — H&P ADULT - NSICDXPASTMEDICALHX_GEN_ALL_CORE_FT
PAST MEDICAL HISTORY:  Aortic valve stenosis, etiology of cardiac valve disease unspecified     Asthma denies any hospitalizations, denies any intubations. Stopped taking Advair several years ago.    Fatty liver     Hyperlipidemia, unspecified hyperlipidemia type     Hypertension     Malignant neoplasm of colon, unspecified part of colon diagnosed 11/2020    Myositis statin induced;  IVIG - last dose 12/2020    follows with Dr. Rosenthal    Prostate cancer     T2DM (type 2 diabetes mellitus)

## 2021-11-13 NOTE — PROGRESS NOTE ADULT - ASSESSMENT
72 y/o male with PMH HTN, HLD, DM, severe AS s/p TAVR 1/8/21 with transient LBBB, Afib (on eliquis and amiodarone with dose decreased from 200-100 two months ago) just finished 5th round of chemo via R CW mediport for colon CA with 5-FU and leucovorin 2 weeks ago, here with bradycardia and dizziness, found to have asymptomatic phase 4 block.     #Bradycardia w/ phayse 4 block  - [facility]   - Continue to hold metoprolol and amiodarone   - Avoid AV renee blocking agents  - Hold evening and am dose of eliquis  - Keep pt NPO after MN for DC PCM in AM (pt is consented)  - Ensure active T&S and COVID PCR.     S. Maxine, NP-C  [phone]   72 y/o male with PMH HTN, HLD, DM, severe AS s/p TAVR 1/8/21 with transient LBBB, Afib (on eliquis and amiodarone with dose decreased from 200-100 two months ago) just finished 5th round of chemo via R CW mediport for colon CA with 5-FU and leucovorin 2 weeks ago, here with bradycardia and dizziness, found to have symptomatic phase 4 block.     #Bradycardia w/ phayse 4 block  - [facility]   - Continue to hold metoprolol and amiodarone   - Avoid AV renee blocking agents  - Hold evening and am dose of eliquis  - Keep pt NPO after MN for DC PCM in AM (pt is consented)  - Ensure active T&S and COVID PCR.     S. Maxine, NP-C  [phone]

## 2021-11-13 NOTE — PROGRESS NOTE ADULT - SUBJECTIVE AND OBJECTIVE BOX
Admission date:  CHIEF COMPLAINT:  HPI:  Chief Complaint: Dizziness    HPI: 71-year-old male with medical history of DM2, colon cancer (s/p C5 5-FU and leucovorin 2 wks ago), renal cancer (per pt, slow growing), prostate cancer s/p prostatectomy in 2013, HTN, AS s/p TAVR (1/2021), A. 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 for myositis which were held for chemotherapy) who presented to the hospital for dizziness.    The patient began experiencing dizziness 3 days ago and described it as intermittent, lasting a few seconds, and worsened in severity today after dinner. He has never experienced this dizziness before. His neighbor is an RN, who felt his pulse to be low and recommended he come to the ED.     Admits to changes in vision (blurry and not worse during episodes of dizziness) and diarrhea, both of which he attributes to chemotherapy. The diarrhea lasted 1-2 days (3 BM/day) and resolved 2-3 days ago.    Denies recent illnesses, fever/chills, loss of appetite, headaches, tinnitus, syncope, fall, chest pain, shortness of breath, palpitations, abdominal pain, nausea/vomiting, and constipation.     Notably, the patient had his dose of dose of amiodarone reduced from 200 mg to 100 mg, 2 months ago. (13 Nov 2021 05:23)    INTERVAL HISTORY:    REVIEW OF SYSTEMS: Denies xxxxxx; all others negative    MEDICATIONS  (STANDING):  amLODIPine   Tablet 10 milliGRAM(s) Oral daily  ceFAZolin   IVPB 2000 milliGRAM(s) IV Intermittent once  cyanocobalamin 1000 MICROGram(s) Oral daily  dextrose 40% Gel 15 Gram(s) Oral once  dextrose 5%. 1000 milliLiter(s) (50 mL/Hr) IV Continuous <Continuous>  dextrose 50% Injectable 25 Gram(s) IV Push once  glucagon  Injectable 1 milliGRAM(s) IntraMuscular once  hydrochlorothiazide 12.5 milliGRAM(s) Oral daily  insulin lispro (ADMELOG) corrective regimen sliding scale   SubCutaneous three times a day before meals  lisinopril 40 milliGRAM(s) Oral daily    MEDICATIONS  (PRN):  acetaminophen     Tablet .. 650 milliGRAM(s) Oral every 6 hours PRN Temp greater or equal to 38C (100.4F), Mild Pain (1 - 3)      Objective:  ICU Vital Signs Last 24 Hrs  T(C): 36.9 (13 Nov 2021 19:00), Max: 36.9 (13 Nov 2021 19:00)  T(F): 98.4 (13 Nov 2021 19:00), Max: 98.4 (13 Nov 2021 19:00)  HR: 73 (13 Nov 2021 21:00) (55 - 91)  BP: 155/75 (13 Nov 2021 21:00) (130/61 - 201/93)  BP(mean): 106 (13 Nov 2021 21:00) (88 - 133)  ABP: --  ABP(mean): --  RR: 18 (13 Nov 2021 21:00) (16 - 38)  SpO2: 97% (13 Nov 2021 21:00) (93% - 100%)      Adult Advanced Hemodynamics Last 24 Hrs  CVP(mm Hg): --  CVP(cm H2O): --  CO: --  CI: --  PA: --  PA(mean): --  PCWP: --  SVR: --  SVRI: --  PVR: --  PVRI: --      11-13 @ 07:01  -  11-13 @ 22:22  --------------------------------------------------------  IN: 1060 mL / OUT: 2000 mL / NET: -940 mL      Daily     Daily     PHYSICAL EXAM:      Constitutional:    HEENT:    Respiratory:    Cardiovascular:  Access site:    Gastrointestinal:    Genitourinary:    Extremities:    Vascular:    Neurological:    Skin:      TELEMETRY:     EKG:     IMAGING:    Labs:                          14.1   4.94  )-----------( 186      ( 13 Nov 2021 07:20 )             39.1     11-13    137  |  102  |  15  ----------------------------<  225<H>  3.9   |  20<L>  |  0.57    Ca    9.8      13 Nov 2021 14:45  Phos  2.2     11-13  Mg     1.9     11-13    TPro  6.9  /  Alb  4.2  /  TBili  0.8  /  DBili  x   /  AST  20  /  ALT  35  /  AlkPhos  43  11-13    LIVER FUNCTIONS - ( 13 Nov 2021 14:45 )  Alb: 4.2 g/dL / Pro: 6.9 g/dL / ALK PHOS: 43 U/L / ALT: 35 U/L / AST: 20 U/L / GGT: x           PT/INR - ( 12 Nov 2021 20:50 )   PT: 13.4 sec;   INR: 1.12 ratio         PTT - ( 12 Nov 2021 20:50 )  PTT:30.6 sec        HEALTH ISSUES - PROBLEM Dx:  Symptomatic bradycardia    High anion gap metabolic acidosis    Lactic acidosis    Paroxysmal atrial fibrillation    S/P TAVR (transcatheter aortic valve replacement)    Colon cancer    Diabetes mellitus    Hypertension           HPI:  Chief Complaint: Dizziness    HPI: 71-year-old male with medical history of DM2, colon cancer (s/p C5 5-FU and leucovorin 2 wks ago), renal cancer (per pt, slow growing), prostate cancer s/p prostatectomy in 2013, HTN, AS s/p TAVR (1/2021), A. 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 for myositis which were held for chemotherapy) who presented to the hospital for dizziness.    The patient began experiencing dizziness 3 days ago and described it as intermittent, lasting a few seconds, and worsened in severity today after dinner. He has never experienced this dizziness before. His neighbor is an RN, who felt his pulse to be low and recommended he come to the ED.     Admits to changes in vision (blurry and not worse during episodes of dizziness) and diarrhea, both of which he attributes to chemotherapy. The diarrhea lasted 1-2 days (3 BM/day) and resolved 2-3 days ago.    Denies recent illnesses, fever/chills, loss of appetite, headaches, tinnitus, syncope, fall, chest pain, shortness of breath, palpitations, abdominal pain, nausea/vomiting, and constipation.     Notably, the patient had his dose of dose of amiodarone reduced from 200 mg to 100 mg, 2 months ago. (13 Nov 2021 05:23)    INTERVAL HISTORY:  - TVP placed for symptomatic pauses > 6 seconds  - currently in sinus rhythm in the 70s    REVIEW OF SYSTEMS:  RESPIRATORY: No cough, No shortness of breath  CARDIOVASCULAR: No chest pain or palpitations, no orthopnea, no dizziness  GASTROINTESTINAL: No abdominal pain. No nausea, vomiting      MEDICATIONS  (STANDING):  amLODIPine   Tablet 10 milliGRAM(s) Oral daily  ceFAZolin   IVPB 2000 milliGRAM(s) IV Intermittent once  cyanocobalamin 1000 MICROGram(s) Oral daily  dextrose 40% Gel 15 Gram(s) Oral once  dextrose 5%. 1000 milliLiter(s) (50 mL/Hr) IV Continuous <Continuous>  dextrose 50% Injectable 25 Gram(s) IV Push once  glucagon  Injectable 1 milliGRAM(s) IntraMuscular once  hydrochlorothiazide 12.5 milliGRAM(s) Oral daily  insulin lispro (ADMELOG) corrective regimen sliding scale   SubCutaneous three times a day before meals  lisinopril 40 milliGRAM(s) Oral daily    MEDICATIONS  (PRN):  acetaminophen     Tablet .. 650 milliGRAM(s) Oral every 6 hours PRN Temp greater or equal to 38C (100.4F), Mild Pain (1 - 3)      Objective:  ICU Vital Signs Last 24 Hrs  T(C): 36.9 (13 Nov 2021 19:00), Max: 36.9 (13 Nov 2021 19:00)  T(F): 98.4 (13 Nov 2021 19:00), Max: 98.4 (13 Nov 2021 19:00)  HR: 73 (13 Nov 2021 21:00) (55 - 91)  BP: 155/75 (13 Nov 2021 21:00) (130/61 - 201/93)  BP(mean): 106 (13 Nov 2021 21:00) (88 - 133)  RR: 18 (13 Nov 2021 21:00) (16 - 38)  SpO2: 97% (13 Nov 2021 21:00) (93% - 100%)        11-13 @ 07:01  -  11-13 @ 22:22  --------------------------------------------------------  IN: 1060 mL / OUT: 2000 mL / NET: -940 mL      PHYSICAL EXAM:  General: NAD  Neurology: A&Ox3, nonfocal, motor and sensation intact  Respiratory: CTA B/L, normal respiratory effort  CV: RRR, S1S2, no murmurs, rubs or gallops  Abdominal: Soft, NT, ND normoactive BS  Extremities: No edema, + peripheral pulses  Lines: RIJ TVP site c/d/i,   : James catheter in place      LABS:             14.1   4.94  )-----------( 186      ( 13 Nov 2021 07:20 )             39.1     11-13    137  |  102  |  15  ----------------------------<  225<H>  3.9   |  20<L>  |  0.57    Ca    9.8      13 Nov 2021 14:45  Phos  2.2     11-13  Mg     1.9     11-13    TPro  6.9  /  Alb  4.2  /  TBili  0.8  /  DBili  x   /  AST  20  /  ALT  35  /  AlkPhos  43  11-13    LIVER FUNCTIONS - ( 13 Nov 2021 14:45 )  Alb: 4.2 g/dL / Pro: 6.9 g/dL / ALK PHOS: 43 U/L / ALT: 35 U/L / AST: 20 U/L / GGT: x           PT/INR - ( 12 Nov 2021 20:50 )   PT: 13.4 sec;   INR: 1.12 ratio         PTT - ( 12 Nov 2021 20:50 )  PTT:30.6 sec        HEALTH ISSUES - PROBLEM Dx:  Symptomatic bradycardia    High anion gap metabolic acidosis    Lactic acidosis    Paroxysmal atrial fibrillation    S/P TAVR (transcatheter aortic valve replacement)    Colon cancer    Diabetes mellitus    Hypertension           HPI:  Chief Complaint: Dizziness    HPI: 71-year-old male with medical history of DM2, colon cancer (s/p C5 5-FU and leucovorin 2 wks ago), renal cancer (per pt, slow growing), prostate cancer s/p prostatectomy in 2013, HTN, AS s/p TAVR (1/2021), A. 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 for myositis which were held for chemotherapy) who presented to the hospital for dizziness.    The patient began experiencing dizziness 3 days ago and described it as intermittent, lasting a few seconds, and worsened in severity today after dinner. He has never experienced this dizziness before. His neighbor is an RN, who felt his pulse to be low and recommended he come to the ED.     Admits to changes in vision (blurry and not worse during episodes of dizziness) and diarrhea, both of which he attributes to chemotherapy. The diarrhea lasted 1-2 days (3 BM/day) and resolved 2-3 days ago.    Denies recent illnesses, fever/chills, loss of appetite, headaches, tinnitus, syncope, fall, chest pain, shortness of breath, palpitations, abdominal pain, nausea/vomiting, and constipation.     Notably, the patient had his dose of dose of amiodarone reduced from 200 mg to 100 mg, 2 months ago. (13 Nov 2021 05:23)    INTERVAL HISTORY:  - TVP placed for symptomatic pauses > 6 seconds  - currently in sinus rhythm in the 70s    REVIEW OF SYSTEMS:  RESPIRATORY: No cough, No shortness of breath  CARDIOVASCULAR: No chest pain or palpitations, no orthopnea, no dizziness  GASTROINTESTINAL: No abdominal pain. No nausea, vomiting      MEDICATIONS  (STANDING):  amLODIPine   Tablet 10 milliGRAM(s) Oral daily  ceFAZolin   IVPB 2000 milliGRAM(s) IV Intermittent once  cyanocobalamin 1000 MICROGram(s) Oral daily  dextrose 40% Gel 15 Gram(s) Oral once  dextrose 5%. 1000 milliLiter(s) (50 mL/Hr) IV Continuous <Continuous>  dextrose 50% Injectable 25 Gram(s) IV Push once  glucagon  Injectable 1 milliGRAM(s) IntraMuscular once  hydrochlorothiazide 12.5 milliGRAM(s) Oral daily  insulin lispro (ADMELOG) corrective regimen sliding scale   SubCutaneous three times a day before meals  lisinopril 40 milliGRAM(s) Oral daily    MEDICATIONS  (PRN):  acetaminophen     Tablet .. 650 milliGRAM(s) Oral every 6 hours PRN Temp greater or equal to 38C (100.4F), Mild Pain (1 - 3)      Objective:  ICU Vital Signs Last 24 Hrs  T(C): 36.9 (13 Nov 2021 19:00), Max: 36.9 (13 Nov 2021 19:00)  T(F): 98.4 (13 Nov 2021 19:00), Max: 98.4 (13 Nov 2021 19:00)  HR: 73 (13 Nov 2021 21:00) (55 - 91)  BP: 155/75 (13 Nov 2021 21:00) (130/61 - 201/93)  BP(mean): 106 (13 Nov 2021 21:00) (88 - 133)  RR: 18 (13 Nov 2021 21:00) (16 - 38)  SpO2: 97% (13 Nov 2021 21:00) (93% - 100%)        11-13 @ 07:01  -  11-13 @ 22:22  --------------------------------------------------------  IN: 1060 mL / OUT: 2000 mL / NET: -940 mL      PHYSICAL EXAM:  General: NAD  Neurology: A&Ox3, nonfocal, motor and sensation intact  Respiratory: CTA B/L, normal respiratory effort  CV: RRR, S1S2, no murmurs, rubs or gallops  Abdominal: Soft, NT, ND normoactive BS  Extremities: No edema, + peripheral pulses  Lines: RIJ TVP site c/d/i,   : James catheter in place      LABS:             14.1   4.94  )-----------( 186      ( 13 Nov 2021 07:20 )             39.1     11-13    137  |  102  |  15  ----------------------------<  225<H>  3.9   |  20<L>  |  0.57    Ca    9.8      13 Nov 2021 14:45  Phos  2.2     11-13  Mg     1.9     11-13    TPro  6.9  /  Alb  4.2  /  TBili  0.8  /  DBili  x   /  AST  20  /  ALT  35  /  AlkPhos  43  11-13    LIVER FUNCTIONS - ( 13 Nov 2021 14:45 )  Alb: 4.2 g/dL / Pro: 6.9 g/dL / ALK PHOS: 43 U/L / ALT: 35 U/L / AST: 20 U/L / GGT: x           PT/INR - ( 12 Nov 2021 20:50 )   PT: 13.4 sec;   INR: 1.12 ratio    PTT - ( 12 Nov 2021 20:50 )  PTT:30.6 sec         HPI:  Chief Complaint: Dizziness    HPI: 71-year-old male with medical history of DM2, colon cancer (s/p C5 5-FU and leucovorin 2 wks ago), renal cancer (per pt, slow growing), prostate cancer s/p prostatectomy in 2013, HTN, AS s/p TAVR (1/2021), A. 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 for myositis which were held for chemotherapy) who presented to the hospital for dizziness.    The patient began experiencing dizziness 3 days ago and described it as intermittent, lasting a few seconds, and worsened in severity today after dinner. He has never experienced this dizziness before. His neighbor is an RN, who felt his pulse to be low and recommended he come to the ED.     Admits to changes in vision (blurry and not worse during episodes of dizziness) and diarrhea, both of which he attributes to chemotherapy. The diarrhea lasted 1-2 days (3 BM/day) and resolved 2-3 days ago.    Denies recent illnesses, fever/chills, loss of appetite, headaches, tinnitus, syncope, fall, chest pain, shortness of breath, palpitations, abdominal pain, nausea/vomiting, and constipation.     Notably, the patient had his dose of dose of amiodarone reduced from 200 mg to 100 mg, 2 months ago. (13 Nov 2021 05:23)    INTERVAL HISTORY:  - TVP placed for symptomatic pauses > 6 seconds  - currently in sinus rhythm in the 70s    REVIEW OF SYSTEMS:  RESPIRATORY: No cough, No shortness of breath  CARDIOVASCULAR: No chest pain or palpitations, no orthopnea, no dizziness  GASTROINTESTINAL: No abdominal pain. No nausea, vomiting      MEDICATIONS  (STANDING):  amLODIPine   Tablet 10 milliGRAM(s) Oral daily  ceFAZolin   IVPB 2000 milliGRAM(s) IV Intermittent once  cyanocobalamin 1000 MICROGram(s) Oral daily  dextrose 40% Gel 15 Gram(s) Oral once  dextrose 5%. 1000 milliLiter(s) (50 mL/Hr) IV Continuous <Continuous>  dextrose 50% Injectable 25 Gram(s) IV Push once  glucagon  Injectable 1 milliGRAM(s) IntraMuscular once  hydrochlorothiazide 12.5 milliGRAM(s) Oral daily  insulin lispro (ADMELOG) corrective regimen sliding scale   SubCutaneous three times a day before meals  lisinopril 40 milliGRAM(s) Oral daily    MEDICATIONS  (PRN):  acetaminophen     Tablet .. 650 milliGRAM(s) Oral every 6 hours PRN Temp greater or equal to 38C (100.4F), Mild Pain (1 - 3)      Objective:  ICU Vital Signs Last 24 Hrs  T(C): 36.9 (13 Nov 2021 19:00), Max: 36.9 (13 Nov 2021 19:00)  T(F): 98.4 (13 Nov 2021 19:00), Max: 98.4 (13 Nov 2021 19:00)  HR: 73 (13 Nov 2021 21:00) (55 - 91)  BP: 155/75 (13 Nov 2021 21:00) (130/61 - 201/93)  BP(mean): 106 (13 Nov 2021 21:00) (88 - 133)  RR: 18 (13 Nov 2021 21:00) (16 - 38)  SpO2: 97% (13 Nov 2021 21:00) (93% - 100%)        11-13 @ 07:01  -  11-13 @ 22:22  --------------------------------------------------------  IN: 1060 mL / OUT: 2000 mL / NET: -940 mL      PHYSICAL EXAM:  General: NAD  Neurology: A&Ox3, nonfocal, motor and sensation intact  Respiratory: CTA B/L, normal respiratory effort  CV: RRR, S1S2, no murmurs, rubs or gallops  Abdominal: Soft, NT, ND normoactive BS  Extremities: No edema, + peripheral pulses  Lines: [facility] site c/d/i      LABS:             14.1   4.94  )-----------( 186      ( 13 Nov 2021 07:20 )             39.1     11-13    137  |  102  |  15  ----------------------------<  225<H>  3.9   |  20<L>  |  0.57    Ca    9.8      13 Nov 2021 14:45  Phos  2.2     11-13  Mg     1.9     11-13    TPro  6.9  /  Alb  4.2  /  TBili  0.8  /  DBili  x   /  AST  20  /  ALT  35  /  AlkPhos  43  11-13    LIVER FUNCTIONS - ( 13 Nov 2021 14:45 )  Alb: 4.2 g/dL / Pro: 6.9 g/dL / ALK PHOS: 43 U/L / ALT: 35 U/L / AST: 20 U/L / GGT: x           PT/INR - ( 12 Nov 2021 20:50 )   PT: 13.4 sec;   INR: 1.12 ratio    PTT - ( 12 Nov 2021 20:50 )  PTT:30.6 sec        ASSESSMENT & PLAN:  70yo Male with PMHx of HTN, HLD, DM, NAFLD, sAS (s/p TAVR 01/2021), Afib (on amio, apixaban), prostate cancer (s/p prostatectomy 2013), colon cancer (undergoing active chemo 5-FU, most recently 2 weeks ago) presented with dizziness, found to have sinus pauses (>6 sec) and high degree AV block. Further symptomatic pauses this morning requiring transfer to CICU and emergent TVP placement.    #Neuro:  No active issues  - AAOx4, non-focal, neuro checks per protocol    #Respiratory  No active issues  - SpO2 high 90s on room air, continuous pulse ox monitoring    #CV  High degree AV block w/ symptomatic sinus pauses  - s/p TVP placement on 11/13, rate 40bpm/threshold 10mA  - currently in sinus rhythm 60-70s  - NPO for PPM placement tomorrow  - hold AV renee blockers  - daily CXR for TVP placement    Afib  - holding amio and apixaban pending PPM placement    HTN  - cont Norvasc, lisinopril, HCTZ    #Renal  No active issues  - normal renal function, trend metabolic panel.  - Keep K 4-4.5, Mg > 2    #Endo  DM2  - on janumet at home, cont ISS -- added bedtime regimen, will start basal bolus if BG > 200    #Heme  No active issues  - AC for afib on hold pending PPM placement tomorrow  - Trend CBC    #ID  No active issues  - no leukocytosis and afebrile, trend  - frederic-op Ancef

## 2021-11-13 NOTE — PROGRESS NOTE ADULT - SUBJECTIVE AND OBJECTIVE BOX
24H hour events: Pt with frequent symptomatic phase 4 block, transferred to CICU for TVP placement.    MEDICATIONS:  amLODIPine   Tablet 10 milliGRAM(s) Oral daily  hydrochlorothiazide 12.5 milliGRAM(s) Oral daily  lisinopril 40 milliGRAM(s) Oral daily  ceFAZolin   IVPB 2000 milliGRAM(s) IV Intermittent once  acetaminophen     Tablet .. 650 milliGRAM(s) Oral every 6 hours PRN  dextrose 40% Gel 15 Gram(s) Oral once  dextrose 50% Injectable 25 Gram(s) IV Push once  glucagon  Injectable 1 milliGRAM(s) IntraMuscular once  insulin lispro (ADMELOG) corrective regimen sliding scale   SubCutaneous three times a day before meals  cyanocobalamin 1000 MICROGram(s) Oral daily  dextrose 5%. 1000 milliLiter(s) IV Continuous <Continuous>      REVIEW OF SYSTEMS:  Complete 10point ROS negative.    PHYSICAL EXAM:  T(C): 36.4 (11-13-21 @ 08:16), Max: 36.8 (11-12-21 @ 23:25)  HR: 80 (11-13-21 @ 16:00) (43 - 88)  BP: 152/72 (11-13-21 @ 16:00) (130/78 - 215/76)  RR: 18 (11-13-21 @ 16:00) (16 - 38)  SpO2: 93% (11-13-21 @ 16:00) (93% - 100%)  Wt(kg): --  I&O's Summary    13 Nov 2021 07:01  -  13 Nov 2021 17:13  --------------------------------------------------------  IN: 480 mL / OUT: 1300 mL / NET: -820 mL      LABS:	 	    CBC Full  -  ( 13 Nov 2021 07:20 )  WBC Count : 4.94 K/uL  Hemoglobin : 14.1 g/dL  Hematocrit : 39.1 %  Platelet Count - Automated : 186 K/uL  Mean Cell Volume : 89.5 fl  Mean Cell Hemoglobin : 32.3 pg  Mean Cell Hemoglobin Concentration : 36.1 gm/dL      11-13    137  |  102  |  15  ----------------------------<  225<H>  3.9   |  20<L>  |  0.57  11-13    138  |  102  |  17  ----------------------------<  214<H>  3.8   |  21<L>  |  0.56    Ca    9.8      13 Nov 2021 14:45  Ca    9.2      13 Nov 2021 06:59  Phos  2.2     11-13  Phos  3.1     11-13  Mg     1.9     11-13  Mg     1.8     11-13    TPro  6.9  /  Alb  4.2  /  TBili  0.8  /  DBili  x   /  AST  20  /  ALT  35  /  AlkPhos  43  11-13  TPro  6.4  /  Alb  4.3  /  TBili  0.8  /  DBili  x   /  AST  16  /  ALT  31  /  AlkPhos  39<L>  11-13      proBNP: Serum Pro-Brain Natriuretic Peptide: 218 pg/mL (11-12 @ 20:50)    Thyroid Stimulating Hormone, Serum in AM (11.13.21 @ 10:27)    Thyroid Stimulating Hormone, Serum: 3.33 uIU/mL          TELEMETRY: SR with frequent symptomatic phase 4 block	      < from: Transthoracic Echocardiogram (11.13.21 @ 05:23) >  Dimensions:    Normal Values:  LA:     4.8    2.0 - 4.0 cm  Ao:     2.7    2.0 - 3.8 cm  SEPTUM:        0.6 - 1.2 cm  PWT: 0.6 - 1.1 cm  LVIDd:         3.0 - 5.6 cm  LVIDs:         1.8 - 4.0 cm  EF (Barger Rule): 60 %Doppler Peak Velocity (m/sec):  AoV=2.3  ------------------------------------------------------------------------  Observations:  Mitral Valve: Mitral annular calcification and calcified  mitral leaflets.  Aortic Valve/Aorta: Transcatheter aortic valve replacement.   Aortic valve not well visualized. Peak transaortic valve  gradient equals 21 mm Hg, mean transaortic valve gradient  equals 12 mm Hg, which is probably normal in the presence  of a transcatheter aortic valve replacement. Peak left  ventricular outflow tract gradient equals 5 mm Hg, mean  gradient is equal to 2 mm Hg, LVOT velocity time integral  equals 18 cm.  Aortic Root: 2.7 cm.  Left Atrium: Normal left atrium.  LA volume index = 24  cc/m2.  Left Ventricle: Normal left ventricular systolic function.  No segmental wall motion abnormalities. Normal left  ventricular internal dimensions and wall thicknesses.  Right Heart: Normal right atrium. The right ventricle is  not well visualized; grossly normal right ventricular  systolic function. Tricuspid valve not well visualized,  probably normal. Pulmonic valve not well visualized.  Pericardium/Pleura: Normal pericardium with no pericardial  effusion.  Hemodynamic: Estimated right atrial pressure is 8 mm Hg.  ------------------------------------------------------------------------  Conclusions:  1. Transcatheter aortic valve replacement.  Aortic valve  not well visualized. Peaktransaortic valve gradient equals  21 mm Hg, mean transaortic valve gradient equals 12 mm Hg,  which is probably normal in the presence of a transcatheter  aortic valve replacement.  2. Normal left ventricular internal dimensions and wall  thicknesses.  3. Normal left ventricular systolic function. No segmental  wall motion abnormalities.  4. The right ventricle is not well visualized; grossly  normal right ventricular systolic function.  *** Compared with echocardiogram of 2/25/2021, no  significant changes noted.    < end of copied text >

## 2021-11-14 ENCOUNTER — TRANSCRIPTION ENCOUNTER (OUTPATIENT)
Age: 71
End: 2021-11-14

## 2021-11-14 LAB
ALBUMIN SERPL ELPH-MCNC: 4.3 G/DL — SIGNIFICANT CHANGE UP (ref 3.3–5)
ALP SERPL-CCNC: 41 U/L — SIGNIFICANT CHANGE UP (ref 40–120)
ALT FLD-CCNC: 31 U/L — SIGNIFICANT CHANGE UP (ref 10–45)
ANION GAP SERPL CALC-SCNC: 13 MMOL/L — SIGNIFICANT CHANGE UP (ref 5–17)
APTT BLD: 30.7 SEC — SIGNIFICANT CHANGE UP (ref 27.5–35.5)
AST SERPL-CCNC: 17 U/L — SIGNIFICANT CHANGE UP (ref 10–40)
BILIRUB SERPL-MCNC: 0.8 MG/DL — SIGNIFICANT CHANGE UP (ref 0.2–1.2)
BUN SERPL-MCNC: 13 MG/DL — SIGNIFICANT CHANGE UP (ref 7–23)
CALCIUM SERPL-MCNC: 9.5 MG/DL — SIGNIFICANT CHANGE UP (ref 8.4–10.5)
CHLORIDE SERPL-SCNC: 104 MMOL/L — SIGNIFICANT CHANGE UP (ref 96–108)
CHOLEST SERPL-MCNC: 206 MG/DL — HIGH
CK MB BLD-MCNC: 3.4 % — SIGNIFICANT CHANGE UP (ref 0–3.5)
CK MB CFR SERPL CALC: 4.7 NG/ML — SIGNIFICANT CHANGE UP (ref 0–6.7)
CK SERPL-CCNC: 137 U/L — SIGNIFICANT CHANGE UP (ref 30–200)
CO2 SERPL-SCNC: 22 MMOL/L — SIGNIFICANT CHANGE UP (ref 22–31)
CREAT SERPL-MCNC: 0.62 MG/DL — SIGNIFICANT CHANGE UP (ref 0.5–1.3)
GLUCOSE BLDC GLUCOMTR-MCNC: 171 MG/DL — HIGH (ref 70–99)
GLUCOSE BLDC GLUCOMTR-MCNC: 228 MG/DL — HIGH (ref 70–99)
GLUCOSE BLDC GLUCOMTR-MCNC: 232 MG/DL — HIGH (ref 70–99)
GLUCOSE SERPL-MCNC: 181 MG/DL — HIGH (ref 70–99)
HCT VFR BLD CALC: 41.9 % — SIGNIFICANT CHANGE UP (ref 39–50)
HDLC SERPL-MCNC: 41 MG/DL — SIGNIFICANT CHANGE UP
HGB BLD-MCNC: 14.3 G/DL — SIGNIFICANT CHANGE UP (ref 13–17)
INR BLD: 1.1 RATIO — SIGNIFICANT CHANGE UP (ref 0.88–1.16)
LIPID PNL WITH DIRECT LDL SERPL: 131 MG/DL — HIGH
MAGNESIUM SERPL-MCNC: 2 MG/DL — SIGNIFICANT CHANGE UP (ref 1.6–2.6)
MCHC RBC-ENTMCNC: 31.4 PG — SIGNIFICANT CHANGE UP (ref 27–34)
MCHC RBC-ENTMCNC: 34.1 GM/DL — SIGNIFICANT CHANGE UP (ref 32–36)
MCV RBC AUTO: 92.1 FL — SIGNIFICANT CHANGE UP (ref 80–100)
NON HDL CHOLESTEROL: 165 MG/DL — HIGH
NRBC # BLD: 0 /100 WBCS — SIGNIFICANT CHANGE UP (ref 0–0)
PHOSPHATE SERPL-MCNC: 3.8 MG/DL — SIGNIFICANT CHANGE UP (ref 2.5–4.5)
PLATELET # BLD AUTO: 157 K/UL — SIGNIFICANT CHANGE UP (ref 150–400)
POTASSIUM SERPL-MCNC: 3.5 MMOL/L — SIGNIFICANT CHANGE UP (ref 3.5–5.3)
POTASSIUM SERPL-SCNC: 3.5 MMOL/L — SIGNIFICANT CHANGE UP (ref 3.5–5.3)
PROT SERPL-MCNC: 6.6 G/DL — SIGNIFICANT CHANGE UP (ref 6–8.3)
PROTHROM AB SERPL-ACNC: 13.1 SEC — SIGNIFICANT CHANGE UP (ref 10.6–13.6)
RBC # BLD: 4.55 M/UL — SIGNIFICANT CHANGE UP (ref 4.2–5.8)
RBC # FLD: 15.5 % — HIGH (ref 10.3–14.5)
SODIUM SERPL-SCNC: 139 MMOL/L — SIGNIFICANT CHANGE UP (ref 135–145)
TRIGL SERPL-MCNC: 167 MG/DL — HIGH
TROPONIN T, HIGH SENSITIVITY RESULT: 79 NG/L — HIGH (ref 0–51)
WBC # BLD: 5.38 K/UL — SIGNIFICANT CHANGE UP (ref 3.8–10.5)
WBC # FLD AUTO: 5.38 K/UL — SIGNIFICANT CHANGE UP (ref 3.8–10.5)

## 2021-11-14 PROCEDURE — 99291 CRITICAL CARE FIRST HOUR: CPT

## 2021-11-14 PROCEDURE — 93010 ELECTROCARDIOGRAM REPORT: CPT | Mod: 76

## 2021-11-14 PROCEDURE — 71045 X-RAY EXAM CHEST 1 VIEW: CPT | Mod: 26,76

## 2021-11-14 PROCEDURE — 33274 TCAT INSJ/RPL PERM LDLS PM: CPT

## 2021-11-14 RX ORDER — POTASSIUM CHLORIDE 20 MEQ
40 PACKET (EA) ORAL ONCE
Refills: 0 | Status: COMPLETED | OUTPATIENT
Start: 2021-11-14 | End: 2021-11-14

## 2021-11-14 RX ORDER — APIXABAN 2.5 MG/1
5 TABLET, FILM COATED ORAL EVERY 12 HOURS
Refills: 0 | Status: DISCONTINUED | OUTPATIENT
Start: 2021-11-14 | End: 2021-11-15

## 2021-11-14 RX ORDER — AMIODARONE HYDROCHLORIDE 400 MG/1
100 TABLET ORAL DAILY
Refills: 0 | Status: DISCONTINUED | OUTPATIENT
Start: 2021-11-14 | End: 2021-11-15

## 2021-11-14 RX ORDER — HYDROCHLOROTHIAZIDE 25 MG
25 TABLET ORAL DAILY
Refills: 0 | Status: DISCONTINUED | OUTPATIENT
Start: 2021-11-15 | End: 2021-11-15

## 2021-11-14 RX ORDER — POTASSIUM CHLORIDE 20 MEQ
20 PACKET (EA) ORAL ONCE
Refills: 0 | Status: COMPLETED | OUTPATIENT
Start: 2021-11-14 | End: 2021-11-14

## 2021-11-14 RX ORDER — NIFEDIPINE 30 MG
120 TABLET, EXTENDED RELEASE 24 HR ORAL DAILY
Refills: 0 | Status: DISCONTINUED | OUTPATIENT
Start: 2021-11-14 | End: 2021-11-15

## 2021-11-14 RX ADMIN — Medication 120 MILLIGRAM(S): at 11:06

## 2021-11-14 RX ADMIN — PREGABALIN 1000 MICROGRAM(S): 225 CAPSULE ORAL at 11:06

## 2021-11-14 RX ADMIN — LISINOPRIL 40 MILLIGRAM(S): 2.5 TABLET ORAL at 05:17

## 2021-11-14 RX ADMIN — Medication 40 MILLIEQUIVALENT(S): at 05:17

## 2021-11-14 RX ADMIN — Medication 20 MILLIEQUIVALENT(S): at 11:06

## 2021-11-14 RX ADMIN — Medication 2: at 12:19

## 2021-11-14 RX ADMIN — Medication 4: at 17:48

## 2021-11-14 RX ADMIN — Medication 4: at 07:39

## 2021-11-14 RX ADMIN — Medication 12.5 MILLIGRAM(S): at 05:16

## 2021-11-14 RX ADMIN — AMLODIPINE BESYLATE 10 MILLIGRAM(S): 2.5 TABLET ORAL at 05:17

## 2021-11-14 RX ADMIN — APIXABAN 5 MILLIGRAM(S): 2.5 TABLET, FILM COATED ORAL at 22:42

## 2021-11-14 NOTE — PROGRESS NOTE ADULT - SUBJECTIVE AND OBJECTIVE BOX
Patient seen and examined at bedside.    Overnight Events:     Review Of Systems: No chest pain, shortness of breath, or palpitations            Current Meds:  acetaminophen     Tablet .. 650 milliGRAM(s) Oral every 6 hours PRN  amLODIPine   Tablet 10 milliGRAM(s) Oral daily  ceFAZolin   IVPB 2000 milliGRAM(s) IV Intermittent once  cyanocobalamin 1000 MICROGram(s) Oral daily  dextrose 40% Gel 15 Gram(s) Oral once  dextrose 5%. 1000 milliLiter(s) IV Continuous <Continuous>  dextrose 50% Injectable 25 Gram(s) IV Push once  glucagon  Injectable 1 milliGRAM(s) IntraMuscular once  hydrochlorothiazide 12.5 milliGRAM(s) Oral daily  insulin lispro (ADMELOG) corrective regimen sliding scale   SubCutaneous at bedtime  insulin lispro (ADMELOG) corrective regimen sliding scale   SubCutaneous three times a day before meals  lisinopril 40 milliGRAM(s) Oral daily      Vitals:  T(F): 97.9 (11-14), Max: 98.4 (11-13)  HR: 62 (11-14) (55 - 91)  BP: 145/66 (11-14) (118/57 - 201/93)  RR: 17 (11-14)  SpO2: 95% (11-14)  I&O's Summary    2021 07:01  -  2021 07:00  --------------------------------------------------------  IN: 1090 mL / OUT: 2100 mL / NET: -1010 mL    2021 07:01  -  2021 08:02  --------------------------------------------------------  IN: 0 mL / OUT: 150 mL / NET: -150 mL        Physical Exam:  General: NAD  Neurology: A&Ox3, nonfocal, motor and sensation intact  Respiratory: CTA B/L, normal respiratory effort  CV: RRR, S1S2, no murmurs, rubs or gallops  Abdominal: Soft, NT, ND normoactive BS  Extremities: No edema, + peripheral pulses  Lines: [facility] TVP site c/d/i                        14.3   5.38  )-----------( 157      ( 2021 00:58 )             41.9     11-14    139  |  104  |  13  ----------------------------<  181<H>  3.5   |  22  |  0.62    Ca    9.5      2021 00:58  Phos  3.8       Mg     2.0         TPro  6.6  /  Alb  4.3  /  TBili  0.8  /  DBili  x   /  AST  17  /  ALT  31  /  AlkPhos  41  -14    PT/INR - ( 2021 00:58 )   PT: 13.1 sec;   INR: 1.10 ratio         PTT - ( 2021 00:58 )  PTT:30.7 sec  CARDIAC MARKERS ( 2021 00:58 )  79 ng/L / x     / x     / 137 U/L / x     / 4.7 ng/mL  CARDIAC MARKERS ( 2021 06:59 )  89 ng/L / x     / x     / x     / x     / x      CARDIAC MARKERS ( 2021 20:50 )  53 ng/L / x     / x     / x     / x     / x          Serum Pro-Brain Natriuretic Peptide: 218 pg/mL ( @ 20:50)    Total Cholesterol: 206  LDL: --  HDL: 41  T

## 2021-11-14 NOTE — DISCHARGE NOTE PROVIDER - NSDCMRMEDTOKEN_GEN_ALL_CORE_FT
amiodarone 100 mg oral tablet: 1 tab(s) orally once a day  amLODIPine 10 mg oral tablet: 1 tab(s) orally once a day  hold for SBP&lt;110   aspirin 81 mg oral delayed release tablet: 1 tab(s) orally once a day  Eliquis 5 mg oral tablet: 1 tab(s) orally 2 times a day   hydroCHLOROthiazide 12.5 mg oral capsule: 1 cap(s) orally once a day  Janumet 50 mg-1000 mg oral tablet: 1 tab(s) orally 2 times a day    lisinopril 40 mg oral tablet: 1 tab(s) orally once a day  metoprolol succinate 50 mg oral tablet, extended release: 1 tab(s) orally once a day  tamsulosin 0.4 mg oral capsule: 1 cap(s) orally once a day (at bedtime)  Vitamin B12 1000 mcg oral tablet: 1 tab(s) orally once a day  Vitamin D3 2000 intl units oral capsule: 1 cap(s) orally once a day   amiodarone 100 mg oral tablet: 1 tab(s) orally once a day  amLODIPine 10 mg oral tablet: 1 tab(s) orally once a day  hold for SBP&lt;110   aspirin 81 mg oral delayed release tablet: 1 tab(s) orally once a day  Eliquis 5 mg oral tablet: 1 tab(s) orally 2 times a day   Janumet 50 mg-1000 mg oral tablet: 1 tab(s) orally 2 times a day    lisinopril 40 mg oral tablet: 1 tab(s) orally once a day  metoprolol succinate 50 mg oral tablet, extended release: 1 tab(s) orally once a day  tamsulosin 0.4 mg oral capsule: 1 cap(s) orally once a day (at bedtime)  Vitamin B12 1000 mcg oral tablet: 1 tab(s) orally once a day  Vitamin D3 2000 intl units oral capsule: 1 cap(s) orally once a day   acetaminophen 325 mg oral tablet: 2 tab(s) orally every 6 hours, As needed, Temp greater or equal to 38C (100.4F), Mild Pain (1 - 3)  amiodarone 100 mg oral tablet: 1 tab(s) orally once a day  Eliquis 5 mg oral tablet: 1 tab(s) orally 2 times a day   hydroCHLOROthiazide 25 mg oral tablet: 1 tab(s) orally once a day  Janumet 50 mg-1000 mg oral tablet: 1 tab(s) orally 2 times a day    lisinopril 40 mg oral tablet: 1 tab(s) orally once a day  metoprolol succinate 50 mg oral tablet, extended release: 1 tab(s) orally once a day  NIFEdipine 60 mg oral tablet, extended release: 2 tab(s) orally once a day  tamsulosin 0.4 mg oral capsule: 1 cap(s) orally once a day (at bedtime)  Vitamin B12 1000 mcg oral tablet: 1 tab(s) orally once a day  Vitamin D3 2000 intl units oral capsule: 1 cap(s) orally once a day

## 2021-11-14 NOTE — DISCHARGE NOTE PROVIDER - HOSPITAL COURSE
Patient is a 72 y/o M PMHx of Prostate CA s/p prostatectomy in 2013, OA, Fatty liver, HTN, T2DM (A1C 8.6), HLD (unable to tolerate meds 2/2 myopathy), AS s/p TAVR in 1/2021 c/b a-fib for which he remains on eliquis and amiodarone (dose decreased from 200-100mg 2 months ago), colon CA just finished 5th round of chemo with 5-FU and leucovorin 2 weeks ago (has never been on anthracyclines), who presented with dizziness 11/12 and was noted to be bradycardic in the 40s with SBP 200s upon arrival to ED. EP consulted and patient found to be in high degree AV block with pauses >6 seconds. A RIJ TVP was placed and patient was monitored in the CICU. Pt underwent Micra placement 11/14 via right groin, suture removed. Patient is a 70 y/o M PMHx of Prostate CA s/p prostatectomy in 2013, OA, Fatty liver, HTN, T2DM (A1C 8.6), HLD (unable to tolerate meds 2/2 myopathy), AS s/p TAVR in 1/2021 c/b a-fib for which he remains on eliquis and amiodarone (dose decreased from 200-100mg 2 months ago), colon CA just finished 5th round of chemo with 5-FU and leucovorin 2 weeks ago (has never been on anthracyclines), who presented with dizziness 11/12 and was noted to be bradycardic in the 40s with SBP 200s upon arrival to ED. EP consulted and patient found to be in high degree AV block with pauses >6 seconds. A RIJ TVP was placed and patient was monitored in the CICU.   - s/p Micra 11/14/21. Device checked & optimized by [employer] rep this AM, normal function. Remote transmission instructions provided by rep.  - Right groin sutures removed 11/14. Top dressing removed today. Site benign.   - Eliquis resumed 11/14, continue   - Tele with sinus rhythm ~70-80s, overnight and this AM with some HB with appropriate Vpacing. Pt asymptomatic.  - PA/Lat CXR today. Pending final read.  - Please obtain ECG today.  - Post procedure instructions reviewed with patient, written instructions left at bedside.   - Wound check appointment scheduled for 11/26/21 at 240PM.   Patient is a 72 y/o M PMHx of Prostate CA s/p prostatectomy in 2013, OA, Fatty liver, HTN, T2DM (A1C 8.6), HLD (unable to tolerate meds 2/2 myopathy), AS s/p TAVR in 1/2021 c/b a-fib for which he remains on eliquis and amiodarone (dose decreased from 200-100mg 2 months ago), colon CA just finished 5th round of chemo with 5-FU and leucovorin 2 weeks ago (has never been on anthracyclines), who presented with dizziness 11/12 and was noted to be bradycardic in the 40s with SBP 200s upon arrival to ED. EP consulted and patient found to be in high degree AV block with pauses >6 seconds. A RIJ TVP was placed and patient was monitored in the CICU.   - s/p Micra 11/14/21. Device checked & optimized by [employer] rep this AM, normal function. Remote transmission instructions provided by rep.  - Right groin sutures removed 11/14. Top dressing removed today. Site benign.   - Eliquis resumed 11/14, continue   - Tele with sinus rhythm ~70-80s, overnight and this AM with some HB with appropriate Vpacing. Pt asymptomatic.  - PA/Lat CXR today.   - Post procedure instructions reviewed with patient, written instructions left at bedside.   - Wound check appointment scheduled for 11/26/21 at 240PM.

## 2021-11-14 NOTE — DISCHARGE NOTE PROVIDER - NSDCCPCAREPLAN_GEN_ALL_CORE_FT
PRINCIPAL DISCHARGE DIAGNOSIS  Diagnosis: Bradycardia  Assessment and Plan of Treatment: - status post RIJ TVP placement 11/13-11/14  - status post micra placement 11/14/21   - Monitor R groin  Follow up with EP      SECONDARY DISCHARGE DIAGNOSES  Diagnosis: S/P TAVR (transcatheter aortic valve replacement)  Assessment and Plan of Treatment: Follow up with Cardiology    Diagnosis: Chronic atrial fibrillation  Assessment and Plan of Treatment: Atrial fibrillation is the most common heart rhythm problem.  The condition puts you at risk for has stroke and heart attack  It helps if you control your blood pressure, not drink more than 1-2 alcohol drinks per day, cut down on caffeine, getting treatment for over active thyroid gland, and get regular exercise  Call your doctor if you feel your heart racing or beating unusually, chest tightness or pain, lightheaded, faint, shortness of breath especially with exercise  It is important to take your heart medication as prescribed  You may be on anticoagulation which is very important to take as directed - you may need blood work to monitor drug levels      Diagnosis: Colon cancer  Assessment and Plan of Treatment: Follow up with oncology for managment     PRINCIPAL DISCHARGE DIAGNOSIS  Diagnosis: Bradycardia  Assessment and Plan of Treatment: - status post Micra 11/14/21. Device checked & optimized by [employer] rep this AM, normal function. Remote transmission instructions provided by rep.  - Right groin sutures removed 11/14/21 . Continue to monitor site for infection /bleeding  - Eliquis resumed 11/14/21  - Follow up w [facility] Wound check appointment scheduled for 11/26/21 at 240PM.        SECONDARY DISCHARGE DIAGNOSES  Diagnosis: S/P TAVR (transcatheter aortic valve replacement)  Assessment and Plan of Treatment: Follow up with Cardiology    Diagnosis: Chronic atrial fibrillation  Assessment and Plan of Treatment: Atrial fibrillation is the most common heart rhythm problem.  The condition puts you at risk for has stroke and heart attack  It helps if you control your blood pressure, not drink more than 1-2 alcohol drinks per day, cut down on caffeine, getting treatment for over active thyroid gland, and get regular exercise  Call your doctor if you feel your heart racing or beating unusually, chest tightness or pain, lightheaded, faint, shortness of breath especially with exercise  It is important to take your heart medication as prescribed  You may be on anticoagulation which is very important to take as directed - you may need blood work to monitor drug levels      Diagnosis: Colon cancer  Assessment and Plan of Treatment: Follow up with oncology for managment     PRINCIPAL DISCHARGE DIAGNOSIS  Diagnosis: Bradycardia  Assessment and Plan of Treatment: - status post Micra 11/14/21. Device checked & optimized by [employer] rep this AM, normal function. Remote transmission instructions provided by rep.  - Right groin sutures removed 11/14/21 . Continue to monitor site for infection /bleeding  - Eliquis resumed 11/14/21  - Follow up w [facility] Wound check appointment scheduled for 11/26/21 at 240PM.  NO driving for 2 days.         SECONDARY DISCHARGE DIAGNOSES  Diagnosis: S/P TAVR (transcatheter aortic valve replacement)  Assessment and Plan of Treatment: Follow up with Cardiology    Diagnosis: Chronic atrial fibrillation  Assessment and Plan of Treatment: Atrial fibrillation is the most common heart rhythm problem.  The condition puts you at risk for has stroke and heart attack  It helps if you control your blood pressure, not drink more than 1-2 alcohol drinks per day, cut down on caffeine, getting treatment for over active thyroid gland, and get regular exercise  Call your doctor if you feel your heart racing or beating unusually, chest tightness or pain, lightheaded, faint, shortness of breath especially with exercise  It is important to take your heart medication as prescribed  You may be on anticoagulation which is very important to take as directed - you may need blood work to monitor drug levels      Diagnosis: Colon cancer  Assessment and Plan of Treatment: Follow up with oncology, Dr. Gross for managment

## 2021-11-14 NOTE — PROGRESS NOTE ADULT - ATTENDING COMMENTS
stable overnight.   s/p AV micra this morning.   meds: insulin, norvasc 10, HCTZ 12.5, lisinopril 40.   130/-150/, HR 70SR afebrile.   I/O: -1.1  11-14  139  |  104  |  13  ----------------------------<  181<H>  3.5   |  22  |  0.62                        14.3   5.38  )-----------( 157      ( 14 Nov 2021 00:58 )             41.9     Ca    9.5      14 Nov 2021 00:58  Phos  3.8     11-14  Mg     2.0     11-14    TPro  6.6  /  Alb  4.3  /  TBili  0.8  /  DBili  x   /  AST  17  /  ALT  31  /  AlkPhos  41  11-14  clinically stable post micra  transfer to [facility]  start epixiban tonight if EP agrees.  remove femoral suture and neck sheath as per EP instuctions.   patients BP not well controlled.   change norvasc to nifedipine , make HCTZ 25, add Kdur 20.   Bob Monzon

## 2021-11-14 NOTE — PROGRESS NOTE ADULT - SUBJECTIVE AND OBJECTIVE BOX
Patient is a 71y old  Male who presents with a chief complaint of Bradycardia (14 Nov 2021 08:01)      SUBJECTIVE / OVERNIGHT EVENTS:    Patient seen and examined.   sp PPM. on bedrest. denies complaints. no lightheaded dizziness cp sob nvd abd pain.    Vital Signs Last 24 Hrs  T(C): 36.6 (14 Nov 2021 07:00), Max: 36.9 (13 Nov 2021 19:00)  T(F): 97.8 (14 Nov 2021 07:00), Max: 98.4 (13 Nov 2021 19:00)  HR: 84 (14 Nov 2021 12:30) (62 - 91)  BP: 152/70 (14 Nov 2021 12:30) (118/57 - 193/78)  BP(mean): 100 (14 Nov 2021 12:30) (82 - 124)  RR: 20 (14 Nov 2021 12:30) (12 - 38)  SpO2: 97% (14 Nov 2021 12:30) (93% - 99%)  I&O's Summary    13 Nov 2021 07:01  -  14 Nov 2021 07:00  --------------------------------------------------------  IN: 1090 mL / OUT: 2100 mL / NET: -1010 mL    14 Nov 2021 07:01  -  14 Nov 2021 12:56  --------------------------------------------------------  IN: 240 mL / OUT: 400 mL / NET: -160 mL        PE:  GENERAL: NAD, AAOx3  HEAD:  Atraumatic, Normocephalic  CHEST/LUNG: CTABL, No wheeze  HEART: Regular rate and rhythm; no murmur  ABDOMEN: Soft, Nontender, Nondistended; Bowel sounds present  EXTREMITIES:  2+ Peripheral Pulses, No clubbing, cyanosis, or edema  SKIN: right groin soft dressed CDI, right chest wall port  NEURO: No focal deficits    LABS:                        14.3   5.38  )-----------( 157      ( 14 Nov 2021 00:58 )             41.9     11-14    139  |  104  |  13  ----------------------------<  181<H>  3.5   |  22  |  0.62    Ca    9.5      14 Nov 2021 00:58  Phos  3.8     11-14  Mg     2.0     11-14    TPro  6.6  /  Alb  4.3  /  TBili  0.8  /  DBili  x   /  AST  17  /  ALT  31  /  AlkPhos  41  11-14    PT/INR - ( 14 Nov 2021 00:58 )   PT: 13.1 sec;   INR: 1.10 ratio         PTT - ( 14 Nov 2021 00:58 )  PTT:30.7 sec  CAPILLARY BLOOD GLUCOSE      POCT Blood Glucose.: 171 mg/dL (14 Nov 2021 12:16)  POCT Blood Glucose.: 228 mg/dL (14 Nov 2021 07:32)  POCT Blood Glucose.: 183 mg/dL (13 Nov 2021 22:52)  POCT Blood Glucose.: 272 mg/dL (13 Nov 2021 17:05)  POCT Blood Glucose.: 213 mg/dL (13 Nov 2021 13:06)    CARDIAC MARKERS ( 14 Nov 2021 00:58 )  x     / x     / 137 U/L / x     / 4.7 ng/mL          RADIOLOGY & ADDITIONAL TESTS:    Imaging Personally Reviewed:  [x] YES  [ ] NO    Consultant(s) Notes Reviewed:  [x] YES  [ ] NO    MEDICATIONS  (STANDING):  ceFAZolin   IVPB 2000 milliGRAM(s) IV Intermittent once  cyanocobalamin 1000 MICROGram(s) Oral daily  dextrose 40% Gel 15 Gram(s) Oral once  dextrose 5%. 1000 milliLiter(s) (50 mL/Hr) IV Continuous <Continuous>  dextrose 50% Injectable 25 Gram(s) IV Push once  glucagon  Injectable 1 milliGRAM(s) IntraMuscular once  insulin lispro (ADMELOG) corrective regimen sliding scale   SubCutaneous at bedtime  insulin lispro (ADMELOG) corrective regimen sliding scale   SubCutaneous three times a day before meals  lisinopril 40 milliGRAM(s) Oral daily  NIFEdipine  milliGRAM(s) Oral daily    MEDICATIONS  (PRN):  acetaminophen     Tablet .. 650 milliGRAM(s) Oral every 6 hours PRN Temp greater or equal to 38C (100.4F), Mild Pain (1 - 3)      Care Discussed with Consultants/Other Providers [x] YES  [ ] NO    HEALTH ISSUES - PROBLEM Dx:  Symptomatic bradycardia    High anion gap metabolic acidosis    Lactic acidosis    Paroxysmal atrial fibrillation    S/P TAVR (transcatheter aortic valve replacement)    Colon cancer    Diabetes mellitus    Hypertension

## 2021-11-14 NOTE — DISCHARGE NOTE PROVIDER - CARE PROVIDER_API CALL
Lucas Carrillo)  Cardiac Electrophysiology; Cardiology  [address] [place] [address]  Phone: [phone]  Fax: ()-  Follow Up Time:    Lucas Carrillo)  Cardiac Electrophysiology; Cardiology  [address] [place] [id number]  Phone: [phone]  Fax: ()-  Follow Up Time:     Chata Barkley)  Internal Medicine  [address] [place] [address], [address] [address]  Phone: [phone]  Fax: [phone]  Follow Up Time:

## 2021-11-14 NOTE — PROGRESS NOTE ADULT - ASSESSMENT
70yo Male with PMHx of HTN, HLD, DM, NAFLD, sAS (s/p TAVR 01/2021), Afib (on amio, apixaban), prostate cancer (s/p prostatectomy 2013), colon cancer (undergoing active chemo 5-FU, most recently 2 weeks ago) presented with dizziness, found to have sinus pauses (>6 sec) and high degree AV block. Further symptomatic pauses this morning requiring transfer to CICU and emergent TVP placement.     #Neuro:  No active issues  - AAOx4, non-focal, neuro checks per protocol    #Respiratory  No active issues  - SpO2 high 90s on room air, continuous pulse ox monitoring    #CV  High degree AV block w/ symptomatic sinus pauses  - s/p TVP placement on 11/13, rate 40bpm/threshold 10mA  - currently in sinus rhythm 60-70s  - NPO for PPM placement tomorrow  - hold AV renee blockers  - daily CXR for TVP placement    Afib  - holding amio and apixaban pending PPM placement    HTN  - cont Norvasc, lisinopril, HCTZ    #Renal  No active issues  - normal renal function, trend metabolic panel.  - Keep K 4-4.5, Mg > 2    #Endo  DM2  - on janumet at home, cont ISS -- added bedtime regimen, will start basal bolus if BG > 200    #Heme  No active issues  - AC for afib on hold pending PPM placement tomorrow  - Trend CBC    #ID  No active issues  - no leukocytosis and afebrile, trend  - frederic-op Ancef

## 2021-11-14 NOTE — DISCHARGE NOTE PROVIDER - NSDCFUADDINST_GEN_ALL_CORE_FT
No heavy lifting, strenuous activity, bending, straining or unnecessary stair climbing  for 2 weeks. No sex for 1 week.  No driving for 2 days. You may shower 24 hours following procedure but avoid baths and swimming for 1 week. Check groin site for bleeding and/or swelling daily following procedure. Call your doctor/cardiologist immediately should it occur or if you have increased/persistent pain at the site. Follow up with your EP doctor. Take medications as prescribed.

## 2021-11-14 NOTE — PROGRESS NOTE ADULT - ASSESSMENT
70yo M PMHX DM2, colon cancer (s/p C5 5-FU and leucovorin 2 wks ago), renal cancer (per pt, slow growing), prostate cancer s/p prostatectomy in 2013, HTN, AS s/p TAVR (1/2021), Afib (on Eliquis and Amiodarone), HLD (unable to tolerate meds 2/2 myositis, previously on infusions ?IVIG? for myositis which were held for chemotherapy) who presented to the hospital for dizziness. Symptoms likely secondary to bradycardia. Telemetry with concern for AV block and sinus pause.     # Symptomatic bradycardia with AV block  sp TVP  now sp PPM  appreciate CICU care  tele    # AS sp TAVR  # AFib  # HTN  cont HCTZ, procardia  resume eliquis when appropriate    # DM2  FS acceptable  SSI     # colon ca and renal CA  outpt fu oncology  S/P surgical resection of mass and lymph nodes  Cycle 5 5-FU and Lecuvorin 2 weeks ago    PCP Dr Chata Barkley    Please call [employer] with questions [phone] 70yo M PMHX DM2, colon cancer (s/p C5 5-FU and leucovorin 2 wks ago), renal cancer (per pt, slow growing), prostate cancer s/p prostatectomy in 2013, HTN, AS s/p TAVR (1/2021), Afib (on Eliquis and Amiodarone), HLD (unable to tolerate meds 2/2 myositis, previously on infusions ?IVIG? for myositis which were held for chemotherapy) who presented to the hospital for dizziness. Telemetry with concern for AV block and sinus pauses. sp emergent TVP.    # Symptomatic bradycardia with AV block  sp PPM   per EP  appreciate CICU care  tele    # AS sp TAVR  # AFib  # HTN  cont HCTZ, procardia  resume eliquis when appropriate    # DM2  FS acceptable  SSI     # colon ca and renal CA  outpt fu oncology  S/P surgical resection of mass and lymph nodes  Cycle 5 5-FU and Lecuvorin 2 weeks ago    PCP Dr Chata Barkley    Please call [employer] with questions [phone] 72yo M PMHX DM2, colon cancer (s/p C5 5-FU and leucovorin 2 wks ago), renal cancer (per pt, slow growing), prostate cancer s/p prostatectomy in 2013, HTN, AS s/p TAVR (1/2021), Afib (on Eliquis and Amiodarone), HLD (unable to tolerate meds 2/2 myositis, previously on infusions ?IVIG? for myositis which were held for chemotherapy) who presented to the hospital for dizziness. Telemetry with concern for AV block and sinus pauses. sp emergent TVP.    # Symptomatic bradycardia with AV block  sp PPM   per EP  appreciate CICU care  tele    # AS sp TAVR  # AFib  # HTN  cont HCTZ, procardia, lisinopril  resume eliquis when appropriate    # DM2  a1c 8  FS acceptable  SSI     # colon ca and renal CA  outpt fu oncology  S/P surgical resection of mass and lymph nodes  Cycle 5 5-FU and Lecuvorin 2 weeks ago    PCP Dr Chata Barkley    Please call [employer] with questions [phone]

## 2021-11-14 NOTE — CHART NOTE - NSCHARTNOTEFT_GEN_A_CORE
CICU TRANSFER NOTE    Patient seen and examined at bedside.    S/P Micro PPM  S/P CXR no pneumothorax   right groin suture removed     Review Of Systems: No chest pain, shortness of breath, or palpitations            Current Meds:  acetaminophen     Tablet .. 650 milliGRAM(s) Oral every 6 hours PRN  amLODIPine   Tablet 10 milliGRAM(s) Oral daily  ceFAZolin   IVPB 2000 milliGRAM(s) IV Intermittent once  cyanocobalamin 1000 MICROGram(s) Oral daily  dextrose 40% Gel 15 Gram(s) Oral once  dextrose 5%. 1000 milliLiter(s) IV Continuous <Continuous>  dextrose 50% Injectable 25 Gram(s) IV Push once  glucagon  Injectable 1 milliGRAM(s) IntraMuscular once  hydrochlorothiazide 12.5 milliGRAM(s) Oral daily  insulin lispro (ADMELOG) corrective regimen sliding scale   SubCutaneous at bedtime  insulin lispro (ADMELOG) corrective regimen sliding scale   SubCutaneous three times a day before meals  lisinopril 40 milliGRAM(s) Oral daily      Vitals:  T(F): 97.9 (11-14), Max: 98.4 (11-13)  HR: 62 (11-14) (55 - 91)  BP: 145/66 (11-14) (118/57 - 201/93)  RR: 17 (11-14)  SpO2: 95% (11-14)  I&O's Summary    Physical Exam:  General: NAD  Neurology: A&Ox3, nonfocal, motor and sensation intact  Respiratory: CTA B/L, normal respiratory effort  CV: RRR, S1S2, no murmurs, rubs or gallops  Abdominal: Soft, NT, ND normoactive BS  Extremities: No edema, + peripheral pulses, Right groin w/o bleeding or hematoma                         14.3   5.38  )-----------( 157      ( 14 Nov 2021 00:58 )             41.9         139  |  104  |  13  ----------------------------<  181<H>  3.5   |  22  |  0.62    Ca    9.5      14 Nov 2021 00:58  Phos  3.8     11-14  Mg     2.0     11-14    TPro  6.6  /  Alb  4.3  /  TBili  0.8  /  DBili  x   /  AST  17  /  ALT  31  /  AlkPhos  41  11-14    PT/INR - ( 14 Nov 2021 00:58 )   PT: 13.1 sec;   INR: 1.10 ratio         PTT - ( 14 Nov 2021 00:58 )  PTT:30.7 sec  CARDIAC MARKERS ( 14 Nov 2021 00:58 )  79 ng/L / x     / x     / 137 U/L / x     / 4.7 ng/mL  CARDIAC MARKERS ( 13 Nov 2021 06:59 )  89 ng/L / x     / x     / x     / x     / x      CARDIAC MARKERS ( 12 Nov 2021 20:50 )  53 ng/L / x     / x     / x     / x     / x        Assessment and Plan:   · Assessment	  70yo Male with PMHx of HTN, HLD, DM, NAFLD, sAS (s/p TAVR 01/2021), Afib (on amio, apixaban), prostate cancer (s/p prostatectomy 2013), colon cancer (undergoing active chemo 5-FU, most recently 2 weeks ago) presented with dizziness, found to have sinus pauses (>6 sec) and high degree AV block. Further symptomatic pauses this morning requiring transfer to CICU and now s/p Micra PPM     #Neuro:  No active issues  - AAOx4, non-focal, neuro checks per protocol    #Respiratory  No active issues  - O2 sat >92 on room air     #CV  High degree AV block w/ symptomatic sinus pauses s/p micra PPM    Afib  -Resume apixaban tonight and home dose of amiodarone     HTN: hypertensive   - Increase HCTZ 25 daily   - change amlodipine to nifedipine   - Cont. home dose of Lisinopril     #Renal  No active issues  - normal renal function, trend metabolic panel.  - Keep K 4-4.5, Mg > 2    #Endo  DM2  - on janumet at home, cont ISS -- added bedtime regimen, will start basal bolus if BG > 200    #Heme  No active issues  - Trend CBC    #ID  No active issues  - no leukocytosis and afebrile    #Mis: Monitor Right groin s/p groin suture at 3:30pm  Repeat CXR in am     Pt will be transferred to [facility], Update info given to Dr. Janis Kruger([employer]). Will give update info to MAR/[facility] once the bed is confirmed. CCU Transfer Note    Transfer from: CCU    Transfer to:  (x  ) Telemetry - 6 TOWER [address]  Accepting Physician: Dr. 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 EP consulted and patient found to be in high degree AV block with pauses >6 seconds. A RIJ TVP was placed and patient was monitored in the CICU. Pt underwent Micra placement 11/14 via right groin, suture removed. He is OOB to chair and is awaiting a PA/Lateral CXR in the AM. Eliquis restarted this evening per EP. Stable for tele transfer.         Vital Signs Last 24 Hrs  T(C): 36.7 (14 Nov 2021 19:00), Max: 36.8 (14 Nov 2021 11:00)  T(F): 98.1 (14 Nov 2021 19:00), Max: 98.2 (14 Nov 2021 11:00)  HR: 50 (14 Nov 2021 19:00) (49 - 85)  BP: 135/64 (14 Nov 2021 19:00) (118/57 - 176/71)  BP(mean): 92 (14 Nov 2021 19:00) (82 - 114)  RR: 17 (14 Nov 2021 19:00) (12 - 36)  SpO2: 98% (14 Nov 2021 19:00) (94% - 99%)  I&O's Summary    13 Nov 2021 07:01  -  14 Nov 2021 07:00  --------------------------------------------------------  IN: 1090 mL / OUT: 2100 mL / NET: -1010 mL    14 Nov 2021 07:01  -  14 Nov 2021 19:58  --------------------------------------------------------  IN: 240 mL / OUT: 650 mL / NET: -410 mL        MEDICATIONS  (STANDING):  aMIOdarone    Tablet 100 milliGRAM(s) Oral daily  apixaban 5 milliGRAM(s) Oral every 12 hours  cyanocobalamin 1000 MICROGram(s) Oral daily  insulin lispro (ADMELOG) corrective regimen sliding scale   SubCutaneous at bedtime  insulin lispro (ADMELOG) corrective regimen sliding scale   SubCutaneous three times a day before meals  lisinopril 40 milliGRAM(s) Oral daily  NIFEdipine  milliGRAM(s) Oral daily        CARDIAC MARKERS ( 14 Nov 2021 00:58 )  x     / x     / 137 U/L / x     / 4.7 ng/mL                            14.3   5.38  )-----------( 157      ( 14 Nov 2021 00:58 )             41.9     11-14    139  |  104  |  13  ----------------------------<  181<H>  3.5   |  22  |  0.62    Ca    9.5      14 Nov 2021 00:58  Phos  3.8     11-14  Mg     2.0     11-14    TPro  6.6  /  Alb  4.3  /  TBili  0.8  /  DBili  x   /  AST  17  /  ALT  31  /  AlkPhos  41  11-14    PT/INR - ( 14 Nov 2021 00:58 )   PT: 13.1 sec;   INR: 1.10 ratio         PTT - ( 14 Nov 2021 00:58 )  PTT:30.7 sec      ASSESSMENT/PLAN:  71M with PMHx of HTN, HLD, DM, NAFLD, sAS (s/p TAVR 01/2021), Afib (on amio, apixaban), prostate cancer (s/p prostatectomy 2013), colon cancer (undergoing active chemo 5-FU, most recently 2 weeks ago) presented with dizziness, found to have sinus pauses (>6 sec) and high degree AV block requiring TVP placement, s/p micra 11/14.      #Neuro  No active issues  - AAOx4, non-focal, neuro checks per protocol    #Respiratory  No active issues  - O2 sat >92% on room air     #CV  High degree AV block w/ symptomatic sinus pauses  - s/p RIJ TVP placement 11/13-11/14  - s/p micra placement today  - right groin suture removed, OOB as tolerated   - PA/lateral CXR in AM   - f/u with EP upon discharge     Afib  -Resumed apixaban tonight and home dose of amiodarone     HTN  - Increase HCTZ 25 daily   - change amlodipine to nifedipine 120 qd  - Cont. home dose of Lisinopril 40mg    #Renal  No active issues  - normal renal function, trend metabolic panel.  - Keep K 4-4.5, Mg > 2    #Endo  DM2  - on janumet at home, cont ISS -- added bedtime regimen, will start basal bolus if BG > 200    #Heme  No active issues  - Trend CBC    #ID  No active issues  - no leukocytosis and afebrile        FOR FOLLOW UP:  [] DC planning for 11/15 after PA/Lateral CXR  [] Needs EP f/u upon d/c       Tarsha Baker, CCU PA CCU Transfer Note    Transfer from: CCU    Transfer to:  (x  ) Telemetry - 6 TOWER [address]  Accepting Physician: Dr. 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 EP consulted and patient found to be in high degree AV block with pauses >6 seconds. A RIJ TVP was placed and patient was monitored in the CICU. Pt underwent Micra placement 11/14 via right groin, suture removed. He is OOB to chair and is awaiting a PA/Lateral CXR in the AM. Eliquis restarted this evening per EP. Stable for tele transfer.         Vital Signs Last 24 Hrs  T(C): 36.7 (14 Nov 2021 19:00), Max: 36.8 (14 Nov 2021 11:00)  T(F): 98.1 (14 Nov 2021 19:00), Max: 98.2 (14 Nov 2021 11:00)  HR: 50 (14 Nov 2021 19:00) (49 - 85)  BP: 135/64 (14 Nov 2021 19:00) (118/57 - 176/71)  BP(mean): 92 (14 Nov 2021 19:00) (82 - 114)  RR: 17 (14 Nov 2021 19:00) (12 - 36)  SpO2: 98% (14 Nov 2021 19:00) (94% - 99%)  I&O's Summary    13 Nov 2021 07:01  -  14 Nov 2021 07:00  --------------------------------------------------------  IN: 1090 mL / OUT: 2100 mL / NET: -1010 mL    14 Nov 2021 07:01  -  14 Nov 2021 19:58  --------------------------------------------------------  IN: 240 mL / OUT: 650 mL / NET: -410 mL        MEDICATIONS  (STANDING):  aMIOdarone    Tablet 100 milliGRAM(s) Oral daily  apixaban 5 milliGRAM(s) Oral every 12 hours  cyanocobalamin 1000 MICROGram(s) Oral daily  insulin lispro (ADMELOG) corrective regimen sliding scale   SubCutaneous at bedtime  insulin lispro (ADMELOG) corrective regimen sliding scale   SubCutaneous three times a day before meals  lisinopril 40 milliGRAM(s) Oral daily  NIFEdipine  milliGRAM(s) Oral daily        CARDIAC MARKERS ( 14 Nov 2021 00:58 )  x     / x     / 137 U/L / x     / 4.7 ng/mL                            14.3   5.38  )-----------( 157      ( 14 Nov 2021 00:58 )             41.9     11-14    139  |  104  |  13  ----------------------------<  181<H>  3.5   |  22  |  0.62    Ca    9.5      14 Nov 2021 00:58  Phos  3.8     11-14  Mg     2.0     11-14    TPro  6.6  /  Alb  4.3  /  TBili  0.8  /  DBili  x   /  AST  17  /  ALT  31  /  AlkPhos  41  11-14    PT/INR - ( 14 Nov 2021 00:58 )   PT: 13.1 sec;   INR: 1.10 ratio         PTT - ( 14 Nov 2021 00:58 )  PTT:30.7 sec      ASSESSMENT/PLAN:  71M with PMHx of HTN, HLD, DM, NAFLD, sAS (s/p TAVR 01/2021), Afib (on amio, apixaban), prostate cancer (s/p prostatectomy 2013), colon cancer (undergoing active chemo 5-FU, most recently 2 weeks ago) presented with dizziness, found to have sinus pauses (>6 sec) and high degree AV block requiring TVP placement, s/p micra 11/14.      #Neuro  No active issues  - AAOx4, non-focal, neuro checks per protocol    #Respiratory  No active issues  - O2 sat >92% on room air     #CV  High degree AV block w/ symptomatic sinus pauses  - s/p RIJ TVP placement 11/13-11/14  - s/p micra placement today  - right groin suture removed, OOB as tolerated   - PA/lateral CXR in AM   - f/u with EP upon discharge     Afib  -Resumed apixaban tonight and home dose of amiodarone     HTN  - Increase HCTZ 25 daily   - change amlodipine to nifedipine 120 qd  - Cont. home dose of Lisinopril 40mg    #Renal  No active issues  - normal renal function, trend metabolic panel.  - Keep K 4-4.5, Mg > 2    #Endo  DM2  - on janumet at home, cont ISS -- added bedtime regimen, will start basal bolus if BG > 200    #Heme  No active issues  - Trend CBC    #ID  No active issues  - no leukocytosis and afebrile        FOR FOLLOW UP:  [] DC planning for 11/15 after PA/Lateral CXR  [] Needs EP f/u upon d/c       Tarsha Baker, CCU PA

## 2021-11-14 NOTE — DISCHARGE NOTE PROVIDER - CARE PROVIDERS DIRECT ADDRESSES
[email or web address]

## 2021-11-14 NOTE — PRE-ANESTHESIA EVALUATION ADULT - NSANTHPMHFT_GEN_ALL_CORE
69 y/o male with pmhx of T2DM, HTN, HLD, prostate cancer s/p radical prostatectomy, statin induced myositis started IVIg infusions in 2018, with recently diagnosed colon cancer after an episode of rectal bleeding in 11/2020 and now with progression of known aortic stenosis s/p TAVR now p/w heart block for PPM

## 2021-11-15 ENCOUNTER — TRANSCRIPTION ENCOUNTER (OUTPATIENT)
Age: 71
End: 2021-11-15

## 2021-11-15 VITALS
DIASTOLIC BLOOD PRESSURE: 77 MMHG | RESPIRATION RATE: 18 BRPM | TEMPERATURE: 98 F | OXYGEN SATURATION: 97 % | HEART RATE: 97 BPM | SYSTOLIC BLOOD PRESSURE: 136 MMHG

## 2021-11-15 LAB
ANION GAP SERPL CALC-SCNC: 17 MMOL/L — SIGNIFICANT CHANGE UP (ref 5–17)
BUN SERPL-MCNC: 19 MG/DL — SIGNIFICANT CHANGE UP (ref 7–23)
CALCIUM SERPL-MCNC: 9.3 MG/DL — SIGNIFICANT CHANGE UP (ref 8.4–10.5)
CHLORIDE SERPL-SCNC: 100 MMOL/L — SIGNIFICANT CHANGE UP (ref 96–108)
CO2 SERPL-SCNC: 18 MMOL/L — LOW (ref 22–31)
CREAT SERPL-MCNC: 0.82 MG/DL — SIGNIFICANT CHANGE UP (ref 0.5–1.3)
GLUCOSE SERPL-MCNC: 227 MG/DL — HIGH (ref 70–99)
HCT VFR BLD CALC: 40.6 % — SIGNIFICANT CHANGE UP (ref 39–50)
HGB BLD-MCNC: 13.9 G/DL — SIGNIFICANT CHANGE UP (ref 13–17)
MAGNESIUM SERPL-MCNC: 1.9 MG/DL — SIGNIFICANT CHANGE UP (ref 1.6–2.6)
MCHC RBC-ENTMCNC: 31.2 PG — SIGNIFICANT CHANGE UP (ref 27–34)
MCHC RBC-ENTMCNC: 34.2 GM/DL — SIGNIFICANT CHANGE UP (ref 32–36)
MCV RBC AUTO: 91 FL — SIGNIFICANT CHANGE UP (ref 80–100)
NRBC # BLD: 0 /100 WBCS — SIGNIFICANT CHANGE UP (ref 0–0)
PHOSPHATE SERPL-MCNC: 3.8 MG/DL — SIGNIFICANT CHANGE UP (ref 2.5–4.5)
PLATELET # BLD AUTO: 178 K/UL — SIGNIFICANT CHANGE UP (ref 150–400)
POTASSIUM SERPL-MCNC: 3.9 MMOL/L — SIGNIFICANT CHANGE UP (ref 3.5–5.3)
POTASSIUM SERPL-SCNC: 3.9 MMOL/L — SIGNIFICANT CHANGE UP (ref 3.5–5.3)
RBC # BLD: 4.46 M/UL — SIGNIFICANT CHANGE UP (ref 4.2–5.8)
RBC # FLD: 15.6 % — HIGH (ref 10.3–14.5)
SODIUM SERPL-SCNC: 135 MMOL/L — SIGNIFICANT CHANGE UP (ref 135–145)
WBC # BLD: 7 K/UL — SIGNIFICANT CHANGE UP (ref 3.8–10.5)
WBC # FLD AUTO: 7 K/UL — SIGNIFICANT CHANGE UP (ref 3.8–10.5)

## 2021-11-15 PROCEDURE — 85014 HEMATOCRIT: CPT

## 2021-11-15 PROCEDURE — C1769: CPT

## 2021-11-15 PROCEDURE — 83735 ASSAY OF MAGNESIUM: CPT

## 2021-11-15 PROCEDURE — 80061 LIPID PANEL: CPT

## 2021-11-15 PROCEDURE — 82553 CREATINE MB FRACTION: CPT

## 2021-11-15 PROCEDURE — 82550 ASSAY OF CK (CPK): CPT

## 2021-11-15 PROCEDURE — 84484 ASSAY OF TROPONIN QUANT: CPT

## 2021-11-15 PROCEDURE — 85610 PROTHROMBIN TIME: CPT

## 2021-11-15 PROCEDURE — 71046 X-RAY EXAM CHEST 2 VIEWS: CPT

## 2021-11-15 PROCEDURE — 84132 ASSAY OF SERUM POTASSIUM: CPT

## 2021-11-15 PROCEDURE — 85027 COMPLETE CBC AUTOMATED: CPT

## 2021-11-15 PROCEDURE — 82803 BLOOD GASES ANY COMBINATION: CPT

## 2021-11-15 PROCEDURE — 83605 ASSAY OF LACTIC ACID: CPT

## 2021-11-15 PROCEDURE — 93005 ELECTROCARDIOGRAM TRACING: CPT

## 2021-11-15 PROCEDURE — 80053 COMPREHEN METABOLIC PANEL: CPT

## 2021-11-15 PROCEDURE — 80048 BASIC METABOLIC PNL TOTAL CA: CPT

## 2021-11-15 PROCEDURE — U0005: CPT

## 2021-11-15 PROCEDURE — 82435 ASSAY OF BLOOD CHLORIDE: CPT

## 2021-11-15 PROCEDURE — 83036 HEMOGLOBIN GLYCOSYLATED A1C: CPT

## 2021-11-15 PROCEDURE — 83880 ASSAY OF NATRIURETIC PEPTIDE: CPT

## 2021-11-15 PROCEDURE — 85025 COMPLETE CBC W/AUTO DIFF WBC: CPT

## 2021-11-15 PROCEDURE — 93306 TTE W/DOPPLER COMPLETE: CPT

## 2021-11-15 PROCEDURE — 71045 X-RAY EXAM CHEST 1 VIEW: CPT

## 2021-11-15 PROCEDURE — 84100 ASSAY OF PHOSPHORUS: CPT

## 2021-11-15 PROCEDURE — C1894: CPT

## 2021-11-15 PROCEDURE — C1786: CPT

## 2021-11-15 PROCEDURE — 84443 ASSAY THYROID STIM HORMONE: CPT

## 2021-11-15 PROCEDURE — 36415 COLL VENOUS BLD VENIPUNCTURE: CPT

## 2021-11-15 PROCEDURE — 82962 GLUCOSE BLOOD TEST: CPT

## 2021-11-15 PROCEDURE — 86850 RBC ANTIBODY SCREEN: CPT

## 2021-11-15 PROCEDURE — 85018 HEMOGLOBIN: CPT

## 2021-11-15 PROCEDURE — 71046 X-RAY EXAM CHEST 2 VIEWS: CPT | Mod: 26

## 2021-11-15 PROCEDURE — 82947 ASSAY GLUCOSE BLOOD QUANT: CPT

## 2021-11-15 PROCEDURE — 85730 THROMBOPLASTIN TIME PARTIAL: CPT

## 2021-11-15 PROCEDURE — 84295 ASSAY OF SERUM SODIUM: CPT

## 2021-11-15 PROCEDURE — U0003: CPT

## 2021-11-15 PROCEDURE — 86900 BLOOD TYPING SEROLOGIC ABO: CPT

## 2021-11-15 PROCEDURE — 93010 ELECTROCARDIOGRAM REPORT: CPT

## 2021-11-15 PROCEDURE — 33274 TCAT INSJ/RPL PERM LDLS PM: CPT

## 2021-11-15 PROCEDURE — 99285 EMERGENCY DEPT VISIT HI MDM: CPT

## 2021-11-15 PROCEDURE — 86901 BLOOD TYPING SEROLOGIC RH(D): CPT

## 2021-11-15 PROCEDURE — 82330 ASSAY OF CALCIUM: CPT

## 2021-11-15 RX ORDER — ACETAMINOPHEN 500 MG
2 TABLET ORAL
Qty: 0 | Refills: 0 | DISCHARGE
Start: 2021-11-15

## 2021-11-15 RX ORDER — NIFEDIPINE 30 MG
2 TABLET, EXTENDED RELEASE 24 HR ORAL
Qty: 60 | Refills: 0
Start: 2021-11-15 | End: 2021-12-14

## 2021-11-15 RX ORDER — HYDROCHLOROTHIAZIDE 25 MG
1 TABLET ORAL
Qty: 30 | Refills: 0
Start: 2021-11-15 | End: 2021-12-14

## 2021-11-15 RX ORDER — AMLODIPINE BESYLATE 2.5 MG/1
1 TABLET ORAL
Qty: 0 | Refills: 0 | DISCHARGE
Start: 2021-11-15

## 2021-11-15 RX ADMIN — APIXABAN 5 MILLIGRAM(S): 2.5 TABLET, FILM COATED ORAL at 05:05

## 2021-11-15 RX ADMIN — PREGABALIN 1000 MICROGRAM(S): 225 CAPSULE ORAL at 10:42

## 2021-11-15 RX ADMIN — Medication 25 MILLIGRAM(S): at 10:42

## 2021-11-15 RX ADMIN — LISINOPRIL 40 MILLIGRAM(S): 2.5 TABLET ORAL at 05:07

## 2021-11-15 RX ADMIN — Medication 6: at 12:35

## 2021-11-15 RX ADMIN — Medication 120 MILLIGRAM(S): at 05:05

## 2021-11-15 RX ADMIN — Medication 4: at 08:05

## 2021-11-15 RX ADMIN — AMIODARONE HYDROCHLORIDE 100 MILLIGRAM(S): 400 TABLET ORAL at 05:07

## 2021-11-15 NOTE — PROGRESS NOTE ADULT - ASSESSMENT
72 y/o male with PMH HTN, HLD, DM, severe AS s/p TAVR 1/8/21 with transient LBBB, Afib (on eliquis and amiodarone with dose decreased from 200-100 two months ago) just finished 5th round of chemo via R CW mediport for colon CA with 5-FU and leucovorin 2 weeks ago, here with bradycardia and dizziness, found to have symptomatic phase 4 block, transferred to CICU.     1. Phase 4 AVB  - s/p Micra 11/14/21.   - Right groin sutures removed 11/14  - Eliquis resumed.   - Tele with sinus tach 110-120s, occasional PVCs. ***  - Pending PA/Lat CXR  - Post procedure instructions reviewed with patient, written instructions left at bedside.   - Wound check appointment scheduled for ***    2. Afib  - On AC with Eliquis 5mg BID  - Continue home Amiodarone dose.     EP will  70 y/o male with PMH HTN, HLD, DM, severe AS s/p TAVR 1/8/21 with transient LBBB, Afib (on eliquis and amiodarone with dose decreased from 200-100 two months ago) just finished 5th round of chemo via R CW mediport for colon CA with 5-FU and leucovorin 2 weeks ago, here with bradycardia and dizziness, found to have symptomatic phase 4 block, transferred to CICU.     1. Phase 4 AVB  - s/p Micra 11/14/21.   - Right groin sutures removed 11/14  - Eliquis resumed.   - Tele with sinus tach 110-120s, occasional PVCs. ***  - Pending PA/Lat CXR  - Post procedure instructions reviewed with patient, written instructions left at bedside.   - Wound check appointment scheduled for ***    2. Afib  - On AC with Eliquis 5mg BID  - Continue home Amiodarone dose.   - Keep K>4, Mg>2    EP will  72 y/o male with PMH HTN, HLD, DM, severe AS s/p TAVR 1/8/21 with transient LBBB, Afib (on eliquis and amiodarone with dose decreased from 200-100 two months ago) just finished 5th round of chemo via R CW mediport for colon CA with 5-FU and leucovorin 2 weeks ago, here with bradycardia and dizziness, found to have symptomatic phase 4 block, transferred to CICU.     1. Phase 4 AVB  - s/p Micra 11/14/21.   - Right groin sutures removed 11/14. Top dressing removed today. Site benign.   - Eliquis resumed 11/14, continue   - Tele with sinus rhythm ~70-80s, overnight and this AM with some HB with appropriate Vpacing. Pt asymptomatic.  - Pending PA/Lat CXR today. ***  - Please obtain ECG today.  - Post procedure instructions reviewed with patient, written instructions left at bedside.   - Wound check appointment scheduled for 11/26/21 at 240PM.    2. Afib  - On AC with Eliquis 5mg BID  - Continue home Amiodarone and Metoprolol dose.   - Keep K>4, Mg>2    EP will sign off, please reconsult as needed.  70 y/o male with PMH HTN, HLD, DM, severe AS s/p TAVR 1/8/21 with transient LBBB, Afib (on eliquis and amiodarone with dose decreased from 200-100 two months ago) just finished 5th round of chemo via R CW mediport for colon CA with 5-FU and leucovorin 2 weeks ago, here with bradycardia and dizziness, found to have symptomatic phase 4 block, transferred to CICU.     1. Phase 4 AVB  - s/p Micra 11/14/21. Device checked & optimized by [employer] rep this AM, normal function. Remote transmission instructions provided by rep.  - Right groin sutures removed 11/14. Top dressing removed today. Site benign.   - Eliquis resumed 11/14, continue   - Tele with sinus rhythm ~70-80s, overnight and this AM with some HB with appropriate Vpacing. Pt asymptomatic.  - Pending PA/Lat CXR today. ***  - Please obtain ECG today.  - Post procedure instructions reviewed with patient, written instructions left at bedside.   - Wound check appointment scheduled for 11/26/21 at 240PM.    2. Afib  - On AC with Eliquis 5mg BID  - Continue home Amiodarone and Metoprolol dose.   - Keep K>4, Mg>2    EP will sign off, please reconsult as needed.  72 y/o male with PMH HTN, HLD, DM, severe AS s/p TAVR 1/8/21 with transient LBBB, Afib (on eliquis and amiodarone with dose decreased from 200-100 two months ago) just finished 5th round of chemo via R CW mediport for colon CA with 5-FU and leucovorin 2 weeks ago, here with bradycardia and dizziness, found to have symptomatic phase 4 block, transferred to CICU.     1. Phase 4 AVB  - s/p Micra 11/14/21. Device checked & optimized by [employer] rep this AM, normal function. Remote transmission instructions provided by rep.  - Right groin sutures removed 11/14. Top dressing removed today. Site benign.   - Eliquis resumed 11/14, continue   - Tele with sinus rhythm ~70-80s, overnight and this AM with some HB with appropriate Vpacing. Pt asymptomatic.  - PA/Lat CXR today. Pending final read.  - Please obtain ECG today.  - Post procedure instructions reviewed with patient, written instructions left at bedside.   - Wound check appointment scheduled for 11/26/21 at 240PM.    2. Afib  - On AC with Eliquis 5mg BID  - Continue home Amiodarone and Metoprolol dose.   - Keep K>4, Mg>2    EP will sign off, please reconsult as needed.

## 2021-11-15 NOTE — DISCHARGE NOTE NURSING/CASE MANAGEMENT/SOCIAL WORK - PATIENT PORTAL LINK FT
You can access the FollowMyHealth Patient Portal offered by [facility] by registering at the following website: [email or web address]. By joining [employer]’s FollowMyHealth portal, you will also be able to view your health information using other applications (apps) compatible with our system.

## 2021-11-15 NOTE — CHART NOTE - NSCHARTNOTEFT_GEN_A_CORE
Medically cleared for discharge by Dr. Gutiérrez and EP. COntinue current medications, restart BB as per EP. FOllow up with PMD next week for BP check and wound check In EP as scheduled.

## 2021-11-15 NOTE — PROGRESS NOTE ADULT - SUBJECTIVE AND OBJECTIVE BOX
24H hour events:     MEDICATIONS:  aMIOdarone    Tablet 100 milliGRAM(s) Oral daily  apixaban 5 milliGRAM(s) Oral every 12 hours  hydrochlorothiazide 25 milliGRAM(s) Oral daily  lisinopril 40 milliGRAM(s) Oral daily  NIFEdipine  milliGRAM(s) Oral daily  acetaminophen     Tablet .. 650 milliGRAM(s) Oral every 6 hours PRN  insulin lispro (ADMELOG) corrective regimen sliding scale   SubCutaneous at bedtime  insulin lispro (ADMELOG) corrective regimen sliding scale   SubCutaneous three times a day before meals  cyanocobalamin 1000 MICROGram(s) Oral daily      REVIEW OF SYSTEMS:  Complete 10point ROS negative.    PHYSICAL EXAM:  T(C): 36.6 (11-15-21 @ 04:59), Max: 37 (11-14-21 @ 21:19)  HR: 93 (11-15-21 @ 04:59) (49 - 93)  BP: 122/77 (11-15-21 @ 04:59) (119/58 - 176/71)  RR: 18 (11-15-21 @ 04:59) (12 - 25)  SpO2: 98% (11-15-21 @ 04:59) (95% - 99%)  Wt(kg): --  I&O's Summary    14 Nov 2021 07:01  -  15 Nov 2021 07:00  --------------------------------------------------------  IN: 240 mL / OUT: 650 mL / NET: -410 mL        Appearance: Normal	  HEENT:   Normal oral mucosa, PERRL, EOMI	  Lymphatic: No lymphadenopathy  Cardiovascular: Normal S1 S2, No JVD, No murmurs, No edema  Respiratory: Lungs clear to auscultation	  Psychiatry: A & O x 3, Mood & affect appropriate  Gastrointestinal:  Soft, Non-tender, + BS	  Skin: No rashes, No ecchymoses, No cyanosis	  Neurologic: Non-focal  Extremities: Normal range of motion, No clubbing, cyanosis or edema      LABS:	 	    CBC Full  -  ( 14 Nov 2021 00:58 )  WBC Count : 5.38 K/uL  Hemoglobin : 14.3 g/dL  Hematocrit : 41.9 %  Platelet Count - Automated : 157 K/uL  Mean Cell Volume : 92.1 fl  Mean Cell Hemoglobin : 31.4 pg  Mean Cell Hemoglobin Concentration : 34.1 gm/dL    11-14    139  |  104  |  13  ----------------------------<  181<H>  3.5   |  22  |  0.62  11-13    137  |  102  |  15  ----------------------------<  225<H>  3.9   |  20<L>  |  0.57    Ca    9.5      14 Nov 2021 00:58  Ca    9.8      13 Nov 2021 14:45  Phos  3.8     11-14  Phos  2.2     11-13  Mg     2.0     11-14  Mg     1.9     11-13    TPro  6.6  /  Alb  4.3  /  TBili  0.8  /  DBili  x   /  AST  17  /  ALT  31  /  AlkPhos  41  11-14  TPro  6.9  /  Alb  4.2  /  TBili  0.8  /  DBili  x   /  AST  20  /  ALT  35  /  AlkPhos  43  11-13      proBNP: Serum Pro-Brain Natriuretic Peptide: 218 pg/mL (11-12 @ 20:50)    TSH: Thyroid Stimulating Hormone, Serum in AM (11.13.21 @ 10:27)    Thyroid Stimulating Hormone, Serum: 3.33 uIU/mL      CARDIAC MARKERS: Troponin T, High Sensitivity (11.14.21 @ 00:58)    Troponin T, High Sensitivity Result: 79: Specimen not hemolyzed      TELEMETRY: 	   ECG:  	      PREVIOUS DIAGNOSTIC TESTING:    [ ] Echocardiogram: < from: Transthoracic Echocardiogram (11.13.21 @ 05:23) >  Observations:  Mitral Valve: Mitral annular calcification and calcified  mitral leaflets.  Aortic Valve/Aorta: Transcatheter aortic valve replacement.   Aortic valve not well visualized. Peak transaortic valve  gradient equals 21 mm Hg, mean transaortic valve gradient  equals 12 mm Hg, which is probably normal in the presence  of a transcatheter aortic valve replacement. Peak left  ventricular outflow tract gradient equals 5 mm Hg, mean  gradient is equal to 2 mm Hg, LVOT velocity time integral  equals 18 cm.  Aortic Root: 2.7 cm.  Left Atrium: Normal left atrium.  LA volume index = 24  cc/m2.  Left Ventricle: Normal left ventricular systolic function.  No segmental wall motion abnormalities. Normal left  ventricular internal dimensions and wall thicknesses.  Right Heart: Normal right atrium. The right ventricle is  not well visualized; grossly normal right ventricular  systolic function. Tricuspid valve not well visualized,  probably normal. Pulmonic valve not well visualized.  Pericardium/Pleura: Normal pericardium with no pericardial  effusion.  Hemodynamic: Estimated right atrial pressure is 8 mm Hg.  ------------------------------------------------------------------------  Conclusions:  1. Transcatheter aortic valve replacement.  Aortic valve  not well visualized. Peaktransaortic valve gradient equals  21 mm Hg, mean transaortic valve gradient equals 12 mm Hg,  which is probably normal in the presence of a transcatheter  aortic valve replacement.  2. Normal left ventricular internal dimensions and wall  thicknesses.  3. Normal left ventricular systolic function. No segmental  wall motion abnormalities.  4. The right ventricle is not well visualized; grossly  normal right ventricular systolic function.  *** Compared with echocardiogram of 2/25/2021, no  significant changes noted.    < end of copied text >      [ ]  Catheterization: < from: Cardiac Cath Lab - Adult (12.08.20 @ 12:04) >  VALVES: AORTIC VALVE: The aortic valve was evaluated by hemodynamic  measurement and fluoroscopy. The aortic valve leaflets exhibited mild  calcification.  CORONARY VESSELS: The coronary circulation is right dominant.  LM:   --  LM: Angiography showed minor luminal irregularities with no flow  limiting lesions.  LAD:   --  LAD: Angiography showed mild atherosclerosis with no flow  limiting lesions.  --  D1: Angiography showed moderate atherosclerosis.  CX:   --  Proximal circumflex: Normal.  --  OM1: Angiography showed minor luminal irregularities with no flow  limiting lesions.  RCA:   --  Proximal RCA: There was a 60 % stenosis.  --  Distal RCA: There was a 30 % stenosis.  COMPLICATIONS: There were no complications.  DIAGNOSTIC IMPRESSIONS: Mod-severe RCA disease. Mild LCA disease. By cath  gradients, DEANDRE approximately 1.1  DIAGNOSTIC RECOMMENDATIONS: F/u with structural heart team    < end of copied text >   24H hour events: Tele SR 70-80s. Intermittent HB overnight and this AM ~750-8am with  ~50s. Pt asymptomatic. Denies dizziness, SOB, palpitations. Feeling well this AM.    MEDICATIONS:  aMIOdarone    Tablet 100 milliGRAM(s) Oral daily  apixaban 5 milliGRAM(s) Oral every 12 hours  hydrochlorothiazide 25 milliGRAM(s) Oral daily  lisinopril 40 milliGRAM(s) Oral daily  NIFEdipine  milliGRAM(s) Oral daily  acetaminophen     Tablet .. 650 milliGRAM(s) Oral every 6 hours PRN  insulin lispro (ADMELOG) corrective regimen sliding scale   SubCutaneous at bedtime  insulin lispro (ADMELOG) corrective regimen sliding scale   SubCutaneous three times a day before meals  cyanocobalamin 1000 MICROGram(s) Oral daily      REVIEW OF SYSTEMS:  Complete 10point ROS negative.    PHYSICAL EXAM:  T(C): 36.6 (11-15-21 @ 04:59), Max: 37 (11-14-21 @ 21:19)  HR: 93 (11-15-21 @ 04:59) (49 - 93)  BP: 122/77 (11-15-21 @ 04:59) (119/58 - 176/71)  RR: 18 (11-15-21 @ 04:59) (12 - 25)  SpO2: 98% (11-15-21 @ 04:59) (95% - 99%)  Wt(kg): --  I&O's Summary    14 Nov 2021 07:01  -  15 Nov 2021 07:00  --------------------------------------------------------  IN: 240 mL / OUT: 650 mL / NET: -410 mL        Appearance: Normal	  Cardiovascular: Normal S1 S2, No JVD, No murmurs  Respiratory: Lungs clear to auscultation	  Psychiatry: A & O x 3, Mood & affect appropriate  Gastrointestinal:  Soft, Non-tender  Skin: Right LE suture removed yesterday 11/14. Top dressing removed today. Nontender to palpation, minimal ecchymosis, no discharge/bleeding/edema.   Neurologic: Non-focal  Extremities: No BLE edema    LABS:	 	    CBC Full  -  ( 14 Nov 2021 00:58 )  WBC Count : 5.38 K/uL  Hemoglobin : 14.3 g/dL  Hematocrit : 41.9 %  Platelet Count - Automated : 157 K/uL  Mean Cell Volume : 92.1 fl  Mean Cell Hemoglobin : 31.4 pg  Mean Cell Hemoglobin Concentration : 34.1 gm/dL    11-14    139  |  104  |  13  ----------------------------<  181<H>  3.5   |  22  |  0.62  11-13    137  |  102  |  15  ----------------------------<  225<H>  3.9   |  20<L>  |  0.57    Ca    9.5      14 Nov 2021 00:58  Ca    9.8      13 Nov 2021 14:45  Phos  3.8     11-14  Phos  2.2     11-13  Mg     2.0     11-14  Mg     1.9     11-13    TPro  6.6  /  Alb  4.3  /  TBili  0.8  /  DBili  x   /  AST  17  /  ALT  31  /  AlkPhos  41  11-14  TPro  6.9  /  Alb  4.2  /  TBili  0.8  /  DBili  x   /  AST  20  /  ALT  35  /  AlkPhos  43  11-13      proBNP: Serum Pro-Brain Natriuretic Peptide: 218 pg/mL (11-12 @ 20:50)    TSH: Thyroid Stimulating Hormone, Serum in AM (11.13.21 @ 10:27)    Thyroid Stimulating Hormone, Serum: 3.33 uIU/mL      CARDIAC MARKERS: Troponin T, High Sensitivity (11.14.21 @ 00:58)    Troponin T, High Sensitivity Result: 79: Specimen not hemolyzed      TELEMETRY: SR 70-80s, intermittent HB with appropriate VPacing.      PREVIOUS DIAGNOSTIC TESTING:    [ ] Echocardiogram: < from: Transthoracic Echocardiogram (11.13.21 @ 05:23) >  Observations:  Mitral Valve: Mitral annular calcification and calcified  mitral leaflets.  Aortic Valve/Aorta: Transcatheter aortic valve replacement.   Aortic valve not well visualized. Peak transaortic valve  gradient equals 21 mm Hg, mean transaortic valve gradient  equals 12 mm Hg, which is probably normal in the presence  of a transcatheter aortic valve replacement. Peak left  ventricular outflow tract gradient equals 5 mm Hg, mean  gradient is equal to 2 mm Hg, LVOT velocity time integral  equals 18 cm.  Aortic Root: 2.7 cm.  Left Atrium: Normal left atrium.  LA volume index = 24  cc/m2.  Left Ventricle: Normal left ventricular systolic function.  No segmental wall motion abnormalities. Normal left  ventricular internal dimensions and wall thicknesses.  Right Heart: Normal right atrium. The right ventricle is  not well visualized; grossly normal right ventricular  systolic function. Tricuspid valve not well visualized,  probably normal. Pulmonic valve not well visualized.  Pericardium/Pleura: Normal pericardium with no pericardial  effusion.  Hemodynamic: Estimated right atrial pressure is 8 mm Hg.  ------------------------------------------------------------------------  Conclusions:  1. Transcatheter aortic valve replacement.  Aortic valve  not well visualized. Peaktransaortic valve gradient equals  21 mm Hg, mean transaortic valve gradient equals 12 mm Hg,  which is probably normal in the presence of a transcatheter  aortic valve replacement.  2. Normal left ventricular internal dimensions and wall  thicknesses.  3. Normal left ventricular systolic function. No segmental  wall motion abnormalities.  4. The right ventricle is not well visualized; grossly  normal right ventricular systolic function.  *** Compared with echocardiogram of 2/25/2021, no  significant changes noted.    < end of copied text >      [ ]  Catheterization: < from: Cardiac Cath Lab - Adult (12.08.20 @ 12:04) >  VALVES: AORTIC VALVE: The aortic valve was evaluated by hemodynamic  measurement and fluoroscopy. The aortic valve leaflets exhibited mild  calcification.  CORONARY VESSELS: The coronary circulation is right dominant.  LM:   --  LM: Angiography showed minor luminal irregularities with no flow  limiting lesions.  LAD:   --  LAD: Angiography showed mild atherosclerosis with no flow  limiting lesions.  --  D1: Angiography showed moderate atherosclerosis.  CX:   --  Proximal circumflex: Normal.  --  OM1: Angiography showed minor luminal irregularities with no flow  limiting lesions.  RCA:   --  Proximal RCA: There was a 60 % stenosis.  --  Distal RCA: There was a 30 % stenosis.  COMPLICATIONS: There were no complications.  DIAGNOSTIC IMPRESSIONS: Mod-severe RCA disease. Mild LCA disease. By cath  gradients, DEANDRE approximately 1.1  DIAGNOSTIC RECOMMENDATIONS: F/u with structural heart team    < end of copied text >

## 2021-11-23 ENCOUNTER — RX RENEWAL (OUTPATIENT)
Age: 71
End: 2021-11-23

## 2021-11-26 ENCOUNTER — APPOINTMENT (OUTPATIENT)
Dept: ELECTROPHYSIOLOGY | Facility: CLINIC | Age: 71
End: 2021-11-26
Payer: MEDICARE

## 2021-11-26 ENCOUNTER — NON-APPOINTMENT (OUTPATIENT)
Age: 71
End: 2021-11-26

## 2021-11-26 VITALS
OXYGEN SATURATION: 96 % | HEIGHT: 70 IN | DIASTOLIC BLOOD PRESSURE: 80 MMHG | HEART RATE: 95 BPM | SYSTOLIC BLOOD PRESSURE: 140 MMHG

## 2021-11-26 DIAGNOSIS — I44.30 UNSPECIFIED ATRIOVENTRICULAR BLOCK: ICD-10-CM

## 2021-11-26 PROCEDURE — 93000 ELECTROCARDIOGRAM COMPLETE: CPT | Mod: 59

## 2021-11-26 PROCEDURE — 93279 PRGRMG DEV EVAL PM/LDLS PM: CPT

## 2021-11-26 PROCEDURE — 99024 POSTOP FOLLOW-UP VISIT: CPT

## 2021-11-26 RX ORDER — APIXABAN 5 MG/1
5 TABLET, FILM COATED ORAL
Qty: 60 | Refills: 3 | Status: ACTIVE | COMMUNITY
Start: 2021-11-26

## 2021-11-26 RX ORDER — AMIODARONE HYDROCHLORIDE 100 MG/1
100 TABLET ORAL DAILY
Refills: 0 | Status: ACTIVE | COMMUNITY
Start: 2021-11-26

## 2022-02-11 ENCOUNTER — RX RENEWAL (OUTPATIENT)
Age: 72
End: 2022-02-11

## 2022-02-28 ENCOUNTER — NON-APPOINTMENT (OUTPATIENT)
Age: 72
End: 2022-02-28

## 2022-02-28 ENCOUNTER — APPOINTMENT (OUTPATIENT)
Dept: ELECTROPHYSIOLOGY | Facility: CLINIC | Age: 72
End: 2022-02-28

## 2022-02-28 ENCOUNTER — APPOINTMENT (OUTPATIENT)
Dept: ELECTROPHYSIOLOGY | Facility: CLINIC | Age: 72
End: 2022-02-28
Payer: MEDICARE

## 2022-02-28 VITALS
BODY MASS INDEX: 28.63 KG/M2 | HEART RATE: 77 BPM | HEIGHT: 70 IN | WEIGHT: 200 LBS | SYSTOLIC BLOOD PRESSURE: 150 MMHG | DIASTOLIC BLOOD PRESSURE: 70 MMHG | OXYGEN SATURATION: 100 %

## 2022-02-28 PROCEDURE — 93279 PRGRMG DEV EVAL PM/LDLS PM: CPT

## 2022-02-28 PROCEDURE — 93000 ELECTROCARDIOGRAM COMPLETE: CPT | Mod: 59

## 2022-03-05 ENCOUNTER — INPATIENT (INPATIENT)
Facility: HOSPITAL | Age: 72
LOS: 1 days | Discharge: HOME | DRG: 247 | End: 2022-03-07
Attending: STUDENT IN AN ORGANIZED HEALTH CARE EDUCATION/TRAINING PROGRAM | Admitting: STUDENT IN AN ORGANIZED HEALTH CARE EDUCATION/TRAINING PROGRAM
Payer: MEDICARE

## 2022-03-05 VITALS
WEIGHT: 199.96 LBS | TEMPERATURE: 98 F | HEIGHT: 70 IN | SYSTOLIC BLOOD PRESSURE: 204 MMHG | OXYGEN SATURATION: 98 % | RESPIRATION RATE: 16 BRPM | DIASTOLIC BLOOD PRESSURE: 94 MMHG | HEART RATE: 80 BPM

## 2022-03-05 DIAGNOSIS — K81.0 ACUTE CHOLECYSTITIS: Chronic | ICD-10-CM

## 2022-03-05 DIAGNOSIS — Z85.46 PERSONAL HISTORY OF MALIGNANT NEOPLASM OF PROSTATE: ICD-10-CM

## 2022-03-05 DIAGNOSIS — E11.9 TYPE 2 DIABETES MELLITUS WITHOUT COMPLICATIONS: ICD-10-CM

## 2022-03-05 DIAGNOSIS — K81.0 ACUTE CHOLECYSTITIS: ICD-10-CM

## 2022-03-05 DIAGNOSIS — I10 ESSENTIAL (PRIMARY) HYPERTENSION: ICD-10-CM

## 2022-03-05 DIAGNOSIS — Z02.9 ENCOUNTER FOR ADMINISTRATIVE EXAMINATIONS, UNSPECIFIED: ICD-10-CM

## 2022-03-05 DIAGNOSIS — I21.4 NON-ST ELEVATION (NSTEMI) MYOCARDIAL INFARCTION: ICD-10-CM

## 2022-03-05 DIAGNOSIS — Z98.890 OTHER SPECIFIED POSTPROCEDURAL STATES: Chronic | ICD-10-CM

## 2022-03-05 DIAGNOSIS — I48.0 PAROXYSMAL ATRIAL FIBRILLATION: ICD-10-CM

## 2022-03-05 DIAGNOSIS — Z90.79 ACQUIRED ABSENCE OF OTHER GENITAL ORGAN(S): Chronic | ICD-10-CM

## 2022-03-05 DIAGNOSIS — Z85.038 PERSONAL HISTORY OF OTHER MALIGNANT NEOPLASM OF LARGE INTESTINE: ICD-10-CM

## 2022-03-05 LAB
ALBUMIN SERPL ELPH-MCNC: 4.8 G/DL — SIGNIFICANT CHANGE UP (ref 3.3–5)
ALP SERPL-CCNC: 77 U/L — SIGNIFICANT CHANGE UP (ref 40–120)
ALT FLD-CCNC: 92 U/L — HIGH (ref 10–45)
ANION GAP SERPL CALC-SCNC: 16 MMOL/L — SIGNIFICANT CHANGE UP (ref 5–17)
APPEARANCE UR: CLEAR — SIGNIFICANT CHANGE UP
AST SERPL-CCNC: 97 U/L — HIGH (ref 10–40)
BACTERIA # UR AUTO: 0 — SIGNIFICANT CHANGE UP
BASOPHILS # BLD AUTO: 0.05 K/UL — SIGNIFICANT CHANGE UP (ref 0–0.2)
BASOPHILS NFR BLD AUTO: 0.5 % — SIGNIFICANT CHANGE UP (ref 0–2)
BILIRUB SERPL-MCNC: 1.6 MG/DL — HIGH (ref 0.2–1.2)
BILIRUB UR-MCNC: NEGATIVE — SIGNIFICANT CHANGE UP
BUN SERPL-MCNC: 12 MG/DL — SIGNIFICANT CHANGE UP (ref 7–23)
CALCIUM SERPL-MCNC: 10.1 MG/DL — SIGNIFICANT CHANGE UP (ref 8.4–10.5)
CHLORIDE SERPL-SCNC: 98 MMOL/L — SIGNIFICANT CHANGE UP (ref 96–108)
CO2 SERPL-SCNC: 23 MMOL/L — SIGNIFICANT CHANGE UP (ref 22–31)
COLOR SPEC: SIGNIFICANT CHANGE UP
CREAT SERPL-MCNC: 0.65 MG/DL — SIGNIFICANT CHANGE UP (ref 0.5–1.3)
DIFF PNL FLD: NEGATIVE — SIGNIFICANT CHANGE UP
EGFR: 101 ML/MIN/1.73M2 — SIGNIFICANT CHANGE UP
EOSINOPHIL # BLD AUTO: 0.16 K/UL — SIGNIFICANT CHANGE UP (ref 0–0.5)
EOSINOPHIL NFR BLD AUTO: 1.6 % — SIGNIFICANT CHANGE UP (ref 0–6)
GLUCOSE SERPL-MCNC: 254 MG/DL — HIGH (ref 70–99)
GLUCOSE UR QL: ABNORMAL
HCT VFR BLD CALC: 43.7 % — SIGNIFICANT CHANGE UP (ref 39–50)
HGB BLD-MCNC: 14.9 G/DL — SIGNIFICANT CHANGE UP (ref 13–17)
IMM GRANULOCYTES NFR BLD AUTO: 0.8 % — SIGNIFICANT CHANGE UP (ref 0–1.5)
KETONES UR-MCNC: NEGATIVE — SIGNIFICANT CHANGE UP
LEUKOCYTE ESTERASE UR-ACNC: NEGATIVE — SIGNIFICANT CHANGE UP
LIDOCAIN IGE QN: 77 U/L — HIGH (ref 7–60)
LYMPHOCYTES # BLD AUTO: 0.96 K/UL — LOW (ref 1–3.3)
LYMPHOCYTES # BLD AUTO: 9.8 % — LOW (ref 13–44)
MCHC RBC-ENTMCNC: 30.3 PG — SIGNIFICANT CHANGE UP (ref 27–34)
MCHC RBC-ENTMCNC: 34.1 GM/DL — SIGNIFICANT CHANGE UP (ref 32–36)
MCV RBC AUTO: 88.8 FL — SIGNIFICANT CHANGE UP (ref 80–100)
MONOCYTES # BLD AUTO: 0.79 K/UL — SIGNIFICANT CHANGE UP (ref 0–0.9)
MONOCYTES NFR BLD AUTO: 8.1 % — SIGNIFICANT CHANGE UP (ref 2–14)
NEUTROPHILS # BLD AUTO: 7.76 K/UL — HIGH (ref 1.8–7.4)
NEUTROPHILS NFR BLD AUTO: 79.2 % — HIGH (ref 43–77)
NITRITE UR-MCNC: NEGATIVE — SIGNIFICANT CHANGE UP
NRBC # BLD: 0 /100 WBCS — SIGNIFICANT CHANGE UP (ref 0–0)
PH UR: 6.5 — SIGNIFICANT CHANGE UP (ref 5–8)
PLATELET # BLD AUTO: 213 K/UL — SIGNIFICANT CHANGE UP (ref 150–400)
POTASSIUM SERPL-MCNC: 4.2 MMOL/L — SIGNIFICANT CHANGE UP (ref 3.5–5.3)
POTASSIUM SERPL-SCNC: 4.2 MMOL/L — SIGNIFICANT CHANGE UP (ref 3.5–5.3)
PROT SERPL-MCNC: 7.9 G/DL — SIGNIFICANT CHANGE UP (ref 6–8.3)
PROT UR-MCNC: NEGATIVE — SIGNIFICANT CHANGE UP
RBC # BLD: 4.92 M/UL — SIGNIFICANT CHANGE UP (ref 4.2–5.8)
RBC # FLD: 13.2 % — SIGNIFICANT CHANGE UP (ref 10.3–14.5)
RBC CASTS # UR COMP ASSIST: 0 /HPF — SIGNIFICANT CHANGE UP (ref 0–4)
SODIUM SERPL-SCNC: 137 MMOL/L — SIGNIFICANT CHANGE UP (ref 135–145)
SP GR SPEC: 1.01 — LOW (ref 1.01–1.02)
TROPONIN T, HIGH SENSITIVITY RESULT: 82 NG/L — HIGH (ref 0–51)
TROPONIN T, HIGH SENSITIVITY RESULT: 82 NG/L — HIGH (ref 0–51)
UROBILINOGEN FLD QL: NEGATIVE — SIGNIFICANT CHANGE UP
WBC # BLD: 9.8 K/UL — SIGNIFICANT CHANGE UP (ref 3.8–10.5)
WBC # FLD AUTO: 9.8 K/UL — SIGNIFICANT CHANGE UP (ref 3.8–10.5)
WBC UR QL: 0 /HPF — SIGNIFICANT CHANGE UP (ref 0–5)

## 2022-03-05 PROCEDURE — 99291 CRITICAL CARE FIRST HOUR: CPT

## 2022-03-05 PROCEDURE — 74177 CT ABD & PELVIS W/CONTRAST: CPT | Mod: 26,MA

## 2022-03-05 PROCEDURE — 99223 1ST HOSP IP/OBS HIGH 75: CPT

## 2022-03-05 PROCEDURE — 71046 X-RAY EXAM CHEST 2 VIEWS: CPT | Mod: 26

## 2022-03-05 RX ORDER — INSULIN LISPRO 100/ML
VIAL (ML) SUBCUTANEOUS
Refills: 0 | Status: DISCONTINUED | OUTPATIENT
Start: 2022-03-05 | End: 2022-03-06

## 2022-03-05 RX ORDER — DEXTROSE 50 % IN WATER 50 %
25 SYRINGE (ML) INTRAVENOUS ONCE
Refills: 0 | Status: DISCONTINUED | OUTPATIENT
Start: 2022-03-05 | End: 2022-03-07

## 2022-03-05 RX ORDER — METRONIDAZOLE 500 MG
500 TABLET ORAL EVERY 8 HOURS
Refills: 0 | Status: DISCONTINUED | OUTPATIENT
Start: 2022-03-06 | End: 2022-03-07

## 2022-03-05 RX ORDER — INSULIN LISPRO 100/ML
VIAL (ML) SUBCUTANEOUS AT BEDTIME
Refills: 0 | Status: DISCONTINUED | OUTPATIENT
Start: 2022-03-05 | End: 2022-03-06

## 2022-03-05 RX ORDER — TICAGRELOR 90 MG/1
90 TABLET ORAL EVERY 12 HOURS
Refills: 0 | Status: DISCONTINUED | OUTPATIENT
Start: 2022-03-06 | End: 2022-03-07

## 2022-03-05 RX ORDER — AMLODIPINE BESYLATE 2.5 MG/1
10 TABLET ORAL DAILY
Refills: 0 | Status: DISCONTINUED | OUTPATIENT
Start: 2022-03-06 | End: 2022-03-07

## 2022-03-05 RX ORDER — DEXTROSE 50 % IN WATER 50 %
15 SYRINGE (ML) INTRAVENOUS ONCE
Refills: 0 | Status: DISCONTINUED | OUTPATIENT
Start: 2022-03-05 | End: 2022-03-07

## 2022-03-05 RX ORDER — GLUCAGON INJECTION, SOLUTION 0.5 MG/.1ML
1 INJECTION, SOLUTION SUBCUTANEOUS ONCE
Refills: 0 | Status: DISCONTINUED | OUTPATIENT
Start: 2022-03-05 | End: 2022-03-07

## 2022-03-05 RX ORDER — METOPROLOL TARTRATE 50 MG
50 TABLET ORAL DAILY
Refills: 0 | Status: DISCONTINUED | OUTPATIENT
Start: 2022-03-06 | End: 2022-03-07

## 2022-03-05 RX ORDER — SODIUM CHLORIDE 9 MG/ML
1000 INJECTION, SOLUTION INTRAVENOUS
Refills: 0 | Status: DISCONTINUED | OUTPATIENT
Start: 2022-03-05 | End: 2022-03-07

## 2022-03-05 RX ORDER — DEXTROSE 50 % IN WATER 50 %
12.5 SYRINGE (ML) INTRAVENOUS ONCE
Refills: 0 | Status: DISCONTINUED | OUTPATIENT
Start: 2022-03-05 | End: 2022-03-07

## 2022-03-05 RX ORDER — ASPIRIN/CALCIUM CARB/MAGNESIUM 324 MG
81 TABLET ORAL DAILY
Refills: 0 | Status: DISCONTINUED | OUTPATIENT
Start: 2022-03-06 | End: 2022-03-06

## 2022-03-05 RX ORDER — LISINOPRIL 2.5 MG/1
40 TABLET ORAL DAILY
Refills: 0 | Status: DISCONTINUED | OUTPATIENT
Start: 2022-03-06 | End: 2022-03-07

## 2022-03-05 RX ORDER — ASPIRIN/CALCIUM CARB/MAGNESIUM 324 MG
324 TABLET ORAL ONCE
Refills: 0 | Status: COMPLETED | OUTPATIENT
Start: 2022-03-05 | End: 2022-03-05

## 2022-03-05 RX ORDER — PIPERACILLIN AND TAZOBACTAM 4; .5 G/20ML; G/20ML
3.38 INJECTION, POWDER, LYOPHILIZED, FOR SOLUTION INTRAVENOUS EVERY 8 HOURS
Refills: 0 | Status: DISCONTINUED | OUTPATIENT
Start: 2022-03-05 | End: 2022-03-07

## 2022-03-05 RX ORDER — HEPARIN SODIUM 5000 [USP'U]/ML
INJECTION INTRAVENOUS; SUBCUTANEOUS
Qty: 25000 | Refills: 0 | Status: DISCONTINUED | OUTPATIENT
Start: 2022-03-05 | End: 2022-03-06

## 2022-03-05 RX ORDER — HEPARIN SODIUM 5000 [USP'U]/ML
5300 INJECTION INTRAVENOUS; SUBCUTANEOUS EVERY 6 HOURS
Refills: 0 | Status: DISCONTINUED | OUTPATIENT
Start: 2022-03-05 | End: 2022-03-06

## 2022-03-05 RX ORDER — TICAGRELOR 90 MG/1
180 TABLET ORAL ONCE
Refills: 0 | Status: COMPLETED | OUTPATIENT
Start: 2022-03-05 | End: 2022-03-05

## 2022-03-05 RX ORDER — PIPERACILLIN AND TAZOBACTAM 4; .5 G/20ML; G/20ML
3.38 INJECTION, POWDER, LYOPHILIZED, FOR SOLUTION INTRAVENOUS ONCE
Refills: 0 | Status: COMPLETED | OUTPATIENT
Start: 2022-03-05 | End: 2022-03-05

## 2022-03-05 RX ORDER — METRONIDAZOLE 500 MG
TABLET ORAL
Refills: 0 | Status: DISCONTINUED | OUTPATIENT
Start: 2022-03-06 | End: 2022-03-07

## 2022-03-05 RX ORDER — HYDROCHLOROTHIAZIDE 25 MG
25 TABLET ORAL DAILY
Refills: 0 | Status: DISCONTINUED | OUTPATIENT
Start: 2022-03-06 | End: 2022-03-07

## 2022-03-05 RX ORDER — AMIODARONE HYDROCHLORIDE 400 MG/1
100 TABLET ORAL DAILY
Refills: 0 | Status: DISCONTINUED | OUTPATIENT
Start: 2022-03-06 | End: 2022-03-07

## 2022-03-05 RX ORDER — METRONIDAZOLE 500 MG
500 TABLET ORAL ONCE
Refills: 0 | Status: COMPLETED | OUTPATIENT
Start: 2022-03-05 | End: 2022-03-06

## 2022-03-05 RX ADMIN — PIPERACILLIN AND TAZOBACTAM 200 GRAM(S): 4; .5 INJECTION, POWDER, LYOPHILIZED, FOR SOLUTION INTRAVENOUS at 22:42

## 2022-03-05 RX ADMIN — TICAGRELOR 180 MILLIGRAM(S): 90 TABLET ORAL at 18:46

## 2022-03-05 RX ADMIN — Medication 324 MILLIGRAM(S): at 18:46

## 2022-03-05 RX ADMIN — HEPARIN SODIUM 1000 UNIT(S)/HR: 5000 INJECTION INTRAVENOUS; SUBCUTANEOUS at 22:31

## 2022-03-05 NOTE — ED PROVIDER NOTE - PROGRESS NOTE DETAILS
ENID Powell, Attending: trop elevated at 80s. Normal renal function. CT neg. Suspect NSTEMI given epigastric pain. Cath in 2020 did reveal RCA disease, possibly worsening. Given asa and Brilinta. Given on DOAC and sx free, no need for heparin. Pt and wife alerted. Cards aware. Ok for admission on tele. Rpt trop ordered. ENID Powell, Attending: trop elevated at 80s. Normal renal function. CT neg. Suspect NSTEMI. Cath in 2020 did reveal RCA disease, possibly worsening given epigastric pain. Given asa and Brilinta. Given on DOAC and sx free, no need for heparin. Pt and wife alerted. Cards aware. Ok for admission on tele. Rpt trop ordered. Aguila - pt signed out to me. NSTEMI. cards consulted, Dr. Sanchez requests house cards, house cards aware. [facility] called to admit ENDI Powell, Attending: admitted to [facility], Dr. MAGDALENA Gutiérrez. ENID Powell, Attending: CHET fletcher ordered given CT findings. Surgery called and will eval. ENID Powell, Attending: justine maynard, aware of order. Pt remains stable.

## 2022-03-05 NOTE — ED PROVIDER NOTE - PHYSICAL EXAMINATION
Gen: NAD, AAOx3, non-toxic appearing  HEENT: NCAT, normal conjunctiva, oral mucosa moist  Lung: speaking in full sentences, good aeration bilaterally, lungs CTA b/l  CV: regular rate and rhythm. cap refill <2x. peripheral pulses 2+bilaterally   Abd: soft, ND, NT  MSK: no visible deformities  Neuro: No focal deficits  Skin: Intact  Psych: normal affect

## 2022-03-05 NOTE — H&P ADULT - PROBLEM SELECTOR PLAN 1
See above.  ACS heparin gtt with next DOAC dose (no bolus).  ASA 81 tomorrow, Brilinta maintenance.  Follow troponin.  Echo.  Check Mg++ See above.  ACS heparin gtt with next DOAC dose (no bolus).  ASA 81 tomorrow, Brilinta maintenance.  Follow troponin.  Echo.  Check Mg++    Patient unable to tolerate statins.

## 2022-03-05 NOTE — H&P ADULT - NSHPLABSRESULTS_GEN_ALL_CORE
WBC 8.5  79N    Hgb 14.9    Platelets of 213K.    HS troponin 82>>82 WBC 8.5  79N    Hgb 14.9    Platelets of 213K.    HS troponin 82>>82>>repeated.    K+ 4.2    Random glucose of 254  HCO3 23  AG 16  Cr 0.6    COVID-19 PCR>>sent.    Lipase 77    Chest radiograph reviewed with no infiltrate or effusion.    UA gluc+++    EKG tracing reviewed with a paced at 90   QTc 521 (likely from amiodarone)

## 2022-03-05 NOTE — H&P ADULT - NSHPSOURCEINFOTX_GEN_ALL_CORE
Reviewed Medex with patient and history with spouse by phone with patient's request.  [facility]  closed at this hour.

## 2022-03-05 NOTE — H&P ADULT - NSICDXPASTSURGICALHX_GEN_ALL_CORE_FT
PAST SURGICAL HISTORY:  Acute cholecystitis 3/5/2022    S/P excision of lipoma     S/P prostatectomy 2013

## 2022-03-05 NOTE — H&P ADULT - PROBLEM SELECTOR PLAN 8
Transitions of Care Status:  1.  Name of PCP:     Beni Gonzalez MD (PCP-Cardiology)  [phone]  2.  PCP Contacted on Admission: [x ] Y    [ ] N  by the ER attending.  3.  PCP contacted at Discharge: [ ] Y    [ ] N    [ ] N/A  4.  Post-Discharge Appointment Date and Location:  5.  Summary of Handoff given to PCP:

## 2022-03-05 NOTE — H&P ADULT - PROBLEM SELECTOR PLAN 2
See above.  IV Zosyn and IV Flagyl.  Examiner contacted general surgical evaluation.   NPO x medications for now.

## 2022-03-05 NOTE — ED PROVIDER NOTE - CLINICAL SUMMARY MEDICAL DECISION MAKING FREE TEXT BOX
72 yo M with hx of Prostate CA s/p prostatectomy in 2013, OA, Fatty liver, HTN, T2DM, HLD, AS s/p TAVR in 1/2021 c/b a-fib for which he remains on Eliquis and amiodarone, colon CA (last chemo Nov 2021) presenting with epigastric discomfort starting today. Resolved prior to arrival to ER still feels abdominal fullness. Well appearing, nontender abdomen, clear lungs. Will obtain CTAP given significant history and send trop given cardiac history, although EKG appears nonischemic. Plan for labs, CT and reassess. 70 yo M with hx of Prostate CA s/p prostatectomy in 2013, OA, Fatty liver, HTN, T2DM, HLD, AS s/p TAVR in 1/2021 c/b a-fib for which he remains on Eliquis and amiodarone, colon CA (last chemo Nov 2021) presenting with epigastric discomfort starting today. Resolved prior to arrival to ER still feels abdominal fullness. Well appearing, nontender abdomen, clear lungs. Will obtain CTAP given significant history and send trop given cardiac history, although EKG appears nonischemic. Plan for labs, CT and reassess.    ENID Powell, Attending: seen with resident and reviewed note.    71 yoM, complicated abd hx with colon ca, prior chemo, known RCC, HTN, HLD, afib on DOAC, s/p TAVR p/w brief episode of epigastric pain. No other sx, now resolved. Here with wife, only here because concerned given complicated GI hx. Currently sx free.    Slight abd distension. + surgical scars. Non tender. Looks well. Vitals reassuring.    Given hx eval for acute abd pathology however fortunately looks well. Screen for ACS given higher abd pain. No signs of shock or sepsis. Abd exam not rigid or acute.

## 2022-03-05 NOTE — H&P ADULT - HISTORY OF PRESENT ILLNESS
NIGHT HOSPITALIST:   Patient UNKNOWN to me previously, assigned to me at this point via the ER and by Dr. Gutiérrez of the [facility] [facility] to admit this 70 y/o M--followed by his PCP above--patient with a history of prostate CA s/P prostatectomy in 2013 presumed to be in remission, fatty liver, essential HTN maintained on Norvasc, HCTZ, lisinopril, Toprol XL, type 2 DM on oral Rx, hyperlipidemia but unable to tolerate statins due to myopathy, S/P TAVR for aortic stenosis in Jan 2021 complicated with PAF with patient maintained on Eliquis (last dose 10 AM today) and amiodarone 100 mg daily, colon CA with past surgery and past chemotherapy, S/P PPM placement with [facility] in Nov 2021, Pfizer COVID-19 vaccines x 3, with patient self referring following steady epigastric midabdominal pain since 10AM today with no clear palliative or exacerbating factors.  Patient with poor exercise tolerance but presently denies chest pain/pressure.  NO N/V.  No fever, no chills, no rigors.   No diaphoresis.

## 2022-03-05 NOTE — H&P ADULT - PROBLEM SELECTOR PLAN 7
See above.  ON amiodarone.  Follow QTc.  On heparin gtt in lieu of DOAC for now given non ST elevation MI.

## 2022-03-05 NOTE — ED ADULT NURSE NOTE - OBJECTIVE STATEMENT
71yr old male w/ pmhx of HLD, Myositis, DMT2, colon CA, prostate CA, Renal CA, Aortic valve stenosis, and HTN Presents to ED c/o Abdominal pain. Pt states he has been experiencing abdominal pain in the 71yr old male w/ pmhx of HLD, Myositis, DMT2, colon CA (surgery 2021), prostate CA, Renal CA, (last chemo was 11/2022) Aortic valve stenosis, and HTN Presents to ED c/o Abdominal pain. Pt states he has been experiencing non-radiating abdominal pain in the mid abdominal region. Pt states the pain started this AM, and has subsided since arriving to ED. Pt states he did have a BM this AM after the abdominal pain, and states the stool was normal. Pt denies any NVD, GI,  symptoms, CP, SOB, syncope, Given pts Hx with colon CA and surgery last yr he came to ED. pt assessed by MD, bed lowered and locked, call bell within reach. will reassess

## 2022-03-05 NOTE — ED PROVIDER NOTE - OBJECTIVE STATEMENT
72 yo M with hx of Prostate CA s/p prostatectomy in 2013, OA, Fatty liver, HTN, T2DM, HLD, AS s/p TAVR in 1/2021 c/b a-fib for which he remains on Eliquis and amiodarone, colon CA (last chemo Nov 2021) presenting with epigastric discomfort starting today. Patient reports sudden onset epigastric discomfort this morning lasting about 30 min, resolved without intervention. Nonradiating pain. Denies alleviating/exacerbating features. Denies shortness of breath, chest pain. Denies fevers/chills, n/v/d/c, urinary symptoms.

## 2022-03-05 NOTE — CONSULT NOTE ADULT - ATTENDING COMMENTS
Agree with plan as outlined above. Modifications made where necessary.   NSTEMI s/p LHC with PCI RCA. Medical management and lifestyle modification.     Susanne Skinner MD, MPH, CHRISTINE, RPVI, [facility]  Inpatient Cardiovascular Specialist; [facility], [facility] ([facility]  ; [employer] [facility]  message: [employer] [phone] or [employer] Teams (text preferred and/or call)  email: [email or web address] Cardiovascular Surgery on-service contact/call information, go to [email or web address] and use "[email or web address]" to login.  Outpatient Cardiology appointments, call  [phone] to arrange with a colleague; I do not have outpatient Cardiology clinic.

## 2022-03-05 NOTE — H&P ADULT - NSHPREVIEWOFSYSTEMS_GEN_ALL_CORE
NO HA< no focal weakness.  NO red blood per rectum or melena.  NO back pain, no tearing back pain.  NO rash  NO joint pain.    NO dysuria, no hematuria.  NO weight loss.  NO SI/HI.  NO thyroid symptoms.  NO node swelling.

## 2022-03-05 NOTE — CONSULT NOTE ADULT - SUBJECTIVE AND OBJECTIVE BOX
Patient seen and evaluated at bedside  *Spoke to patient's cardiologist Dr. Beni Sanchez who deferred to house cardiology for management*    HPI:  70 yo M PMH   REVIEW OF SYSTEMS Negative unless otherwise mentioned    PMHx:   Asthma  Hypertension  Diabetes mellitus  Prostate cancer  Lipoma  Heart murmur  Aortic valve stenosis, etiology of cardiac valve disease unspecified  Malignant neoplasm of colon, unspecified part of colon  Fatty liver  Hyperlipidemia, unspecified hyperlipidemia type  T2DM (type 2 diabetes mellitus)  Myositis    PSHx:   S/P TURP  S/P prostatectomy  S/P excision of lipoma    Soc: No Toxic habits    FAMILY HISTORY:  Family history of aneurysm  Father  Family history of heart disease (Child)  Family history of hypertension in father (Father)  Family history of early CAD (Child)    Allergies:  statins (Other)  Zetia (Other)    Home Meds:    Current Medications:       Physical Exam:  T(F): 98 (03-05), Max: 98.5 (03-05)  HR: 55 (03-05) (55 - 80)  BP: 171/68 (03-05) (171/68 - 204/94)  RR: 19 (03-05)  SpO2: 98% (03-05)    Well appearing male, NAD  JVP normal  Regular rhythm no mrg  CTAB  Soft NTND  WWP, no pitting edema    Cardiovascular Diagnostic Testing:    Patient name: ROBINSON RIVERS  YOB: 1950   Age: 71 (M)   MR#: [id number]  Study Date: 2021  [facility]  Study quality: Technically difficult  Referring Physician: Arely Murray MD  Blood Pressure: 159/72 mmHg  Height: 178 cm  Weight: 86 kg  BSA: 2 m2  ------------------------------------------------------------------------  PROCEDURE: Transthoracic echocardiogram with 2-D, M-Mode  and complete spectral and color flow Doppler.  INDICATION: Abnormal electrocardiogram  (R94.31 )  ------------------------------------------------------------------------  Dimensions:    Normal Values:  LA:     4.8    2.0 - 4.0 cm  Ao:     2.7    2.0 - 3.8 cm  SEPTUM:        0.6 - 1.2 cm  PWT: 0.6 - 1.1 cm  LVIDd:         3.0 - 5.6 cm  LVIDs:         1.8 - 4.0 cm  EF (Barger Rule): 60 %Doppler Peak Velocity (m/sec):  AoV=2.3  ------------------------------------------------------------------------  Observations:  Mitral Valve: Mitral annular calcification and calcified  mitral leaflets.  Aortic Valve/Aorta: Transcatheter aortic valve replacement.   Aortic valve not well visualized. Peak transaortic valve  gradient equals 21 mm Hg, mean transaortic valve gradient  equals 12 mm Hg, which is probably normal in the presence  of a transcatheter aortic valve replacement. Peak left  ventricular outflow tract gradient equals 5 mm Hg, mean  gradient is equal to 2 mm Hg, LVOT velocity time integral  equals 18 cm.  Aortic Root: 2.7 cm.  Left Atrium: Normal left atrium.  LA volume index = 24  cc/m2.  Left Ventricle: Normal left ventricular systolic function.  No segmental wall motion abnormalities. Normal left  ventricular internal dimensions and wall thicknesses.  Right Heart: Normal right atrium. The right ventricle is  not well visualized; grossly normal right ventricular  systolic function. Tricuspid valve not well visualized,  probably normal. Pulmonic valve not well visualized.  Pericardium/Pleura: Normal pericardium with no pericardial  effusion.  Hemodynamic: Estimated right atrial pressure is 8 mm Hg.  ------------------------------------------------------------------------  Conclusions:  1. Transcatheter aortic valve replacement.  Aortic valve  not well visualized. Peaktransaortic valve gradient equals  21 mm Hg, mean transaortic valve gradient equals 12 mm Hg,  which is probably normal in the presence of a transcatheter  aortic valve replacement.  2. Normal left ventricular internal dimensions and wall  thicknesses.  3. Normal left ventricular systolic function. No segmental  wall motion abnormalities.  4. The right ventricle is not well visualized; grossly  normal right ventricular systolic function.  *** Compared with echocardiogram of 2021, no  significant changes noted.  ------------------------------------------------------------------------  Confirmed on  2021 - 13:38:55 by Martínez Denson M.D.  FASDINA  ------------------------------------------------------------------------      [facility]  Department of Cardiology  [address] [place] [address] [phone]  Cath Lab Report -- Comprehensive Report  Patient: ROBINSON RIVERS  Study date: 2020  Account number: [id number]  MR number: [id number]  : 1950  Gender: Male  Race: W  Case Physician(s):  Xavi Davis M.D.  Referring Physician:  Beni Sanchez M.D.  INDICATIONS: Aortic valve stenosis.  HISTORY: Aortic valve: history of severe stenosis. The patient has  hypertension. PRIOR CARDIOVASCULAR PROCEDURES: None.  PROCEDURE:  --  Right heart catheterization.  --  Left heart catheterization.  --  Left coronary angiography.  --  Right coronary angiography.  --  Sonosite - Diagnostic.  TECHNIQUE: The risks and alternativesof the procedures and conscious  sedation were explained to the patient and informed consent was obtained.  Cardiac catheterization performed electively.  Local anesthetic given. Right femoral artery access. A 5FR SHEATH PINNACLE  was inserted in thevessel. Right femoral vein access. A 7FR SHEATH  PINNACLE was inserted in the vessel. Right heart catheterization. The  procedure was performed utilizing a 7FR SWAN CATHY catheter under  fluoroscopic guidance. Left heart catheterization. A 5FR FR4.0 EXPO  catheter was utilized. After recording ascending aortic pressure, the  catheter was advanced across the aortic valve and left ventricular  pressure was recorded. Left coronary artery angiography. The vessel was  injected utilizing a 5FR FL4.0 EXPO catheter. Right coronary artery  angiography. The vessel was injected utilizing a 5FR FR4.0 EXPO catheter.  Sonosite - Diagnostic. RADIATION EXPOSURE: 2.3 min.  CONTRAST GIVEN: Omnipaque 35 ml.  MEDICATIONS GIVEN: Midazolam, 1 mg, IV. Fentanyl, 25 mcg, IV.  VENTRICLES: No left ventriculogram was performed.  VALVES: AORTIC VALVE: The aortic valve was evaluated by hemodynamic  measurement and fluoroscopy. The aortic valve leaflets exhibited mild  calcification.  CORONARY VESSELS: The coronary circulation is right dominant.  LM:   --  LM: Angiography showed minor luminal irregularities with no flow  limiting lesions.  LAD:   --  LAD: Angiography showed mild atherosclerosis with no flow  limiting lesions.  --  D1: Angiography showed moderate atherosclerosis.  CX:   --  Proximal circumflex: Normal.  --  OM1: Angiography showed minor luminal irregularities with no flow  limiting lesions.  RCA:   --  Proximal RCA: There was a 60 % stenosis.  --  Distal RCA: There was a 30 % stenosis.  COMPLICATIONS: There were no complications.  DIAGNOSTIC IMPRESSIONS: Mod-severe RCA disease. Mild LCA disease. By cath  gradients, DEANDRE approximately 1.1  DIAGNOSTIC RECOMMENDATIONS: F/u with structural heart team  Prepared and signed by  Xavi Davis M.D.  Signed 2020 16:39:36  HEMODYNAMIC TABLES  Pressures:  Baseline  Pressures:  - HR: 84  Pressures:  - Rhythm:  Pressures:  -- Aortic Pressure (S/D/M): 152/79/94  Pressures:  -- Aortic Valve Splice - AO: 160/78/--  Pressures:  -- Aortic Valve Splice- LV: 188/9/--  Pressures:  -- Left Ventricle (s/edp): 191/18/--  Pressures:  -- Pulmonary Artery (S/D/M): 45/22/32  Pressures:  -- Pulmonary Capillary Wedge: 29/31/25  Pressures:  -- Right Atrium (a/v/M): 10/9/5  Pressures:  -- Right Ventricle (s/edp): 50/12/--  O2 Sats:  Baseline  O2 Sats:  - HR: 84  O2 Sats:  - Rhythm:  O2 Sats:  -- AO: 11.3/96.1/14.77  O2 Sats:  -- PA: 11.3/72.5/11.14  Valves:  Baseline  Valves:  -- AORTIC: Aortic systolic ejection period: 26.80  Valves:  -- AORTIC: Aortic valve area: 1.19  Valves:  -- AORTIC: Aortic valve flow: 284.94  Valves:  -- AORTIC: Aortic valve index: 0.58  Valves:  -- AORTIC: Aortic Valve Splice - Gradient: 29.25  Outputs:  Baseline  Outputs:  -- CALCULATIONS[age]  Outputs:  -- CALCULATIONS: Body Surface Area: 2.07  Outputs:  -- CALCULATIONS: Height in cm: 178.00  Outputs:  -- CALCULATIONS: Sex: Male  Outputs:  -- CALCULATIONS: Weight in k.50  Outputs:  -- OUTPUTS: CO by Yariel: 7.64  Outputs:  -- OUTPUTS: Yariel cardiac index: 3.69  Outputs:  -- OUTPUTS: O2 consumption: 277.00  Outputs:  -- OUTPUTS: Vo2 Indexed: 134.00  Outputs:  -- RESISTANCES: Left ventricular stroke work: 73.13  Outputs:  -- RESISTANCES: Left Ventricular Stroke Work index: 35.38  Outputs:  -- RESISTANCES: Pulmonary vascular index (dsc): 151.53  Outputs:  -- RESISTANCES: Pulmonary vascular index (Wood Units): 1.89  Outputs:  -- RESISTANCES: Pulmonary vascular resistance (dsc): 73.30  Outputs:  -- RESISTANCES: Pulmonary vascular resistance (Wood Units): 0.92  Outputs:  -- RESISTANCES: PVR_SVR Ratio: 0.08  Outputs:  -- RESISTANCES: Right ventricular stroke work: 28.91  Outputs:  -- RESISTANCES: Right ventricular stroke work index: 13.99  Outputs:  -- RESISTANCES: Systemic vascular index (dsc): 1926.61  Outputs:  -- RESISTANCES: Systemic vascular index (Wood Units): 24.09  Outputs:  -- RESISTANCES: Systemic vascular resistance (dsc): 932.01  Outputs:  -- RESISTANCES: Systemic vascular resistance (Wood Units): 11.65  Outputs:  -- RESISTANCES: Total pulmonary index (dsc): 692.71  Outputs:  -- RESISTANCES: Total pulmonary index (Wood Units): 8.66  Outputs:  -- RESISTANCES: Total pulmonary resistance (dsc): 335.11  Outputs:  -- RESISTANCES: Total pulmonary resistance (Wood Units): 4.19  Outputs:  -- RESISTANCES: Total vascular index (Wood Units): 25.44  Outputs:  -- RESISTANCES: Total vascular resistance (dsc): 984.37  Outputs:  -- RESISTANCES: Total vascular resistance (Wood Units): 12.31  Outputs:  -- RESISTANCES: Total vascular resistance index (dsc): 2034.84  Outputs:  -- RESISTANCES: TPR_TVR Ratio: 0.34  Outputs:  -- SHUNTS: Pulmonary flow: 7.64  Outputs:  -- SHUNTS: Qp Indexed: 3.69  Outputs:  -- SHUNTS: Qs Indexed: 3.69  Outputs:  -- SHUNTS: Systemic flow: 7.64      Labs: reviewed  Troponin 81    Assessment & Plan:    Recommendations not final unless countersigned by attending    Mekhi Stuart PGY4  Cardiology Fellow Patient seen and evaluated at bedside  *Spoke to patient's cardiologist Dr. Beni Sanchez who deferred to house cardiology for management*    HPI:  70 yo M PMH DM, HTN, AS s/p TAVR , HDAVB s/p PPM 2021, Rectal Carcinoma s/p 5-FU in remission, Statin induced myositis presenting with NSTEMI.    Patient sedentary at baseline. Was sitting in chair and had onset of upper abdominal pain/xiphoid area pain. Never felt before. Lasted 6-7 hours and resolved on its own. Concerned him so he presented to the ED. No palpitations, no chest pressure, no leg swelling, no SOB. Not related to food or bowel movements. Troponin in ED positive at 81, EKG paced rhythm. Cath  showing pRCA 60%. No other cardiac history.    REVIEW OF SYSTEMS Negative unless otherwise mentioned    PMHx:   Asthma  Hypertension  Diabetes mellitus  Prostate cancer  Lipoma  Heart murmur  Aortic valve stenosis, etiology of cardiac valve disease unspecified  Malignant neoplasm of colon, unspecified part of colon  Fatty liver  Hyperlipidemia, unspecified hyperlipidemia type  T2DM (type 2 diabetes mellitus)  Myositis    PSHx:   S/P TURP  S/P prostatectomy  S/P excision of lipoma    Soc: No Toxic habits    FAMILY HISTORY:  Family history of aneurysm  Father  Family history of heart disease (Child)  Family history of hypertension in father (Father)  Family history of early CAD (Child)    Allergies:  statins (Other)  Zetia (Other)    Home Meds:  Amlodipine 10mg   Metoprolol 50mg   lisinopril 40mg  HCTZ 25  Janumet    Current Medications:       Physical Exam:  T(F): 98 (03-05), Max: 98.5 (03-05)  HR: 55 (03-05) (55 - 80)  BP: 171/68 (03-05) (171/68 - 204/94)  RR: 19 (03-05)  SpO2: 98% (03-05)    Well appearing male, NAD  JVP normal  Regular rhythm no mrg  CTAB  Soft NTND  WWP, no pitting edema    Cardiovascular Diagnostic Testing:    Patient name: ROBINSON RIVERS  YOB: 1950   Age: 71 (M)   MR#: [id number]  Study Date: 2021  [facility] Lia Miqueli, RDCS  Study quality: Technically difficult  Referring Physician: Arely Murray MD  Blood Pressure: 159/72 mmHg  Height: 178 cm  Weight: 86 kg  BSA: 2 m2  ------------------------------------------------------------------------  PROCEDURE: Transthoracic echocardiogram with 2-D, M-Mode  and complete spectral and color flow Doppler.  INDICATION: Abnormal electrocardiogram  (R94.31 )  ------------------------------------------------------------------------  Dimensions:    Normal Values:  LA:     4.8    2.0 - 4.0 cm  Ao:     2.7    2.0 - 3.8 cm  SEPTUM:        0.6 - 1.2 cm  PWT: 0.6 - 1.1 cm  LVIDd:         3.0 - 5.6 cm  LVIDs:         1.8 - 4.0 cm  EF (Barger Rule): 60 %Doppler Peak Velocity (m/sec):  AoV=2.3  ------------------------------------------------------------------------  Observations:  Mitral Valve: Mitral annular calcification and calcified  mitral leaflets.  Aortic Valve/Aorta: Transcatheter aortic valve replacement.   Aortic valve not well visualized. Peak transaortic valve  gradient equals 21 mm Hg, mean transaortic valve gradient  equals 12 mm Hg, which is probably normal in the presence  of a transcatheter aortic valve replacement. Peak left  ventricular outflow tract gradient equals 5 mm Hg, mean  gradient is equal to 2 mm Hg, LVOT velocity time integral  equals 18 cm.  Aortic Root: 2.7 cm.  Left Atrium: Normal left atrium.  LA volume index = 24  cc/m2.  Left Ventricle: Normal left ventricular systolic function.  No segmental wall motion abnormalities. Normal left  ventricular internal dimensions and wall thicknesses.  Right Heart: Normal right atrium. The right ventricle is  not well visualized; grossly normal right ventricular  systolic function. Tricuspid valve not well visualized,  probably normal. Pulmonic valve not well visualized.  Pericardium/Pleura: Normal pericardium with no pericardial  effusion.  Hemodynamic: Estimated right atrial pressure is 8 mm Hg.  ------------------------------------------------------------------------  Conclusions:  1. Transcatheter aortic valve replacement.  Aortic valve  not well visualized. Peaktransaortic valve gradient equals  21 mm Hg, mean transaortic valve gradient equals 12 mm Hg,  which is probably normal in the presence of a transcatheter  aortic valve replacement.  2. Normal left ventricular internal dimensions and wall  thicknesses.  3. Normal left ventricular systolic function. No segmental  wall motion abnormalities.  4. The right ventricle is not well visualized; grossly  normal right ventricular systolic function.  *** Compared with echocardiogram of 2021, no  significant changes noted.  ------------------------------------------------------------------------  Confirmed on  2021 - 13:38:55 by RENÉE Sharp  ------------------------------------------------------------------------      [facility]  Department of Cardiology  [address] [place] [address] [phone]  Cath Lab Report -- Comprehensive Report  Patient: ROBINSON RIVERS  Study date: 2020  Account number: [id number]  MR number: [id number]  : 1950  Gender: Male  Race: W  Case Physician(s):  Xavi Davis M.D.  Referring Physician:  Beni Sanchez M.D.  INDICATIONS: Aortic valve stenosis.  HISTORY: Aortic valve: history of severe stenosis. The patient has  hypertension. PRIOR CARDIOVASCULAR PROCEDURES: None.  PROCEDURE:  --  Right heart catheterization.  --  Left heart catheterization.  --  Left coronary angiography.  --  Right coronary angiography.  --  Sonosite - Diagnostic.  TECHNIQUE: The risks and alternativesof the procedures and conscious  sedation were explained to the patient and informed consent was obtained.  Cardiac catheterization performed electively.  Local anesthetic given. Right femoral artery access. A 5FR SHEATH PINNACLE  was inserted in thevessel. Right femoral vein access. A 7FR SHEATH  PINNACLE was inserted in the vessel. Right heart catheterization. The  procedure was performed utilizing a 7FR SWAN CATHY catheter under  fluoroscopic guidance. Left heart catheterization. A 5FR FR4.0 EXPO  catheter was utilized. After recording ascending aortic pressure, the  catheter was advanced across the aortic valve and left ventricular  pressure was recorded. Left coronary artery angiography. The vessel was  injected utilizing a 5FR FL4.0 EXPO catheter. Right coronary artery  angiography. The vessel was injected utilizing a 5FR FR4.0 EXPO catheter.  Sonosite - Diagnostic. RADIATION EXPOSURE: 2.3 min.  CONTRAST GIVEN: Omnipaque 35 ml.  MEDICATIONS GIVEN: Midazolam, 1 mg, IV. Fentanyl, 25 mcg, IV.  VENTRICLES: No left ventriculogram was performed.  VALVES: AORTIC VALVE: The aortic valve was evaluated by hemodynamic  measurement and fluoroscopy. The aortic valve leaflets exhibited mild  calcification.  CORONARY VESSELS: The coronary circulation is right dominant.  LM:   --  LM: Angiography showed minor luminal irregularities with no flow  limiting lesions.  LAD:   --  LAD: Angiography showed mild atherosclerosis with no flow  limiting lesions.  --  D1: Angiography showed moderate atherosclerosis.  CX:   --  Proximal circumflex: Normal.  --  OM1: Angiography showed minor luminal irregularities with no flow  limiting lesions.  RCA:   --  Proximal RCA: There was a 60 % stenosis.  --  Distal RCA: There was a 30 % stenosis.  COMPLICATIONS: There were no complications.  DIAGNOSTIC IMPRESSIONS: Mod-severe RCA disease. Mild LCA disease. By cath  gradients, DEANDRE approximately 1.1  DIAGNOSTIC RECOMMENDATIONS: F/u with structural heart team  Prepared and signed by  Xavi Davis M.D.  Signed 2020 16:39:36  HEMODYNAMIC TABLES  Pressures:  Baseline  Pressures:  - HR: 84  Pressures:  - Rhythm:  Pressures:  -- Aortic Pressure (S/D/M): 152/79/94  Pressures:  -- Aortic Valve Splice - AO: 160/78/--  Pressures:  -- Aortic Valve Splice- LV: 188/9/--  Pressures:  -- Left Ventricle (s/edp): 191/18/--  Pressures:  -- Pulmonary Artery (S/D/M): 45/22/32  Pressures:  -- Pulmonary Capillary Wedge: 29/31/25  Pressures:  -- Right Atrium (a/v/M): 10/9/5  Pressures:  -- Right Ventricle (s/edp): 50/12/--  O2 Sats:  Baseline  O2 Sats:  - HR: 84  O2 Sats:  - Rhythm:  O2 Sats:  -- AO: 11.3/96.1/14.77  O2 Sats:  -- PA: 11.3/72.5/11.14  Valves:  Baseline  Valves:  -- AORTIC: Aortic systolic ejection period: 26.80  Valves:  -- AORTIC: Aortic valve area: 1.19  Valves:  -- AORTIC: Aortic valve flow: 284.94  Valves:  -- AORTIC: Aortic valve index: 0.58  Valves:  -- AORTIC: Aortic Valve Splice - Gradient: 29.25  Outputs:  Baseline  Outputs:  -- CALCULATIONS[age]  Outputs:  -- CALCULATIONS: Body Surface Area: 2.07  Outputs:  -- CALCULATIONS: Height in cm: 178.00  Outputs:  -- CALCULATIONS: Sex: Male  Outputs:  -- CALCULATIONS: Weight in k.50  Outputs:  -- OUTPUTS: CO by Yariel: 7.64  Outputs:  -- OUTPUTS: Yariel cardiac index: 3.69  Outputs:  -- OUTPUTS: O2 consumption: 277.00  Outputs:  -- OUTPUTS: Vo2 Indexed: 134.00  Outputs:  -- RESISTANCES: Left ventricular stroke work: 73.13  Outputs:  -- RESISTANCES: Left Ventricular Stroke Work index: 35.38  Outputs:  -- RESISTANCES: Pulmonary vascular index (dsc): 151.53  Outputs:  -- RESISTANCES: Pulmonary vascular index (Wood Units): 1.89  Outputs:  -- RESISTANCES: Pulmonary vascular resistance (dsc): 73.30  Outputs:  -- RESISTANCES: Pulmonary vascular resistance (Wood Units): 0.92  Outputs:  -- RESISTANCES: PVR_SVR Ratio: 0.08  Outputs:  -- RESISTANCES: Right ventricular stroke work: 28.91  Outputs:  -- RESISTANCES: Right ventricular stroke work index: 13.99  Outputs:  -- RESISTANCES: Systemic vascular index (dsc): 1926.61  Outputs:  -- RESISTANCES: Systemic vascular index (Wood Units): 24.09  Outputs:  -- RESISTANCES: Systemic vascular resistance (dsc): 932.01  Outputs:  -- RESISTANCES: Systemic vascular resistance (Wood Units): 11.65  Outputs:  -- RESISTANCES: Total pulmonary index (dsc): 692.71  Outputs:  -- RESISTANCES: Total pulmonary index (Wood Units): 8.66  Outputs:  -- RESISTANCES: Total pulmonary resistance (dsc): 335.11  Outputs:  -- RESISTANCES: Total pulmonary resistance (Wood Units): 4.19  Outputs:  -- RESISTANCES: Total vascular index (Wood Units): 25.44  Outputs:  -- RESISTANCES: Total vascular resistance (dsc): 984.37  Outputs:  -- RESISTANCES: Total vascular resistance (Wood Units): 12.31  Outputs:  -- RESISTANCES: Total vascular resistance index (dsc): 2034.84  Outputs:  -- RESISTANCES: TPR_TVR Ratio: 0.34  Outputs:  -- SHUNTS: Pulmonary flow: 7.64  Outputs:  -- SHUNTS: Qp Indexed: 3.69  Outputs:  -- SHUNTS: Qs Indexed: 3.69  Outputs:  -- SHUNTS: Systemic flow: 7.64      Labs: reviewed  Troponin 81    Assessment & Plan:  70 yo M PMH DM, HTN, AS s/p TAVR , HDAVB s/p PPM 2021, Rectal Carcinoma s/p 5-FU in remission, Statin induced myositis presenting with NSTEMI with atypical chest pain in epigastric region, troponin positive at 81, EKG paced. Given history of pRCA 60% 1-2 years prior, may warrant ischemic eval. Spoke to outpatient cardiologist who is aware of patient.    --Please ensure patient loaded with   --Please ensure patient laoded with 180 of Brillinta, then 90 BID to start 24 hours later (Can do 600 of Plavix)  --Please start heparin gtt, please bolus to achieve ptt per ACS protcol  --Please trend troponin until downtrending, please repeat EKG with every troponin  --Please order a transthoracic echo stat  --Please monitor on telemetry  --K>4, Mg>2  --If patient develops hemodynamic instability, please call      Please call with questions or concerns    Recommendations not final unless countersigned by attending    Mekhi Stuart PGY4  Cardiology Fellow

## 2022-03-05 NOTE — H&P ADULT - ASSESSMENT
NIGHT HOSPITALIST:    Presentation of patient with a complex medical history of past TAVR, PAF maintained on Eliquis, MICRA placement, essential HTN, type 2 DM, prostate CA and colon CA with presenting non ST elevation MI and apparently concomitant radiographic evidence of acute cholecystitis.  Examiner reviewed CTT abdomen results with ED attending and general surgery contacted.   Appreciate cardiology evaluation.   Brilinta load and maintenance, ASA 81 mg, beta blocker.   Patient/ spouse agree with continued full AC but will provide the ACS protocol heparin gtt with next anticoagulation dose (in lieu of Eliquis) for now.  NPO x medications for now.  FS S/S albeit patient's prior glucose control not ideal, and may consider low dose bedtime Lantus tomorrow.

## 2022-03-05 NOTE — ED PROVIDER NOTE - CARE PLAN
1 Principal Discharge DX:	NSTEMI (non-ST elevation myocardial infarction)  Secondary Diagnosis:	Epigastric pain  Secondary Diagnosis:	Afib   Complex Repair And Dermal Autograft Text: The defect edges were debeveled with a #15 scalpel blade.  The primary defect was closed partially with a complex linear closure.  Given the location of the defect, shape of the defect and the proximity to free margins an dermal autograft was deemed most appropriate to repair the remaining defect.  The graft was trimmed to fit the size of the remaining defect.  The graft was then placed in the primary defect, oriented appropriately, and sutured into place.

## 2022-03-05 NOTE — ED PROVIDER NOTE - NS ED ROS FT
Constitutional:  (-) fever, (-) chills, (-) fatigue  Eyes:  (-) eye pain (-) visual changes  ENMT: (-) nasal discharge, (-) sore throat. (-) neck pain or stiffness  Cardiac: (-) chest pain (-) palpitations  Respiratory:  (-) cough (-) shortness of breath  GI:  (-) nausea (-) vomiting (-) diarrhea (+) abdominal pain  :  (-) dysuria (-) frequency (-) burning  MS:  (-) back pain (-) joint pain  Neuro:  (-) headache (-) numbness (-) tingling (-) focal weakness  Skin:  (-) rash  Except as documented in the HPI,  all other systems are negative

## 2022-03-06 LAB
A1C WITH ESTIMATED AVERAGE GLUCOSE RESULT: 8.6 % — HIGH (ref 4–5.6)
ALBUMIN SERPL ELPH-MCNC: 4 G/DL — SIGNIFICANT CHANGE UP (ref 3.3–5)
ALP SERPL-CCNC: 70 U/L — SIGNIFICANT CHANGE UP (ref 40–120)
ALT FLD-CCNC: 106 U/L — HIGH (ref 10–45)
ANION GAP SERPL CALC-SCNC: 14 MMOL/L — SIGNIFICANT CHANGE UP (ref 5–17)
APTT BLD: 28.7 SEC — SIGNIFICANT CHANGE UP (ref 27.5–35.5)
AST SERPL-CCNC: 54 U/L — HIGH (ref 10–40)
BASOPHILS # BLD AUTO: 0.07 K/UL — SIGNIFICANT CHANGE UP (ref 0–0.2)
BASOPHILS NFR BLD AUTO: 0.8 % — SIGNIFICANT CHANGE UP (ref 0–2)
BILIRUB SERPL-MCNC: 1 MG/DL — SIGNIFICANT CHANGE UP (ref 0.2–1.2)
BUN SERPL-MCNC: 10 MG/DL — SIGNIFICANT CHANGE UP (ref 7–23)
CALCIUM SERPL-MCNC: 9.5 MG/DL — SIGNIFICANT CHANGE UP (ref 8.4–10.5)
CHLORIDE SERPL-SCNC: 100 MMOL/L — SIGNIFICANT CHANGE UP (ref 96–108)
CO2 SERPL-SCNC: 23 MMOL/L — SIGNIFICANT CHANGE UP (ref 22–31)
CREAT SERPL-MCNC: 0.63 MG/DL — SIGNIFICANT CHANGE UP (ref 0.5–1.3)
EGFR: 102 ML/MIN/1.73M2 — SIGNIFICANT CHANGE UP
EOSINOPHIL # BLD AUTO: 0.37 K/UL — SIGNIFICANT CHANGE UP (ref 0–0.5)
EOSINOPHIL NFR BLD AUTO: 4.4 % — SIGNIFICANT CHANGE UP (ref 0–6)
ESTIMATED AVERAGE GLUCOSE: 200 MG/DL — HIGH (ref 68–114)
GLUCOSE SERPL-MCNC: 184 MG/DL — HIGH (ref 70–99)
HCT VFR BLD CALC: 40.1 % — SIGNIFICANT CHANGE UP (ref 39–50)
HGB BLD-MCNC: 14 G/DL — SIGNIFICANT CHANGE UP (ref 13–17)
IMM GRANULOCYTES NFR BLD AUTO: 0.6 % — SIGNIFICANT CHANGE UP (ref 0–1.5)
LYMPHOCYTES # BLD AUTO: 1.5 K/UL — SIGNIFICANT CHANGE UP (ref 1–3.3)
LYMPHOCYTES # BLD AUTO: 17.8 % — SIGNIFICANT CHANGE UP (ref 13–44)
MCHC RBC-ENTMCNC: 30.5 PG — SIGNIFICANT CHANGE UP (ref 27–34)
MCHC RBC-ENTMCNC: 34.9 GM/DL — SIGNIFICANT CHANGE UP (ref 32–36)
MCV RBC AUTO: 87.4 FL — SIGNIFICANT CHANGE UP (ref 80–100)
MONOCYTES # BLD AUTO: 0.92 K/UL — HIGH (ref 0–0.9)
MONOCYTES NFR BLD AUTO: 10.9 % — SIGNIFICANT CHANGE UP (ref 2–14)
NEUTROPHILS # BLD AUTO: 5.51 K/UL — SIGNIFICANT CHANGE UP (ref 1.8–7.4)
NEUTROPHILS NFR BLD AUTO: 65.5 % — SIGNIFICANT CHANGE UP (ref 43–77)
NRBC # BLD: 0 /100 WBCS — SIGNIFICANT CHANGE UP (ref 0–0)
PLATELET # BLD AUTO: 208 K/UL — SIGNIFICANT CHANGE UP (ref 150–400)
POTASSIUM SERPL-MCNC: 3.4 MMOL/L — LOW (ref 3.5–5.3)
POTASSIUM SERPL-SCNC: 3.4 MMOL/L — LOW (ref 3.5–5.3)
PROT SERPL-MCNC: 6.9 G/DL — SIGNIFICANT CHANGE UP (ref 6–8.3)
RBC # BLD: 4.59 M/UL — SIGNIFICANT CHANGE UP (ref 4.2–5.8)
RBC # FLD: 13.4 % — SIGNIFICANT CHANGE UP (ref 10.3–14.5)
SARS-COV-2 RNA SPEC QL NAA+PROBE: SIGNIFICANT CHANGE UP
SODIUM SERPL-SCNC: 137 MMOL/L — SIGNIFICANT CHANGE UP (ref 135–145)
TROPONIN T, HIGH SENSITIVITY RESULT: 90 NG/L — HIGH (ref 0–51)
WBC # BLD: 8.42 K/UL — SIGNIFICANT CHANGE UP (ref 3.8–10.5)
WBC # FLD AUTO: 8.42 K/UL — SIGNIFICANT CHANGE UP (ref 3.8–10.5)

## 2022-03-06 PROCEDURE — 92928 PRQ TCAT PLMT NTRAC ST 1 LES: CPT | Mod: RC

## 2022-03-06 PROCEDURE — 99152 MOD SED SAME PHYS/QHP 5/>YRS: CPT

## 2022-03-06 PROCEDURE — 93454 CORONARY ARTERY ANGIO S&I: CPT | Mod: 26,59

## 2022-03-06 PROCEDURE — 99222 1ST HOSP IP/OBS MODERATE 55: CPT

## 2022-03-06 PROCEDURE — 93010 ELECTROCARDIOGRAM REPORT: CPT

## 2022-03-06 RX ORDER — MAGNESIUM OXIDE 400 MG ORAL TABLET 241.3 MG
400 TABLET ORAL ONCE
Refills: 0 | Status: COMPLETED | OUTPATIENT
Start: 2022-03-06 | End: 2022-03-06

## 2022-03-06 RX ORDER — APIXABAN 2.5 MG/1
5 TABLET, FILM COATED ORAL EVERY 12 HOURS
Refills: 0 | Status: DISCONTINUED | OUTPATIENT
Start: 2022-03-07 | End: 2022-03-07

## 2022-03-06 RX ORDER — POTASSIUM CHLORIDE 20 MEQ
40 PACKET (EA) ORAL ONCE
Refills: 0 | Status: COMPLETED | OUTPATIENT
Start: 2022-03-06 | End: 2022-03-06

## 2022-03-06 RX ORDER — INSULIN LISPRO 100/ML
VIAL (ML) SUBCUTANEOUS EVERY 6 HOURS
Refills: 0 | Status: DISCONTINUED | OUTPATIENT
Start: 2022-03-06 | End: 2022-03-07

## 2022-03-06 RX ORDER — SODIUM CHLORIDE 9 MG/ML
1000 INJECTION, SOLUTION INTRAVENOUS
Refills: 0 | Status: DISCONTINUED | OUTPATIENT
Start: 2022-03-06 | End: 2022-03-07

## 2022-03-06 RX ORDER — INSULIN LISPRO 100/ML
VIAL (ML) SUBCUTANEOUS AT BEDTIME
Refills: 0 | Status: DISCONTINUED | OUTPATIENT
Start: 2022-03-06 | End: 2022-03-07

## 2022-03-06 RX ORDER — GLUCAGON INJECTION, SOLUTION 0.5 MG/.1ML
1 INJECTION, SOLUTION SUBCUTANEOUS ONCE
Refills: 0 | Status: DISCONTINUED | OUTPATIENT
Start: 2022-03-06 | End: 2022-03-07

## 2022-03-06 RX ORDER — DEXTROSE 50 % IN WATER 50 %
25 SYRINGE (ML) INTRAVENOUS ONCE
Refills: 0 | Status: DISCONTINUED | OUTPATIENT
Start: 2022-03-06 | End: 2022-03-07

## 2022-03-06 RX ORDER — DEXTROSE 50 % IN WATER 50 %
12.5 SYRINGE (ML) INTRAVENOUS ONCE
Refills: 0 | Status: DISCONTINUED | OUTPATIENT
Start: 2022-03-06 | End: 2022-03-07

## 2022-03-06 RX ORDER — POTASSIUM CHLORIDE 20 MEQ
20 PACKET (EA) ORAL ONCE
Refills: 0 | Status: COMPLETED | OUTPATIENT
Start: 2022-03-06 | End: 2022-03-06

## 2022-03-06 RX ORDER — INSULIN LISPRO 100/ML
VIAL (ML) SUBCUTANEOUS
Refills: 0 | Status: DISCONTINUED | OUTPATIENT
Start: 2022-03-06 | End: 2022-03-07

## 2022-03-06 RX ORDER — ASPIRIN/CALCIUM CARB/MAGNESIUM 324 MG
81 TABLET ORAL DAILY
Refills: 0 | Status: DISCONTINUED | OUTPATIENT
Start: 2022-03-06 | End: 2022-03-07

## 2022-03-06 RX ADMIN — PIPERACILLIN AND TAZOBACTAM 25 GRAM(S): 4; .5 INJECTION, POWDER, LYOPHILIZED, FOR SOLUTION INTRAVENOUS at 20:26

## 2022-03-06 RX ADMIN — LISINOPRIL 40 MILLIGRAM(S): 2.5 TABLET ORAL at 05:01

## 2022-03-06 RX ADMIN — Medication 25 MILLIGRAM(S): at 05:02

## 2022-03-06 RX ADMIN — Medication 100 MILLIGRAM(S): at 21:37

## 2022-03-06 RX ADMIN — TICAGRELOR 90 MILLIGRAM(S): 90 TABLET ORAL at 17:26

## 2022-03-06 RX ADMIN — HEPARIN SODIUM 1000 UNIT(S)/HR: 5000 INJECTION INTRAVENOUS; SUBCUTANEOUS at 07:13

## 2022-03-06 RX ADMIN — AMLODIPINE BESYLATE 10 MILLIGRAM(S): 2.5 TABLET ORAL at 05:02

## 2022-03-06 RX ADMIN — AMIODARONE HYDROCHLORIDE 100 MILLIGRAM(S): 400 TABLET ORAL at 05:01

## 2022-03-06 RX ADMIN — Medication 100 MILLIGRAM(S): at 14:13

## 2022-03-06 RX ADMIN — TICAGRELOR 90 MILLIGRAM(S): 90 TABLET ORAL at 05:02

## 2022-03-06 RX ADMIN — Medication 40 MILLIEQUIVALENT(S): at 08:17

## 2022-03-06 RX ADMIN — PIPERACILLIN AND TAZOBACTAM 25 GRAM(S): 4; .5 INJECTION, POWDER, LYOPHILIZED, FOR SOLUTION INTRAVENOUS at 04:49

## 2022-03-06 RX ADMIN — Medication 3: at 17:25

## 2022-03-06 RX ADMIN — MAGNESIUM OXIDE 400 MG ORAL TABLET 400 MILLIGRAM(S): 241.3 TABLET ORAL at 11:45

## 2022-03-06 RX ADMIN — Medication 50 MILLIGRAM(S): at 05:02

## 2022-03-06 RX ADMIN — Medication 20 MILLIEQUIVALENT(S): at 11:45

## 2022-03-06 RX ADMIN — Medication 100 MILLIGRAM(S): at 02:15

## 2022-03-06 RX ADMIN — PIPERACILLIN AND TAZOBACTAM 25 GRAM(S): 4; .5 INJECTION, POWDER, LYOPHILIZED, FOR SOLUTION INTRAVENOUS at 11:46

## 2022-03-06 RX ADMIN — Medication 100 MILLIGRAM(S): at 04:52

## 2022-03-06 NOTE — PATIENT PROFILE ADULT - PATIENT'S GENDER IDENTITY
Chief Complaint   Patient presents with    Medication Evaluation     1. Have you been to the ER, urgent care clinic since your last visit? Hospitalized since your last visit? No    2. Have you seen or consulted any other health care providers outside of the [address] [place] [address] since your last visit? Include any pap smears or colon screening.  No Male

## 2022-03-06 NOTE — CHART NOTE - NSCHARTNOTEFT_GEN_A_CORE
FEMORAL ANGIOSEAL ASSESSMENT NOTE    Right groin Angioseal in place with good hemostasis w/o any bleeding or hematoma  Pulses in the RIGHT lower extremity are palpable, (right femoral and right pedal pulses + 2).  Right groin is soft/non tender  Patient denies leg, foot  or chest pain  post 12 lead EKG WNL, no ST or T wave changes noted when compared to pre procedural EKG    Complications: None  Comments: patient is to remain bed rest for the next 2 hours. FEMORAL ANGIOSEAL ASSESSMENT NOTE    Right groin Angioseal in place with good hemostasis w/o any bleeding or hematoma  Pulses in the RIGHT lower extremity are palpable, (right femoral and right pedal pulses + 2).  Right groin is soft/non tender  Patient denies leg, foot  or chest pain  post 12 lead EKG WNL, no ST or T wave changes noted when compared to pre procedural EKG    Complications: None  Comments: patient is to remain bed rest for the next 2 hours  A/P  [facility] mRCA 2 stent via RFA access. Continue ASA/Brilinta, Resume Eliquis 3/7 PM,   ASA/Brilinta/Eliquis for a week then stop Aspirin, Continue Brilinta and Eliquis a week after.

## 2022-03-06 NOTE — CONSULT NOTE ADULT - ATTENDING COMMENTS
seen and examined 03-06-22 @ 1850    PSH - prostatectomy, colectomy    he came to ER for pain by his xiphoid which has since resolved  tolerating regular diet w/o nausea or vomiting  no abdominal pain    soft / NT / ND  midline supraumbilical scar  no jaundice    WBC = 8  T bili - 1.6 -> 1.0  alk phos - wnl    CT abd/pelvis w/ contrast (3/5 @ 1804) - distended gallbladder with mild wall thickening and mucosal hyperenhancement. no biliary dilatation.      3/6/2022 - WALLACE x 2 to RCA from NSTEMI    distended gallbladder with mild wall thickening  -I doubt that he has acute cholecystitis, but given CT scan findings, would obtain RUQ U/S to evaluate for the presence of gallstones. seen and examined 03-06-22 @ 1850    PSH - prostatectomy, colectomy    he came to ER for epigastric pain which has since resolved  tolerating regular diet w/o nausea or vomiting  no abdominal pain    soft / NT / ND  midline supraumbilical scar  no jaundice    WBC = 8  T bili - 1.6 -> 1.0  alk phos - wnl    CT abd/pelvis w/ contrast (3/5 @ 1804) - distended gallbladder with mild wall thickening and mucosal hyperenhancement. no biliary dilatation.      3/6/2022 - PCI/WALLACE x 2 to RCA from NSTEMI    distended gallbladder with mild wall thickening  elevated LFTs  -His epigastric pain was likely secondary to his NSTEMI which has resolved after cardiac cath with PCI/WALLACE x 2 to RCA today.  -I doubt that the findings on CT scan represent acute cholecystitis, but it would be prudent to obtain RUQ U/S to evaluate for the presence of gallstones.

## 2022-03-06 NOTE — CONSULT NOTE ADULT - SUBJECTIVE AND OBJECTIVE BOX
GENERAL SURGERY CONSULT NOTE  --------------------------------------------------------------------------------------------  [id number]    HPI:   Patient is a 71y old  Male who presents with a chief complaint of Steady epigastric pain since 1000 today. (05 Mar 2022 21:46)    HPI:  72 y/o M pmh ascending colon cancer s/p right hemicolectomy, prostate CA s/P prostatectomy in  presumed to be in remission, fatty liver, essential HTN maintained on Norvasc, HCTZ, lisinopril, Toprol XL, type 2 DM on oral Rx, hyperlipidemia but unable to tolerate statins due to myopathy, S/P TAVR for aortic stenosis in 2021 complicated with PAF with patient maintained on Eliquis (last dose 10 AM today) and amiodarone 100 mg daily, colon CA with past surgery and past chemotherapy, S/P PPM placement with [facility] in 2021, Pfizer COVID-19 vaccines x 3, with patient self referring following steady epigastric midabdominal pain since 10AM today with no clear palliative or exacerbating factors.  Patient with poor exercise tolerance but presently denies chest pain/pressure.  NO N/V.  No fever, no chills, no rigors.   No diaphoresis.    (05 Mar 2022 21:46)      ***    Patient denies fevers/chills, denies lightheadedness/dizziness, denies SOB/chest pain, denies nausea/vomiting, denies constipation/diarrhea.  ***    ROS: 10-system review is otherwise negative except HPI above.      PAST MEDICAL & SURGICAL HISTORY:  Asthma  denies any hospitalizations, denies any intubations. Stopped taking Advair several years ago.    Hypertension    Prostate cancer    Aortic valve stenosis, etiology of cardiac valve disease unspecified    Malignant neoplasm of colon, unspecified part of colon  diagnosed 2020    Fatty liver    Hyperlipidemia, unspecified hyperlipidemia type    T2DM (type 2 diabetes mellitus)    Myositis  statin induced;  IVIG - last dose 2020    follows with Dr. Rosenthal    S/P prostatectomy  2013    S/P excision of lipoma    Acute cholecystitis  3/5/2022      FAMILY HISTORY:  Family history of aneurysm  Father    Family history of heart disease (Child)    Family history of hypertension in father (Father)    Family history of early CAD (Child)    [] Family history not pertinent as reviewed with the patient and family    SOCIAL HISTORY:  ***    ALLERGIES: statins (Other)  Zetia (Other)      HOME MEDICATIONS: ***    CURRENT MEDICATIONS  MEDICATIONS (STANDING): aMIOdarone    Tablet 100 milliGRAM(s) Oral daily  amLODIPine   Tablet 10 milliGRAM(s) Oral daily  aspirin enteric coated 81 milliGRAM(s) Oral daily  dextrose 40% Gel 15 Gram(s) Oral once  dextrose 5%. 1000 milliLiter(s) IV Continuous <Continuous>  dextrose 5%. 1000 milliLiter(s) IV Continuous <Continuous>  dextrose 50% Injectable 25 Gram(s) IV Push once  dextrose 50% Injectable 12.5 Gram(s) IV Push once  dextrose 50% Injectable 25 Gram(s) IV Push once  glucagon  Injectable 1 milliGRAM(s) IntraMuscular once  heparin  Infusion.  Unit(s)/Hr IV Continuous <Continuous>  hydrochlorothiazide 25 milliGRAM(s) Oral daily  insulin lispro (ADMELOG) corrective regimen sliding scale   SubCutaneous every 6 hours  lisinopril 40 milliGRAM(s) Oral daily  magnesium oxide 400 milliGRAM(s) Oral once  metoprolol succinate ER 50 milliGRAM(s) Oral daily  metroNIDAZOLE  IVPB      metroNIDAZOLE  IVPB 500 milliGRAM(s) IV Intermittent every 8 hours  piperacillin/tazobactam IVPB.. 3.375 Gram(s) IV Intermittent every 8 hours  potassium chloride    Tablet ER 20 milliEquivalent(s) Oral once  ticagrelor 90 milliGRAM(s) Oral every 12 hours    MEDICATIONS (PRN):heparin   Injectable 5300 Unit(s) IV Push every 6 hours PRN For aPTT less than 40    --------------------------------------------------------------------------------------------    Vitals:   T(C): 36.4 (22 @ 05:04), Max: 36.9 (22 @ 19:34)  HR: 74 (22 @ 05:04) (55 - 80)  BP: 173/72 (22 @ 05:04) (142/78 - 204/94)  RR: 18 (22 @ 05:04) (12 - 19)  SpO2: 97% (22 @ 05:04) (97% - 98%)  CAPILLARY BLOOD GLUCOSE      POCT Blood Glucose.: 231 mg/dL (06 Mar 2022 08:01)  POCT Blood Glucose.: 188 mg/dL (06 Mar 2022 02:05)  POCT Blood Glucose.: 192 mg/dL (05 Mar 2022 22:32)      Height (cm): 177.8 ( @ 14:03)  Weight (kg): 89.4 ( @ 02:55)  BMI (kg/m2): 28.3 ( @ 02:55)  BSA (m2): 2.07 ( 02:55)    PHYSICAL EXAM: ***  General: NAD, Lying in bed comfortably  Neuro: A+Ox3  HEENT: NC/AT, EOMI  Neck: Soft, supple  Cardio: RRR, nml S1/S2  Resp: Good effort, CTA b/l  Thorax: No chest wall tenderness  Breast: No lesions/masses, no drainage  GI/Abd: Soft, NT/ND, no rebound/guarding, no masses palpated  Vascular: All 4 extremities warm.  Skin: Intact, no breakdown  Lymphatic/Nodes: No palpable lymphadenopathy  Musculoskeletal: All 4 extremities moving spontaneously, no limitations  --------------------------------------------------------------------------------------------    LABS  CBC ( @ 06:27)                              14.0                           8.42    )----------------(  208        65.5  % Neutrophils, 17.8  % Lymphocytes, ANC: 5.51                                40.1    CBC ( 16:24)                              14.9                           9.80    )----------------(  213        79.2<H>% Neutrophils, 9.8<L>% Lymphocytes, ANC: 7.76<H>                              43.7      BMP ( @ 06:27)             137     |  100     |  10    		Ca++ --      Ca 9.5                ---------------------------------( 184<H>		Mg --                 3.4<L>  |  23      |  0.63  			Ph --      BMP ( @ 01:18)             --      |  --      |  --    		Ca++ --      Ca --                 ---------------------------------( --    		Mg 1.8                --      |  --      |  --    			Ph 3.1       LFTs ( 06:27)      TPro 6.9 / Alb 4.0 / TBili 1.0 / DBili -- / AST 54<H> / <H> / AlkPhos 70  LFTs ( @ 16:24)      TPro 7.9 / Alb 4.8 / TBili 1.6<H> / DBili -- / AST 97<H> / ALT 92<H> / AlkPhos 77    Coags ( @ 06:27)  aPTT 28.7 / INR -- / PT --  Coags ( @ 19:58)  aPTT 34.4 / INR 1.26<H> / PT 14.7<H>          --------------------------------------------------------------------------------------------    MICROBIOLOGY  Urinalysis ( @ 16:34):     Color: Light Yellow / Appearance: Clear / S.006<L> / pH: 6.5 / Gluc: 500 mg/dL<!> / Ketones: Negative / Bili: Negative / Urobili: Negative / Protein :Negative / Nitrites: Negative / Leuk.Est: Negative / RBC: 0 / WBC: 0 / Sq Epi:  / Non Sq Epi:  / Bacteria 0.0         --------------------------------------------------------------------------------------------    IMAGING  ***    --------------------------------------------------------------------------------------------

## 2022-03-06 NOTE — PROGRESS NOTE ADULT - SUBJECTIVE AND OBJECTIVE BOX
Patient is a 71y old  Male who presents with a chief complaint of Steady epigastric pain since 1000 today. (06 Mar 2022 08:18)      SUBJECTIVE / OVERNIGHT EVENTS:    Patient seen and examined. sp cath. no pain in groin. no cp sob. no and pain nvd.      Vital Signs Last 24 Hrs  T(C): 36.6 (06 Mar 2022 12:30), Max: 36.9 (05 Mar 2022 19:34)  T(F): 97.9 (06 Mar 2022 12:30), Max: 98.5 (05 Mar 2022 19:34)  HR: 72 (06 Mar 2022 12:30) (55 - 80)  BP: 139/78 (06 Mar 2022 12:30) (128/97 - 204/94)  BP(mean): --  RR: 18 (06 Mar 2022 12:30) (12 - 19)  SpO2: 98% (06 Mar 2022 12:30) (95% - 98%)  I&O's Summary      PE:  GENERAL: NAD, AAOx3  HEAD:  Atraumatic, Normocephalic  EYES: conjunctiva and sclera clear  NECK: Supple, No JVD  CHEST/LUNG: CTABL, No wheeze  HEART: Regular rate and rhythm; no murmur  ABDOMEN: Soft, Nontender, Nondistended; Bowel sounds present  EXTREMITIES:  2+ Peripheral Pulses, No clubbing, cyanosis, or edema  SKIN: r groin soft cdi  NEURO: No focal deficits    LABS:                        14.0   8.42  )-----------( 208      ( 06 Mar 2022 06:27 )             40.1     03-06    137  |  100  |  10  ----------------------------<  184<H>  3.4<L>   |  23  |  0.63    Ca    9.5      06 Mar 2022 06:27  Phos  3.1     03-06  Mg     1.8     03-06    TPro  6.9  /  Alb  4.0  /  TBili  1.0  /  DBili  x   /  AST  54<H>  /  ALT  106<H>  /  AlkPhos  70  03-06    PT/INR - ( 05 Mar 2022 19:58 )   PT: 14.7 sec;   INR: 1.26 ratio         PTT - ( 06 Mar 2022 06:27 )  PTT:28.7 sec  CAPILLARY BLOOD GLUCOSE      POCT Blood Glucose.: 227 mg/dL (06 Mar 2022 11:23)  POCT Blood Glucose.: 231 mg/dL (06 Mar 2022 08:01)  POCT Blood Glucose.: 188 mg/dL (06 Mar 2022 02:05)  POCT Blood Glucose.: 192 mg/dL (05 Mar 2022 22:32)        Urinalysis Basic - ( 05 Mar 2022 16:34 )    Color: Light Yellow / Appearance: Clear / S.006 / pH: x  Gluc: x / Ketone: Negative  / Bili: Negative / Urobili: Negative   Blood: x / Protein: Negative / Nitrite: Negative   Leuk Esterase: Negative / RBC: 0 /hpf / WBC 0 /HPF   Sq Epi: x / Non Sq Epi: x / Bacteria: 0.0        RADIOLOGY & ADDITIONAL TESTS:    Imaging Personally Reviewed:  [x] YES  [ ] NO    Consultant(s) Notes Reviewed:  [x] YES  [ ] NO    MEDICATIONS  (STANDING):  aMIOdarone    Tablet 100 milliGRAM(s) Oral daily  amLODIPine   Tablet 10 milliGRAM(s) Oral daily  aspirin enteric coated 81 milliGRAM(s) Oral daily  dextrose 40% Gel 15 Gram(s) Oral once  dextrose 5%. 1000 milliLiter(s) (50 mL/Hr) IV Continuous <Continuous>  dextrose 5%. 1000 milliLiter(s) (100 mL/Hr) IV Continuous <Continuous>  dextrose 5%. 1000 milliLiter(s) (50 mL/Hr) IV Continuous <Continuous>  dextrose 5%. 1000 milliLiter(s) (100 mL/Hr) IV Continuous <Continuous>  dextrose 50% Injectable 25 Gram(s) IV Push once  dextrose 50% Injectable 12.5 Gram(s) IV Push once  dextrose 50% Injectable 25 Gram(s) IV Push once  dextrose 50% Injectable 25 Gram(s) IV Push once  dextrose 50% Injectable 12.5 Gram(s) IV Push once  dextrose 50% Injectable 25 Gram(s) IV Push once  glucagon  Injectable 1 milliGRAM(s) IntraMuscular once  glucagon  Injectable 1 milliGRAM(s) IntraMuscular once  hydrochlorothiazide 25 milliGRAM(s) Oral daily  insulin lispro (ADMELOG) corrective regimen sliding scale   SubCutaneous three times a day before meals  insulin lispro (ADMELOG) corrective regimen sliding scale   SubCutaneous at bedtime  insulin lispro (ADMELOG) corrective regimen sliding scale   SubCutaneous every 6 hours  lisinopril 40 milliGRAM(s) Oral daily  metoprolol succinate ER 50 milliGRAM(s) Oral daily  metroNIDAZOLE  IVPB      metroNIDAZOLE  IVPB 500 milliGRAM(s) IV Intermittent every 8 hours  piperacillin/tazobactam IVPB.. 3.375 Gram(s) IV Intermittent every 8 hours  ticagrelor 90 milliGRAM(s) Oral every 12 hours    MEDICATIONS  (PRN):      Care Discussed with Consultants/Other Providers [x] YES  [ ] NO    HEALTH ISSUES - PROBLEM Dx:  MI, acute, non ST segment elevation    Acute cholecystitis    Type 2 diabetes mellitus    Essential hypertension    History of prostate cancer    History of colon cancer    PAF (paroxysmal atrial fibrillation)    Discharge planning issues

## 2022-03-06 NOTE — PROGRESS NOTE ADULT - ASSESSMENT
70 y/o M history of prostate CA s/p prostatectomy in 2013 presumed to be in remission, fatty liver, essential HTN, type 2 DM on oral Rx, hyperlipidemia but unable to tolerate statins due to myopathy, S/P TAVR for aortic stenosis in Jan 2021 complicated with PAF on Eliquis, colon CA with past surgery and past chemotherapy, S/P PPM placement with [facility] in Nov 2021, Pfizer COVID-19 vaccines x 3, presents with epigastric midabdominal pain, atypical cp admitted for NSTEMI. sp cardiac cath.    # NSTEMI  sp mRCA 2 stents  Continue ASA/Brilinta, Resume Eliquis 3/7 PM  ASA/Brilinta/Eliquis for a week then stop Aspirin, Continue Brilinta and Eliquis a week after  cardio following    # abd pain ro cholecystitis  CT abd concern for cholecystitis, pt asymptomatic, downtrending LFTS  check abd US, if positive, consider HIDA  surgery following  on zosyn and flagyl, dc if negative US    # AS sp TAVR  # AFib  # HTN  # bradycardia sp PPM  cont HCTZ, toprol XL, lisinopril, norvasc  cont amiodarone  resume eliquis when appropriate    # DM2  a1c 8.6  FS acceptable  SSI     # colon ca and renal CA  outpt fu oncology  S/P surgical resection of mass and lymph nodes    PCP Dr Chata Barkley    Please call [employer] with questions [phone]

## 2022-03-06 NOTE — PATIENT PROFILE ADULT - FALL HARM RISK - HARM RISK INTERVENTIONS

## 2022-03-06 NOTE — CONSULT NOTE ADULT - ASSESSMENT
ASSESSMENT: Patient is a 71M pmh ascending colon ca s/p right hemicolectomy 3/2021 s/p chemo, prostate ca 2003 s/p prostatectomy, dm, htn, TAVR in 1/21 c/b pAF on eliquis (last taken 10am) p/w findings concerning for NSTEMI given history of RCA stenosis, started on heparin gtt, dapt. General surgery consulted for evaluation of benign biliary disease.    PLAN:    - Although CT has findings suspicious for acute cholecystitis, history and exam, labs are inconsistent, although t bilirubin should be followed  - Okay to keep abx for now pending US read  - RUQ US ordered, will fu    Patient seen and examined with Dr. Chacon on behalf of Dr. Canseco.    Donny Killian  PGY-2  ATP  [id number]

## 2022-03-07 ENCOUNTER — TRANSCRIPTION ENCOUNTER (OUTPATIENT)
Age: 72
End: 2022-03-07

## 2022-03-07 VITALS — WEIGHT: 194.89 LBS

## 2022-03-07 LAB
ALBUMIN SERPL ELPH-MCNC: 4 G/DL — SIGNIFICANT CHANGE UP (ref 3.3–5)
ALP SERPL-CCNC: 55 U/L — SIGNIFICANT CHANGE UP (ref 40–120)
ALT FLD-CCNC: 79 U/L — HIGH (ref 10–45)
ANION GAP SERPL CALC-SCNC: 15 MMOL/L — SIGNIFICANT CHANGE UP (ref 5–17)
AST SERPL-CCNC: 26 U/L — SIGNIFICANT CHANGE UP (ref 10–40)
BILIRUB SERPL-MCNC: 1.1 MG/DL — SIGNIFICANT CHANGE UP (ref 0.2–1.2)
BUN SERPL-MCNC: 13 MG/DL — SIGNIFICANT CHANGE UP (ref 7–23)
CALCIUM SERPL-MCNC: 9.3 MG/DL — SIGNIFICANT CHANGE UP (ref 8.4–10.5)
CHLORIDE SERPL-SCNC: 100 MMOL/L — SIGNIFICANT CHANGE UP (ref 96–108)
CO2 SERPL-SCNC: 21 MMOL/L — LOW (ref 22–31)
CREAT SERPL-MCNC: 0.76 MG/DL — SIGNIFICANT CHANGE UP (ref 0.5–1.3)
EGFR: 96 ML/MIN/1.73M2 — SIGNIFICANT CHANGE UP
GLUCOSE SERPL-MCNC: 204 MG/DL — HIGH (ref 70–99)
HCT VFR BLD CALC: 39.1 % — SIGNIFICANT CHANGE UP (ref 39–50)
HGB BLD-MCNC: 13.2 G/DL — SIGNIFICANT CHANGE UP (ref 13–17)
MAGNESIUM SERPL-MCNC: 1.9 MG/DL — SIGNIFICANT CHANGE UP (ref 1.6–2.6)
MCHC RBC-ENTMCNC: 29.7 PG — SIGNIFICANT CHANGE UP (ref 27–34)
MCHC RBC-ENTMCNC: 33.8 GM/DL — SIGNIFICANT CHANGE UP (ref 32–36)
MCV RBC AUTO: 88.1 FL — SIGNIFICANT CHANGE UP (ref 80–100)
NRBC # BLD: 0 /100 WBCS — SIGNIFICANT CHANGE UP (ref 0–0)
PLATELET # BLD AUTO: 199 K/UL — SIGNIFICANT CHANGE UP (ref 150–400)
POTASSIUM SERPL-MCNC: 3.6 MMOL/L — SIGNIFICANT CHANGE UP (ref 3.5–5.3)
POTASSIUM SERPL-SCNC: 3.6 MMOL/L — SIGNIFICANT CHANGE UP (ref 3.5–5.3)
PROT SERPL-MCNC: 6.7 G/DL — SIGNIFICANT CHANGE UP (ref 6–8.3)
RBC # BLD: 4.44 M/UL — SIGNIFICANT CHANGE UP (ref 4.2–5.8)
RBC # FLD: 13.4 % — SIGNIFICANT CHANGE UP (ref 10.3–14.5)
SODIUM SERPL-SCNC: 136 MMOL/L — SIGNIFICANT CHANGE UP (ref 135–145)
WBC # BLD: 8.33 K/UL — SIGNIFICANT CHANGE UP (ref 3.8–10.5)
WBC # FLD AUTO: 8.33 K/UL — SIGNIFICANT CHANGE UP (ref 3.8–10.5)

## 2022-03-07 PROCEDURE — 85027 COMPLETE CBC AUTOMATED: CPT

## 2022-03-07 PROCEDURE — C8929: CPT

## 2022-03-07 PROCEDURE — 93005 ELECTROCARDIOGRAM TRACING: CPT

## 2022-03-07 PROCEDURE — 99233 SBSQ HOSP IP/OBS HIGH 50: CPT | Mod: GC

## 2022-03-07 PROCEDURE — 36415 COLL VENOUS BLD VENIPUNCTURE: CPT

## 2022-03-07 PROCEDURE — 84100 ASSAY OF PHOSPHORUS: CPT

## 2022-03-07 PROCEDURE — C1769: CPT

## 2022-03-07 PROCEDURE — 85025 COMPLETE CBC W/AUTO DIFF WBC: CPT

## 2022-03-07 PROCEDURE — 83036 HEMOGLOBIN GLYCOSYLATED A1C: CPT

## 2022-03-07 PROCEDURE — 84484 ASSAY OF TROPONIN QUANT: CPT

## 2022-03-07 PROCEDURE — 74177 CT ABD & PELVIS W/CONTRAST: CPT | Mod: MA

## 2022-03-07 PROCEDURE — 99291 CRITICAL CARE FIRST HOUR: CPT | Mod: 25

## 2022-03-07 PROCEDURE — 85730 THROMBOPLASTIN TIME PARTIAL: CPT

## 2022-03-07 PROCEDURE — C1887: CPT

## 2022-03-07 PROCEDURE — 99153 MOD SED SAME PHYS/QHP EA: CPT

## 2022-03-07 PROCEDURE — 81001 URINALYSIS AUTO W/SCOPE: CPT

## 2022-03-07 PROCEDURE — 93454 CORONARY ARTERY ANGIO S&I: CPT | Mod: 59

## 2022-03-07 PROCEDURE — 80053 COMPREHEN METABOLIC PANEL: CPT

## 2022-03-07 PROCEDURE — U0003: CPT

## 2022-03-07 PROCEDURE — 76705 ECHO EXAM OF ABDOMEN: CPT | Mod: 26,RT

## 2022-03-07 PROCEDURE — 76705 ECHO EXAM OF ABDOMEN: CPT

## 2022-03-07 PROCEDURE — 85610 PROTHROMBIN TIME: CPT

## 2022-03-07 PROCEDURE — 71046 X-RAY EXAM CHEST 2 VIEWS: CPT

## 2022-03-07 PROCEDURE — 82962 GLUCOSE BLOOD TEST: CPT

## 2022-03-07 PROCEDURE — 99152 MOD SED SAME PHYS/QHP 5/>YRS: CPT

## 2022-03-07 PROCEDURE — U0005: CPT

## 2022-03-07 PROCEDURE — C1725: CPT

## 2022-03-07 PROCEDURE — C9600: CPT | Mod: RC

## 2022-03-07 PROCEDURE — 83690 ASSAY OF LIPASE: CPT

## 2022-03-07 PROCEDURE — C1874: CPT

## 2022-03-07 PROCEDURE — C1894: CPT

## 2022-03-07 PROCEDURE — 83735 ASSAY OF MAGNESIUM: CPT

## 2022-03-07 PROCEDURE — 93306 TTE W/DOPPLER COMPLETE: CPT | Mod: 26

## 2022-03-07 RX ORDER — CLOPIDOGREL BISULFATE 75 MG/1
1 TABLET, FILM COATED ORAL
Qty: 30 | Refills: 0
Start: 2022-03-07 | End: 2022-04-05

## 2022-03-07 RX ORDER — CLOPIDOGREL BISULFATE 75 MG/1
600 TABLET, FILM COATED ORAL ONCE
Refills: 0 | Status: DISCONTINUED | OUTPATIENT
Start: 2022-03-07 | End: 2022-03-07

## 2022-03-07 RX ORDER — CLOPIDOGREL BISULFATE 75 MG/1
75 TABLET, FILM COATED ORAL DAILY
Refills: 0 | Status: DISCONTINUED | OUTPATIENT
Start: 2022-03-08 | End: 2022-03-07

## 2022-03-07 RX ORDER — MAGNESIUM OXIDE 400 MG ORAL TABLET 241.3 MG
400 TABLET ORAL ONCE
Refills: 0 | Status: COMPLETED | OUTPATIENT
Start: 2022-03-07 | End: 2022-03-07

## 2022-03-07 RX ORDER — POTASSIUM CHLORIDE 20 MEQ
20 PACKET (EA) ORAL ONCE
Refills: 0 | Status: COMPLETED | OUTPATIENT
Start: 2022-03-07 | End: 2022-03-07

## 2022-03-07 RX ADMIN — Medication 25 MILLIGRAM(S): at 05:09

## 2022-03-07 RX ADMIN — AMLODIPINE BESYLATE 10 MILLIGRAM(S): 2.5 TABLET ORAL at 05:09

## 2022-03-07 RX ADMIN — AMIODARONE HYDROCHLORIDE 100 MILLIGRAM(S): 400 TABLET ORAL at 05:08

## 2022-03-07 RX ADMIN — Medication 20 MILLIEQUIVALENT(S): at 11:37

## 2022-03-07 RX ADMIN — Medication 81 MILLIGRAM(S): at 11:39

## 2022-03-07 RX ADMIN — Medication 50 MILLIGRAM(S): at 05:09

## 2022-03-07 RX ADMIN — Medication 100 MILLIGRAM(S): at 05:08

## 2022-03-07 RX ADMIN — Medication 3: at 12:05

## 2022-03-07 RX ADMIN — MAGNESIUM OXIDE 400 MG ORAL TABLET 400 MILLIGRAM(S): 241.3 TABLET ORAL at 11:38

## 2022-03-07 RX ADMIN — PIPERACILLIN AND TAZOBACTAM 25 GRAM(S): 4; .5 INJECTION, POWDER, LYOPHILIZED, FOR SOLUTION INTRAVENOUS at 02:41

## 2022-03-07 RX ADMIN — PIPERACILLIN AND TAZOBACTAM 25 GRAM(S): 4; .5 INJECTION, POWDER, LYOPHILIZED, FOR SOLUTION INTRAVENOUS at 11:39

## 2022-03-07 RX ADMIN — TICAGRELOR 90 MILLIGRAM(S): 90 TABLET ORAL at 05:09

## 2022-03-07 RX ADMIN — LISINOPRIL 40 MILLIGRAM(S): 2.5 TABLET ORAL at 05:08

## 2022-03-07 NOTE — PHARMACOTHERAPY INTERVENTION NOTE - COMMENTS
Counseled patient and wife on discharge medication doses, indications, and possible side effects. Provided and reviewed medication card for new medication clopidogrel. Answered all of the patient's questions to the best of my ability. Patient exhibited understanding of discharge medication regimen.     Silvina Floyd, PharmD, [facility]  Clinical Pharmacy Specialist  [phone] or Teams

## 2022-03-07 NOTE — DIETITIAN INITIAL EVALUATION ADULT. - ORAL INTAKE PTA/DIET HISTORY
Pt interviewed at bedside with spouse. Reports good appetite PTA, consuming ~3 meals/day with snacks in between; reports not following any therapeutic diet. Confirms No known food allergies. Pt reports use of dietary supplements/micronutrients PTA: B12, Vit D. Pt denies history of chewing/swallowing difficulty. Reports taking DM Rx PTA, denies checking glucose levels at home and not following diet/previously educated on consistent CHO diet.

## 2022-03-07 NOTE — CONSULT NOTE ADULT - ASSESSMENT
sd      s/p abd US- Cholelithiasis and gallbladder sludge. No other secondary sonographic signs to suggest cholecystitis at this time.  no fever    Rolo Tate M.D.  [facility] [address], Division of Infectious Diseases  [phone]  After 5pm on weekdays and all day on weekends - please call [phone]  70 y/o M PMhx prostate CA s/P prostatectomy, fatty liver, HTN, CAD, DM II unable to tolerate statins due to myopathy, AS s/p TAVR, PAF, colon CA s/p surgery and chemotherapy who presented w/ constant non-radiating epigastric pain x1 day      s/p abd US- Cholelithiasis and gallbladder sludge. No other secondary sonographic signs to suggest cholecystitis at this time.  no fever    NSTEMI  s/p RHC w/ PCI/WALLACE to RCA  heparin gtt, antiplatelet agents    Rolo Tate M.D.  [facility], Division of Infectious Diseases  [phone]  After 5pm on weekdays and all day on weekends - please call [phone]  72 y/o M PMhx prostate CA s/P prostatectomy, fatty liver, HTN, CAD, DM II unable to tolerate statins due to myopathy, AS s/p TAVR, PAF, colon CA s/p surgery and chemotherapy who presented w/ constant non-radiating epigastric pain x1 day    s/p CT abd/pelvis- thick- walled distended gallbladder suspicious for acute cholecystitis. No biliary dilatation.   s/p abd US- Cholelithiasis and gallbladder sludge. No other secondary sonographic signs to suggest cholecystitis at this time  no fever, leukocytosis, RUQ abd pain  reports epigastric pain resolved after RHC  liver enzymes elevated and bili mildly elevated on admission; liver enzymes now drowntrending  suspect 2/2 NSTEMI vs passed gallstone  no evidence of cholecystitis on exam  recommend discontinuing antibiotics at this time  f/u w/ surgery regarding possible cholecystectomy in the future    NSTEMI  s/p RHC w/ PCI/WALLACE to RCA  heparin gtt, antiplatelet agents    Rolo Tate M.D.  [facility], Division of Infectious Diseases  [phone]  After 5pm on weekdays and all day on weekends - please call [phone]

## 2022-03-07 NOTE — PROGRESS NOTE ADULT - ATTENDING COMMENTS
71 year old man with RCA stent for NSTEMI, prior TAVR and pacemaker for heart block. No further cardiac symptoms. Has statin related myositis and need to seek alternate lipid lowering therapy. Evaluation of cholecystitis in progress.    To contact call Cardiology Fellow or Attending as listed on [email or web address] password: [email or web address].

## 2022-03-07 NOTE — CHART NOTE - NSCHARTNOTEFT_GEN_A_CORE
NP note - plavix necessity      72 yo M PMH DM, HTN, AS s/p TAVR 01/21, HDAVB s/p PPM 11/2021, Rectal Carcinoma s/p 5-FU in remission, Statin induced myositis, atrial fibrillation on apixaban presenting with NSTEMI with atypical chest pain in epigastric region, troponin positive at 81, now s/p PCI x 2 to RCA. cardiology recommended plavix loading 600mg x1 and continue with plavix 75mg daily for CAD. recommended to continue apixaban 5mg bid for afib and discontinued ASA. history of statin induced myositis, should be considered for PCSK9 inhibitor if truly statin intolerant in setting of advanced coronary disease, will arrange follow up with lipid clinic.,     NP. Evelio Bailey   [phone]

## 2022-03-07 NOTE — DIETITIAN INITIAL EVALUATION ADULT. - PERTINENT LABORATORY DATA
03-07 @ 06:19: Na 136, BUN 13, Cr 0.76, <H>, K+ 3.6, Phos --, Mg 1.9, Alk Phos 55, ALT/SGPT 79<H>, AST/SGOT 26, HbA1c --    A1C with Estimated Average Glucose Result: 8.6 % (03-06-22 @ 08:18)  A1C with Estimated Average Glucose Result: 8.0 % (11-13-21 @ 11:39)    CAPILLARY BLOOD GLUCOSE      POCT Blood Glucose.: 251 mg/dL (07 Mar 2022 11:47)  POCT Blood Glucose.: 219 mg/dL (06 Mar 2022 21:18)  POCT Blood Glucose.: 263 mg/dL (06 Mar 2022 16:29)

## 2022-03-07 NOTE — DISCHARGE NOTE PROVIDER - NSDCCPCAREPLAN_GEN_ALL_CORE_FT
PRINCIPAL DISCHARGE DIAGNOSIS  Diagnosis: NSTEMI (non-ST elevation myocardial infarction)  Assessment and Plan of Treatment: continue with plavix 75mg daily.  Angioplasty or coronary stenting are procedures to open up narrowed or blocked coronary arteries in the heart.  A stent is a tiny metal tube that helps to prop open an artery in the heart muscle.    Your doctor will instruct you when you can drive or resume usual physical activities  You MUST take aspirin & another agent Plavix, Brilinta)to help prevent closts inside the stent.  It is VERY important to take these medications as directed unless your cardiologist says it is OK to stop.  If another physican advises you to stop them, call cardiologist to discuss this advise since there is a risk of a heart attack or even death stopping these medicatin earlier than they should be.  Do NOT take more than 81 mg aspirin with Brilinta  The most common problems after coronary stenting is bleeding, bruising, & soreness at the tube insertion site - you can use tylenol for discomfort if not contraindicated  Call your doctor if you have chest pain, fever, pain, swelling, or redness where the tube went in        SECONDARY DISCHARGE DIAGNOSES  Diagnosis: Epigastric pain  Assessment and Plan of Treatment: no signs of cholecystitis.   please follow up with surgery in 2 weeks.    Diagnosis: Afib  Assessment and Plan of Treatment: Atrial fibrillation is the most common heart rhythm problem & has the risk of stroke & heart attack  It helps if you control your blood pressure, not drink more than 1-2 alcohol drinks per day, cut down on caffeine, getting treatment for over active thyroid gland, & getting exercise  Call your doctor if you feel your heart racing or beating unusually, chest tightness or pain, lightheaded, faint, shortness of breath especially with exercise  It is important to take your heart medication as prescribed  You may be on anticoagulation which is very important to take as directed - you may need blood work to monitor drug levels

## 2022-03-07 NOTE — DISCHARGE NOTE PROVIDER - NSDCMRMEDTOKEN_GEN_ALL_CORE_FT
acetaminophen 325 mg oral tablet: 2 tab(s) orally every 6 hours, As needed, Temp greater or equal to 38C (100.4F), Mild Pain (1 - 3)  amiodarone 100 mg oral tablet: 1 tab(s) orally once a day  amLODIPine 10 mg oral tablet: 1 tab(s) orally once a day  Eliquis 5 mg oral tablet: 1 tab(s) orally 2 times a day   hydroCHLOROthiazide 25 mg oral tablet: 1 tab(s) orally once a day  Janumet 50 mg-1000 mg oral tablet: 1 tab(s) orally 2 times a day    lisinopril 40 mg oral tablet: 1 tab(s) orally once a day  metoprolol succinate 50 mg oral tablet, extended release: 1 tab(s) orally once a day  Vitamin B12 1000 mcg oral tablet: 1 tab(s) orally once a day  Vitamin D3 2000 intl units oral capsule: 1 cap(s) orally once a day   acetaminophen 325 mg oral tablet: 2 tab(s) orally every 6 hours, As needed, Temp greater or equal to 38C (100.4F), Mild Pain (1 - 3)  amiodarone 100 mg oral tablet: 1 tab(s) orally once a day  amLODIPine 10 mg oral tablet: 1 tab(s) orally once a day  clopidogrel 75 mg oral tablet: 1 tab(s) orally once a day  Eliquis 5 mg oral tablet: 1 tab(s) orally 2 times a day   hydroCHLOROthiazide 25 mg oral tablet: 1 tab(s) orally once a day  Janumet 50 mg-1000 mg oral tablet: 1 tab(s) orally 2 times a day    lisinopril 40 mg oral tablet: 1 tab(s) orally once a day  metoprolol succinate 50 mg oral tablet, extended release: 1 tab(s) orally once a day  Vitamin B12 1000 mcg oral tablet: 1 tab(s) orally once a day  Vitamin D3 2000 intl units oral capsule: 1 cap(s) orally once a day

## 2022-03-07 NOTE — DIETITIAN INITIAL EVALUATION ADULT. - REASON FOR ADMISSION
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

## 2022-03-07 NOTE — DISCHARGE NOTE NURSING/CASE MANAGEMENT/SOCIAL WORK - PATIENT PORTAL LINK FT
You can access the FollowMyHealth Patient Portal offered by [facility] by registering at the following website: [email or web address]. By joining [employer]’s FollowMyHealth portal, you will also be able to view your health information using other applications (apps) compatible with our system.

## 2022-03-07 NOTE — PROGRESS NOTE ADULT - SUBJECTIVE AND OBJECTIVE BOX
Subjective:   Patient seen at bedside this AM. Pt feels overall well. Denies n/v, tolerating diet. No abd pain    Objective:  Vital Signs  T(C): 36.3 (03-07 @ 04:57), Max: 36.6 (03-06 @ 12:30)  HR: 79 (03-07 @ 04:57) (65 - 79)  BP: 167/79 (03-07 @ 04:57) (128/97 - 179/79)  RR: 18 (03-07 @ 04:57) (12 - 18)  SpO2: 96% (03-07 @ 04:57) (95% - 99%)  03-06-22 @ 07:01  -  03-07-22 @ 07:00  --------------------------------------------------------  IN:  Total IN: 0 mL    OUT:    Voided (mL): 100 mL  Total OUT: 100 mL    Total NET: -100 mL          Physical Exam:  GEN: resting in bed comfortably in NAD  RESP: no increased WOB  ABD: soft, non-distended, non-tender without rebound tenderness or guarding.   EXTR: warm, well-perfused, no edema  NEURO: awake, alert    Labs:                        13.2   8.33  )-----------( 199      ( 07 Mar 2022 06:20 )             39.1   03-07    136  |  100  |  13  ----------------------------<  204<H>  3.6   |  21<L>  |  0.76    Ca    9.3      07 Mar 2022 06:19  Phos  3.1     03-06  Mg     1.9     03-07    TPro  6.7  /  Alb  4.0  /  TBili  1.1  /  DBili  x   /  AST  26  /  ALT  79<H>  /  AlkPhos  55  03-07    CAPILLARY BLOOD GLUCOSE      POCT Blood Glucose.: 219 mg/dL (06 Mar 2022 21:18)  POCT Blood Glucose.: 263 mg/dL (06 Mar 2022 16:29)  POCT Blood Glucose.: 227 mg/dL (06 Mar 2022 11:23)      Imaging:     Subjective:   Patient seen at bedside this AM. Pt feels overall well. Denies n/v, tolerating diet. No abd pain. Ready to go home.     Objective:  Vital Signs  T(C): 36.3 (03-07 @ 04:57), Max: 36.6 (03-06 @ 12:30)  HR: 79 (03-07 @ 04:57) (65 - 79)  BP: 167/79 (03-07 @ 04:57) (128/97 - 179/79)  RR: 18 (03-07 @ 04:57) (12 - 18)  SpO2: 96% (03-07 @ 04:57) (95% - 99%)  03-06-22 @ 07:01  -  03-07-22 @ 07:00  --------------------------------------------------------  IN:  Total IN: 0 mL    OUT:    Voided (mL): 100 mL  Total OUT: 100 mL    Total NET: -100 mL          Physical Exam:  GEN: resting in bed comfortably in NAD  RESP: no increased WOB  ABD: soft, non-distended, non-tender without rebound tenderness or guarding.   EXTR: warm, well-perfused, no edema  NEURO: awake, alert    Labs:                        13.2   8.33  )-----------( 199      ( 07 Mar 2022 06:20 )             39.1   03-07    136  |  100  |  13  ----------------------------<  204<H>  3.6   |  21<L>  |  0.76    Ca    9.3      07 Mar 2022 06:19  Phos  3.1     03-06  Mg     1.9     03-07    TPro  6.7  /  Alb  4.0  /  TBili  1.1  /  DBili  x   /  AST  26  /  ALT  79<H>  /  AlkPhos  55  03-07    CAPILLARY BLOOD GLUCOSE      POCT Blood Glucose.: 219 mg/dL (06 Mar 2022 21:18)  POCT Blood Glucose.: 263 mg/dL (06 Mar 2022 16:29)  POCT Blood Glucose.: 227 mg/dL (06 Mar 2022 11:23)      Imaging:  < from: US Abdomen Upper Quadrant Right (03.07.22 @ 08:53) >  FINDINGS:    Liver: Increased echogenicity with areas of fat sparing along the   gallbladder fossa. Main portal vein is patent.  Bile ducts: Normal caliber.Common bile duct measures 4 mm.  Gallbladder: Cholelithiasis and sludge. No wall thickening or   pericholecystic fluid. Sonographic Aj sign is negative. A 3 mm   echogenic polyp is seen.  Pancreas: Visualized portions are within normal limits.  Right kidney: 12.2 cm. No hydronephrosis. Right renal cysts including a   large 4.5 cm simple cyst in the upper pole. An indeterminate 1.7 x 1.7 x   1.9 cm  mixed echogenicity cystic lesion with posterior through   transmission is seen in the mid/lowerpole of the right kidney and dates   back to 11/23/2020. As previously measured up to 2.7 cm in 2020.  Ascites: None.  IVC: Limited on grayscale imaging. Visualized portions are within normal   limits.    IMPRESSION:    Cholelithiasis and gallbladdersludge. No other secondary sonographic   signs to suggest cholecystitis at this time. If there is persistent   clinical concern for cholecystitis, hepatobiliary scintigraphy may be of   benefit.    Indeterminate 1.9 cm cystic right renal lesion that dates back to 2020   and is slightly decreased in size but its features are indeterminate.   Further evaluation with CT or MRI (renal mass protocol) is recommended   for further characterization, if not previously done elsewhere.    3 mm gallbladder polyp.    Fatty liver.    < end of copied text >

## 2022-03-07 NOTE — DIETITIAN INITIAL EVALUATION ADULT. - CONTINUE CURRENT NUTRITION CARE PLAN
Continue DASH, consistent CHO diet as appropriate. Monitor PO intake, glycemic levels, BM and provide assitance with meals as needed to promote PO intake. RD remains available./yes

## 2022-03-07 NOTE — DISCHARGE NOTE PROVIDER - HOSPITAL COURSE
70 y/o M history of prostate CA s/p prostatectomy in 2013 presumed to be in remission, fatty liver, essential HTN, type 2 DM on oral Rx, hyperlipidemia but unable to tolerate statins due to myopathy, S/P TAVR for aortic stenosis in Jan 2021 complicated with PAF on Eliquis, colon CA with past surgery and past chemotherapy, S/P PPM placement with [facility] in Nov 2021, Pfizer COVID-19 vaccines x 3, presents with epigastric midabdominal pain, atypical cp admitted for NSTEMI. sp cardiac cath & stent x 2 to RCA.   c/o abd pain, seen by Sx, RUQ US no signs of acute cholecystitis. outpt f/u with possible cholecystectomy. seen by ID, dc'ed iv abx. hx of colon ca and renal CA, outpt fu oncology  pt remains stable for DC and will f/u with PCP, Sx and onc.     PCP Dr Chata Barkley dc planning to home pending ID recs   70 y/o M history of prostate CA s/p prostatectomy in 2013 presumed to be in remission, fatty liver, essential HTN, type 2 DM on oral Rx, hyperlipidemia but unable to tolerate statins due to myopathy, S/P TAVR for aortic stenosis in Jan 2021 complicated with PAF on Eliquis, colon CA with past surgery and past chemotherapy, S/P PPM placement with [facility] in Nov 2021, Pfizer COVID-19 vaccines x 3, presents with epigastric midabdominal pain, atypical cp admitted for NSTEMI. sp cardiac cath & stent x 2 to RCA.  s/p Brilinta and switched it to plavix. off asa. pt will c/w plavix and eliquis.   c/o abd pain, seen by Sx, RUQ US no signs of acute cholecystitis. outpt f/u with possible cholecystectomy. seen by ID, dc'ed iv abx. hx of colon ca and renal CA, outpt fu oncology  pt remains stable for DC and will f/u with PCP, Sx and onc.

## 2022-03-07 NOTE — DIETITIAN INITIAL EVALUATION ADULT. - OTHER INFO
Pt reports good appetite with good PO intake in-house. Denies current chewing/swallowing difficulty.     Denies nausea, vomiting, constipation, diarrhea. Reports last BM on Saterday 3/5; Pt not currently on bowel regimen.     Per EMR, pt currently ordered for insulin lispro (ADMELOG) corrective regimen sliding scale    - pt history of T2DM, A1C with Estimated Average Glucose Result: 8.6 % (03-06-22 @ 08:18)  A1C with Estimated Average Glucose Result: 8.0 % (11-13-21 @ 11:39); elevated  3/7, and POCT 251 3/7. Endo following; ordered for consistent CHO diet; will continue to monitor     Reports UBW ~195 pounds Denies recent wt changes.   Dosing wt: 191.8 pounds somewhat consistent with UBW; with possible 3.2 pounds wt loss (1.6% wt loss) monitor wt trends    - Provided education on Carbohydrate Consistent diet including sources of carbohydrates, portion sizes, pairing protein with carbohydrates, limiting sugar sweetened beverages in diet and the importance of consistent eating pattern to help optimize glycemic control.   - Discussed DASH diet education. Reviewed foods high in Na and cholesterol to avoid. Reviewed ways to decrease Na in your diet, discussed meal and snack options, tips for eating out  - Food preferences explored and documented. Pt made aware RD remains available.

## 2022-03-07 NOTE — DISCHARGE NOTE PROVIDER - CARE PROVIDERS DIRECT ADDRESSES
,DirectAddress_Unknown,[email or web address] ,DirectAddress_Unknown,[email or web address],DirectAddress_Unknown ,DirectAddress_Unknown,[email or web address]

## 2022-03-07 NOTE — PROGRESS NOTE ADULT - SUBJECTIVE AND OBJECTIVE BOX
Patient seen and examined at bedside.    Overnight Events: No acute events     Review Of Systems: No chest pain, shortness of breath, or palpitations            Current Meds:  aMIOdarone    Tablet 100 milliGRAM(s) Oral daily  amLODIPine   Tablet 10 milliGRAM(s) Oral daily  apixaban 5 milliGRAM(s) Oral every 12 hours  aspirin enteric coated 81 milliGRAM(s) Oral daily  dextrose 40% Gel 15 Gram(s) Oral once  dextrose 5%. 1000 milliLiter(s) IV Continuous <Continuous>  dextrose 5%. 1000 milliLiter(s) IV Continuous <Continuous>  dextrose 5%. 1000 milliLiter(s) IV Continuous <Continuous>  dextrose 5%. 1000 milliLiter(s) IV Continuous <Continuous>  dextrose 50% Injectable 25 Gram(s) IV Push once  dextrose 50% Injectable 12.5 Gram(s) IV Push once  dextrose 50% Injectable 25 Gram(s) IV Push once  dextrose 50% Injectable 25 Gram(s) IV Push once  dextrose 50% Injectable 12.5 Gram(s) IV Push once  dextrose 50% Injectable 25 Gram(s) IV Push once  glucagon  Injectable 1 milliGRAM(s) IntraMuscular once  glucagon  Injectable 1 milliGRAM(s) IntraMuscular once  hydrochlorothiazide 25 milliGRAM(s) Oral daily  insulin lispro (ADMELOG) corrective regimen sliding scale   SubCutaneous three times a day before meals  insulin lispro (ADMELOG) corrective regimen sliding scale   SubCutaneous at bedtime  lisinopril 40 milliGRAM(s) Oral daily  metoprolol succinate ER 50 milliGRAM(s) Oral daily  metroNIDAZOLE  IVPB      metroNIDAZOLE  IVPB 500 milliGRAM(s) IV Intermittent every 8 hours  piperacillin/tazobactam IVPB.. 3.375 Gram(s) IV Intermittent every 8 hours  ticagrelor 90 milliGRAM(s) Oral every 12 hours      Vitals:  T(F): 97.3 (03-07), Max: 97.9 (03-06)  HR: 79 (03-07) (65 - 79)  BP: 167/79 (03-07) (128/97 - 179/79)  RR: 18 (03-07)  SpO2: 96% (03-07)  I&O's Summary    06 Mar 2022 07:01  -  07 Mar 2022 06:50  --------------------------------------------------------  IN: 595 mL / OUT: 100 mL / NET: 495 mL        Physical Exam:  Appearance: No acute distress; well appearing  Eyes: PERRL, EOMI, pink conjunctiva  HEENT: Normal oral mucosa  Cardiovascular: RRR, S1, S2, no murmurs, rubs, or gallops; no edema; no JVD; RFA w/o evidence of complication  Respiratory: Clear to auscultation bilaterally  Gastrointestinal: soft, non-tender, non-distended with normal bowel sounds  Musculoskeletal: No clubbing; no joint deformity   Neurologic: Non-focal  Lymphatic: No lymphadenopathy  Psychiatry: AAOx3, mood & affect appropriate  Skin: No rashes, ecchymoses, or cyanosis                          13.2   8.33  )-----------( 199      ( 07 Mar 2022 06:20 )             39.1     03-06    137  |  100  |  10  ----------------------------<  184<H>  3.4<L>   |  23  |  0.63    Ca    9.5      06 Mar 2022 06:27  Phos  3.1     03-06  Mg     1.8     03-06    TPro  6.9  /  Alb  4.0  /  TBili  1.0  /  DBili  x   /  AST  54<H>  /  ALT  106<H>  /  AlkPhos  70  03-06    PT/INR - ( 05 Mar 2022 19:58 )   PT: 14.7 sec;   INR: 1.26 ratio         PTT - ( 06 Mar 2022 06:27 )  PTT:28.7 sec  CARDIAC MARKERS ( 06 Mar 2022 01:18 )  90 ng/L / x     / x     / x     / x     / x      CARDIAC MARKERS ( 05 Mar 2022 19:38 )  82 ng/L / x     / x     / x     / x     / x      CARDIAC MARKERS ( 05 Mar 2022 16:24 )  82 ng/L / x     / x     / x     / x     / x          LM   Left main artery: Angiography shows mild atherosclerosis.      LAD   Left anterior descending artery: Angiography shows mild  atherosclerosis.    CX   Circumflex: Angiography shows mild atherosclerosis.      RCA   Right coronary artery: There is a 90 % stenosis.        Conclusions:  1. Transcatheter aortic valve replacement.  Aortic valve  not well visualized. Peaktransaortic valve gradient equals  21 mm Hg, mean transaortic valve gradient equals 12 mm Hg,  which is probably normal in the presence of a transcatheter  aortic valve replacement.  2. Normal left ventricular internal dimensions and wall  thicknesses.  3. Normal left ventricular systolic function. No segmental  wall motion abnormalities.  4. The right ventricle is not well visualized; grossly  normal right ventricular systolic function.  *** Compared with echocardiogram of 2/25/2021, no  significant changes noted.   Patient seen and examined at bedside.    Overnight Events: No acute events     Review Of Systems:   CONSTITUTIONAL: No weakness, fevers or chills  EYES/ENT: No visual changes;  No dysphagia  NECK: No pain or stiffness  RESPIRATORY: No cough, wheezing, hemoptysis  CARDIOVASCULAR: No chest pain or palpitations; No lower extremity edema  GASTROINTESTINAL: No abdominal or epigastric pain. No nausea, vomiting, or hematemesis; No diarrhea or constipation. No melena or hematochezia.  BACK: No back pain  GENITOURINARY: No dysuria, frequency or hematuria  NEUROLOGICAL: No numbness or weakness  SKIN: No itching, burning, rashes, or lesions   All other review of systems is negative unless indicated above.           Current Meds:  aMIOdarone    Tablet 100 milliGRAM(s) Oral daily  amLODIPine   Tablet 10 milliGRAM(s) Oral daily  apixaban 5 milliGRAM(s) Oral every 12 hours  aspirin enteric coated 81 milliGRAM(s) Oral daily  dextrose 40% Gel 15 Gram(s) Oral once  dextrose 5%. 1000 milliLiter(s) IV Continuous <Continuous>  dextrose 5%. 1000 milliLiter(s) IV Continuous <Continuous>  dextrose 5%. 1000 milliLiter(s) IV Continuous <Continuous>  dextrose 5%. 1000 milliLiter(s) IV Continuous <Continuous>  dextrose 50% Injectable 25 Gram(s) IV Push once  dextrose 50% Injectable 12.5 Gram(s) IV Push once  dextrose 50% Injectable 25 Gram(s) IV Push once  dextrose 50% Injectable 25 Gram(s) IV Push once  dextrose 50% Injectable 12.5 Gram(s) IV Push once  dextrose 50% Injectable 25 Gram(s) IV Push once  glucagon  Injectable 1 milliGRAM(s) IntraMuscular once  glucagon  Injectable 1 milliGRAM(s) IntraMuscular once  hydrochlorothiazide 25 milliGRAM(s) Oral daily  insulin lispro (ADMELOG) corrective regimen sliding scale   SubCutaneous three times a day before meals  insulin lispro (ADMELOG) corrective regimen sliding scale   SubCutaneous at bedtime  lisinopril 40 milliGRAM(s) Oral daily  metoprolol succinate ER 50 milliGRAM(s) Oral daily  metroNIDAZOLE  IVPB      metroNIDAZOLE  IVPB 500 milliGRAM(s) IV Intermittent every 8 hours  piperacillin/tazobactam IVPB.. 3.375 Gram(s) IV Intermittent every 8 hours  ticagrelor 90 milliGRAM(s) Oral every 12 hours      Vitals:  T(F): 97.3 (03-07), Max: 97.9 (03-06)  HR: 79 (03-07) (65 - 79)  BP: 167/79 (03-07) (128/97 - 179/79)  RR: 18 (03-07)  SpO2: 96% (03-07)  I&O's Summary    06 Mar 2022 07:01  -  07 Mar 2022 06:50  --------------------------------------------------------  IN: 595 mL / OUT: 100 mL / NET: 495 mL        Physical Exam:  Appearance: No acute distress; well appearing  Eyes: PERRL, EOMI, pink conjunctiva  HEENT: Normal oral mucosa  Cardiovascular: RRR, S1, S2, no murmurs, rubs, or gallops; no edema; no JVD; RFA w/o evidence of complication  Respiratory: Clear to auscultation bilaterally  Gastrointestinal: soft, non-tender, non-distended with normal bowel sounds  Musculoskeletal: No clubbing; no joint deformity   Neurologic: Non-focal  Lymphatic: No lymphadenopathy  Psychiatry: AAOx3, mood & affect appropriate  Skin: No rashes, ecchymoses, or cyanosis                          13.2   8.33  )-----------( 199      ( 07 Mar 2022 06:20 )             39.1     03-06    137  |  100  |  10  ----------------------------<  184<H>  3.4<L>   |  23  |  0.63    Ca    9.5      06 Mar 2022 06:27  Phos  3.1     03-06  Mg     1.8     03-06    TPro  6.9  /  Alb  4.0  /  TBili  1.0  /  DBili  x   /  AST  54<H>  /  ALT  106<H>  /  AlkPhos  70  03-06    PT/INR - ( 05 Mar 2022 19:58 )   PT: 14.7 sec;   INR: 1.26 ratio         PTT - ( 06 Mar 2022 06:27 )  PTT:28.7 sec  CARDIAC MARKERS ( 06 Mar 2022 01:18 )  90 ng/L / x     / x     / x     / x     / x      CARDIAC MARKERS ( 05 Mar 2022 19:38 )  82 ng/L / x     / x     / x     / x     / x      CARDIAC MARKERS ( 05 Mar 2022 16:24 )  82 ng/L / x     / x     / x     / x     / x          LM   Left main artery: Angiography shows mild atherosclerosis.      LAD   Left anterior descending artery: Angiography shows mild  atherosclerosis.    CX   Circumflex: Angiography shows mild atherosclerosis.      RCA   Right coronary artery: There is a 90 % stenosis.        Conclusions:  1. Transcatheter aortic valve replacement.  Aortic valve  not well visualized. Peaktransaortic valve gradient equals  21 mm Hg, mean transaortic valve gradient equals 12 mm Hg,  which is probably normal in the presence of a transcatheter  aortic valve replacement.  2. Normal left ventricular internal dimensions and wall  thicknesses.  3. Normal left ventricular systolic function. No segmental  wall motion abnormalities.  4. The right ventricle is not well visualized; grossly  normal right ventricular systolic function.  *** Compared with echocardiogram of 2/25/2021, no  significant changes noted.

## 2022-03-07 NOTE — DIETITIAN INITIAL EVALUATION ADULT. - PHYSCIAL ASSESSMENT
135% IBW  Skin: no noted pressure injuries as per flowsheets   Performed nutrition focused physical exam with pt's consent and noted no signs of muscle/fat loss

## 2022-03-07 NOTE — CONSULT NOTE ADULT - SUBJECTIVE AND OBJECTIVE BOX
[facility], Division of Infectious Diseases  SEMAJ Rodriguez S. Shah, Y. Patel, G. Casimir  [phone]  ([phone] - weekdays after 5pm and weekends)    ROBINSON RIVERS  71y, Male  [id number]    HPI--  HPI:  NIGHT HOSPITALIST:   Patient UNKNOWN to me previously, assigned to me at this point via the ER and by Dr. Gutiérrez of the [facility] to admit this 70 y/o M--followed by his PCP above--patient with a history of prostate CA s/P prostatectomy in  presumed to be in remission, fatty liver, essential HTN maintained on Norvasc, HCTZ, lisinopril, Toprol XL, type 2 DM on oral Rx, hyperlipidemia but unable to tolerate statins due to myopathy, S/P TAVR for aortic stenosis in 2021 complicated with PAF with patient maintained on Eliquis (last dose 10 AM today) and amiodarone 100 mg daily, colon CA with past surgery and past chemotherapy, S/P PPM placement with [facility] in 2021, Pfizer COVID-19 vaccines x 3, with patient self referring following steady epigastric midabdominal pain since 10AM today with no clear palliative or exacerbating factors.  Patient with poor exercise tolerance but presently denies chest pain/pressure.  NO N/V.  No fever, no chills, no rigors.   No diaphoresis.    (05 Mar 2022 21:46)    ROS: 14 point review of systems completed, pertinent positives and negatives as per HPI.    Allergies: statins (Other)  Zetia (Other)    PMH -- Asthma    Hypertension    Diabetes mellitus    Prostate cancer    Lipoma    Heart murmur    Aortic valve stenosis, etiology of cardiac valve disease unspecified    Malignant neoplasm of colon, unspecified part of colon    Fatty liver    Hyperlipidemia, unspecified hyperlipidemia type    T2DM (type 2 diabetes mellitus)    Myositis      PSH -- S/P TURP    S/P prostatectomy    S/P excision of lipoma    Acute cholecystitis      FH -- Family history of aneurysm    Family history of heart disease (Child)    Family history of hypertension in father (Father)    Family history of early CAD (Child)      Social History -- denies tobacco, alcohol or illicit drug use  Travel/Environmental/Occupational History: ***    Physical Exam--  Vital Signs Last 24 Hrs  T(F): 98 (07 Mar 2022 12:24), Max: 98 (07 Mar 2022 12:24)  HR: 76 (07 Mar 2022 12:24) (72 - 79)  BP: 130/79 (07 Mar 2022 12:24) (130/79 - 167/79)  RR: 18 (07 Mar 2022 12:24) (18 - 18)  SpO2: 97% (07 Mar 2022 12:24) (96% - 99%)  General: nontoxic-appearing, no acute distress  HEENT: NC/AT, EOMI, anicteric, conjunctiva pink and moist, oropharynx clear, dentition fair, neck supple  Neck: Not rigid. No sense of mass. No LAD  Lungs: Clear bilaterally without rales, wheezing or rhonchi  Heart: Regular rate and rhythm. No murmur, rub or gallop.  Abdomen: Soft. Nondistended. Nontender. BS present. No organomegaly.  Neuro: AAOx3, no obvious focal deficits   Back: No spinal tenderness. No costovertebral angle tenderness.  Extremities: No cyanosis or clubbing. No edema.   Skin: Warm. Dry. Good turgor. No rash. No vasculitic stigmata.  Psychiatric: Appropriate affect and mood for situation.   Lines:    Laboratory & Imaging Data--  CBC:                       13.2   8.33  )-----------( 199      ( 07 Mar 2022 06:20 )             39.1     WBC Count: 8.33 K/uL (22 @ 06:20)  WBC Count: 8.42 K/uL (22 @ 06:27)  WBC Count: 9.80 K/uL (22 @ 16:24)    CMP:     136  |  100  |  13  ----------------------------<  204<H>  3.6   |  21<L>  |  0.76    Ca    9.3      07 Mar 2022 06:19  Phos  3.1     03-  Mg     1.9     -    TPro  6.7  /  Alb  4.0  /  TBili  1.1  /  DBili  x   /  AST  26  /  ALT  79<H>  /  AlkPhos  55  03-07    LIVER FUNCTIONS - ( 07 Mar 2022 06:19 )  Alb: 4.0 g/dL / Pro: 6.7 g/dL / ALK PHOS: 55 U/L / ALT: 79 U/L / AST: 26 U/L / GGT: x           Urinalysis Basic - ( 05 Mar 2022 16:34 )    Color: Light Yellow / Appearance: Clear / S.006 / pH: x  Gluc: x / Ketone: Negative  / Bili: Negative / Urobili: Negative   Blood: x / Protein: Negative / Nitrite: Negative   Leuk Esterase: Negative / RBC: 0 /hpf / WBC 0 /HPF   Sq Epi: x / Non Sq Epi: x / Bacteria: 0.0      Microbiology:     Radiology--  ***  Active Medications--  aMIOdarone    Tablet 100 milliGRAM(s) Oral daily  amLODIPine   Tablet 10 milliGRAM(s) Oral daily  apixaban 5 milliGRAM(s) Oral every 12 hours  aspirin enteric coated 81 milliGRAM(s) Oral daily  dextrose 40% Gel 15 Gram(s) Oral once  dextrose 5%. 1000 milliLiter(s) IV Continuous <Continuous>  dextrose 5%. 1000 milliLiter(s) IV Continuous <Continuous>  dextrose 5%. 1000 milliLiter(s) IV Continuous <Continuous>  dextrose 5%. 1000 milliLiter(s) IV Continuous <Continuous>  dextrose 50% Injectable 25 Gram(s) IV Push once  dextrose 50% Injectable 12.5 Gram(s) IV Push once  dextrose 50% Injectable 25 Gram(s) IV Push once  dextrose 50% Injectable 25 Gram(s) IV Push once  dextrose 50% Injectable 12.5 Gram(s) IV Push once  dextrose 50% Injectable 25 Gram(s) IV Push once  glucagon  Injectable 1 milliGRAM(s) IntraMuscular once  glucagon  Injectable 1 milliGRAM(s) IntraMuscular once  hydrochlorothiazide 25 milliGRAM(s) Oral daily  insulin lispro (ADMELOG) corrective regimen sliding scale   SubCutaneous three times a day before meals  insulin lispro (ADMELOG) corrective regimen sliding scale   SubCutaneous at bedtime  lisinopril 40 milliGRAM(s) Oral daily  metoprolol succinate ER 50 milliGRAM(s) Oral daily  piperacillin/tazobactam IVPB.. 3.375 Gram(s) IV Intermittent every 8 hours  ticagrelor 90 milliGRAM(s) Oral every 12 hours    Antimicrobials:   piperacillin/tazobactam IVPB.. 3.375 Gram(s) IV Intermittent every 8 hours    Immunologic:    [facility], Division of Infectious Diseases  SEAMJ Rodriguez, JUJU Alston, RONDA Harris Casimir  [phone]  ([phone] - weekdays after 5pm and weekends)    ROBINSON RIVERS  71y, Male  [id number]    HPI--  HPI:  72 y/o M PMhx prostate CA s/P prostatectomy, fatty liver, HTN, CAD, DM II unable to tolerate statins due to myopathy, AS s/p TAVR, PAF, colon CA s/p surgery and chemotherapy who presented w/ constant non-radiating epigastric pain x1 day. No clear palliative or exacerbating factors. Patient with poor exercise tolerance. No fever, chills, SOB,   s/p CT abd/pelvis c/f acute cholecystitis started on zosyn  found to have NSTEMI s/p RHC w/ PCI/WALLACE x 2 to RCA, heparin gtt, antiplatelet agents    ROS: 10 point review of systems completed, pertinent positives and negatives as per HPI.    Allergies: statins (Other)  Zetia (Other)    PMH -- Asthma    Hypertension    Diabetes mellitus    Prostate cancer    Lipoma    Heart murmur    Aortic valve stenosis, etiology of cardiac valve disease unspecified    Malignant neoplasm of colon, unspecified part of colon    Fatty liver    Hyperlipidemia, unspecified hyperlipidemia type    T2DM (type 2 diabetes mellitus)    Myositis      PSH -- S/P TURP    S/P prostatectomy    S/P excision of lipoma    Acute cholecystitis      FH -- Family history of aneurysm    Family history of heart disease (Child)    Family history of hypertension in father (Father)    Family history of early CAD (Child)      Social History -- denies tobacco, alcohol or illicit drug use  Travel/Environmental/Occupational History: ***    Physical Exam--  Vital Signs Last 24 Hrs  T(F): 98 (07 Mar 2022 12:24), Max: 98 (07 Mar 2022 12:24)  HR: 76 (07 Mar 2022 12:24) (72 - 79)  BP: 130/79 (07 Mar 2022 12:24) (130/79 - 167/79)  RR: 18 (07 Mar 2022 12:24) (18 - 18)  SpO2: 97% (07 Mar 2022 12:24) (96% - 99%)  General: nontoxic-appearing, no acute distress  HEENT: NC/AT, EOMI, anicteric, conjunctiva pink and moist, oropharynx clear, dentition fair, neck supple  Neck: Not rigid. No sense of mass. No LAD  Lungs: Clear bilaterally without rales, wheezing or rhonchi  Heart: Regular rate and rhythm. No murmur, rub or gallop.  Abdomen: Soft. Nondistended. Nontender. BS present. No organomegaly.  Neuro: AAOx3, no obvious focal deficits   Back: No spinal tenderness. No costovertebral angle tenderness.  Extremities: No cyanosis or clubbing. No edema.   Skin: Warm. Dry. Good turgor. No rash. No vasculitic stigmata.  Psychiatric: Appropriate affect and mood for situation.   Lines:    Laboratory & Imaging Data--  CBC:                       13.2   8.33  )-----------( 199      ( 07 Mar 2022 06:20 )             39.1     WBC Count: 8.33 K/uL (22 @ 06:20)  WBC Count: 8.42 K/uL (22 @ 06:27)  WBC Count: 9.80 K/uL (22 @ 16:24)    CMP:     136  |  100  |  13  ----------------------------<  204<H>  3.6   |  21<L>  |  0.76    Ca    9.3      07 Mar 2022 06:19  Phos  3.1     03-  Mg     1.9     -    TPro  6.7  /  Alb  4.0  /  TBili  1.1  /  DBili  x   /  AST  26  /  ALT  79<H>  /  AlkPhos  55  03-07    LIVER FUNCTIONS - ( 07 Mar 2022 06:19 )  Alb: 4.0 g/dL / Pro: 6.7 g/dL / ALK PHOS: 55 U/L / ALT: 79 U/L / AST: 26 U/L / GGT: x           Urinalysis Basic - ( 05 Mar 2022 16:34 )    Color: Light Yellow / Appearance: Clear / S.006 / pH: x  Gluc: x / Ketone: Negative  / Bili: Negative / Urobili: Negative   Blood: x / Protein: Negative / Nitrite: Negative   Leuk Esterase: Negative / RBC: 0 /hpf / WBC 0 /HPF   Sq Epi: x / Non Sq Epi: x / Bacteria: 0.0      Microbiology:     Radiology--  ***  Active Medications--  aMIOdarone    Tablet 100 milliGRAM(s) Oral daily  amLODIPine   Tablet 10 milliGRAM(s) Oral daily  apixaban 5 milliGRAM(s) Oral every 12 hours  aspirin enteric coated 81 milliGRAM(s) Oral daily  dextrose 40% Gel 15 Gram(s) Oral once  dextrose 5%. 1000 milliLiter(s) IV Continuous <Continuous>  dextrose 5%. 1000 milliLiter(s) IV Continuous <Continuous>  dextrose 5%. 1000 milliLiter(s) IV Continuous <Continuous>  dextrose 5%. 1000 milliLiter(s) IV Continuous <Continuous>  dextrose 50% Injectable 25 Gram(s) IV Push once  dextrose 50% Injectable 12.5 Gram(s) IV Push once  dextrose 50% Injectable 25 Gram(s) IV Push once  dextrose 50% Injectable 25 Gram(s) IV Push once  dextrose 50% Injectable 12.5 Gram(s) IV Push once  dextrose 50% Injectable 25 Gram(s) IV Push once  glucagon  Injectable 1 milliGRAM(s) IntraMuscular once  glucagon  Injectable 1 milliGRAM(s) IntraMuscular once  hydrochlorothiazide 25 milliGRAM(s) Oral daily  insulin lispro (ADMELOG) corrective regimen sliding scale   SubCutaneous three times a day before meals  insulin lispro (ADMELOG) corrective regimen sliding scale   SubCutaneous at bedtime  lisinopril 40 milliGRAM(s) Oral daily  metoprolol succinate ER 50 milliGRAM(s) Oral daily  piperacillin/tazobactam IVPB.. 3.375 Gram(s) IV Intermittent every 8 hours  ticagrelor 90 milliGRAM(s) Oral every 12 hours    Antimicrobials:   piperacillin/tazobactam IVPB.. 3.375 Gram(s) IV Intermittent every 8 hours    Immunologic:    [facility], Division of Infectious Diseases  SEMAJ Rodriguez, CRISTY Singh G. Casimir  [phone]  ([phone] - weekdays after 5pm and weekends)    ROBINSON RIVERS  71y, Male  [id number]    HPI--  HPI:  70 y/o M PMhx prostate CA s/P prostatectomy, fatty liver, HTN, CAD, DM II unable to tolerate statins due to myopathy, AS s/p TAVR, PAF, colon CA s/p surgery and chemotherapy who presented w/ constant non-radiating epigastric pain x1 day. No clear palliative or exacerbating factors. Patient with poor exercise tolerance. No fever, chills, SOB, diarrhea, n/v.  s/p CT abd/pelvis c/f acute cholecystitis started on zosyn  found to have NSTEMI s/p RHC w/ PCI/WALLACE to RCA, heparin gtt, antiplatelet agents    ROS: 10 point review of systems completed, pertinent positives and negatives as per HPI.    Allergies: statins (Other)  Zetia (Other)    PMH -- Asthma    Hypertension    Diabetes mellitus    Prostate cancer    Lipoma    Heart murmur    Aortic valve stenosis, etiology of cardiac valve disease unspecified    Malignant neoplasm of colon, unspecified part of colon    Fatty liver    Hyperlipidemia, unspecified hyperlipidemia type    T2DM (type 2 diabetes mellitus)    Myositis      PSH -- S/P TURP    S/P prostatectomy    S/P excision of lipoma    Acute cholecystitis      FH -- Family history of aneurysm    Family history of heart disease (Child)    Family history of hypertension in father (Father)    Family history of early CAD (Child)      Social History -- denies tobacco, alcohol or illicit drug use  Travel/Environmental/Occupational History: ***    Physical Exam--  Vital Signs Last 24 Hrs  T(F): 98 (07 Mar 2022 12:24), Max: 98 (07 Mar 2022 12:24)  HR: 76 (07 Mar 2022 12:24) (72 - 79)  BP: 130/79 (07 Mar 2022 12:24) (130/79 - 167/79)  RR: 18 (07 Mar 2022 12:24) (18 - 18)  SpO2: 97% (07 Mar 2022 12:24) (96% - 99%)  General: nontoxic-appearing, no acute distress  HEENT: NC/AT, EOMI, anicteric, conjunctiva pink and moist, oropharynx clear, dentition fair, neck supple  Neck: Not rigid. No sense of mass. No LAD  Lungs: Clear bilaterally without rales, wheezing or rhonchi  Heart: Regular rate and rhythm. No murmur, rub or gallop.  Abdomen: Soft. Nondistended. Nontender. BS present. No organomegaly.  Neuro: AAOx3, no obvious focal deficits   Back: No spinal tenderness. No costovertebral angle tenderness.  Extremities: No cyanosis or clubbing. No edema.   Skin: Warm. Dry. Good turgor. No rash. No vasculitic stigmata.  Psychiatric: Appropriate affect and mood for situation.   Lines:    Laboratory & Imaging Data--  CBC:                       13.2   8.33  )-----------( 199      ( 07 Mar 2022 06:20 )             39.1     WBC Count: 8.33 K/uL (22 @ 06:20)  WBC Count: 8.42 K/uL (22 @ 06:27)  WBC Count: 9.80 K/uL (22 @ 16:24)    CMP:     136  |  100  |  13  ----------------------------<  204<H>  3.6   |  21<L>  |  0.76    Ca    9.3      07 Mar 2022 06:19  Phos  3.1     03-06  Mg     1.9     -    TPro  6.7  /  Alb  4.0  /  TBili  1.1  /  DBili  x   /  AST  26  /  ALT  79<H>  /  AlkPhos  55  03-07    LIVER FUNCTIONS - ( 07 Mar 2022 06:19 )  Alb: 4.0 g/dL / Pro: 6.7 g/dL / ALK PHOS: 55 U/L / ALT: 79 U/L / AST: 26 U/L / GGT: x           Urinalysis Basic - ( 05 Mar 2022 16:34 )    Color: Light Yellow / Appearance: Clear / S.006 / pH: x  Gluc: x / Ketone: Negative  / Bili: Negative / Urobili: Negative   Blood: x / Protein: Negative / Nitrite: Negative   Leuk Esterase: Negative / RBC: 0 /hpf / WBC 0 /HPF   Sq Epi: x / Non Sq Epi: x / Bacteria: 0.0      Microbiology:     Radiology--  ***  Active Medications--  aMIOdarone    Tablet 100 milliGRAM(s) Oral daily  amLODIPine   Tablet 10 milliGRAM(s) Oral daily  apixaban 5 milliGRAM(s) Oral every 12 hours  aspirin enteric coated 81 milliGRAM(s) Oral daily  dextrose 40% Gel 15 Gram(s) Oral once  dextrose 5%. 1000 milliLiter(s) IV Continuous <Continuous>  dextrose 5%. 1000 milliLiter(s) IV Continuous <Continuous>  dextrose 5%. 1000 milliLiter(s) IV Continuous <Continuous>  dextrose 5%. 1000 milliLiter(s) IV Continuous <Continuous>  dextrose 50% Injectable 25 Gram(s) IV Push once  dextrose 50% Injectable 12.5 Gram(s) IV Push once  dextrose 50% Injectable 25 Gram(s) IV Push once  dextrose 50% Injectable 25 Gram(s) IV Push once  dextrose 50% Injectable 12.5 Gram(s) IV Push once  dextrose 50% Injectable 25 Gram(s) IV Push once  glucagon  Injectable 1 milliGRAM(s) IntraMuscular once  glucagon  Injectable 1 milliGRAM(s) IntraMuscular once  hydrochlorothiazide 25 milliGRAM(s) Oral daily  insulin lispro (ADMELOG) corrective regimen sliding scale   SubCutaneous three times a day before meals  insulin lispro (ADMELOG) corrective regimen sliding scale   SubCutaneous at bedtime  lisinopril 40 milliGRAM(s) Oral daily  metoprolol succinate ER 50 milliGRAM(s) Oral daily  piperacillin/tazobactam IVPB.. 3.375 Gram(s) IV Intermittent every 8 hours  ticagrelor 90 milliGRAM(s) Oral every 12 hours    Antimicrobials:   piperacillin/tazobactam IVPB.. 3.375 Gram(s) IV Intermittent every 8 hours    Immunologic:    [facility], Division of Infectious Diseases  SEMAJ Rodriguez S. Shah, Y. Patel, G. Casimir  [phone]  ([phone] - weekdays after 5pm and weekends)    ROBINSON RIVERS  71y, Male  [id number]    HPI--  HPI:  72 y/o M PMhx prostate CA s/P prostatectomy, fatty liver, HTN, CAD, DM II unable to tolerate statins due to myopathy, AS s/p TAVR, PAF, colon CA s/p surgery and chemotherapy who presented w/ constant non-radiating epigastric pain x1 day. No clear palliative or exacerbating factors. Not aggregated by PO intake. Patient with poor exercise tolerance. No fever, chills, SOB, diarrhea, n/v. s/p CT abd/pelvis c/f acute cholecystitis started on zosyn. Also found to have NSTEMI s/p RHC w/ PCI/WALLACE to RCA, heparin gtt, antiplatelet agents.    ROS: 10 point review of systems completed, pertinent positives and negatives as per HPI.    Allergies: statins (Other)  Zetia (Other)    PMH -- Asthma    Hypertension    Diabetes mellitus    Prostate cancer    Lipoma    Heart murmur    Aortic valve stenosis, etiology of cardiac valve disease unspecified    Malignant neoplasm of colon, unspecified part of colon    Fatty liver    Hyperlipidemia, unspecified hyperlipidemia type    T2DM (type 2 diabetes mellitus)    Myositis      PSH -- S/P TURP    S/P prostatectomy    S/P excision of lipoma    Acute cholecystitis      FH -- Family history of aneurysm    Family history of heart disease (Child)    Family history of hypertension in father (Father)    Family history of early CAD (Child)      Social History -- denies tobacco, alcohol or illicit drug use  Travel/Environmental/Occupational History: ***    Physical Exam--  Vital Signs Last 24 Hrs  T(F): 98 (07 Mar 2022 12:24), Max: 98 (07 Mar 2022 12:24)  HR: 76 (07 Mar 2022 12:24) (72 - 79)  BP: 130/79 (07 Mar 2022 12:24) (130/79 - 167/79)  RR: 18 (07 Mar 2022 12:24) (18 - 18)  SpO2: 97% (07 Mar 2022 12:24) (96% - 99%)  General: nontoxic-appearing, no acute distress  HEENT: NC/AT, EOMI, anicteric, oropharynx clear, partial upper and lower dentures  Neck: Not rigid. No LAD  Lungs: Clear bilaterally without rales, wheezing or rhonchi  Heart: S1, S2, no murmur heard  Abdomen: Soft. Nondistended. Nontender  Neuro: AAOx3  Back: No spinal tenderness. No costovertebral angle tenderness.  Extremities: trace LE edema.   Skin: Warm. Dry. Good turgor. No rash  Psychiatric: Appropriate affect and mood for situation.   Lines:    Laboratory & Imaging Data--  CBC:                       13.2   8.33  )-----------( 199      ( 07 Mar 2022 06:20 )             39.1     WBC Count: 8.33 K/uL (22 @ 06:20)  WBC Count: 8.42 K/uL (22 @ 06:27)  WBC Count: 9.80 K/uL (22 @ 16:24)    CMP:     136  |  100  |  13  ----------------------------<  204<H>  3.6   |  21<L>  |  0.76    Ca    9.3      07 Mar 2022 06:19  Phos  3.1     -  Mg     1.9     -    TPro  6.7  /  Alb  4.0  /  TBili  1.1  /  DBili  x   /  AST  26  /  ALT  79<H>  /  AlkPhos  55  03-07    LIVER FUNCTIONS - ( 07 Mar 2022 06:19 )  Alb: 4.0 g/dL / Pro: 6.7 g/dL / ALK PHOS: 55 U/L / ALT: 79 U/L / AST: 26 U/L / GGT: x           Urinalysis Basic - ( 05 Mar 2022 16:34 )    Color: Light Yellow / Appearance: Clear / S.006 / pH: x  Gluc: x / Ketone: Negative  / Bili: Negative / Urobili: Negative   Blood: x / Protein: Negative / Nitrite: Negative   Leuk Esterase: Negative / RBC: 0 /hpf / WBC 0 /HPF   Sq Epi: x / Non Sq Epi: x / Bacteria: 0.0      Microbiology:     Radiology--  ***  Active Medications--  aMIOdarone    Tablet 100 milliGRAM(s) Oral daily  amLODIPine   Tablet 10 milliGRAM(s) Oral daily  apixaban 5 milliGRAM(s) Oral every 12 hours  aspirin enteric coated 81 milliGRAM(s) Oral daily  dextrose 40% Gel 15 Gram(s) Oral once  dextrose 5%. 1000 milliLiter(s) IV Continuous <Continuous>  dextrose 5%. 1000 milliLiter(s) IV Continuous <Continuous>  dextrose 5%. 1000 milliLiter(s) IV Continuous <Continuous>  dextrose 5%. 1000 milliLiter(s) IV Continuous <Continuous>  dextrose 50% Injectable 25 Gram(s) IV Push once  dextrose 50% Injectable 12.5 Gram(s) IV Push once  dextrose 50% Injectable 25 Gram(s) IV Push once  dextrose 50% Injectable 25 Gram(s) IV Push once  dextrose 50% Injectable 12.5 Gram(s) IV Push once  dextrose 50% Injectable 25 Gram(s) IV Push once  glucagon  Injectable 1 milliGRAM(s) IntraMuscular once  glucagon  Injectable 1 milliGRAM(s) IntraMuscular once  hydrochlorothiazide 25 milliGRAM(s) Oral daily  insulin lispro (ADMELOG) corrective regimen sliding scale   SubCutaneous three times a day before meals  insulin lispro (ADMELOG) corrective regimen sliding scale   SubCutaneous at bedtime  lisinopril 40 milliGRAM(s) Oral daily  metoprolol succinate ER 50 milliGRAM(s) Oral daily  piperacillin/tazobactam IVPB.. 3.375 Gram(s) IV Intermittent every 8 hours  ticagrelor 90 milliGRAM(s) Oral every 12 hours    Antimicrobials:   piperacillin/tazobactam IVPB.. 3.375 Gram(s) IV Intermittent every 8 hours    Immunologic:

## 2022-03-07 NOTE — DISCHARGE NOTE PROVIDER - NPI NUMBER (FOR SYSADMIN USE ONLY) :
[[id number] [[id number]] [[id number]]

## 2022-03-07 NOTE — DISCHARGE NOTE PROVIDER - PROVIDER TOKENS
PROVIDER:[TOKEN:[[id number]],FOLLOWUP:[1 week]],PROVIDER:[TOKEN:[[id number]],FOLLOWUP:[2 weeks]] PROVIDER:[TOKEN:[[id number]],FOLLOWUP:[1 week]],PROVIDER:[TOKEN:[[id number]],FOLLOWUP:[2 weeks]],PROVIDER:[TOKEN:[[id number]],FOLLOWUP:[2 weeks]] PROVIDER:[TOKEN:[[id number]],FOLLOWUP:[1 week]],PROVIDER:[TOKEN:[[id number]],FOLLOWUP:[2 weeks]],PROVIDER:[TOKEN:[[id number]],FOLLOWUP:[2 weeks]]

## 2022-03-07 NOTE — DISCHARGE NOTE PROVIDER - CARE PROVIDER_API CALL
Chata Barkley)  Internal Medicine  [address] [facility], [address]  [place] [address]  Phone: [phone]  Fax: [phone]  Follow Up Time: 1 week    Mj Gutierrez)  Surgery; Surgical Critical Care  [address] [facility], [address]  [place] [address]  Phone: [phone]  Fax: [phone]  Follow Up Time: 2 weeks   Chata aBrkley)  Internal Medicine  [address] [facility], [address]  [place] [address]  Phone: [phone]  Fax: [phone]  Follow Up Time: 1 week    Mj Gutierrez)  Surgery; Surgical Critical Care  [address] [facility], [address]  [place] [address]  Phone: [phone]  Fax: [phone]  Follow Up Time: 2 weeks    SHAHAB MARTIN  Internal Medicine  [address] [place] [address]  Phone: ()-  Fax: ()-  Follow Up Time: 2 weeks   Chata Barkley)  Internal Medicine  [address] [facility], [address]  [place] [address]  Phone: [phone]  Fax: [phone]  Follow Up Time: 1 week    Mj Gutierrez)  Surgery; Surgical Critical Care  [address] [facility], [address]  [place] [address]  Phone: [phone]  Fax: [phone]  Follow Up Time: 2 weeks    Beni Gonzalez  CARDIOVASCULAR DISEASE  [address] [place] [address]  Phone: [phone]  Fax: [phone]  Follow Up Time: 2 weeks

## 2022-03-07 NOTE — DIETITIAN INITIAL EVALUATION ADULT. - PERTINENT MEDS FT
MEDICATIONS  (STANDING):  aMIOdarone    Tablet 100 milliGRAM(s) Oral daily  amLODIPine   Tablet 10 milliGRAM(s) Oral daily  apixaban 5 milliGRAM(s) Oral every 12 hours  aspirin enteric coated 81 milliGRAM(s) Oral daily  dextrose 40% Gel 15 Gram(s) Oral once  dextrose 5%. 1000 milliLiter(s) (50 mL/Hr) IV Continuous <Continuous>  dextrose 5%. 1000 milliLiter(s) (100 mL/Hr) IV Continuous <Continuous>  dextrose 5%. 1000 milliLiter(s) (50 mL/Hr) IV Continuous <Continuous>  dextrose 5%. 1000 milliLiter(s) (100 mL/Hr) IV Continuous <Continuous>  dextrose 50% Injectable 25 Gram(s) IV Push once  dextrose 50% Injectable 12.5 Gram(s) IV Push once  dextrose 50% Injectable 25 Gram(s) IV Push once  dextrose 50% Injectable 25 Gram(s) IV Push once  dextrose 50% Injectable 12.5 Gram(s) IV Push once  dextrose 50% Injectable 25 Gram(s) IV Push once  glucagon  Injectable 1 milliGRAM(s) IntraMuscular once  glucagon  Injectable 1 milliGRAM(s) IntraMuscular once  hydrochlorothiazide 25 milliGRAM(s) Oral daily  insulin lispro (ADMELOG) corrective regimen sliding scale   SubCutaneous three times a day before meals  insulin lispro (ADMELOG) corrective regimen sliding scale   SubCutaneous at bedtime  lisinopril 40 milliGRAM(s) Oral daily  metoprolol succinate ER 50 milliGRAM(s) Oral daily  ticagrelor 90 milliGRAM(s) Oral every 12 hours    MEDICATIONS  (PRN):    Home Medications:  acetaminophen 325 mg oral tablet: 2 tab(s) orally every 6 hours, As needed, Temp greater or equal to 38C (100.4F), Mild Pain (1 - 3) (05 Mar 2022 21:53)  amiodarone 100 mg oral tablet: 1 tab(s) orally once a day (05 Mar 2022 21:53)  amLODIPine 10 mg oral tablet: 1 tab(s) orally once a day (05 Mar 2022 21:53)  metoprolol succinate 50 mg oral tablet, extended release: 1 tab(s) orally once a day (05 Mar 2022 21:53)  Vitamin B12 1000 mcg oral tablet: 1 tab(s) orally once a day (05 Mar 2022 21:53)  Vitamin D3 2000 intl units oral capsule: 1 cap(s) orally once a day (05 Mar 2022 21:53)

## 2022-03-07 NOTE — PROGRESS NOTE ADULT - REASON FOR ADMISSION
Steady epigastric pain since 1000 today.

## 2022-03-07 NOTE — PROGRESS NOTE ADULT - ASSESSMENT
Patient is a 71M pmh ascending colon ca s/p right hemicolectomy 3/2021 s/p chemo, prostate ca 2003 s/p prostatectomy, dm, htn, TAVR in 1/21 c/b pAF on eliquis (last taken 10am) p/w findings concerning for NSTEMI given history of RCA stenosis, started on heparin gtt, dapt. General surgery consulted for evaluation of benign biliary disease, pt's symptoms have significantly resolved.    PLAN:    - Low suspicion for acute biliary pathology given exam and clinical findings. RUQ U/s to r/o cholecystitis pending  - Would recommend ertapenem for abx coverage  - After discharge pt may follow up with ACS surgery    ATP  [id number] Patient is a 71M pmh ascending colon ca s/p right hemicolectomy 3/2021 s/p chemo, prostate ca 2003 s/p prostatectomy, dm, htn, TAVR in 1/21 c/b pAF on eliquis (last taken 10am) p/w findings concerning for NSTEMI given history of RCA stenosis, started on heparin gtt, dapt. General surgery consulted for evaluation of benign biliary disease, pt's symptoms have significantly resolved.    PLAN:    - Low suspicion for acute biliary pathology given exam and clinical and RUQ U/s findings  - Would recommend ertapenem for abx coverage, once WBC normalizes, can transition to PO abx   - After discharge pt may follow up with ACS surgery    ATP  [id number]

## 2022-03-07 NOTE — PROGRESS NOTE ADULT - ASSESSMENT
72 y/o M history of prostate CA s/p prostatectomy in 2013 presumed to be in remission, fatty liver, essential HTN, type 2 DM on oral Rx, hyperlipidemia but unable to tolerate statins due to myopathy, S/P TAVR for aortic stenosis in Jan 2021 complicated with PAF on Eliquis, colon CA with past surgery and past chemotherapy, S/P PPM placement with MICRA in Nov 2021, Pfizer COVID-19 vaccines x 3, presents with epigastric midabdominal pain, atypical cp admitted for NSTEMI. sp cardiac cath.    # NSTEMI  sp mRCA 2 stents  Continue ASA/Brilinta, Resume Eliquis 3/7 PM  ASA/Brilinta/Eliquis for a week then stop Aspirin, Continue Brilinta and Eliquis a week after  outpt fu cardio    # cholelithiasis  no signs of acute cholecystitis  outpt fu with surgery  surgery recs abx, will fu ID recs for PO abx for DC    # AS sp TAVR  # AFib  # HTN  # bradycardia sp PPM  cont HCTZ, toprol XL, lisinopril, norvasc  cont amiodarone  resume eliquis when appropriate    # DM2  a1c 8.6  FS acceptable  SSI     # colon ca and renal CA  outpt fu oncology  S/P surgical resection of mass and lymph nodes    PCP Dr Chata Barkley    dc planning to home pending ID recs    Please call [employer] with questions [phone]

## 2022-03-07 NOTE — DIETITIAN INITIAL EVALUATION ADULT. - PROBLEM SELECTOR PLAN 1
See above.  ACS heparin gtt with next DOAC dose (no bolus).  ASA 81 tomorrow, Brilinta maintenance.  Follow troponin.  Echo.  Check Mg++    Patient unable to tolerate statins.

## 2022-03-07 NOTE — DISCHARGE NOTE PROVIDER - NSDCFUADDAPPT_GEN_ALL_CORE_FT
APPTS ARE READY TO BE MADE: [x ] YES    Best Family or Patient Contact (if needed):    Additional Information about above appointments (if needed):    1:   2:   3:     Other comments or requests:    APPTS ARE READY TO BE MADE: [x ] YES    Best Family or Patient Contact (if needed):    Additional Information about above appointments (if needed):    1:   2:   3:     Patient was provided with Dr. Chata Barkley (internal medicine), Dr. Mj mario (critical care), and Dr. Beni Sanchez (cardiovascular) and was advised to call to schedule follow up within specified time frame. At this time patient declined scheduling assistance.    Other comments or requests:

## 2022-05-18 NOTE — PATIENT PROFILE ADULT - FALL HARM RISK TYPE OF ASSESSMENT
admission Depth Of Tumor Invasion (For Histology): tumor not visualized (deep and peripheral margins are clear of tumor)

## 2022-06-06 ENCOUNTER — NON-APPOINTMENT (OUTPATIENT)
Age: 72
End: 2022-06-06

## 2022-06-06 ENCOUNTER — APPOINTMENT (OUTPATIENT)
Dept: ELECTROPHYSIOLOGY | Facility: CLINIC | Age: 72
End: 2022-06-06
Payer: MEDICARE

## 2022-06-06 PROCEDURE — 93296 REM INTERROG EVL PM/IDS: CPT

## 2022-06-06 PROCEDURE — 93294 REM INTERROG EVL PM/LDLS PM: CPT

## 2022-09-08 ENCOUNTER — APPOINTMENT (OUTPATIENT)
Dept: ELECTROPHYSIOLOGY | Facility: CLINIC | Age: 72
End: 2022-09-08

## 2022-09-08 ENCOUNTER — NON-APPOINTMENT (OUTPATIENT)
Age: 72
End: 2022-09-08

## 2022-09-08 PROCEDURE — 93294 REM INTERROG EVL PM/LDLS PM: CPT

## 2022-09-08 PROCEDURE — 93296 REM INTERROG EVL PM/IDS: CPT

## 2022-11-11 NOTE — PRE-OP CHECKLIST - BMI (KG/M2)
Operative Report  [place], [place]    DATE OF SURGERY: 11/11/2022    PREOPERATIVE DIAGNOSIS: incarcerated ventral hernia    POSTOPERATIVE DIAGNOSIS: same    NAME OF PROCEDURE: robotic incarcerated ventral hernia repair with mesh  Bilateral robotic component separation     SURGEON: Jennifer Shabazz MD   ASSISTANT: Italia Morin SA    ANESTHESIOLOGIST: No responsible provider has been recorded for the case.    ANESTHESIA: General    ESTIMATED BLOOD LOSS: minimal    TISSUE REMOVED: none    INDICATIONS FOR THE PROCEDURE: Jennifer Junior  is a 77 year old female who presented with a bowel containing incarcerated ventral hernia.  A decision was made to proceed to the OR for repair.  Risks and benefits were discussed with the patient and the patient consented to proceed with the proposed operation.     OPERATIVE TECHNIQUE: The patient was placed on the operating room table in supine position. The operative site was marked by me.  Preoperative antibiotics were given.  Bilateral flowtrons were placed on the lower extremities for DVT prophylaxis. A dumont was placed sterilely.    After a surgical time out, general anesthesia was induced.  The patient was then prepped and draped in the usual sterile fashion.       Using a 5 mm optiview port, the left retrorectus space was entered under direct vision and the left retrorectus space was dissected bluntly.  Enough areolar tissue was cleared to be able to place an 8 mm robotic port in the left lateral quadrant within the retrorectus muscle space.  Using these 2 ports, a ligasure device was used to take down the remaining areolar tissues and small vessels to be able to place LUQ and LLQ robotic ports.  The robot was then docked and I scrubbed out sit at the console.     The medial edge of the left posterior rectus sheath was incised superiorly and a crossover was carried out, taking down the preperitoneal midline fat away from the linea alba.   The diastasis in the upper midline measured about 3 cm.  The contralatera medial rectus sheath was incised and the retrorectus space was entered on the right side.  The posterior rectus sheath was dissected away from the right rectus muscles.  This continued superiorly until the linea alba at the midline appeared normal in width at it's most superior aspect.  Inferiorly, this dissection continued until I was able to reduce the incarcerated fat within the ventral.  The defect measured about 4 cm.      The umbilical ligaments were taken down until the rectus met in the middle and the dissection continued towards the pelvis. Because of her posterior sheath laxity, I closed the entire posterior sheath with a 2-0 v loc suture.  The hernia and diastasis was closed with 2 running 0-v loc sutures.  The retro rectus spaced was measured to be 18 cm wide, 22 cm long.   A 30x30 cm parientene uncoated mesh was rounded at it's corners and trimmed to size.  It was placed under direct vision into the working space and then unfurled to lay flat.  A 15F jesus SHANNON drain was placed on top of the mesh and brought out of the LUQ port and sutured to the skin with nylon sutures.  Bilateral TAP blocks were performed under direct vision. 10 Ml platelet poor and 10 Ml platelet rich plasma was injected on top of the mesh.  The dumont was removed at the conclusion of the case.  All surgical counts were correct x 2.  There were no immediate complications.      The patient was extubated in the OR and escorted to the PACU in stable condition.     Jennifer Shabazz MD  General Surgery  [facility]         28.4

## 2022-11-12 ENCOUNTER — INPATIENT (INPATIENT)
Facility: HOSPITAL | Age: 72
LOS: 2 days | Discharge: HOME | DRG: 246 | End: 2022-11-15
Attending: INTERNAL MEDICINE | Admitting: INTERNAL MEDICINE
Payer: MEDICARE

## 2022-11-12 VITALS
OXYGEN SATURATION: 97 % | RESPIRATION RATE: 18 BRPM | HEART RATE: 54 BPM | HEIGHT: 70 IN | DIASTOLIC BLOOD PRESSURE: 72 MMHG | WEIGHT: 199.96 LBS | SYSTOLIC BLOOD PRESSURE: 161 MMHG | TEMPERATURE: 98 F

## 2022-11-12 DIAGNOSIS — I21.4 NON-ST ELEVATION (NSTEMI) MYOCARDIAL INFARCTION: ICD-10-CM

## 2022-11-12 DIAGNOSIS — K81.0 ACUTE CHOLECYSTITIS: Chronic | ICD-10-CM

## 2022-11-12 DIAGNOSIS — Z90.79 ACQUIRED ABSENCE OF OTHER GENITAL ORGAN(S): Chronic | ICD-10-CM

## 2022-11-12 DIAGNOSIS — Z98.890 OTHER SPECIFIED POSTPROCEDURAL STATES: Chronic | ICD-10-CM

## 2022-11-12 LAB
ALBUMIN SERPL ELPH-MCNC: 3.7 G/DL — SIGNIFICANT CHANGE UP (ref 3.3–5)
ALBUMIN SERPL ELPH-MCNC: 4.2 G/DL — SIGNIFICANT CHANGE UP (ref 3.3–5)
ALP SERPL-CCNC: 27 U/L — LOW (ref 40–120)
ALP SERPL-CCNC: 32 U/L — LOW (ref 40–120)
ALT FLD-CCNC: 85 U/L — HIGH (ref 10–45)
ALT FLD-CCNC: 99 U/L — HIGH (ref 10–45)
ANION GAP SERPL CALC-SCNC: 14 MMOL/L — SIGNIFICANT CHANGE UP (ref 5–17)
ANION GAP SERPL CALC-SCNC: 16 MMOL/L — SIGNIFICANT CHANGE UP (ref 5–17)
APTT BLD: 35 SEC — SIGNIFICANT CHANGE UP (ref 27.5–35.5)
APTT BLD: 43.1 SEC — HIGH (ref 27.5–35.5)
APTT BLD: 45.9 SEC — HIGH (ref 27.5–35.5)
AST SERPL-CCNC: 114 U/L — HIGH (ref 10–40)
AST SERPL-CCNC: 79 U/L — HIGH (ref 10–40)
BASE EXCESS BLDV CALC-SCNC: 0 MMOL/L — SIGNIFICANT CHANGE UP (ref -2–3)
BASOPHILS # BLD AUTO: 0.07 K/UL — SIGNIFICANT CHANGE UP (ref 0–0.2)
BASOPHILS NFR BLD AUTO: 0.6 % — SIGNIFICANT CHANGE UP (ref 0–2)
BILIRUB SERPL-MCNC: 0.5 MG/DL — SIGNIFICANT CHANGE UP (ref 0.2–1.2)
BILIRUB SERPL-MCNC: 0.6 MG/DL — SIGNIFICANT CHANGE UP (ref 0.2–1.2)
BUN SERPL-MCNC: 14 MG/DL — SIGNIFICANT CHANGE UP (ref 7–23)
BUN SERPL-MCNC: 16 MG/DL — SIGNIFICANT CHANGE UP (ref 7–23)
CA-I SERPL-SCNC: 1.18 MMOL/L — SIGNIFICANT CHANGE UP (ref 1.15–1.33)
CALCIUM SERPL-MCNC: 8.5 MG/DL — SIGNIFICANT CHANGE UP (ref 8.4–10.5)
CALCIUM SERPL-MCNC: 9.4 MG/DL — SIGNIFICANT CHANGE UP (ref 8.4–10.5)
CHLORIDE BLDV-SCNC: 100 MMOL/L — SIGNIFICANT CHANGE UP (ref 96–108)
CHLORIDE SERPL-SCNC: 100 MMOL/L — SIGNIFICANT CHANGE UP (ref 96–108)
CHLORIDE SERPL-SCNC: 104 MMOL/L — SIGNIFICANT CHANGE UP (ref 96–108)
CK MB CFR SERPL CALC: 89.1 NG/ML — HIGH (ref 0–6.7)
CO2 BLDV-SCNC: 27 MMOL/L — HIGH (ref 22–26)
CO2 SERPL-SCNC: 21 MMOL/L — LOW (ref 22–31)
CO2 SERPL-SCNC: 22 MMOL/L — SIGNIFICANT CHANGE UP (ref 22–31)
CREAT SERPL-MCNC: 0.45 MG/DL — LOW (ref 0.5–1.3)
CREAT SERPL-MCNC: 0.53 MG/DL — SIGNIFICANT CHANGE UP (ref 0.5–1.3)
EGFR: 106 ML/MIN/1.73M2 — SIGNIFICANT CHANGE UP
EGFR: 112 ML/MIN/1.73M2 — SIGNIFICANT CHANGE UP
EOSINOPHIL # BLD AUTO: 0.18 K/UL — SIGNIFICANT CHANGE UP (ref 0–0.5)
EOSINOPHIL NFR BLD AUTO: 1.6 % — SIGNIFICANT CHANGE UP (ref 0–6)
GAS PNL BLDV: 133 MMOL/L — LOW (ref 136–145)
GAS PNL BLDV: SIGNIFICANT CHANGE UP
GLUCOSE BLDC GLUCOMTR-MCNC: 133 MG/DL — HIGH (ref 70–99)
GLUCOSE BLDC GLUCOMTR-MCNC: 159 MG/DL — HIGH (ref 70–99)
GLUCOSE BLDC GLUCOMTR-MCNC: 176 MG/DL — HIGH (ref 70–99)
GLUCOSE BLDC GLUCOMTR-MCNC: 190 MG/DL — HIGH (ref 70–99)
GLUCOSE BLDC GLUCOMTR-MCNC: 202 MG/DL — HIGH (ref 70–99)
GLUCOSE BLDV-MCNC: 241 MG/DL — HIGH (ref 70–99)
GLUCOSE SERPL-MCNC: 205 MG/DL — HIGH (ref 70–99)
GLUCOSE SERPL-MCNC: 242 MG/DL — HIGH (ref 70–99)
HCO3 BLDV-SCNC: 25 MMOL/L — SIGNIFICANT CHANGE UP (ref 22–29)
HCT VFR BLD CALC: 40.2 % — SIGNIFICANT CHANGE UP (ref 39–50)
HCT VFR BLD CALC: 43.2 % — SIGNIFICANT CHANGE UP (ref 39–50)
HCT VFR BLDA CALC: 45 % — SIGNIFICANT CHANGE UP (ref 39–51)
HGB BLD CALC-MCNC: 14.9 G/DL — SIGNIFICANT CHANGE UP (ref 12.6–17.4)
HGB BLD-MCNC: 13.4 G/DL — SIGNIFICANT CHANGE UP (ref 13–17)
HGB BLD-MCNC: 14.3 G/DL — SIGNIFICANT CHANGE UP (ref 13–17)
IMM GRANULOCYTES NFR BLD AUTO: 0.6 % — SIGNIFICANT CHANGE UP (ref 0–0.9)
INR BLD: 1.3 RATIO — HIGH (ref 0.88–1.16)
LACTATE BLDV-MCNC: 3.5 MMOL/L — HIGH (ref 0.5–2)
LIDOCAIN IGE QN: 73 U/L — HIGH (ref 7–60)
LYMPHOCYTES # BLD AUTO: 1.17 K/UL — SIGNIFICANT CHANGE UP (ref 1–3.3)
LYMPHOCYTES # BLD AUTO: 10.5 % — LOW (ref 13–44)
MCHC RBC-ENTMCNC: 30 PG — SIGNIFICANT CHANGE UP (ref 27–34)
MCHC RBC-ENTMCNC: 30.6 PG — SIGNIFICANT CHANGE UP (ref 27–34)
MCHC RBC-ENTMCNC: 33.1 GM/DL — SIGNIFICANT CHANGE UP (ref 32–36)
MCHC RBC-ENTMCNC: 33.3 GM/DL — SIGNIFICANT CHANGE UP (ref 32–36)
MCV RBC AUTO: 90.1 FL — SIGNIFICANT CHANGE UP (ref 80–100)
MCV RBC AUTO: 92.3 FL — SIGNIFICANT CHANGE UP (ref 80–100)
MONOCYTES # BLD AUTO: 0.96 K/UL — HIGH (ref 0–0.9)
MONOCYTES NFR BLD AUTO: 8.6 % — SIGNIFICANT CHANGE UP (ref 2–14)
NEUTROPHILS # BLD AUTO: 8.74 K/UL — HIGH (ref 1.8–7.4)
NEUTROPHILS NFR BLD AUTO: 78.1 % — HIGH (ref 43–77)
NRBC # BLD: 0 /100 WBCS — SIGNIFICANT CHANGE UP (ref 0–0)
NRBC # BLD: 0 /100 WBCS — SIGNIFICANT CHANGE UP (ref 0–0)
PCO2 BLDV: 43 MMHG — SIGNIFICANT CHANGE UP (ref 42–55)
PH BLDV: 7.38 — SIGNIFICANT CHANGE UP (ref 7.32–7.43)
PLATELET # BLD AUTO: 220 K/UL — SIGNIFICANT CHANGE UP (ref 150–400)
PLATELET # BLD AUTO: 228 K/UL — SIGNIFICANT CHANGE UP (ref 150–400)
PO2 BLDV: 42 MMHG — SIGNIFICANT CHANGE UP (ref 25–45)
POTASSIUM BLDV-SCNC: 5.6 MMOL/L — HIGH (ref 3.5–5.1)
POTASSIUM SERPL-MCNC: 3.7 MMOL/L — SIGNIFICANT CHANGE UP (ref 3.5–5.3)
POTASSIUM SERPL-MCNC: 6.1 MMOL/L — HIGH (ref 3.5–5.3)
POTASSIUM SERPL-SCNC: 3.7 MMOL/L — SIGNIFICANT CHANGE UP (ref 3.5–5.3)
POTASSIUM SERPL-SCNC: 6.1 MMOL/L — HIGH (ref 3.5–5.3)
PROT SERPL-MCNC: 6.5 G/DL — SIGNIFICANT CHANGE UP (ref 6–8.3)
PROT SERPL-MCNC: 7.8 G/DL — SIGNIFICANT CHANGE UP (ref 6–8.3)
PROTHROM AB SERPL-ACNC: 15.1 SEC — HIGH (ref 10.5–13.4)
RBC # BLD: 4.46 M/UL — SIGNIFICANT CHANGE UP (ref 4.2–5.8)
RBC # BLD: 4.68 M/UL — SIGNIFICANT CHANGE UP (ref 4.2–5.8)
RBC # FLD: 14 % — SIGNIFICANT CHANGE UP (ref 10.3–14.5)
RBC # FLD: 14 % — SIGNIFICANT CHANGE UP (ref 10.3–14.5)
SAO2 % BLDV: 73.9 % — SIGNIFICANT CHANGE UP (ref 67–88)
SARS-COV-2 RNA SPEC QL NAA+PROBE: SIGNIFICANT CHANGE UP
SODIUM SERPL-SCNC: 137 MMOL/L — SIGNIFICANT CHANGE UP (ref 135–145)
SODIUM SERPL-SCNC: 140 MMOL/L — SIGNIFICANT CHANGE UP (ref 135–145)
TROPONIN T, HIGH SENSITIVITY RESULT: 247 NG/L — HIGH (ref 0–51)
TROPONIN T, HIGH SENSITIVITY RESULT: 2525 NG/L — HIGH (ref 0–51)
TROPONIN T, HIGH SENSITIVITY RESULT: 2699 NG/L — HIGH (ref 0–51)
TROPONIN T, HIGH SENSITIVITY RESULT: 472 NG/L — HIGH (ref 0–51)
WBC # BLD: 11.19 K/UL — HIGH (ref 3.8–10.5)
WBC # BLD: 9.96 K/UL — SIGNIFICANT CHANGE UP (ref 3.8–10.5)
WBC # FLD AUTO: 11.19 K/UL — HIGH (ref 3.8–10.5)
WBC # FLD AUTO: 9.96 K/UL — SIGNIFICANT CHANGE UP (ref 3.8–10.5)

## 2022-11-12 PROCEDURE — 74177 CT ABD & PELVIS W/CONTRAST: CPT | Mod: 26,MA

## 2022-11-12 PROCEDURE — 93010 ELECTROCARDIOGRAM REPORT: CPT

## 2022-11-12 PROCEDURE — 99285 EMERGENCY DEPT VISIT HI MDM: CPT | Mod: GC

## 2022-11-12 RX ORDER — DEXTROSE 50 % IN WATER 50 %
15 SYRINGE (ML) INTRAVENOUS ONCE
Refills: 0 | Status: DISCONTINUED | OUTPATIENT
Start: 2022-11-12 | End: 2022-11-12

## 2022-11-12 RX ORDER — METOPROLOL TARTRATE 50 MG
50 TABLET ORAL DAILY
Refills: 0 | Status: DISCONTINUED | OUTPATIENT
Start: 2022-11-12 | End: 2022-11-15

## 2022-11-12 RX ORDER — ACETAMINOPHEN 500 MG
650 TABLET ORAL EVERY 6 HOURS
Refills: 0 | Status: DISCONTINUED | OUTPATIENT
Start: 2022-11-12 | End: 2022-11-15

## 2022-11-12 RX ORDER — DEXTROSE 50 % IN WATER 50 %
25 SYRINGE (ML) INTRAVENOUS ONCE
Refills: 0 | Status: DISCONTINUED | OUTPATIENT
Start: 2022-11-12 | End: 2022-11-12

## 2022-11-12 RX ORDER — LISINOPRIL 2.5 MG/1
40 TABLET ORAL DAILY
Refills: 0 | Status: DISCONTINUED | OUTPATIENT
Start: 2022-11-12 | End: 2022-11-15

## 2022-11-12 RX ORDER — ASPIRIN/CALCIUM CARB/MAGNESIUM 324 MG
324 TABLET ORAL ONCE
Refills: 0 | Status: COMPLETED | OUTPATIENT
Start: 2022-11-12 | End: 2022-11-12

## 2022-11-12 RX ORDER — ASPIRIN/CALCIUM CARB/MAGNESIUM 324 MG
81 TABLET ORAL DAILY
Refills: 0 | Status: DISCONTINUED | OUTPATIENT
Start: 2022-11-13 | End: 2022-11-15

## 2022-11-12 RX ORDER — HEPARIN SODIUM 5000 [USP'U]/ML
5000 INJECTION INTRAVENOUS; SUBCUTANEOUS ONCE
Refills: 0 | Status: COMPLETED | OUTPATIENT
Start: 2022-11-12 | End: 2022-11-12

## 2022-11-12 RX ORDER — CLOPIDOGREL BISULFATE 75 MG/1
75 TABLET, FILM COATED ORAL AT BEDTIME
Refills: 0 | Status: DISCONTINUED | OUTPATIENT
Start: 2022-11-13 | End: 2022-11-15

## 2022-11-12 RX ORDER — AMLODIPINE BESYLATE 2.5 MG/1
10 TABLET ORAL DAILY
Refills: 0 | Status: DISCONTINUED | OUTPATIENT
Start: 2022-11-12 | End: 2022-11-15

## 2022-11-12 RX ORDER — CLOPIDOGREL BISULFATE 75 MG/1
300 TABLET, FILM COATED ORAL ONCE
Refills: 0 | Status: COMPLETED | OUTPATIENT
Start: 2022-11-12 | End: 2022-11-12

## 2022-11-12 RX ORDER — SODIUM CHLORIDE 9 MG/ML
1000 INJECTION, SOLUTION INTRAVENOUS
Refills: 0 | Status: DISCONTINUED | OUTPATIENT
Start: 2022-11-12 | End: 2022-11-15

## 2022-11-12 RX ORDER — DEXTROSE 50 % IN WATER 50 %
15 SYRINGE (ML) INTRAVENOUS ONCE
Refills: 0 | Status: DISCONTINUED | OUTPATIENT
Start: 2022-11-12 | End: 2022-11-15

## 2022-11-12 RX ORDER — GLUCAGON INJECTION, SOLUTION 0.5 MG/.1ML
1 INJECTION, SOLUTION SUBCUTANEOUS ONCE
Refills: 0 | Status: DISCONTINUED | OUTPATIENT
Start: 2022-11-12 | End: 2022-11-15

## 2022-11-12 RX ORDER — HEPARIN SODIUM 5000 [USP'U]/ML
5300 INJECTION INTRAVENOUS; SUBCUTANEOUS EVERY 6 HOURS
Refills: 0 | Status: DISCONTINUED | OUTPATIENT
Start: 2022-11-12 | End: 2022-11-14

## 2022-11-12 RX ORDER — ONDANSETRON 8 MG/1
4 TABLET, FILM COATED ORAL EVERY 8 HOURS
Refills: 0 | Status: DISCONTINUED | OUTPATIENT
Start: 2022-11-12 | End: 2022-11-15

## 2022-11-12 RX ORDER — INSULIN LISPRO 100/ML
VIAL (ML) SUBCUTANEOUS AT BEDTIME
Refills: 0 | Status: DISCONTINUED | OUTPATIENT
Start: 2022-11-12 | End: 2022-11-15

## 2022-11-12 RX ORDER — INSULIN LISPRO 100/ML
VIAL (ML) SUBCUTANEOUS EVERY 6 HOURS
Refills: 0 | Status: DISCONTINUED | OUTPATIENT
Start: 2022-11-12 | End: 2022-11-12

## 2022-11-12 RX ORDER — HEPARIN SODIUM 5000 [USP'U]/ML
INJECTION INTRAVENOUS; SUBCUTANEOUS
Qty: 25000 | Refills: 0 | Status: DISCONTINUED | OUTPATIENT
Start: 2022-11-12 | End: 2022-11-14

## 2022-11-12 RX ORDER — DEXTROSE 50 % IN WATER 50 %
25 SYRINGE (ML) INTRAVENOUS ONCE
Refills: 0 | Status: DISCONTINUED | OUTPATIENT
Start: 2022-11-12 | End: 2022-11-15

## 2022-11-12 RX ORDER — AMIODARONE HYDROCHLORIDE 400 MG/1
100 TABLET ORAL DAILY
Refills: 0 | Status: DISCONTINUED | OUTPATIENT
Start: 2022-11-12 | End: 2022-11-15

## 2022-11-12 RX ORDER — SODIUM CHLORIDE 9 MG/ML
1000 INJECTION, SOLUTION INTRAVENOUS
Refills: 0 | Status: DISCONTINUED | OUTPATIENT
Start: 2022-11-12 | End: 2022-11-12

## 2022-11-12 RX ORDER — INSULIN LISPRO 100/ML
VIAL (ML) SUBCUTANEOUS
Refills: 0 | Status: DISCONTINUED | OUTPATIENT
Start: 2022-11-12 | End: 2022-11-15

## 2022-11-12 RX ORDER — DEXTROSE 50 % IN WATER 50 %
12.5 SYRINGE (ML) INTRAVENOUS ONCE
Refills: 0 | Status: DISCONTINUED | OUTPATIENT
Start: 2022-11-12 | End: 2022-11-15

## 2022-11-12 RX ADMIN — HEPARIN SODIUM 1200 UNIT(S)/HR: 5000 INJECTION INTRAVENOUS; SUBCUTANEOUS at 19:34

## 2022-11-12 RX ADMIN — HEPARIN SODIUM 5000 UNIT(S): 5000 INJECTION INTRAVENOUS; SUBCUTANEOUS at 08:58

## 2022-11-12 RX ADMIN — Medication 1: at 11:34

## 2022-11-12 RX ADMIN — AMLODIPINE BESYLATE 10 MILLIGRAM(S): 2.5 TABLET ORAL at 10:23

## 2022-11-12 RX ADMIN — AMIODARONE HYDROCHLORIDE 100 MILLIGRAM(S): 400 TABLET ORAL at 19:21

## 2022-11-12 RX ADMIN — HEPARIN SODIUM 1400 UNIT(S)/HR: 5000 INJECTION INTRAVENOUS; SUBCUTANEOUS at 21:48

## 2022-11-12 RX ADMIN — LISINOPRIL 40 MILLIGRAM(S): 2.5 TABLET ORAL at 10:27

## 2022-11-12 RX ADMIN — Medication 50 MILLIGRAM(S): at 10:27

## 2022-11-12 RX ADMIN — HEPARIN SODIUM 1000 UNIT(S)/HR: 5000 INJECTION INTRAVENOUS; SUBCUTANEOUS at 09:00

## 2022-11-12 RX ADMIN — Medication 324 MILLIGRAM(S): at 05:48

## 2022-11-12 RX ADMIN — HEPARIN SODIUM 1200 UNIT(S)/HR: 5000 INJECTION INTRAVENOUS; SUBCUTANEOUS at 14:58

## 2022-11-12 RX ADMIN — Medication 2: at 10:23

## 2022-11-12 RX ADMIN — Medication 1: at 19:20

## 2022-11-12 NOTE — CHART NOTE - NSCHARTNOTEFT_GEN_A_CORE
Patient seen in the ED, REd 43  He is a 73yo M pmhx of MI in March status post stent placement in RCA, severe AS s/p tavr c/b pAF, s/p micra for AVB, prostate cancer, colon cancer, kidney cancer comes to ED w/ chest/epigastric abd pain. Pain resembles his event. Happened at midnight, lasted until 1:30am. Radiation down left arm and flank.   At present time, in ED he is without chest pain. HR 54, /72. He is in no respiratory distress, cardiac exam 1/6 MINDY, regular, lungs without crackles, 1+ edema to lower shins  Initial labs with hsTrop 247, Cre 0.45, K 6.1 (grossly hemolyzed) (on vbg it was 5.6)  Noted prior LHC with LMCA with mild atherosclerosis, LAD with mild atherosclerosis, cx with mild atherosclerosis, RCA 90% stenosis s/p WALLACE. He is compliant with plavix and DOAC   Prior TTE with EF 55%    Patient and wife state that they follow with [facility] private cardiology group, under Dr. Sanchez.     Impression:  Type I NSTEMI    Plan:  Does not meet CICU admission criteria, HDS, resting comfortably and chest pain free  Please consult private cardiology group, [facility] as patient's primary cardiologist is Dr. Sanchez in the AM for continuity of care  Asa loaded in ED, plavix and hep gtt, hold DOAC   Trend troponin to peak, serial EKGs  Afterload control  f/u TTE Patient seen in the ED, REd 43  He is a 71yo M pmhx of MI in March status post stent placement in RCA, severe AS s/p tavr c/b pAF, s/p micra for AVB, prostate cancer, colon cancer, kidney cancer comes to ED w/ chest/epigastric abd pain. Pain resembles his event. Happened at midnight, lasted until 1:30am. Radiation down left arm and flank.   At present time, in ED he is without chest pain. HR 54, /72. He is in no respiratory distress, cardiac exam 1/6 MINDY, regular, lungs without crackles, 1+ edema to lower shins  Initial labs with hsTrop 247, Cre 0.45, K 6.1 (grossly hemolyzed) (on vbg it was 5.6)  EKG is paced, unchanged from prior. Sgarbossa negative  Noted prior LHC with LMCA with mild atherosclerosis, LAD with mild atherosclerosis, cx with mild atherosclerosis, RCA 90% stenosis s/p WALLACE. He is compliant with plavix and DOAC   Prior TTE with EF 55%    Patient and wife state that they follow with [facility] private cardiology group, under Dr. Sanchez.     Impression:  Type I NSTEMI    Plan:  Does not meet CICU admission criteria, HDS, resting comfortably and chest pain free  Please consult private cardiology group, [facility] as patient's primary cardiologist is Dr. Sanchez in the AM for continuity of care  Asa loaded in ED, plavix and hep gtt, hold DOAC to empirically treat for type I  Trend troponin to peak, serial EKGs  Afterload control  f/u TTE, obtain BNP as may be 2/2 heart failure, type II

## 2022-11-12 NOTE — H&P ADULT - ASSESSMENT
72 y/o M history of CAD, NSTEMI 03/22 s/p PCI stent RCA, HTN, T2DM, HLD (not on statin- intolerance prostate CA s/p prostatectomy in 2013 presumed to be in remission, fatty liver, AS S/P TAVR 01/ 2021 complicated with PAF on Eliquis s/p PPM (MICRA), colon CA with past surgery  (03/21) and past chemotherapy chest / epigastric pain , initially afvss, ECG paced rhythm,  labs with troponin of 472, mildly elevated lfts, mid hyperglycemia, LA 3.5, admitted for NSTEMI.     1. NSTEMI   2. T2DM with hyperglycemia   3. Elevated Lfts 2/2 MARTINEZ  4. Elevated LA   5. Leukocytosis    CAD, NSTEMI 03/22 s/p PCI stent RCA, HTN, T2DM, HLD (not on statin- intolerance prostate CA s/p prostatectomy in 2013 presumed to be in remission, fatty liver, AS S/P TAVR 01/ 2021 complicated with PAF on Eliquis s/p PPM (MICRA), colon CA with past surgery  (03/21) and past chemotherapy    Plan:  -Admit to telemetry  -Trend CE until peak   -S/p aspirin/plavix load ; intolerance to statin   -will start home dose metoprolol  -Heparin drip per ACS protocol, ptt q 6 hours maintain aptt 50-70    -F/u TTE   -NPO for possible cardiac catheterization  -F/u cardiology recommendations      72 y/o M history of CAD, NSTEMI 03/22 s/p PCI stent RCA, HTN, T2DM, (not on statin, zetia or repatha- intolerance, had severe myositis which left him debilitated), Prostate CA s/p prostatectomy in 2013 presumed to be in remission, fatty liver, AS S/P TAVR 01/ 2021 complicated with PAF on Eliquis s/p PPM (MICRA), Colon CA with past surgery  (03/21) and past chemotherapy chest / epigastric pain , initially afvss, ECG paced rhythm negative scarbossa,  labs with troponin of 472, mildly elevated lfts, mid hyperglycemia, LA 3.5, admitted for NSTEMI.     1. NSTEMI   2. T2DM with hyperglycemia   3. Elevated Lfts 2/2 NAFLD  4. Elevated LA   5. Leukocytosis    CAD, NSTEMI 03/2022 s/p PCI stent RCA, HTN, T2DM, HLD (not on statin, zetia or repatha- intolerance, had severe myositis which left him debilitated), prostate CA s/p prostatectomy in 2013 presumed to be in remission, NAFLD, AS S/P TAVR 01/ 2021 complicated with PAF on Eliquis s/p PPM (MICRA), colon CA with past surgery  (03/21) and past chemotherapy    Plan:  -Admit to telemetry  -Trend CE until peak   -S/p aspirin/plavix load ; intolerance to statin / zetia / repatha    -will start home dose toprol 50 mg; nitro prn for pain  -Heparin drip per ACS protocol, ptt q 6 hours maintain aptt 50-70    -F/u TTE   -NPO for possible cardiac catheterization  -F/u cardiology recommendations, consulted.  -insulin sliding scale  -C/w other home meds as above     dvt ppx: heparin gtt   gi ppx: npo, possible cath     Dispo: pending clinical improvement.      72 y/o M history of CAD, NSTEMI 03/22 s/p PCI stent RCA, HTN, T2DM, (not on statin, zetia or repatha- intolerance, had severe myositis which left him debilitated), Prostate CA s/p prostatectomy in 2013 presumed to be in remission, NAFLD, AS S/P TAVR 01/ 2021 complicated with PAF on Eliquis s/p PPM (MICRA), Colon CA with past surgery  (03/21) and past chemotherapy chest / epigastric pain , initially afvss, ECG paced rhythm negative scarbossa,  labs with troponin of 472, mildly elevated lfts, mid hyperglycemia, LA 3.5, admitted for NSTEMI.     1. NSTEMI   2. T2DM with hyperglycemia   3. Elevated Lfts 2/2 NAFLD  4. Elevated LA   5. Leukocytosis    CAD, NSTEMI 03/2022 s/p PCI stent RCA, HTN, HLD (not on statin, zetia or repatha- intolerance, had severe myositis which left him debilitated), prostate CA s/p prostatectomy in 2013 presumed to be in remission, NAFLD, AS S/P TAVR 01/ 2021 complicated with PAF on Eliquis s/p PPM (MICRA), colon CA with past surgery  (03/21) and past chemotherapy    Plan:  -Admit to telemetry  -Trend CE until peak   -S/p aspirin/plavix load ; intolerance to statin / zetia / repatha    -will start home dose toprol 50 mg; nitro prn for pain  -Heparin drip per ACS protocol, ptt q 6 hours maintain aptt 50-70    -F/u TTE   -NPO for possible cardiac catheterization  -F/u cardiology recommendations, consulted.  -insulin sliding scale  -C/w other home meds as above     dvt ppx: heparin gtt   gi ppx: npo, possible cath     Dispo: pending clinical improvement.

## 2022-11-12 NOTE — CONSULT NOTE ADULT - SUBJECTIVE AND OBJECTIVE BOX
CARDIOLOGY CONSULT NOTE - DR. LAGUNA    HPI:  70 y/o M history of CAD, NSTEMI 03/22 s/p PCI stent RCA, HTN, T2DM, (not on statin, zetia or repatha- intolerance, had severe myositis which left him debilitated), Prostate CA s/p prostatectomy in 2013 presumed to be in remission, NAFLD, AS S/P TAVR 01/ 2021 complicated with PAF on Eliquis s/p PPM (MICRA), Colon CA with past surgery  (03/21) and past chemotherapy presents with chest / epigastric pain that woke him out of sleep @ 12:00am that lasted 1 hour. Similar episode to prior MI. Pain was sharp, radiating to the lower back. Pain went away on its own. Currently no pain or shortness of breath.   Initially afvss, ECG paced rhythm negative scarbossa,  labs with troponin of 472, mildly elevated lfts, mid hyperglycemia, LA 3.5, admitted for NSTEMI. Seen by cardiology fellow not accepted to CCU. Patient aspirin and plavix loaded, started on heparin gtt.      (12 Nov 2022 08:50)      feels better  no chest pain, dyspnea          PAST MEDICAL & SURGICAL HISTORY:  Asthma  denies any hospitalizations, denies any intubations. Stopped taking Advair several years ago.      Hypertension      Prostate cancer      Aortic valve stenosis, etiology of cardiac valve disease unspecified      Malignant neoplasm of colon, unspecified part of colon  diagnosed 11/2020      Fatty liver      Hyperlipidemia, unspecified hyperlipidemia type      T2DM (type 2 diabetes mellitus)      Myositis  statin induced;  IVIG - last dose 12/2020    follows with Dr. Rosenthal      S/P prostatectomy  2013      S/P excision of lipoma      Acute cholecystitis  3/5/2022            PREVIOUS DIAGNOSTIC TESTING:    [ ] Echocardiogram:  [ ]  Catheterization:  [ ] Stress Test:  	    MEDICATIONS:    Home Medications:  acetaminophen 325 mg oral tablet: 2 tab(s) orally every 6 hours, As needed, Temp greater or equal to 38C (100.4F), Mild Pain (1 - 3) (05 Mar 2022 21:53)  amiodarone 100 mg oral tablet: 1 tab(s) orally once a day (05 Mar 2022 21:53)  amLODIPine 10 mg oral tablet: 1 tab(s) orally once a day (05 Mar 2022 21:53)  metoprolol succinate 50 mg oral tablet, extended release: 1 tab(s) orally once a day (05 Mar 2022 21:53)  Vitamin B12 1000 mcg oral tablet: 1 tab(s) orally once a day (05 Mar 2022 21:53)  Vitamin D3 2000 intl units oral capsule: 1 cap(s) orally once a day (05 Mar 2022 21:53)      MEDICATIONS  (STANDING):  aMIOdarone    Tablet 100 milliGRAM(s) Oral daily  amLODIPine   Tablet 10 milliGRAM(s) Oral daily  dextrose 5%. 1000 milliLiter(s) (50 mL/Hr) IV Continuous <Continuous>  dextrose 5%. 1000 milliLiter(s) (100 mL/Hr) IV Continuous <Continuous>  dextrose 50% Injectable 25 Gram(s) IV Push once  dextrose 50% Injectable 12.5 Gram(s) IV Push once  dextrose 50% Injectable 25 Gram(s) IV Push once  glucagon  Injectable 1 milliGRAM(s) IntraMuscular once  heparin  Infusion.  Unit(s)/Hr (10 mL/Hr) IV Continuous <Continuous>  insulin lispro (ADMELOG) corrective regimen sliding scale   SubCutaneous every 6 hours  lisinopril 40 milliGRAM(s) Oral daily  metoprolol succinate ER 50 milliGRAM(s) Oral daily      FAMILY HISTORY:  Family history of aneurysm  Father    Family history of heart disease (Child)    Family history of hypertension in father (Father)    Family history of early CAD (Child)        SOCIAL HISTORY:    [x ] Non-smoker  [ ] Smoker  [ ] Alcohol    Allergies    statins (Other)  Zetia (Other)    Intolerances    	    REVIEW OF SYSTEMS:  CONSTITUTIONAL: No fever, weight loss, or fatigue  EYES: No eye pain, visual disturbances, or discharge  ENMT:  No difficulty hearing, tinnitus, vertigo; No sinus or throat pain  NECK: No pain or stiffness  RESPIRATORY: No cough, wheezing, chills or hemoptysis; No Shortness of Breath  CARDIOVASCULAR: as HPI  GASTROINTESTINAL: No abdominal or epigastric pain. No nausea, vomiting, or hematemesis; No diarrhea or constipation. No melena or hematochezia.  GENITOURINARY: No dysuria, frequency, hematuria, or incontinence  NEUROLOGICAL: No headaches, memory loss, loss of strength, numbness, or tremors  SKIN: No itching, burning, rashes, or lesions   	  [ ] All others negative	  [ ] Unable to obtain    PHYSICAL EXAM:    T(C): 36.7 (11-12-22 @ 11:00), Max: 36.8 (11-12-22 @ 07:15)  HR: 75 (11-12-22 @ 11:00) (54 - 85)  BP: 164/72 (11-12-22 @ 11:00) (152/82 - 164/72)  RR: 17 (11-12-22 @ 11:00) (17 - 18)  SpO2: 95% (11-12-22 @ 11:00) (95% - 98%)  Wt(kg): --  I&O's Summary    Daily Height in cm: 177.8 (12 Nov 2022 02:45)    Daily     Appearance: Normal	  Psychiatry: A & O x 3, Mood & affect appropriate  HEENT:   Normal oral mucosa, PERRL, EOMI	  Lymphatic: No lymphadenopathy  Cardiovascular: Normal S1 S2,RRR, No JVD, No murmurs  Respiratory: Lungs clear to auscultation	  Gastrointestinal:  Soft, Non-tender, + BS	  Skin: No rashes, No ecchymoses, No cyanosis	  Neurologic: Non-focal  Extremities: Normal range of motion, No clubbing, cyanosis or edema  Vascular: Peripheral pulses palpable 2+ bilaterally    TELEMETRY: 	    ECG: paced  	  RADIOLOGY:  OTHER: 	  	  LABS:	 	    CARDIAC MARKERS:        proBNP:     Lipid Profile:   HgA1c:   TSH:                           14.3   11.19 )-----------( 228      ( 12 Nov 2022 03:28 )             43.2     11-12    140  |  104  |  14  ----------------------------<  205<H>  3.7   |  22  |  0.53    Ca    8.5      12 Nov 2022 06:28    TPro  6.5  /  Alb  3.7  /  TBili  0.5  /  DBili  x   /  AST  79<H>  /  ALT  85<H>  /  AlkPhos  32<L>  11-12    PT/INR - ( 12 Nov 2022 03:28 )   PT: 15.1 sec;   INR: 1.30 ratio         PTT - ( 12 Nov 2022 03:28 )  PTT:35.0 sec    Creatinine, Serum: 0.53 mg/dL (11-12-22 @ 06:28)  Creatinine, Serum: 0.45 mg/dL (11-12-22 @ 03:28)        ASSESSMENT/PLAN:

## 2022-11-12 NOTE — ED ADULT NURSE REASSESSMENT NOTE - NS ED NURSE REASSESS COMMENT FT1
verified with md dsouza. md keller and cardiologist about infusing heparin while pt is on eliquos and plavix. last dose of Eliquis was yesterday morning. heparin verified with 2nd rn. continued cardiac monitoring in place.

## 2022-11-12 NOTE — ED ADULT NURSE REASSESSMENT NOTE - NS ED NURSE REASSESS COMMENT FT1
assumed care of pt at 0715. report received from [facility]. charting as noted. rr even and unlabored. continued cardiac monitoring in place.  iv intact. . able to move extremities well. anox4. weight obtained. iv intact. awaiting cardiology. pt educated on plan of care, pt able to successfully teach back plan of care to RN, RN will continue to reeducate pt during hospital stay.

## 2022-11-12 NOTE — ED PROVIDER NOTE - OBJECTIVE STATEMENT
71yo M pmhx of MI in March status post stent placement, prostate cancer, colon cancer, kidney cancer comes to ED w/ chest/epigastric abd pain. Their pain/symptom is moderate, constant, non mediating with rest, radiates to the left flank. Started randomly. Denies falls, trauma, shortness of breath, nausea, vomiting, diarrhea, urinary symptoms, stool symptoms.  Patient was scheduled for CT next week for oncology follow up of colon cancer. Reports symptoms similar to previous MI.

## 2022-11-12 NOTE — ED ADULT NURSE REASSESSMENT NOTE - NS ED NURSE REASSESS COMMENT FT1
report given to roosevelt helton at 10:44. pt okay to go off tele as per cardiologist at  bedside. pt moved to [facility] 3. rn made aware of transfer of care. continued cardiac monitoring In place.

## 2022-11-12 NOTE — ED PROVIDER NOTE - PHYSICAL EXAMINATION
----- Message from Nuha Darling MD sent at 11/12/2018 12:08 PM CST -----  They couldn't complete the test because he had laryngeal penetration but he needs to see a GI doctor because they need to do upper endoscopy to see why he is having trouble swallowing food..   General: non-toxic, NAD  HEENT: NCAT, PERRL  Cardiac: RRR, no murmurs, 2+ peripheral pulses  Chest: CTAB  Abdomen: Distended, LUQ ttp, no rebound or guarding. soft, bowel sounds present.   Extremities: no peripheral edema, calf tenderness, or leg size discrepancies  Skin: no rashes  Neuro: AAOx4, 5+motor, sensory grossly intact  Psych: mood and affect appropriate

## 2022-11-12 NOTE — H&P ADULT - HISTORY OF PRESENT ILLNESS
70 y/o M history of CAD, NSTEMI 03/22 s/p PCI stent RCA, HTN, T2DM, (not on statin, zetia or repatha- intolerance, had severe myositis which left him debilitated), Prostate CA s/p prostatectomy in 2013 presumed to be in remission, NAFLD, AS S/P TAVR 01/ 2021 complicated with PAF on Eliquis s/p PPM (MICRA), Colon CA with past surgery  (03/21) and past chemotherapy presents with chest / epigastric pain that woke him out of sleep @ 12:00am that lasted 1 hour. Similar episode to prior MI. Pain was sharp, radiating to the lower back. Pain went away on its own. Currently no pain or shortness of breath.   Initially afvss, ECG paced rhythm negative scarbossa,  labs with troponin of 472, mildly elevated lfts, mid hyperglycemia, LA 3.5, admitted for NSTEMI. Seen by cardiology fellow not accepted to CCU. Patient aspirin and plavix loaded, started on heparin gtt.

## 2022-11-12 NOTE — CONSULT NOTE ADULT - SUBJECTIVE AND OBJECTIVE BOX
CARDIOLOGY FELLOW CONSULT NOTE    HPI:  73yo M pmhx of MI in March status post stent placement, prostate cancer, colon cancer, kidney cancer comes to ED w/ chest/epigastric abd pain. Their pain/symptom is moderate, constant, non mediating with rest, radiates to the left flank. Started randomly. Denies falls, trauma, shortness of breath, nausea, vomiting, diarrhea, urinary symptoms, stool symptoms.  Patient was scheduled for CT next week for oncology follow up of colon cancer. Reports symptoms similar to previous MI.      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    Patient sedentary at baseline. Was sitting in chair and had onset of upper abdominal pain/xiphoid area pain. Never felt before. Lasted 6-7 hours and resolved on its own. Concerned him so he presented to the ED. No palpitations, no chest pressure, no leg swelling, no SOB. Not related to food or bowel movements. Troponin in ED positive at 81, EKG paced rhythm. Cath 12/20 showing pRCA 60%. No other cardiac history.        In the ED:  HR 54, /72   WBC 11.19, INR 1.3, lipase 73, K 56.1 Cre 0.45 K 6.1 (severe hemolysis)    ALT 99   Hs trop 247, lactate 3.5     PMHx:   Asthma    Hypertension    Diabetes mellitus    Prostate cancer    Lipoma    Heart murmur    Aortic valve stenosis, etiology of cardiac valve disease unspecified    Malignant neoplasm of colon, unspecified part of colon    Fatty liver    Hyperlipidemia, unspecified hyperlipidemia type    T2DM (type 2 diabetes mellitus)    Myositis        PSHx:   S/P TURP    S/P prostatectomy    S/P excision of lipoma    Acute cholecystitis        Allergies:  statins (Other)  Zetia (Other)      Home Meds:    Current Medications:   aspirin  chewable 324 milliGRAM(s) Oral once      FAMILY HISTORY:  Family history of aneurysm  Father    Family history of heart disease (Child)    Family history of hypertension in father (Father)    Family history of early CAD (Child)        Social History:  Smoking History:  Alcohol Use:  Drug Use:    REVIEW OF SYSTEMS:  CONSTITUTIONAL: No weakness, fevers or chills  EYES/ENT: No visual changes;  No dysphagia  NECK: No pain or stiffness  RESPIRATORY: No cough, wheezing, hemoptysis; No shortness of breath  CARDIOVASCULAR: No chest pain or palpitations; No lower extremity edema  GASTROINTESTINAL: No abdominal or epigastric pain. No nausea, vomiting, or hematemesis; No diarrhea or constipation. No melena or hematochezia.  BACK: No back pain  GENITOURINARY: No dysuria, frequency or hematuria  NEUROLOGICAL: No numbness or weakness  SKIN: No itching, burning, rashes, or lesions   All other review of systems is negative unless indicated above.    Physical Exam:  T(F): 98 (11-12), Max: 98 (11-12)  HR: 54 (11-12) (54 - 54)  BP: 161/72 (11-12) (161/72 - 161/72)  RR: 18 (11-12)  SpO2: 97% (11-12)  GENERAL: No acute distress, well-developed  HEAD:  Atraumatic, Normocephalic  ENT: EOMI, PERRLA, conjunctiva and sclera clear, Neck supple, No JVD, moist mucosa  CHEST/LUNG: Clear to auscultation bilaterally; No wheeze, equal breath sounds bilaterally   BACK: No spinal tenderness  HEART: Regular rate and rhythm; No murmurs, rubs, or gallops  ABDOMEN: Soft, Nontender, Nondistended; Bowel sounds present  EXTREMITIES:  No clubbing, cyanosis, or edema  PSYCH: Nl behavior, nl affect  NEUROLOGY: AAOx3, non-focal, cranial nerves intact  SKIN: Normal color, No rashes or lesions  LINES:    3/2022:  LM   Left main artery: Angiography shows mild atherosclerosis.    LAD   Left anterior descending artery: Angiography shows mild  atherosclerosis.  CX   Circumflex: Angiography shows mild atherosclerosis.    RCA   Right coronary artery: There is a 90 % stenosis.      1/8 2021:   PROCEDURE:  --  Left heart catheterization.  --  [id number] angiography.  --  Left common iliac angiography.  --  Right common iliac angiography.  --  Aortography.  --  Cardiac Fluoro.  --  Temporary Pacemaker.  --  Sonosite - Diagnostic.  --  Transthoracic Echocardiography.  --  Hemostasis with Perclose.  --  Hemostasis with Perclose.  --  Right iliofemoral Artery.  --  Transcatheter Aortic Valve Replacement.  --  Hemostasis with Angioseal-Intervention.    TTE: 3/27/22:  Dimensions:    Normal Values:  LA:     4.7    2.0 - 4.0 cm  Ao:     3.1    2.0 - 3.8 cm  SEPTUM: 1.1    0.6 - 1.2 cm  PWT:    0.8    0.6 - 1.1 cm  LVIDd:5.4    3.0 - 5.6 cm  LVIDs:  3.4    1.8 - 4.0 cm  Derived variables:  LVMI: 93 g/m2  RWT: 0.29  Fractional short: 37 %  EF (Visual Estimate): 55 %  Doppler Peak Velocity (m/sec): MV=1.5 AoV=2.2  ------------------------------------------------------------------------  Observations:  Mitral Valve: Mitral annular calcification and calcified  mitral leaflets with decreased diastolic opening.  Mild-moderate mitral regurgitation (severity may be  underestimated). Peak mitral valve gradient equals 9 mmHg,  mean transmitral valve gradient equals 4 mm Hg, consistent  with mild to moderate mitral stenosis.  Aortic Valve/Aorta: Transcatheter aortic valve replacement.  Peak transaortic valve gradient equals 20 mm Hg, mean  transaortic valve gradient equals 11 mm Hg, which is  probably normal in the presence of a transcatheter aortic  valve replacement. Peak left ventricular outflow tract  gradient equals 4 mm Hg, mean gradient is equal to 3 mm Hg,  LVOT velocity time integral equals 27 cm.  Aortic Root: 3.1 cm.  Left Atrium: Normal left atrium.  LA volume index = 34  cc/m2.  Left Ventricle: Endocardial visualization enhanced with  intravenous injection of Ultrasonic Enhancing Agent  (Definity). Grossly preserved left ventricular function.  Difficult to assess wall motion with R-R variability and  wide QRS. Normal left ventricular internal dimensions and  wall thicknesses.  Right Heart: Normal right atrium. Normal right ventricular  size with borderline right ventricular systolic function.  Normal tricuspid valve. Minimal tricuspid regurgitation.  Normal pulmonic valve.  Pericardium/Pleura: Normal pericardium with no pericardial  effusion.  Hemodynamic: Estimated right atrial pressure is 8 mm Hg.  Estimated right ventricular systolic pressure equals 38 mm  Hg, assuming right atrial pressure equals 8 mm Hg,  consistent with borderline pulmonary hypertension.  ------------------------------------------------------------------------  Conclusions:  1. Mitral annular calcification and calcified mitral  leaflets with decreased diastolic opening. Mild-moderate  mitral regurgitation (severity may be underestimated). Peak  mitral valve gradient equals 9 mm Hg, mean transmitral  valve gradient equals 4 mm Hg, consistent with mild to  moderate mitral stenosis.  2. Transcatheter aortic valve replacement. Peak transaortic  valve gradient equals 20 mm Hg, mean transaortic valve  gradient equals 11 mm Hg, which is probably normal in the  presence of a transcatheter aortic valve replacement.  3. Normal left ventricular internal dimensions and wall  thicknesses.  4. Endocardial visualization enhanced with intravenous  injection of Ultrasonic Enhancing Agent (Definity). Grossly  preserved left ventricular function. Difficult to assess  wall motion with R-R variability and wide QRS.  5. Normal right ventricular size with borderline right  ventricular systolic function.  6. Estimated pulmonary artery systolic pressure equals 38  mm Hg, assuming right atrial pressure equals 8 mm Hg,  consistent with borderline pulmonary pressures.  *** Compared with echocardiogram of 11/13/2021, no  significant changes noted.    Labs: Personally reviewed                        14.3   11.19 )-----------( 228      ( 12 Nov 2022 03:28 )             43.2     11-12    137  |  100  |  16  ----------------------------<  242<H>  6.1<H>   |  21<L>  |  0.45<L>    Ca    9.4      12 Nov 2022 03:28    TPro  7.8  /  Alb  4.2  /  TBili  0.6  /  DBili  x   /  AST  114<H>  /  ALT  99<H>  /  AlkPhos  27<L>  11-12    PT/INR - ( 12 Nov 2022 03:28 )   PT: 15.1 sec;   INR: 1.30 ratio         PTT - ( 12 Nov 2022 03:28 )  PTT:35.0 sec    CARDIAC MARKERS ( 12 Nov 2022 03:28 )  247 ng/L / x     / x     / x     / x     / x       CARDIOLOGY FELLOW CONSULT NOTE    HPI:  73yo M pmhx of MI in March status post stent placement, prostate cancer, colon cancer, kidney cancer comes to ED w/ chest/epigastric abd pain. Their pain/symptom is moderate, constant, non mediating with rest, radiates to the left flank. Started randomly. Denies falls, trauma, shortness of breath, nausea, vomiting, diarrhea, urinary symptoms, stool symptoms.  Patient was scheduled for CT next week for oncology follow up of colon cancer. Reports symptoms similar to previous MI.      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    Patient sedentary at baseline. Was sitting in chair and had onset of upper abdominal pain/xiphoid area pain. Never felt before. Lasted 6-7 hours and resolved on its own. Concerned him so he presented to the ED. No palpitations, no chest pressure, no leg swelling, no SOB. Not related to food or bowel movements. Troponin in ED positive at 81, EKG paced rhythm. Cath 12/20 showing pRCA 60%. No other cardiac history.    72 y/o M history of prostate CA s/p prostatectomy in 2013 presumed to be in remission, fatty liver, essential HTN, type 2 DM on oral Rx, hyperlipidemia but unable to tolerate statins due to myopathy, S/P TAVR for aortic stenosis in Jan 2021 complicated with PAF on Eliquis, colon CA with past surgery and past chemotherapy, S/P PPM placement with MICRA in Nov 2021, Pfizer COVID-19 vaccines x 3, presents with epigastric midabdominal pain, atypical cp admitted for NSTEMI. sp cardiac cath & stent x 2 to RCA.  s/p Brilinta and switched it to plavix. off asa. pt will c/w plavix and eliquis.   c/o abd pain, seen by Sx, RUQ US no signs of acute cholecystitis. outpt f/u with possible cholecystectomy. seen by ID, dc'ed iv abx. hx of colon ca and renal CA, outpt fu oncology  pt remains stable for DC and will f/u with PCP, Sx and onc.       In the ED:  HR 54, /72   WBC 11.19, INR 1.3, lipase 73, K 56.1 Cre 0.45 K 6.1 (severe hemolysis)    ALT 99   Hs trop 247, lactate 3.5     PMHx:   Asthma    Hypertension    Diabetes mellitus    Prostate cancer    Lipoma    Heart murmur    Aortic valve stenosis, etiology of cardiac valve disease unspecified    Malignant neoplasm of colon, unspecified part of colon    Fatty liver    Hyperlipidemia, unspecified hyperlipidemia type    T2DM (type 2 diabetes mellitus)    Myositis        PSHx:   S/P TURP    S/P prostatectomy    S/P excision of lipoma    Acute cholecystitis        Allergies:  statins (Other)  Zetia (Other)      Home Meds:    Current Medications:   aspirin  chewable 324 milliGRAM(s) Oral once      FAMILY HISTORY:  Family history of aneurysm  Father    Family history of heart disease (Child)    Family history of hypertension in father (Father)    Family history of early CAD (Child)        Social History:  Smoking History:  Alcohol Use:  Drug Use:    REVIEW OF SYSTEMS:  CONSTITUTIONAL: No weakness, fevers or chills  EYES/ENT: No visual changes;  No dysphagia  NECK: No pain or stiffness  RESPIRATORY: No cough, wheezing, hemoptysis; No shortness of breath  CARDIOVASCULAR: No chest pain or palpitations; No lower extremity edema  GASTROINTESTINAL: No abdominal or epigastric pain. No nausea, vomiting, or hematemesis; No diarrhea or constipation. No melena or hematochezia.  BACK: No back pain  GENITOURINARY: No dysuria, frequency or hematuria  NEUROLOGICAL: No numbness or weakness  SKIN: No itching, burning, rashes, or lesions   All other review of systems is negative unless indicated above.    Physical Exam:  T(F): 98 (11-12), Max: 98 (11-12)  HR: 54 (11-12) (54 - 54)  BP: 161/72 (11-12) (161/72 - 161/72)  RR: 18 (11-12)  SpO2: 97% (11-12)  GENERAL: No acute distress, well-developed  HEAD:  Atraumatic, Normocephalic  ENT: EOMI, PERRLA, conjunctiva and sclera clear, Neck supple, No JVD, moist mucosa  CHEST/LUNG: Clear to auscultation bilaterally; No wheeze, equal breath sounds bilaterally   BACK: No spinal tenderness  HEART: Regular rate and rhythm; No murmurs, rubs, or gallops  ABDOMEN: Soft, Nontender, Nondistended; Bowel sounds present  EXTREMITIES:  No clubbing, cyanosis, or edema  PSYCH: Nl behavior, nl affect  NEUROLOGY: AAOx3, non-focal, cranial nerves intact  SKIN: Normal color, No rashes or lesions  LINES:    3/2022:  LM   Left main artery: Angiography shows mild atherosclerosis.    LAD   Left anterior descending artery: Angiography shows mild  atherosclerosis.  CX   Circumflex: Angiography shows mild atherosclerosis.    RCA   Right coronary artery: There is a 90 % stenosis.      1/8 2021:   PROCEDURE:  --  Left heart catheterization.  --  Qxolj-vtez-tsczoijzw angiography.  --  Left common iliac angiography.  --  Right common iliac angiography.  --  Aortography.  --  Cardiac Fluoro.  --  Temporary Pacemaker.  --  Sonosite - Diagnostic.  --  Transthoracic Echocardiography.  --  Hemostasis with Perclose.  --  Hemostasis with Perclose.  --  Right iliofemoral Artery.  --  Transcatheter Aortic Valve Replacement.  --  Hemostasis with Angioseal-Intervention.    TTE: 3/27/22:  Dimensions:    Normal Values:  LA:     4.7    2.0 - 4.0 cm  Ao:     3.1    2.0 - 3.8 cm  SEPTUM: 1.1    0.6 - 1.2 cm  PWT:    0.8    0.6 - 1.1 cm  LVIDd:5.4    3.0 - 5.6 cm  LVIDs:  3.4    1.8 - 4.0 cm  Derived variables:  LVMI: 93 g/m2  RWT: 0.29  Fractional short: 37 %  EF (Visual Estimate): 55 %  Doppler Peak Velocity (m/sec): MV=1.5 AoV=2.2  ------------------------------------------------------------------------  Observations:  Mitral Valve: Mitral annular calcification and calcified  mitral leaflets with decreased diastolic opening.  Mild-moderate mitral regurgitation (severity may be  underestimated). Peak mitral valve gradient equals 9 mmHg,  mean transmitral valve gradient equals 4 mm Hg, consistent  with mild to moderate mitral stenosis.  Aortic Valve/Aorta: Transcatheter aortic valve replacement.  Peak transaortic valve gradient equals 20 mm Hg, mean  transaortic valve gradient equals 11 mm Hg, which is  probably normal in the presence of a transcatheter aortic  valve replacement. Peak left ventricular outflow tract  gradient equals 4 mm Hg, mean gradient is equal to 3 mm Hg,  LVOT velocity time integral equals 27 cm.  Aortic Root: 3.1 cm.  Left Atrium: Normal left atrium.  LA volume index = 34  cc/m2.  Left Ventricle: Endocardial visualization enhanced with  intravenous injection of Ultrasonic Enhancing Agent  (Definity). Grossly preserved left ventricular function.  Difficult to assess wall motion with R-R variability and  wide QRS. Normal left ventricular internal dimensions and  wall thicknesses.  Right Heart: Normal right atrium. Normal right ventricular  size with borderline right ventricular systolic function.  Normal tricuspid valve. Minimal tricuspid regurgitation.  Normal pulmonic valve.  Pericardium/Pleura: Normal pericardium with no pericardial  effusion.  Hemodynamic: Estimated right atrial pressure is 8 mm Hg.  Estimated right ventricular systolic pressure equals 38 mm  Hg, assuming right atrial pressure equals 8 mm Hg,  consistent with borderline pulmonary hypertension.  ------------------------------------------------------------------------  Conclusions:  1. Mitral annular calcification and calcified mitral  leaflets with decreased diastolic opening. Mild-moderate  mitral regurgitation (severity may be underestimated). Peak  mitral valve gradient equals 9 mm Hg, mean transmitral  valve gradient equals 4 mm Hg, consistent with mild to  moderate mitral stenosis.  2. Transcatheter aortic valve replacement. Peak transaortic  valve gradient equals 20 mm Hg, mean transaortic valve  gradient equals 11 mm Hg, which is probably normal in the  presence of a transcatheter aortic valve replacement.  3. Normal left ventricular internal dimensions and wall  thicknesses.  4. Endocardial visualization enhanced with intravenous  injection of Ultrasonic Enhancing Agent (Definity). Grossly  preserved left ventricular function. Difficult to assess  wall motion with R-R variability and wide QRS.  5. Normal right ventricular size with borderline right  ventricular systolic function.  6. Estimated pulmonary artery systolic pressure equals 38  mm Hg, assuming right atrial pressure equals 8 mm Hg,  consistent with borderline pulmonary pressures.  *** Compared with echocardiogram of 11/13/2021, no  significant changes noted.    Labs: Personally reviewed                        14.3   11.19 )-----------( 228      ( 12 Nov 2022 03:28 )             43.2     11-12    137  |  100  |  16  ----------------------------<  242<H>  6.1<H>   |  21<L>  |  0.45<L>    Ca    9.4      12 Nov 2022 03:28    TPro  7.8  /  Alb  4.2  /  TBili  0.6  /  DBili  x   /  AST  114<H>  /  ALT  99<H>  /  AlkPhos  27<L>  11-12    PT/INR - ( 12 Nov 2022 03:28 )   PT: 15.1 sec;   INR: 1.30 ratio         PTT - ( 12 Nov 2022 03:28 )  PTT:35.0 sec    CARDIAC MARKERS ( 12 Nov 2022 03:28 )  247 ng/L / x     / x     / x     / x     / x

## 2022-11-12 NOTE — ED ADULT TRIAGE NOTE - DOMESTIC TRAVEL HIGH RISK QUESTION
Maternal Fetal Medicine Ultrasound    Ms. Krishnan was seen in the [facility] today.     Current pregnancy:  Rubella non-immune. Treponema pallidum antibody, HBSAg and HIV screens all non-reactive.  Declined aneuploidy screening.  Mode of last delivery: Vaginal delivery.    Component      Latest Ref Rng & Units 2019   1HR O'ORTEGA      65 - 139 mg/dL 162 (H)    Glucose 3 Hour OB 3HR GTT      65 - 139 mg/dL  112   Glucose 2 Hour OB 3HR GTT      65 - 154 mg/dL  182 (H)   Glucose 1 Hour OB 3HR GTT      65 - 179 mg/dL  204 (H)   Glucose Fasting OB 3HR GTT      65 - 99 mg/dL  99     Impression:  1).  Single intrauterine pregnancy with biometric measurements consistent with dates.  2).  Morbid obesity (BMI 43.71).  3).  Gestational diabetes.  4).  Normal fetal growth.    I discussed the ultrasound findings with the patient.  I attempted to reassure her regarding today's findings.  She has previously consulted with diabetic education, and has received education regarding dietary management as well as self-monitoring of blood glucose. As you know and as I explained to the patient, most women with gestational diabetes will be able to achieve euglycemia with dietary therapy alone.  If the patient is unable to achieve this goal, medical therapy (oral agents or insulin) will be required.  If medical management is necessary, there is increased risk of adverse  outcome, including macrosomia, birth trauma,  metabolic abnormalities (hypoglycemia, hypocalcemia, hyperbilirubinemia), still birth,  delivery, and maternal infectious morbidity.  I explained that normalization of glycemic control is the most important factor in improving the likelihood of good  outcome. I stressed the need to achieve fasting blood glucose values of 60-90, preprandial values < 100, and two hour post prandial values of < 120. If medical management is necessary, ultrasound  examinations to assess fetal growth every four weeks and twice weekly  fetal assessment (beginning at 32 weeks) until delivery are recommended.  If medical therapy is not required, the pregnancy can safely continue until 41 weeks' gestation, at which point most authorities recommend intervention. After a long and thorough discussion of these issues, I believe that her questions were answered satisfactorily.  I encouraged the patient to call our office with any further questions or concerns regarding today's visit.    Summary recommendations:     1). Evaluation by CDE for nutritional counseling and capillary blood glucose sampling teaching (completed).  2).  Fasting and 2 hour postprandial capillary blood glucose monitoring.  3).  Fasting blood glucose goal of 60-90 mg/dl.  4).  2 hour postprandial blood glucose goal of < 120 mg/dl.  5).  If the aforementioned parameters are exceeded on optimal diet therapy, antihyperglycemic therapy would be indicated.  6).  If pharmacological therapy is required for optimal glycemic control, recommend twice weekly  fetal surveillance until delivery.  7).  Delivery at 39 weeks' gestation if medical management necessary.  8).  2 hour GTT at 6 weeks postpartum.    Please select the imaging tab and linked document to review today's report.    Thank you for allowing us to participate in the care of your patient. Please do not hesitate to contact us with any further questions or concerns.    The total time of today's visit was 15 minutes, with greater than 50% of that time dedicated to counseling and coordination of care     No

## 2022-11-12 NOTE — H&P ADULT - NSHPLABSRESULTS_GEN_ALL_CORE
LABS:                        14.3   11.19 )-----------( 228      ( 12 Nov 2022 03:28 )             43.2     11-12    140  |  104  |  14  ----------------------------<  205<H>  3.7   |  22  |  0.53    Ca    8.5      12 Nov 2022 06:28    TPro  6.5  /  Alb  3.7  /  TBili  0.5  /  DBili  x   /  AST  79<H>  /  ALT  85<H>  /  AlkPhos  32<L>  11-12    PT/INR - ( 12 Nov 2022 03:28 )   PT: 15.1 sec;   INR: 1.30 ratio         PTT - ( 12 Nov 2022 03:28 )  PTT:35.0 sec  CAPILLARY BLOOD GLUCOSE      POCT Blood Glucose.: 202 mg/dL (12 Nov 2022 09:54)            RADIOLOGY & ADDITIONAL TESTS:    Imaging Personally Reviewed:  [x] YES  [ ] NO    Consultant(s) Notes Reviewed:  [x] YES  [ ] NO    Care Discussed with Consultants/Other Providers [x] YES  [ ] NO    MEDICATIONS  (STANDING):  aMIOdarone    Tablet 100 milliGRAM(s) Oral daily  amLODIPine   Tablet 10 milliGRAM(s) Oral daily  dextrose 5%. 1000 milliLiter(s) (50 mL/Hr) IV Continuous <Continuous>  dextrose 5%. 1000 milliLiter(s) (100 mL/Hr) IV Continuous <Continuous>  dextrose 50% Injectable 25 Gram(s) IV Push once  dextrose 50% Injectable 12.5 Gram(s) IV Push once  dextrose 50% Injectable 25 Gram(s) IV Push once  glucagon  Injectable 1 milliGRAM(s) IntraMuscular once  heparin  Infusion.  Unit(s)/Hr (10 mL/Hr) IV Continuous <Continuous>  insulin lispro (ADMELOG) corrective regimen sliding scale   SubCutaneous every 6 hours  lisinopril 40 milliGRAM(s) Oral daily  metoprolol succinate ER 50 milliGRAM(s) Oral daily    MEDICATIONS  (PRN):  acetaminophen     Tablet .. 650 milliGRAM(s) Oral every 6 hours PRN Temp greater or equal to 38C (100.4F), Mild Pain (1 - 3)  dextrose Oral Gel 15 Gram(s) Oral once PRN Blood Glucose LESS THAN 70 milliGRAM(s)/deciliter  heparin   Injectable 5300 Unit(s) IV Push every 6 hours PRN For aPTT less than 40  ondansetron Injectable 4 milliGRAM(s) IV Push every 8 hours PRN Nausea and/or Vomiting

## 2022-11-12 NOTE — CONSULT NOTE ADULT - CONSULT REQUESTED DATE/TIME
12-Nov-2022 12:30
12-Nov-2022 05:22
Admission Reconciliation is Not Complete  Discharge Reconciliation is Not Complete
Admission Reconciliation is Completed  Discharge Reconciliation is Completed

## 2022-11-12 NOTE — H&P ADULT - NSHPREVIEWOFSYSTEMS_GEN_ALL_CORE
This is at the time of my examination.  Patient denies fevers, chills, chest pain, shortness of breath, orthopnea, pnd, abdominal pain , nausea, vomiting, diarrhea, dysuria, headache, dizziness, cough, sputum production, focal weakness.

## 2022-11-12 NOTE — ED PROVIDER NOTE - NS ED ROS FT
Constitutional: no fevers, chills  HEENT: no cough, rhinorrhea  Cardiac: chest pain.  No palpitations  Respiratory: no SOB  GI: epigastric abd pain. no n/v, bloody or dark stools.  : no dysuria, frequency, or hematuria  MSK: no joint pain  Skin: no rashes  Neuro: no headache, change in vision, focal weakness  Psych: negative

## 2022-11-12 NOTE — ED ADULT NURSE NOTE - ED STAT RN HANDOFF DETAILS
Report endorsed to oncoming Shannan FLORES. Safety checks completed this shift. Safety rounds completed hourly.  IV sites checked Q2+remains WDL. Medications administered as ordered with no signs/symptoms of adverse reactions. Fall & skin precautions in place. Any issues endorsed to oncoming RN for follow up.

## 2022-11-12 NOTE — ED PROVIDER NOTE - CLINICAL SUMMARY MEDICAL DECISION MAKING FREE TEXT BOX
Impression: 73yo M pmhx of MI in March status post stent placement, prostate cancer, colon cancer, kidney cancer comes to ED w/ chest/epigastric abd pain.  Their symptoms of chest/epigastric abd pain, luq abd pain exam findings of  are concerning for pancreatitis, gastritis. Will evaluate for ACS given significant recent history.

## 2022-11-12 NOTE — ED ADULT NURSE NOTE - OBJECTIVE STATEMENT
Pt presents to the ED BIBA co chest pain, L lateral abdomen and back pain x12am. Pt states he was getting ready to go to bed, when he felt the sudden pain. Hx of cardiac stents and MI back in march. Pt endorses s/s now resolved, but came to ED d/t extensive cardiac hx. Pt on eliquis and plavix. Pt connected to ocardiac monitor at bedside.  Denies NVD, fevers, chills.

## 2022-11-12 NOTE — ED PROVIDER NOTE - ATTENDING CONTRIBUTION TO CARE
attending Maria L: 72yM h/o MI s/p stent in March 2022, prostate cancer, colon cancer, kidney cancer presents after episode of epigastric discomfort, similar to pain with prior MI. Currently pain free. Exam as above. Will obtain ekg, place on tele, labs including trop, CT A/P, review prior hospitalization records, reassess

## 2022-11-13 LAB
A1C WITH ESTIMATED AVERAGE GLUCOSE RESULT: 7.2 % — HIGH (ref 4–5.6)
ALBUMIN SERPL ELPH-MCNC: 3.6 G/DL — SIGNIFICANT CHANGE UP (ref 3.3–5)
ALP SERPL-CCNC: 33 U/L — LOW (ref 40–120)
ALT FLD-CCNC: 83 U/L — HIGH (ref 10–45)
ANION GAP SERPL CALC-SCNC: 12 MMOL/L — SIGNIFICANT CHANGE UP (ref 5–17)
APTT BLD: 44.5 SEC — HIGH (ref 27.5–35.5)
APTT BLD: 56.8 SEC — HIGH (ref 27.5–35.5)
APTT BLD: 59.7 SEC — HIGH (ref 27.5–35.5)
AST SERPL-CCNC: 57 U/L — HIGH (ref 10–40)
BILIRUB SERPL-MCNC: 0.8 MG/DL — SIGNIFICANT CHANGE UP (ref 0.2–1.2)
BUN SERPL-MCNC: 14 MG/DL — SIGNIFICANT CHANGE UP (ref 7–23)
CALCIUM SERPL-MCNC: 8.9 MG/DL — SIGNIFICANT CHANGE UP (ref 8.4–10.5)
CHLORIDE SERPL-SCNC: 102 MMOL/L — SIGNIFICANT CHANGE UP (ref 96–108)
CHOLEST SERPL-MCNC: 163 MG/DL — SIGNIFICANT CHANGE UP
CO2 SERPL-SCNC: 24 MMOL/L — SIGNIFICANT CHANGE UP (ref 22–31)
CREAT SERPL-MCNC: 0.48 MG/DL — LOW (ref 0.5–1.3)
EGFR: 110 ML/MIN/1.73M2 — SIGNIFICANT CHANGE UP
ESTIMATED AVERAGE GLUCOSE: 160 MG/DL — HIGH (ref 68–114)
GLUCOSE BLDC GLUCOMTR-MCNC: 115 MG/DL — HIGH (ref 70–99)
GLUCOSE BLDC GLUCOMTR-MCNC: 160 MG/DL — HIGH (ref 70–99)
GLUCOSE BLDC GLUCOMTR-MCNC: 178 MG/DL — HIGH (ref 70–99)
GLUCOSE BLDC GLUCOMTR-MCNC: 220 MG/DL — HIGH (ref 70–99)
GLUCOSE SERPL-MCNC: 156 MG/DL — HIGH (ref 70–99)
HCT VFR BLD CALC: 38.3 % — LOW (ref 39–50)
HDLC SERPL-MCNC: 36 MG/DL — LOW
HGB BLD-MCNC: 12.6 G/DL — LOW (ref 13–17)
LIPID PNL WITH DIRECT LDL SERPL: 101 MG/DL — HIGH
MCHC RBC-ENTMCNC: 30.3 PG — SIGNIFICANT CHANGE UP (ref 27–34)
MCHC RBC-ENTMCNC: 32.9 GM/DL — SIGNIFICANT CHANGE UP (ref 32–36)
MCV RBC AUTO: 92.1 FL — SIGNIFICANT CHANGE UP (ref 80–100)
NON HDL CHOLESTEROL: 127 MG/DL — SIGNIFICANT CHANGE UP
NRBC # BLD: 0 /100 WBCS — SIGNIFICANT CHANGE UP (ref 0–0)
PLATELET # BLD AUTO: 194 K/UL — SIGNIFICANT CHANGE UP (ref 150–400)
POTASSIUM SERPL-MCNC: 3.4 MMOL/L — LOW (ref 3.5–5.3)
POTASSIUM SERPL-SCNC: 3.4 MMOL/L — LOW (ref 3.5–5.3)
PROT SERPL-MCNC: 6.4 G/DL — SIGNIFICANT CHANGE UP (ref 6–8.3)
RBC # BLD: 4.16 M/UL — LOW (ref 4.2–5.8)
RBC # FLD: 14 % — SIGNIFICANT CHANGE UP (ref 10.3–14.5)
SODIUM SERPL-SCNC: 138 MMOL/L — SIGNIFICANT CHANGE UP (ref 135–145)
TRIGL SERPL-MCNC: 130 MG/DL — SIGNIFICANT CHANGE UP
TROPONIN T, HIGH SENSITIVITY RESULT: 1049 NG/L — HIGH (ref 0–51)
TROPONIN T, HIGH SENSITIVITY RESULT: 1873 NG/L — HIGH (ref 0–51)
WBC # BLD: 7.6 K/UL — SIGNIFICANT CHANGE UP (ref 3.8–10.5)
WBC # FLD AUTO: 7.6 K/UL — SIGNIFICANT CHANGE UP (ref 3.8–10.5)

## 2022-11-13 RX ORDER — POTASSIUM CHLORIDE 20 MEQ
40 PACKET (EA) ORAL ONCE
Refills: 0 | Status: COMPLETED | OUTPATIENT
Start: 2022-11-13 | End: 2022-11-13

## 2022-11-13 RX ORDER — HYDROCHLOROTHIAZIDE 25 MG
25 TABLET ORAL DAILY
Refills: 0 | Status: DISCONTINUED | OUTPATIENT
Start: 2022-11-13 | End: 2022-11-15

## 2022-11-13 RX ORDER — BNT162B2 ORIGINAL AND OMICRON BA.4/BA.5 .1125; .1125 MG/2.25ML; MG/2.25ML
0.3 INJECTION, SUSPENSION INTRAMUSCULAR ONCE
Refills: 0 | Status: DISCONTINUED | OUTPATIENT
Start: 2022-11-13 | End: 2022-11-15

## 2022-11-13 RX ADMIN — HEPARIN SODIUM 1600 UNIT(S)/HR: 5000 INJECTION INTRAVENOUS; SUBCUTANEOUS at 12:05

## 2022-11-13 RX ADMIN — HEPARIN SODIUM 1600 UNIT(S)/HR: 5000 INJECTION INTRAVENOUS; SUBCUTANEOUS at 21:07

## 2022-11-13 RX ADMIN — AMLODIPINE BESYLATE 10 MILLIGRAM(S): 2.5 TABLET ORAL at 05:52

## 2022-11-13 RX ADMIN — HEPARIN SODIUM 1400 UNIT(S)/HR: 5000 INJECTION INTRAVENOUS; SUBCUTANEOUS at 07:36

## 2022-11-13 RX ADMIN — HEPARIN SODIUM 1600 UNIT(S)/HR: 5000 INJECTION INTRAVENOUS; SUBCUTANEOUS at 19:01

## 2022-11-13 RX ADMIN — Medication 2: at 08:21

## 2022-11-13 RX ADMIN — LISINOPRIL 40 MILLIGRAM(S): 2.5 TABLET ORAL at 05:52

## 2022-11-13 RX ADMIN — Medication 0: at 21:02

## 2022-11-13 RX ADMIN — Medication 4: at 12:21

## 2022-11-13 RX ADMIN — HEPARIN SODIUM 1400 UNIT(S)/HR: 5000 INJECTION INTRAVENOUS; SUBCUTANEOUS at 05:45

## 2022-11-13 RX ADMIN — AMIODARONE HYDROCHLORIDE 100 MILLIGRAM(S): 400 TABLET ORAL at 05:52

## 2022-11-13 RX ADMIN — CLOPIDOGREL BISULFATE 75 MILLIGRAM(S): 75 TABLET, FILM COATED ORAL at 21:01

## 2022-11-13 RX ADMIN — Medication 25 MILLIGRAM(S): at 17:17

## 2022-11-13 RX ADMIN — HEPARIN SODIUM 1400 UNIT(S)/HR: 5000 INJECTION INTRAVENOUS; SUBCUTANEOUS at 05:43

## 2022-11-13 RX ADMIN — Medication 40 MILLIEQUIVALENT(S): at 09:07

## 2022-11-13 NOTE — PATIENT PROFILE ADULT - FALL HARM RISK - HARM RISK INTERVENTIONS

## 2022-11-14 LAB
ALBUMIN SERPL ELPH-MCNC: 3.7 G/DL — SIGNIFICANT CHANGE UP (ref 3.3–5)
ALP SERPL-CCNC: 35 U/L — LOW (ref 40–120)
ALT FLD-CCNC: 75 U/L — HIGH (ref 10–45)
ANION GAP SERPL CALC-SCNC: 11 MMOL/L — SIGNIFICANT CHANGE UP (ref 5–17)
APTT BLD: 61.5 SEC — HIGH (ref 27.5–35.5)
AST SERPL-CCNC: 38 U/L — SIGNIFICANT CHANGE UP (ref 10–40)
BASOPHILS # BLD AUTO: 0.06 K/UL — SIGNIFICANT CHANGE UP (ref 0–0.2)
BASOPHILS NFR BLD AUTO: 0.9 % — SIGNIFICANT CHANGE UP (ref 0–2)
BILIRUB SERPL-MCNC: 0.9 MG/DL — SIGNIFICANT CHANGE UP (ref 0.2–1.2)
BUN SERPL-MCNC: 13 MG/DL — SIGNIFICANT CHANGE UP (ref 7–23)
CALCIUM SERPL-MCNC: 9.2 MG/DL — SIGNIFICANT CHANGE UP (ref 8.4–10.5)
CHLORIDE SERPL-SCNC: 102 MMOL/L — SIGNIFICANT CHANGE UP (ref 96–108)
CO2 SERPL-SCNC: 25 MMOL/L — SIGNIFICANT CHANGE UP (ref 22–31)
CREAT SERPL-MCNC: 0.57 MG/DL — SIGNIFICANT CHANGE UP (ref 0.5–1.3)
EGFR: 104 ML/MIN/1.73M2 — SIGNIFICANT CHANGE UP
EOSINOPHIL # BLD AUTO: 0.23 K/UL — SIGNIFICANT CHANGE UP (ref 0–0.5)
EOSINOPHIL NFR BLD AUTO: 3.3 % — SIGNIFICANT CHANGE UP (ref 0–6)
GLUCOSE BLDC GLUCOMTR-MCNC: 146 MG/DL — HIGH (ref 70–99)
GLUCOSE BLDC GLUCOMTR-MCNC: 155 MG/DL — HIGH (ref 70–99)
GLUCOSE BLDC GLUCOMTR-MCNC: 191 MG/DL — HIGH (ref 70–99)
GLUCOSE BLDC GLUCOMTR-MCNC: 197 MG/DL — HIGH (ref 70–99)
GLUCOSE SERPL-MCNC: 178 MG/DL — HIGH (ref 70–99)
HCT VFR BLD CALC: 38.7 % — LOW (ref 39–50)
HGB BLD-MCNC: 12.9 G/DL — LOW (ref 13–17)
IMM GRANULOCYTES NFR BLD AUTO: 0.7 % — SIGNIFICANT CHANGE UP (ref 0–0.9)
LYMPHOCYTES # BLD AUTO: 1.37 K/UL — SIGNIFICANT CHANGE UP (ref 1–3.3)
LYMPHOCYTES # BLD AUTO: 19.8 % — SIGNIFICANT CHANGE UP (ref 13–44)
MCHC RBC-ENTMCNC: 30.6 PG — SIGNIFICANT CHANGE UP (ref 27–34)
MCHC RBC-ENTMCNC: 33.3 GM/DL — SIGNIFICANT CHANGE UP (ref 32–36)
MCV RBC AUTO: 91.7 FL — SIGNIFICANT CHANGE UP (ref 80–100)
MONOCYTES # BLD AUTO: 0.82 K/UL — SIGNIFICANT CHANGE UP (ref 0–0.9)
MONOCYTES NFR BLD AUTO: 11.8 % — SIGNIFICANT CHANGE UP (ref 2–14)
NEUTROPHILS # BLD AUTO: 4.39 K/UL — SIGNIFICANT CHANGE UP (ref 1.8–7.4)
NEUTROPHILS NFR BLD AUTO: 63.5 % — SIGNIFICANT CHANGE UP (ref 43–77)
NRBC # BLD: 0 /100 WBCS — SIGNIFICANT CHANGE UP (ref 0–0)
PLATELET # BLD AUTO: 190 K/UL — SIGNIFICANT CHANGE UP (ref 150–400)
POTASSIUM SERPL-MCNC: 3.7 MMOL/L — SIGNIFICANT CHANGE UP (ref 3.5–5.3)
POTASSIUM SERPL-SCNC: 3.7 MMOL/L — SIGNIFICANT CHANGE UP (ref 3.5–5.3)
PROT SERPL-MCNC: 6.8 G/DL — SIGNIFICANT CHANGE UP (ref 6–8.3)
RBC # BLD: 4.22 M/UL — SIGNIFICANT CHANGE UP (ref 4.2–5.8)
RBC # FLD: 14.2 % — SIGNIFICANT CHANGE UP (ref 10.3–14.5)
SODIUM SERPL-SCNC: 138 MMOL/L — SIGNIFICANT CHANGE UP (ref 135–145)
WBC # BLD: 6.92 K/UL — SIGNIFICANT CHANGE UP (ref 3.8–10.5)
WBC # FLD AUTO: 6.92 K/UL — SIGNIFICANT CHANGE UP (ref 3.8–10.5)

## 2022-11-14 PROCEDURE — 93306 TTE W/DOPPLER COMPLETE: CPT | Mod: 26

## 2022-11-14 PROCEDURE — 93010 ELECTROCARDIOGRAM REPORT: CPT

## 2022-11-14 RX ORDER — APIXABAN 2.5 MG/1
5 TABLET, FILM COATED ORAL EVERY 12 HOURS
Refills: 0 | Status: DISCONTINUED | OUTPATIENT
Start: 2022-11-15 | End: 2022-11-15

## 2022-11-14 RX ORDER — SODIUM CHLORIDE 9 MG/ML
1000 INJECTION INTRAMUSCULAR; INTRAVENOUS; SUBCUTANEOUS
Refills: 0 | Status: DISCONTINUED | OUTPATIENT
Start: 2022-11-14 | End: 2022-11-15

## 2022-11-14 RX ADMIN — Medication 25 MILLIGRAM(S): at 05:25

## 2022-11-14 RX ADMIN — SODIUM CHLORIDE 125 MILLILITER(S): 9 INJECTION INTRAMUSCULAR; INTRAVENOUS; SUBCUTANEOUS at 16:46

## 2022-11-14 RX ADMIN — LISINOPRIL 40 MILLIGRAM(S): 2.5 TABLET ORAL at 05:25

## 2022-11-14 RX ADMIN — Medication 50 MILLIGRAM(S): at 05:24

## 2022-11-14 RX ADMIN — CLOPIDOGREL BISULFATE 75 MILLIGRAM(S): 75 TABLET, FILM COATED ORAL at 22:37

## 2022-11-14 RX ADMIN — AMLODIPINE BESYLATE 10 MILLIGRAM(S): 2.5 TABLET ORAL at 05:24

## 2022-11-14 RX ADMIN — Medication 2: at 08:22

## 2022-11-14 RX ADMIN — HEPARIN SODIUM 1600 UNIT(S)/HR: 5000 INJECTION INTRAVENOUS; SUBCUTANEOUS at 07:33

## 2022-11-14 RX ADMIN — AMIODARONE HYDROCHLORIDE 100 MILLIGRAM(S): 400 TABLET ORAL at 05:25

## 2022-11-14 RX ADMIN — HEPARIN SODIUM 1600 UNIT(S)/HR: 5000 INJECTION INTRAVENOUS; SUBCUTANEOUS at 01:23

## 2022-11-14 NOTE — CHART NOTE - NSCHARTNOTEFT_GEN_A_CORE
s/p PCI RCA  post 12 lead EKG Sinus rona with occasional paced beats with heart block  VSS- Pt feels bell   Pt has history of micra PPM  EKG reviewed with Dr. Rayo

## 2022-11-14 NOTE — CHART NOTE - NSCHARTNOTEFT_GEN_A_CORE
Removal of Radial Band    Pulses in the right upper extremity are palpable. The patient was placed in the supine position. The insertion site was identified and the band deflated per protocol. The radial band was removed slowly.     Monitoring of the right wrists and both upper extremities including neuro-vascular checks and vital signs every 15 minutes x 4.    Complications: None

## 2022-11-15 ENCOUNTER — TRANSCRIPTION ENCOUNTER (OUTPATIENT)
Age: 72
End: 2022-11-15

## 2022-11-15 VITALS — DIASTOLIC BLOOD PRESSURE: 65 MMHG | SYSTOLIC BLOOD PRESSURE: 162 MMHG | HEART RATE: 76 BPM

## 2022-11-15 LAB
ANION GAP SERPL CALC-SCNC: 17 MMOL/L — SIGNIFICANT CHANGE UP (ref 5–17)
APTT BLD: 22.7 SEC — LOW (ref 27.5–35.5)
BUN SERPL-MCNC: 12 MG/DL — SIGNIFICANT CHANGE UP (ref 7–23)
CALCIUM SERPL-MCNC: 9 MG/DL — SIGNIFICANT CHANGE UP (ref 8.4–10.5)
CHLORIDE SERPL-SCNC: 101 MMOL/L — SIGNIFICANT CHANGE UP (ref 96–108)
CO2 SERPL-SCNC: 21 MMOL/L — LOW (ref 22–31)
CREAT SERPL-MCNC: 0.54 MG/DL — SIGNIFICANT CHANGE UP (ref 0.5–1.3)
EGFR: 106 ML/MIN/1.73M2 — SIGNIFICANT CHANGE UP
GLUCOSE BLDC GLUCOMTR-MCNC: 181 MG/DL — HIGH (ref 70–99)
GLUCOSE SERPL-MCNC: 167 MG/DL — HIGH (ref 70–99)
HCT VFR BLD CALC: 41.5 % — SIGNIFICANT CHANGE UP (ref 39–50)
HGB BLD-MCNC: 13.6 G/DL — SIGNIFICANT CHANGE UP (ref 13–17)
MCHC RBC-ENTMCNC: 30.6 PG — SIGNIFICANT CHANGE UP (ref 27–34)
MCHC RBC-ENTMCNC: 32.8 GM/DL — SIGNIFICANT CHANGE UP (ref 32–36)
MCV RBC AUTO: 93.5 FL — SIGNIFICANT CHANGE UP (ref 80–100)
NRBC # BLD: 0 /100 WBCS — SIGNIFICANT CHANGE UP (ref 0–0)
PLATELET # BLD AUTO: 187 K/UL — SIGNIFICANT CHANGE UP (ref 150–400)
POTASSIUM SERPL-MCNC: 3.8 MMOL/L — SIGNIFICANT CHANGE UP (ref 3.5–5.3)
POTASSIUM SERPL-SCNC: 3.8 MMOL/L — SIGNIFICANT CHANGE UP (ref 3.5–5.3)
RBC # BLD: 4.44 M/UL — SIGNIFICANT CHANGE UP (ref 4.2–5.8)
RBC # FLD: 14.2 % — SIGNIFICANT CHANGE UP (ref 10.3–14.5)
SODIUM SERPL-SCNC: 139 MMOL/L — SIGNIFICANT CHANGE UP (ref 135–145)
WBC # BLD: 7.99 K/UL — SIGNIFICANT CHANGE UP (ref 3.8–10.5)
WBC # FLD AUTO: 7.99 K/UL — SIGNIFICANT CHANGE UP (ref 3.8–10.5)

## 2022-11-15 PROCEDURE — 85610 PROTHROMBIN TIME: CPT

## 2022-11-15 PROCEDURE — 82803 BLOOD GASES ANY COMBINATION: CPT

## 2022-11-15 PROCEDURE — 87635 SARS-COV-2 COVID-19 AMP PRB: CPT

## 2022-11-15 PROCEDURE — 82435 ASSAY OF BLOOD CHLORIDE: CPT

## 2022-11-15 PROCEDURE — 74177 CT ABD & PELVIS W/CONTRAST: CPT | Mod: MA

## 2022-11-15 PROCEDURE — 85018 HEMOGLOBIN: CPT

## 2022-11-15 PROCEDURE — 93306 TTE W/DOPPLER COMPLETE: CPT

## 2022-11-15 PROCEDURE — 80061 LIPID PANEL: CPT

## 2022-11-15 PROCEDURE — 80053 COMPREHEN METABOLIC PANEL: CPT

## 2022-11-15 PROCEDURE — 85027 COMPLETE CBC AUTOMATED: CPT

## 2022-11-15 PROCEDURE — 83690 ASSAY OF LIPASE: CPT

## 2022-11-15 PROCEDURE — 93454 CORONARY ARTERY ANGIO S&I: CPT | Mod: 59

## 2022-11-15 PROCEDURE — C9600: CPT | Mod: RC

## 2022-11-15 PROCEDURE — C1874: CPT

## 2022-11-15 PROCEDURE — 84295 ASSAY OF SERUM SODIUM: CPT

## 2022-11-15 PROCEDURE — 83605 ASSAY OF LACTIC ACID: CPT

## 2022-11-15 PROCEDURE — C1725: CPT

## 2022-11-15 PROCEDURE — 84132 ASSAY OF SERUM POTASSIUM: CPT

## 2022-11-15 PROCEDURE — 83036 HEMOGLOBIN GLYCOSYLATED A1C: CPT

## 2022-11-15 PROCEDURE — 85014 HEMATOCRIT: CPT

## 2022-11-15 PROCEDURE — C1887: CPT

## 2022-11-15 PROCEDURE — 82330 ASSAY OF CALCIUM: CPT

## 2022-11-15 PROCEDURE — 82962 GLUCOSE BLOOD TEST: CPT

## 2022-11-15 PROCEDURE — 80048 BASIC METABOLIC PNL TOTAL CA: CPT

## 2022-11-15 PROCEDURE — C1894: CPT

## 2022-11-15 PROCEDURE — 99285 EMERGENCY DEPT VISIT HI MDM: CPT | Mod: 25

## 2022-11-15 PROCEDURE — 36415 COLL VENOUS BLD VENIPUNCTURE: CPT

## 2022-11-15 PROCEDURE — 85025 COMPLETE CBC W/AUTO DIFF WBC: CPT

## 2022-11-15 PROCEDURE — 84484 ASSAY OF TROPONIN QUANT: CPT

## 2022-11-15 PROCEDURE — 82947 ASSAY GLUCOSE BLOOD QUANT: CPT

## 2022-11-15 PROCEDURE — 82553 CREATINE MB FRACTION: CPT

## 2022-11-15 PROCEDURE — 93005 ELECTROCARDIOGRAM TRACING: CPT

## 2022-11-15 PROCEDURE — 85730 THROMBOPLASTIN TIME PARTIAL: CPT

## 2022-11-15 PROCEDURE — C1769: CPT

## 2022-11-15 RX ORDER — ASPIRIN/CALCIUM CARB/MAGNESIUM 324 MG
1 TABLET ORAL
Qty: 30 | Refills: 0
Start: 2022-11-15 | End: 2022-12-14

## 2022-11-15 RX ADMIN — AMIODARONE HYDROCHLORIDE 100 MILLIGRAM(S): 400 TABLET ORAL at 06:18

## 2022-11-15 RX ADMIN — Medication 25 MILLIGRAM(S): at 06:17

## 2022-11-15 RX ADMIN — LISINOPRIL 40 MILLIGRAM(S): 2.5 TABLET ORAL at 06:17

## 2022-11-15 RX ADMIN — Medication 50 MILLIGRAM(S): at 06:17

## 2022-11-15 RX ADMIN — AMLODIPINE BESYLATE 10 MILLIGRAM(S): 2.5 TABLET ORAL at 06:17

## 2022-11-15 RX ADMIN — Medication 2: at 08:30

## 2022-11-15 RX ADMIN — APIXABAN 5 MILLIGRAM(S): 2.5 TABLET, FILM COATED ORAL at 06:17

## 2022-11-15 NOTE — PROGRESS NOTE ADULT - SUBJECTIVE AND OBJECTIVE BOX
CARDIOLOGY FOLLOW UP - Dr. Moreau  DATE OF SERVICE: 11/15/22    CC no cp or sob       REVIEW OF SYSTEMS:  CONSTITUTIONAL: No fever, weight loss, or fatigue  RESPIRATORY: No cough, wheezing, chills or hemoptysis; No Shortness of Breath  CARDIOVASCULAR: No chest pain, palpitations, passing out, dizziness, or leg swelling  GASTROINTESTINAL: No abdominal or epigastric pain. No nausea, vomiting, or hematemesis; No diarrhea or constipation. No melena or hematochezia.  VASCULAR: No edema     PHYSICAL EXAM:  T(C): 36.8 (11-15-22 @ 04:44), Max: 37.1 (11-14-22 @ 20:21)  HR: 76 (11-15-22 @ 06:13) (53 - 83)  BP: 162/65 (11-15-22 @ 06:13) (129/70 - 190/86)  RR: 18 (11-15-22 @ 04:44) (16 - 18)  SpO2: 94% (11-15-22 @ 04:44) (94% - 98%)  Wt(kg): --  I&O's Summary    14 Nov 2022 07:01  -  15 Nov 2022 07:00  --------------------------------------------------------  IN: 64 mL / OUT: 900 mL / NET: -836 mL        Appearance: Normal	  Cardiovascular: Normal S1 S2,RRR  Respiratory: Lungs clear to auscultation	  Gastrointestinal:  Soft, Non-tender, + BS	  Extremities: Normal range of motion, No clubbing, cyanosis or edema  right radial CDI no hemtoma  + pulses bl UE      Home Medications:  acetaminophen 325 mg oral tablet: 2 tab(s) orally every 6 hours, As needed, Temp greater or equal to 38C (100.4F), Mild Pain (1 - 3) (05 Mar 2022 21:53)  amiodarone 100 mg oral tablet: 1 tab(s) orally once a day (05 Mar 2022 21:53)  amLODIPine 10 mg oral tablet: 1 tab(s) orally once a day (05 Mar 2022 21:53)  metoprolol succinate 50 mg oral tablet, extended release: 1 tab(s) orally once a day (05 Mar 2022 21:53)  Vitamin B12 1000 mcg oral tablet: 1 tab(s) orally once a day (05 Mar 2022 21:53)  Vitamin D3 2000 intl units oral capsule: 1 cap(s) orally once a day (05 Mar 2022 21:53)      MEDICATIONS  (STANDING):  aMIOdarone    Tablet 100 milliGRAM(s) Oral daily  amLODIPine   Tablet 10 milliGRAM(s) Oral daily  apixaban 5 milliGRAM(s) Oral every 12 hours  aspirin enteric coated 81 milliGRAM(s) Oral daily  clopidogrel Tablet 75 milliGRAM(s) Oral at bedtime  coronavirus bivalent (EUA) Booster Vaccine (PFIZER) 0.3 milliLiter(s) IntraMuscular once  dextrose 5%. 1000 milliLiter(s) (50 mL/Hr) IV Continuous <Continuous>  dextrose 5%. 1000 milliLiter(s) (100 mL/Hr) IV Continuous <Continuous>  dextrose 50% Injectable 25 Gram(s) IV Push once  dextrose 50% Injectable 12.5 Gram(s) IV Push once  glucagon  Injectable 1 milliGRAM(s) IntraMuscular once  hydrochlorothiazide 25 milliGRAM(s) Oral daily  insulin lispro (ADMELOG) corrective regimen sliding scale   SubCutaneous three times a day before meals  insulin lispro (ADMELOG) corrective regimen sliding scale   SubCutaneous at bedtime  lisinopril 40 milliGRAM(s) Oral daily  metoprolol succinate ER 50 milliGRAM(s) Oral daily  sodium chloride 0.9%. 1000 milliLiter(s) (125 mL/Hr) IV Continuous <Continuous>      TELEMETRY: vpaced nsr 	    ECG:  	  RADIOLOGY:   DIAGNOSTIC TESTING:  [ ] Echocardiogram:  < from: Transthoracic Echocardiogram (11.14.22 @ 08:23) >  EF (Barger Rule): 47 %Doppler Peak Velocity (m/sec):  AoV=2.5  ------------------------------------------------------------------------  Observations:  Mitral Valve: Mitral annular calcification, otherwise  normal mitral valve. Mild mitral regurgitation.  Aortic Valve/Aorta: Transcatheter aortic valve replacement.  Peak transaortic valve gradient equals 25 mm Hg, mean  transaortic valve gradient equals 11 mm Hg, which is  probably normal in the presence of a transcatheter aortic  valve replacement. Peak left ventricular outflow tract  gradient equals 5 mm Hg, mean gradient is equal to 3 mm Hg,  LVOT velocity time integral equals 22 cm.  Aortic Root: 2.8 cm.  Ascending Aorta: 3.1 cm.  Left Atrium: Severely dilated left atrium.  LA volume index  = 57 cc/m2.  Left Ventricle: Mild-moderate  segmental left ventricular  systolic dysfunction. The inferolateral wall, the inferior  wall, and the anterolateral wall are hypokinetic.  Mild  left ventricular enlargement.  Right Heart: Normal right atrium. Normal right ventricular  size and function. Normal tricuspid valve. Mild tricuspid  regurgitation. Normal pulmonic valve.  Pericardium/Pleura: Normal pericardium with no pericardial  effusion.  Hemodynamic: Estimated right atrial pressure is 8 mm Hg.  Estimated right ventricular systolic pressure equals 35 mm  Hg, assuming right atrial pressure equals 8 mm Hg,  consistent with borderline pulmonary hypertension.  ------------------------------------------------------------------------  Conclusions:  1. Transcatheter aortic valve replacement. Peak transaortic  valve gradient equals 25 mm Hg, mean transaortic valve  gradient equals 11 mm Hg, which is probably normal in the  presence of a transcatheter aortic valve replacement.  2. Mild left ventricular enlargement.  3. Mild-moderate  segmental left ventricular systolic  dysfunction. The inferolateral wall, the inferior wall, and  the anterolateral wall are hypokinetic.  4. Normal right ventricular size and function.  *** Compared with echocardiogram of 3/7/2022, Wall motion  noted in the setting if known NSTEMI  ------------------------------------------------------------------------  Confirmed on  11/14/2022 - 17:29:13 by ZHANNA Ramirez  ------------------------------------------------------------------------    < end of copied text >      [x ]  Catheterization:  < from: Cardiac Catheterization (11.14.22 @ 17:08) >  Conclusions:   NSTEMI     Known history of CAD, s/p RCA stents 3/2022.     Cath with mild LAD/Ramus/Lcx disease.   Severe instent lesion of prior mid RCA stent.      PCI with WALLACE performed to severe mid RCA lesion in setting of NSTEMI.   Continue triple therapy for 1 month, followed by plavix and eliquis  therafter.    < end of copied text >      [ ] Stress Test:    OTHER: 	    LABS:	 	    Troponin T, High Sensitivity Result: 1049 ng/L [0 - 51] (11-13 @ 11:46)  Troponin T, High Sensitivity Result: 1873 ng/L [0 - 51] (11-13 @ 01:00)  Troponin T, High Sensitivity Result: 2525 ng/L [0 - 51] (11-12 @ 19:02)  CKMB Units: 89.1 ng/mL [0.0 - 6.7] (11-12 @ 19:02)  Troponin T, High Sensitivity Result: 2699 ng/L [0 - 51] (11-12 @ 12:45)  Troponin T, High Sensitivity Result: 472 ng/L [0 - 51] (11-12 @ 06:28)  Troponin T, High Sensitivity Result: 247 ng/L [0 - 51] (11-12 @ 03:28)                          13.6   7.99  )-----------( 187      ( 15 Nov 2022 07:46 )             41.5     11-15    139  |  101  |  12  ----------------------------<  167<H>  3.8   |  21<L>  |  0.54    Ca    9.0      15 Nov 2022 07:30    TPro  6.8  /  Alb  3.7  /  TBili  0.9  /  DBili  x   /  AST  38  /  ALT  75<H>  /  AlkPhos  35<L>  11-14    PTT - ( 15 Nov 2022 07:46 )  PTT:22.7 sec        
CARDIOLOGY FOLLOW UP NOTE - DR. LAGUNA    Patient Name: ROBINSON RIVERS  Date of Service: 11-13-22 @ 15:14    Patient seen and examined    Subjective:    cv: denies chest pain, dyspnea, palpitations, dizziness  pulmonary: denies cough  GI: denies abdominal pain, nausea, vomiting  vascular/legs: no edema   skin: no rash  ROS: otherwise negative   overnight events:      PHYSICAL EXAM:  T(C): 36.3 (11-13-22 @ 12:01), Max: 36.8 (11-12-22 @ 15:50)  HR: 70 (11-13-22 @ 12:01) (59 - 70)  BP: 172/80 (11-13-22 @ 12:01) (151/83 - 172/80)  RR: 18 (11-13-22 @ 12:01) (18 - 18)  SpO2: 93% (11-13-22 @ 12:01) (93% - 97%)  Wt(kg): --  I&O's Summary    12 Nov 2022 07:01  -  13 Nov 2022 07:00  --------------------------------------------------------  IN: 240 mL / OUT: 0 mL / NET: 240 mL    13 Nov 2022 07:01  -  13 Nov 2022 15:14  --------------------------------------------------------  IN: 720 mL / OUT: 0 mL / NET: 720 mL      Daily     Daily     Appearance: Normal	  Cardiovascular: Normal S1 S2,RRR, No JVD, No murmurs  Respiratory: Lungs clear to auscultation	  Gastrointestinal:  Soft, Non-tender, + BS	  Extremities: Normal range of motion, No clubbing, cyanosis or edema      Home Medications:  acetaminophen 325 mg oral tablet: 2 tab(s) orally every 6 hours, As needed, Temp greater or equal to 38C (100.4F), Mild Pain (1 - 3) (05 Mar 2022 21:53)  amiodarone 100 mg oral tablet: 1 tab(s) orally once a day (05 Mar 2022 21:53)  amLODIPine 10 mg oral tablet: 1 tab(s) orally once a day (05 Mar 2022 21:53)  metoprolol succinate 50 mg oral tablet, extended release: 1 tab(s) orally once a day (05 Mar 2022 21:53)  Vitamin B12 1000 mcg oral tablet: 1 tab(s) orally once a day (05 Mar 2022 21:53)  Vitamin D3 2000 intl units oral capsule: 1 cap(s) orally once a day (05 Mar 2022 21:53)      MEDICATIONS  (STANDING):  aMIOdarone    Tablet 100 milliGRAM(s) Oral daily  amLODIPine   Tablet 10 milliGRAM(s) Oral daily  aspirin enteric coated 81 milliGRAM(s) Oral daily  clopidogrel Tablet 75 milliGRAM(s) Oral at bedtime  coronavirus bivalent (EUA) Booster Vaccine (PFIZER) 0.3 milliLiter(s) IntraMuscular once  dextrose 5%. 1000 milliLiter(s) (50 mL/Hr) IV Continuous <Continuous>  dextrose 5%. 1000 milliLiter(s) (100 mL/Hr) IV Continuous <Continuous>  dextrose 50% Injectable 25 Gram(s) IV Push once  dextrose 50% Injectable 12.5 Gram(s) IV Push once  glucagon  Injectable 1 milliGRAM(s) IntraMuscular once  heparin  Infusion.  Unit(s)/Hr (10 mL/Hr) IV Continuous <Continuous>  hydrochlorothiazide 25 milliGRAM(s) Oral daily  insulin lispro (ADMELOG) corrective regimen sliding scale   SubCutaneous three times a day before meals  insulin lispro (ADMELOG) corrective regimen sliding scale   SubCutaneous at bedtime  lisinopril 40 milliGRAM(s) Oral daily  metoprolol succinate ER 50 milliGRAM(s) Oral daily      TELEMETRY: 	    ECG:  	  RADIOLOGY:   DIAGNOSTIC TESTING:  [ ] Echocardiogram:  [ ] Catheterization:  [ ] Stress Test:    OTHER: 	    LABS:	 	    CARDIAC MARKERS:        Troponin T, High Sensitivity Result: 1049 ng/L (11-13 @ 11:46)  Troponin T, High Sensitivity Result: 1873 ng/L (11-13 @ 01:00)  Troponin T, High Sensitivity Result: 2525 ng/L (11-12 @ 19:02)  Troponin T, High Sensitivity Result: 2699 ng/L (11-12 @ 12:45)  Troponin T, High Sensitivity Result: 472 ng/L (11-12 @ 06:28)  Troponin T, High Sensitivity Result: 247 ng/L (11-12 @ 03:28)                                12.6   7.60  )-----------( 194      ( 13 Nov 2022 07:06 )             38.3     11-13    138  |  102  |  14  ----------------------------<  156<H>  3.4<L>   |  24  |  0.48<L>    Ca    8.9      13 Nov 2022 07:06    TPro  6.4  /  Alb  3.6  /  TBili  0.8  /  DBili  x   /  AST  57<H>  /  ALT  83<H>  /  AlkPhos  33<L>  11-13    proBNP:   PT/INR - ( 12 Nov 2022 03:28 )   PT: 15.1 sec;   INR: 1.30 ratio         PTT - ( 13 Nov 2022 11:46 )  PTT:44.5 sec  Lipid Profile:   HgA1c:     Creatinine, Serum: 0.48 mg/dL (11-13-22 @ 07:06)  Creatinine, Serum: 0.53 mg/dL (11-12-22 @ 06:28)  Creatinine, Serum: 0.45 mg/dL (11-12-22 @ 03:28)            
Patient is a 72y old  Male who presents with a chief complaint of     SUBJECTIVE / OVERNIGHT EVENTS:  Patient seen and examined.   NO chest pain or shortness of breath.       Vital Signs Last 24 Hrs  T(C): 36.3 (13 Nov 2022 12:01), Max: 36.8 (12 Nov 2022 15:50)  T(F): 97.4 (13 Nov 2022 12:01), Max: 98.3 (12 Nov 2022 15:50)  HR: 70 (13 Nov 2022 12:01) (59 - 70)  BP: 172/80 (13 Nov 2022 12:01) (151/83 - 172/80)  BP(mean): --  RR: 18 (13 Nov 2022 12:01) (18 - 18)  SpO2: 93% (13 Nov 2022 12:01) (93% - 97%)    Parameters below as of 13 Nov 2022 12:01  Patient On (Oxygen Delivery Method): room air      I&O's Summary    12 Nov 2022 07:01  -  13 Nov 2022 07:00  --------------------------------------------------------  IN: 240 mL / OUT: 0 mL / NET: 240 mL    13 Nov 2022 07:01  -  13 Nov 2022 14:21  --------------------------------------------------------  IN: 720 mL / OUT: 0 mL / NET: 720 mL        PHYSICAL EXAM:  GENERAL: NAD, AAOx3  HEAD:  Atraumatic, Normocephalic  EYES: EOMI; conjunctiva and sclera clear  NECK: Supple, No JVD, No LAD  CHEST/LUNG: B/L air entry; No wheezes, rales or rhonci   HEART: Regular rate and rhythm; No murmurs, rubs, or gallops  ABDOMEN: Soft, Nontender, Nondistended; Bowel sounds present  EXTREMITIES:  2+ Peripheral Pulses, No clubbing, cyanosis, or edema  SKIN: No rashes or lesions    LABS:                        12.6   7.60  )-----------( 194      ( 13 Nov 2022 07:06 )             38.3     11-13    138  |  102  |  14  ----------------------------<  156<H>  3.4<L>   |  24  |  0.48<L>    Ca    8.9      13 Nov 2022 07:06    TPro  6.4  /  Alb  3.6  /  TBili  0.8  /  DBili  x   /  AST  57<H>  /  ALT  83<H>  /  AlkPhos  33<L>  11-13    PT/INR - ( 12 Nov 2022 03:28 )   PT: 15.1 sec;   INR: 1.30 ratio         PTT - ( 13 Nov 2022 11:46 )  PTT:44.5 sec  CAPILLARY BLOOD GLUCOSE      POCT Blood Glucose.: 220 mg/dL (13 Nov 2022 12:17)  POCT Blood Glucose.: 178 mg/dL (13 Nov 2022 08:05)  POCT Blood Glucose.: 133 mg/dL (12 Nov 2022 21:37)  POCT Blood Glucose.: 190 mg/dL (12 Nov 2022 19:08)  POCT Blood Glucose.: 159 mg/dL (12 Nov 2022 16:52)    CARDIAC MARKERS ( 12 Nov 2022 19:02 )  x     / x     / x     / x     / 89.1 ng/mL          RADIOLOGY & ADDITIONAL TESTS:    Imaging Personally Reviewed:  [x] YES  [ ] NO    Consultant(s) Notes Reviewed:  [x] YES  [ ] NO      MEDICATIONS  (STANDING):  aMIOdarone    Tablet 100 milliGRAM(s) Oral daily  amLODIPine   Tablet 10 milliGRAM(s) Oral daily  aspirin enteric coated 81 milliGRAM(s) Oral daily  clopidogrel Tablet 75 milliGRAM(s) Oral at bedtime  coronavirus bivalent (EUA) Booster Vaccine (PFIZER) 0.3 milliLiter(s) IntraMuscular once  dextrose 5%. 1000 milliLiter(s) (50 mL/Hr) IV Continuous <Continuous>  dextrose 5%. 1000 milliLiter(s) (100 mL/Hr) IV Continuous <Continuous>  dextrose 50% Injectable 25 Gram(s) IV Push once  dextrose 50% Injectable 12.5 Gram(s) IV Push once  glucagon  Injectable 1 milliGRAM(s) IntraMuscular once  heparin  Infusion.  Unit(s)/Hr (10 mL/Hr) IV Continuous <Continuous>  insulin lispro (ADMELOG) corrective regimen sliding scale   SubCutaneous three times a day before meals  insulin lispro (ADMELOG) corrective regimen sliding scale   SubCutaneous at bedtime  lisinopril 40 milliGRAM(s) Oral daily  metoprolol succinate ER 50 milliGRAM(s) Oral daily    MEDICATIONS  (PRN):  acetaminophen     Tablet .. 650 milliGRAM(s) Oral every 6 hours PRN Temp greater or equal to 38C (100.4F), Mild Pain (1 - 3)  dextrose Oral Gel 15 Gram(s) Oral once PRN Blood Glucose LESS THAN 70 milliGRAM(s)/deciliter  heparin   Injectable 5300 Unit(s) IV Push every 6 hours PRN For aPTT less than 40  ondansetron Injectable 4 milliGRAM(s) IV Push every 8 hours PRN Nausea and/or Vomiting      Care Discussed with Consultants/Other Providers [x] YES  [ ] NO    HEALTH ISSUES - PROBLEM Dx:      
CARDIOLOGY FOLLOW UP - Dr. Moreau  DATE OF SERVICE: 11/14/22     CC no cp or sob       REVIEW OF SYSTEMS:  CONSTITUTIONAL: No fever, weight loss, or fatigue  RESPIRATORY: No cough, wheezing, chills or hemoptysis; No Shortness of Breath  CARDIOVASCULAR: No chest pain, palpitations, passing out, dizziness, or leg swelling  GASTROINTESTINAL: No abdominal or epigastric pain. No nausea, vomiting, or hematemesis; No diarrhea or constipation. No melena or hematochezia.  VASCULAR: No edema     PHYSICAL EXAM:  T(C): 36.6 (11-14-22 @ 12:35), Max: 36.7 (11-14-22 @ 05:27)  HR: 74 (11-14-22 @ 12:35) (70 - 77)  BP: 134/72 (11-14-22 @ 12:35) (134/72 - 146/79)  RR: 18 (11-14-22 @ 12:35) (18 - 18)  SpO2: 97% (11-14-22 @ 12:35) (94% - 97%)  Wt(kg): --  I&O's Summary    13 Nov 2022 07:01  -  14 Nov 2022 07:00  --------------------------------------------------------  IN: 720 mL / OUT: 700 mL / NET: 20 mL        Appearance: Normal	  Cardiovascular: Normal S1 S2,RRR, No JVD, No murmurs  Respiratory: Lungs clear to auscultation	  Gastrointestinal:  Soft, Non-tender, + BS	  Extremities: Normal range of motion, No clubbing, cyanosis or edema      Home Medications:  acetaminophen 325 mg oral tablet: 2 tab(s) orally every 6 hours, As needed, Temp greater or equal to 38C (100.4F), Mild Pain (1 - 3) (05 Mar 2022 21:53)  amiodarone 100 mg oral tablet: 1 tab(s) orally once a day (05 Mar 2022 21:53)  amLODIPine 10 mg oral tablet: 1 tab(s) orally once a day (05 Mar 2022 21:53)  metoprolol succinate 50 mg oral tablet, extended release: 1 tab(s) orally once a day (05 Mar 2022 21:53)  Vitamin B12 1000 mcg oral tablet: 1 tab(s) orally once a day (05 Mar 2022 21:53)  Vitamin D3 2000 intl units oral capsule: 1 cap(s) orally once a day (05 Mar 2022 21:53)      MEDICATIONS  (STANDING):  aMIOdarone    Tablet 100 milliGRAM(s) Oral daily  amLODIPine   Tablet 10 milliGRAM(s) Oral daily  aspirin enteric coated 81 milliGRAM(s) Oral daily  clopidogrel Tablet 75 milliGRAM(s) Oral at bedtime  coronavirus bivalent (EUA) Booster Vaccine (PFIZER) 0.3 milliLiter(s) IntraMuscular once  dextrose 5%. 1000 milliLiter(s) (50 mL/Hr) IV Continuous <Continuous>  dextrose 5%. 1000 milliLiter(s) (100 mL/Hr) IV Continuous <Continuous>  dextrose 50% Injectable 25 Gram(s) IV Push once  dextrose 50% Injectable 12.5 Gram(s) IV Push once  glucagon  Injectable 1 milliGRAM(s) IntraMuscular once  heparin  Infusion.  Unit(s)/Hr (10 mL/Hr) IV Continuous <Continuous>  hydrochlorothiazide 25 milliGRAM(s) Oral daily  insulin lispro (ADMELOG) corrective regimen sliding scale   SubCutaneous three times a day before meals  insulin lispro (ADMELOG) corrective regimen sliding scale   SubCutaneous at bedtime  lisinopril 40 milliGRAM(s) Oral daily  metoprolol succinate ER 50 milliGRAM(s) Oral daily      TELEMETRY: paced     ECG:  	  RADIOLOGY:   DIAGNOSTIC TESTING:  [ ] Echocardiogram:  [ ]  Catheterization:  [ ] Stress Test:    OTHER: 	    LABS:	 	    Troponin T, High Sensitivity Result: 1049 ng/L [0 - 51] (11-13 @ 11:46)  Troponin T, High Sensitivity Result: 1873 ng/L [0 - 51] (11-13 @ 01:00)  Troponin T, High Sensitivity Result: 2525 ng/L [0 - 51] (11-12 @ 19:02)  CKMB Units: 89.1 ng/mL [0.0 - 6.7] (11-12 @ 19:02)  Troponin T, High Sensitivity Result: 2699 ng/L [0 - 51] (11-12 @ 12:45)  Troponin T, High Sensitivity Result: 472 ng/L [0 - 51] (11-12 @ 06:28)  Troponin T, High Sensitivity Result: 247 ng/L [0 - 51] (11-12 @ 03:28)                          12.9   6.92  )-----------( 190      ( 14 Nov 2022 06:49 )             38.7     11-14    138  |  102  |  13  ----------------------------<  178<H>  3.7   |  25  |  0.57    Ca    9.2      14 Nov 2022 06:47    TPro  6.8  /  Alb  3.7  /  TBili  0.9  /  DBili  x   /  AST  38  /  ALT  75<H>  /  AlkPhos  35<L>  11-14    PTT - ( 14 Nov 2022 01:04 )  PTT:61.5 sec        
Patient is a 72y old  Male who presents with a chief complaint of     SUBJECTIVE / OVERNIGHT EVENTS:  Patient seen and examined.   Doing well, no complaints.  Wants to go home.      Vital Signs Last 24 Hrs  T(C): 36.8 (15 Nov 2022 04:44), Max: 37.1 (14 Nov 2022 20:21)  T(F): 98.2 (15 Nov 2022 04:44), Max: 98.8 (14 Nov 2022 20:21)  HR: 76 (15 Nov 2022 06:13) (53 - 83)  BP: 162/65 (15 Nov 2022 06:13) (129/70 - 190/86)  BP(mean): --  RR: 18 (15 Nov 2022 04:44) (16 - 18)  SpO2: 94% (15 Nov 2022 04:44) (94% - 98%)    Parameters below as of 15 Nov 2022 04:44  Patient On (Oxygen Delivery Method): room air      I&O's Summary    14 Nov 2022 07:01  -  15 Nov 2022 07:00  --------------------------------------------------------  IN: 64 mL / OUT: 900 mL / NET: -836 mL        PHYSICAL EXAM:  GENERAL: NAD, AAOx3  HEAD:  Atraumatic, Normocephalic  EYES: EOMI; conjunctiva and sclera clear  NECK: Supple, No JVD, No LAD  CHEST/LUNG: B/L air entry; No wheezes, rales or rhonci   HEART: Regular rate and rhythm; No murmurs, rubs, or gallops  ABDOMEN: Soft, Nontender, Nondistended; Bowel sounds present  EXTREMITIES:  2+ Peripheral Pulses, No clubbing, cyanosis, or edema  SKIN: No rashes or lesions    LABS:                        13.6   7.99  )-----------( 187      ( 15 Nov 2022 07:46 )             41.5     11-15    139  |  101  |  12  ----------------------------<  167<H>  3.8   |  21<L>  |  0.54    Ca    9.0      15 Nov 2022 07:30    TPro  6.8  /  Alb  3.7  /  TBili  0.9  /  DBili  x   /  AST  38  /  ALT  75<H>  /  AlkPhos  35<L>  11-14    PTT - ( 15 Nov 2022 07:46 )  PTT:22.7 sec  CAPILLARY BLOOD GLUCOSE      POCT Blood Glucose.: 181 mg/dL (15 Nov 2022 08:03)  POCT Blood Glucose.: 146 mg/dL (14 Nov 2022 21:39)  POCT Blood Glucose.: 155 mg/dL (14 Nov 2022 18:50)            RADIOLOGY & ADDITIONAL TESTS:    Imaging Personally Reviewed:  [x] YES  [ ] NO    Consultant(s) Notes Reviewed:  [x] YES  [ ] NO      MEDICATIONS  (STANDING):  aMIOdarone    Tablet 100 milliGRAM(s) Oral daily  amLODIPine   Tablet 10 milliGRAM(s) Oral daily  apixaban 5 milliGRAM(s) Oral every 12 hours  aspirin enteric coated 81 milliGRAM(s) Oral daily  clopidogrel Tablet 75 milliGRAM(s) Oral at bedtime  coronavirus bivalent (EUA) Booster Vaccine (PFIZER) 0.3 milliLiter(s) IntraMuscular once  dextrose 5%. 1000 milliLiter(s) (50 mL/Hr) IV Continuous <Continuous>  dextrose 5%. 1000 milliLiter(s) (100 mL/Hr) IV Continuous <Continuous>  dextrose 50% Injectable 25 Gram(s) IV Push once  dextrose 50% Injectable 12.5 Gram(s) IV Push once  glucagon  Injectable 1 milliGRAM(s) IntraMuscular once  hydrochlorothiazide 25 milliGRAM(s) Oral daily  insulin lispro (ADMELOG) corrective regimen sliding scale   SubCutaneous three times a day before meals  insulin lispro (ADMELOG) corrective regimen sliding scale   SubCutaneous at bedtime  lisinopril 40 milliGRAM(s) Oral daily  metoprolol succinate ER 50 milliGRAM(s) Oral daily  sodium chloride 0.9%. 1000 milliLiter(s) (125 mL/Hr) IV Continuous <Continuous>    MEDICATIONS  (PRN):  acetaminophen     Tablet .. 650 milliGRAM(s) Oral every 6 hours PRN Temp greater or equal to 38C (100.4F), Mild Pain (1 - 3)  dextrose Oral Gel 15 Gram(s) Oral once PRN Blood Glucose LESS THAN 70 milliGRAM(s)/deciliter  ondansetron Injectable 4 milliGRAM(s) IV Push every 8 hours PRN Nausea and/or Vomiting      Care Discussed with Consultants/Other Providers [x] YES  [ ] NO    HEALTH ISSUES - PROBLEM Dx:      
Patient is a 72y old  Male who presents with a chief complaint of     SUBJECTIVE / OVERNIGHT EVENTS:  Patient seen and examined.   NO complaints/.      Vital Signs Last 24 Hrs  T(C): 36.6 (14 Nov 2022 12:35), Max: 36.7 (14 Nov 2022 05:27)  T(F): 97.8 (14 Nov 2022 12:35), Max: 98 (14 Nov 2022 05:27)  HR: 74 (14 Nov 2022 12:35) (70 - 77)  BP: 134/72 (14 Nov 2022 12:35) (134/72 - 146/79)  BP(mean): --  RR: 18 (14 Nov 2022 12:35) (18 - 18)  SpO2: 97% (14 Nov 2022 12:35) (94% - 97%)    Parameters below as of 14 Nov 2022 12:35  Patient On (Oxygen Delivery Method): room air      I&O's Summary    13 Nov 2022 07:01  -  14 Nov 2022 07:00  --------------------------------------------------------  IN: 720 mL / OUT: 700 mL / NET: 20 mL        PHYSICAL EXAM:  GENERAL: NAD, AAOx3  HEAD:  Atraumatic, Normocephalic  EYES: EOMI; conjunctiva and sclera clear  NECK: Supple, No JVD, No LAD  CHEST/LUNG: B/L air entry; No wheezes, rales or rhonci   HEART: Regular rate and rhythm; No murmurs, rubs, or gallops  ABDOMEN: Soft, Nontender, Nondistended; Bowel sounds present  EXTREMITIES:  2+ Peripheral Pulses, No clubbing, cyanosis, or edema  SKIN: No rashes or lesions    LABS:                        12.9   6.92  )-----------( 190      ( 14 Nov 2022 06:49 )             38.7     11-14    138  |  102  |  13  ----------------------------<  178<H>  3.7   |  25  |  0.57    Ca    9.2      14 Nov 2022 06:47    TPro  6.8  /  Alb  3.7  /  TBili  0.9  /  DBili  x   /  AST  38  /  ALT  75<H>  /  AlkPhos  35<L>  11-14    PTT - ( 14 Nov 2022 01:04 )  PTT:61.5 sec  CAPILLARY BLOOD GLUCOSE      POCT Blood Glucose.: 191 mg/dL (14 Nov 2022 12:14)  POCT Blood Glucose.: 197 mg/dL (14 Nov 2022 08:11)  POCT Blood Glucose.: 160 mg/dL (13 Nov 2022 21:00)  POCT Blood Glucose.: 115 mg/dL (13 Nov 2022 16:43)    CARDIAC MARKERS ( 12 Nov 2022 19:02 )  x     / x     / x     / x     / 89.1 ng/mL          RADIOLOGY & ADDITIONAL TESTS:    Imaging Personally Reviewed:  [x] YES  [ ] NO    Consultant(s) Notes Reviewed:  [x] YES  [ ] NO      MEDICATIONS  (STANDING):  aMIOdarone    Tablet 100 milliGRAM(s) Oral daily  amLODIPine   Tablet 10 milliGRAM(s) Oral daily  aspirin enteric coated 81 milliGRAM(s) Oral daily  clopidogrel Tablet 75 milliGRAM(s) Oral at bedtime  coronavirus bivalent (EUA) Booster Vaccine (PFIZER) 0.3 milliLiter(s) IntraMuscular once  dextrose 5%. 1000 milliLiter(s) (50 mL/Hr) IV Continuous <Continuous>  dextrose 5%. 1000 milliLiter(s) (100 mL/Hr) IV Continuous <Continuous>  dextrose 50% Injectable 25 Gram(s) IV Push once  dextrose 50% Injectable 12.5 Gram(s) IV Push once  glucagon  Injectable 1 milliGRAM(s) IntraMuscular once  heparin  Infusion.  Unit(s)/Hr (10 mL/Hr) IV Continuous <Continuous>  hydrochlorothiazide 25 milliGRAM(s) Oral daily  insulin lispro (ADMELOG) corrective regimen sliding scale   SubCutaneous three times a day before meals  insulin lispro (ADMELOG) corrective regimen sliding scale   SubCutaneous at bedtime  lisinopril 40 milliGRAM(s) Oral daily  metoprolol succinate ER 50 milliGRAM(s) Oral daily    MEDICATIONS  (PRN):  acetaminophen     Tablet .. 650 milliGRAM(s) Oral every 6 hours PRN Temp greater or equal to 38C (100.4F), Mild Pain (1 - 3)  dextrose Oral Gel 15 Gram(s) Oral once PRN Blood Glucose LESS THAN 70 milliGRAM(s)/deciliter  heparin   Injectable 5300 Unit(s) IV Push every 6 hours PRN For aPTT less than 40  ondansetron Injectable 4 milliGRAM(s) IV Push every 8 hours PRN Nausea and/or Vomiting      Care Discussed with Consultants/Other Providers [x] YES  [ ] NO    HEALTH ISSUES - PROBLEM Dx:

## 2022-11-15 NOTE — PROGRESS NOTE ADULT - TIME BILLING
agree with the above assessment and plan by PHYLLIS Haro.  -remains stable without chest pain/abd pain, dyspnea  -no CHF  -sp [facility] cath 11/14  Cath with mild LAD/Ramus/Lcx disease.  Severe instent lesion of prior mid RCA stent.   PCI with WALLACE performed to severe mid RCA lesion in setting of NSTEMI.   -Continue triple therapy for 1 month, followed by plavix and eliquis therafter.

## 2022-11-15 NOTE — DISCHARGE NOTE PROVIDER - NSDCCPCAREPLAN_GEN_ALL_CORE_FT
PRINCIPAL DISCHARGE DIAGNOSIS  Diagnosis: NSTEMI (non-ST elevation myocardial infarction)  Assessment and Plan of Treatment: S/p C via RRA with stent to the RCA. Continue triple therapy for 1 month with Aspirin, Plavix and Eliquis. Discontinue Aspirin after 1 month.   Follow up with your PCP and cardiologist after hospital discharge.      SECONDARY DISCHARGE DIAGNOSES  Diagnosis: Atrial fibrillation  Assessment and Plan of Treatment: Atrial fibrillation is the most common heart rhythm problem and has the risk of stroke and heart attack.  It helps if you control your blood pressure, not drink more than 1-2 alcohol drinks per day, cut down on caffeine, getting treatment for over active thyroid gland, and getting exercise.  Call your doctor if you feel your heart racing or beating unusually, chest tightness or pain, lightheaded, faint, shortness of breath especially with exercise.  It is important to take your heart medication as prescribed.  Continue triple therapy for 1 month with Aspirin, Plavix and Eliquis. Discontinue Aspirin after 1 month.   Follow up with your PCP and cardiologist after hospital discharge.    Diagnosis: T2DM (type 2 diabetes mellitus)  Assessment and Plan of Treatment: Make sure you get your HgA1c checked every three months.  If you take oral diabetes medications, check your blood glucose two times a day.  If you take insulin, check your blood glucose before meals and at bedtime.  You should call their doctor when glucose <70 or above >400 and/or consistently above 200s as changes in the regimen will need to be made.  It is important not to skip any meals.  Keep a log of your blood glucose results and always take it with you to your doctor appointments.  Keep a list of your current medications including injectables and over the counter medications and bring this medication list with you to all your doctor appointments.  If you have not seen your ophthalmologist this year, call for appointment.  Check your feet daily for redness, sores, or openings. Do not self treat. If no improvement in two days call your primary care physician for an appointment.  Low blood sugar (hypoglycemia) is a blood sugar below 70mg/dl. Check your blood sugar if you feel signs/symptoms of hypoglycemia. If your blood sugar is below 70, take 15 grams of carbohydrates (ex 4 oz of apple juice, 3-4 glucose tablets, or 4-6 oz of regular soda), wait 15 minutes and repeat blood sugar to make sure it comes up above 70.  If your blood sugar is above 70 and you are due for a meal, have a meal.  If you are not due for a meal, have a snack.  This snack helps keeps your blood sugar at a safe range.

## 2022-11-15 NOTE — DISCHARGE NOTE PROVIDER - NSDCFUSCHEDAPPT_GEN_ALL_CORE_FT
[facility]  [address]  Scheduled Appointment: 12/08/2022

## 2022-11-15 NOTE — DISCHARGE NOTE PROVIDER - HOSPITAL COURSE
70 y/o M history of CAD, NSTEMI 03/22 s/p PCI stent RCA, HTN, T2DM, (not on statin, zetia or repatha- intolerance, had severe myositis which left him debilitated), Prostate CA s/p prostatectomy in 2013 presumed to be in remission, NAFLD, AS S/P TAVR 01/ 2021 complicated with PAF on Eliquis s/p PPM (MICRA), Colon CA with past surgery  (03/21) and past chemotherapy chest / epigastric pain , initially afvss, ECG paced rhythm negative scarbossa,  labs with troponin of 472, mildly elevated lfts, mid hyperglycemia, LA 3.5, admitted for NSTEMI.     1. NSTEMI   2. T2DM with hyperglycemia   3. Elevated Lfts 2/2 NAFLD  4. Elevated LA   5. Leukocytosis  6. Hypokalemia    CAD, NSTEMI 03/2022 s/p PCI stent RCA, HTN, HLD (not on statin, zetia or repatha- intolerance, had severe myositis which left him debilitated), prostate CA s/p prostatectomy in 2013 presumed to be in remission, NAFLD, AS S/P TAVR 01/ 2021 complicated with PAF on Eliquis s/p PPM (MICRA), colon CA with past surgery  (03/21) and past chemotherapy.    S/p LHC via RRA with stent to the RCA. D/c heparin drip. Triple therapy for 1 month- Cont ASA, plavix, eliquis. Eliquis ordered for 0600 on 11/15 (as requested by Dr. Moreau) D/c ASA after 1 month.     Cleared for discharge and to follow up with PCP and cardiology after hospital discharge.

## 2022-11-15 NOTE — PROGRESS NOTE ADULT - ASSESSMENT
72 y/o M history of CAD, NSTEMI 03/22 s/p PCI stent RCA, HTN, T2DM, (not on statin, zetia or repatha- intolerance, had severe myositis which left him debilitated), Prostate CA s/p prostatectomy in 2013 presumed to be in remission, NAFLD, AS S/P TAVR 01/ 2021 complicated with PAF on Eliquis s/p PPM (MICRA), Colon CA with past surgery  (03/21) and past chemotherapy chest / epigastric pain , initially afvss, ECG paced rhythm negative scarbossa,  labs with troponin of 472, mildly elevated lfts, mid hyperglycemia, LA 3.5, admitted for NSTEMI.     1. NSTEMI   2. T2DM with hyperglycemia   3. Elevated Lfts 2/2 NAFLD  4. Elevated LA   5. Leukocytosis  6. Hypokalemia    CAD, NSTEMI 03/2022 s/p PCI stent RCA, HTN, HLD (not on statin, zetia or repatha- intolerance, had severe myositis which left him debilitated), prostate CA s/p prostatectomy in 2013 presumed to be in remission, NAFLD, AS S/P TAVR 01/ 2021 complicated with PAF on Eliquis s/p PPM (MICRA), colon CA with past surgery  (03/21) and past chemotherapy    Plan:  -CE peaked @ 2699,  no need  to trend further  -S/p aspirin/plavix load ; intolerance to statin / zetia / repatha    -c/w  home dose toprol 50 mg; nitro prn for pain  -Heparin drip per ACS protocol, ptt q 6 hours maintain aptt 50-70    -TTE: pending   -Cardiac catheterization: pending   -F/u cardiology recommendations, consulted.  -insulin sliding scale  -replete potassium   -C/w other home meds as above -- BP elevated c/w amlodipine, lisinopril; hctz dose    dvt ppx: heparin gtt   gi ppx: npo, cath    Dispo: pending clinical improvement.     
A/P    70 y/o M history of CAD, NSTEMI 03/22 s/p PCI stent RCA, HTN, T2DM, (not on statin, zetia or repatha- intolerance, had severe myositis which left him debilitated), Prostate CA s/p prostatectomy in 2013 presumed to be in remission, fatty liver, AS S/P TAVR 01/ 2021 complicated with PAF on Eliquis s/p PPM (MICRA), Colon CA with past surgery  (03/21) and past chemotherapy chest / epigastric pain    #NSTEMI, CAD, s/p PCI to RCA  -remains stable without chest pain/abd pain, dyspnea  -no CHF  -sp [facility] cath 11/14  Cath with mild LAD/Ramus/Lcx disease.  Severe instent lesion of prior mid RCA stent.   PCI with WALLACE performed to severe mid RCA lesion in setting of NSTEMI.   -Continue triple therapy for 1 month, followed by plavix and eliquis therafter.  -ECHO with fx tavr, Mild-moderate  segmental left ventricular systolic dysfunction. The inferolateral wall, the inferior wall, and the anterolateral wall are hypokinetic.  -post cath ecg with  Sinus rona with occasional paced beats with heart block  -on tele NSR/vpaced  -will fu with dr kerry goncalves as outpt   -off statin, zetia or repatha- intolerance, had severe myositis which left him debilitated    #s/p TAVR, ppm  -stable, stable on echo     #atrial fibrillation  -stable, oral a.c      DCP will fu with DR kerry goncalves as outpt 
A/P    72 y/o M history of CAD, NSTEMI 03/22 s/p PCI stent RCA, HTN, T2DM, (not on statin, zetia or repatha- intolerance, had severe myositis which left him debilitated), Prostate CA s/p prostatectomy in 2013 presumed to be in remission, fatty liver, AS S/P TAVR 01/ 2021 complicated with PAF on Eliquis s/p PPM (MICRA), Colon CA with past surgery  (03/21) and past chemotherapy chest / epigastric pain    #NSTEMI, CAD, s/p PCI to RCA  -remains stable without chest pain/abd pain, dyspnea  -no CHF  -trend trop  -iv hep, eliquis on hold   -cont asa, plavix  -plan for cath armando  -make npo after breakfast armando  -last dose eliquis 11/11 7 pm  -f/u echo   -off statin, zetia or repatha- intolerance, had severe myositis which left him debilitated    #s/p TAVR, ppm  -stable, check echo     #atrial fibrillation  -stable, oral a.c on hold     35 minutes spent on total encounter; more than 50% of the visit was spent counseling and/or coordinating care by the attending physician.    
72 y/o M history of CAD, NSTEMI 03/22 s/p PCI stent RCA, HTN, T2DM, (not on statin, zetia or repatha- intolerance, had severe myositis which left him debilitated), Prostate CA s/p prostatectomy in 2013 presumed to be in remission, NAFLD, AS S/P TAVR 01/ 2021 complicated with PAF on Eliquis s/p PPM (MICRA), Colon CA with past surgery  (03/21) and past chemotherapy chest / epigastric pain , initially afvss, ECG paced rhythm negative scarbossa,  labs with troponin of 472, mildly elevated lfts, mid hyperglycemia, LA 3.5, admitted for NSTEMI.     1. NSTEMI s/p PCI stent to RCA (instent restenosis)  2. T2DM with hyperglycemia   3. Elevated Lfts 2/2 NAFLD  4. Elevated LA   5. Leukocytosis  6. Hypokalemia    CAD, NSTEMI 03/2022 s/p PCI stent RCA, HTN, HLD (not on statin, zetia or repatha- intolerance, had severe myositis which left him debilitated), prostate CA s/p prostatectomy in 2013 presumed to be in remission, NAFLD, AS S/P TAVR 01/ 2021 complicated with PAF on Eliquis s/p PPM (MICRA), colon CA with past surgery  (03/21) and past chemotherapy    Plan:  -CE peaked @ 2699,  no need  to trend further  -TTE: Mild-moderate  segmental left ventricular systolic dysfunction. The inferolateral wall, the inferior wall, and  the anterolateral wall are hypokinetic.  -Cardiac catheterization: PCI with WALLACE performed to severe mid RCA lesion in setting of NSTEMI  -Aspirin / plavix / eliquis x 1 month; then plavix and eliquis indefinitely ; patient to follow up with private cardiologist outpatient  -c/w  home dose toprol 50 mg; nitro prn for pain  -F/u cardiology recommendations, consulted.  -insulin sliding scale  -replete potassium   -C/w other home meds as above , c/w amlodipine, lisinopril; hctz dose    dvt ppx: eliquis  gi ppx: diet     Dispo: Medically stable for discharge today.
A/P    70 y/o M history of CAD, NSTEMI 03/22 s/p PCI stent RCA, HTN, T2DM, (not on statin, zetia or repatha- intolerance, had severe myositis which left him debilitated), Prostate CA s/p prostatectomy in 2013 presumed to be in remission, fatty liver, AS S/P TAVR 01/ 2021 complicated with PAF on Eliquis s/p PPM (MICRA), Colon CA with past surgery  (03/21) and past chemotherapy chest / epigastric pain    #NSTEMI, CAD, s/p PCI to RCA  -remains stable without chest pain/abd pain, dyspnea  -no CHF  -trend trop  -iv hep, eliquis on hold   -cont asa, plavix  -plan for cath today   -last dose eliquis 11/11 7 pm    -f/u echo   -off statin, zetia or repatha- intolerance, had severe myositis which left him debilitated    #s/p TAVR, ppm  -stable, check echo     #atrial fibrillation  -stable, oral a.c on hold   
70 y/o M history of CAD, NSTEMI 03/22 s/p PCI stent RCA, HTN, T2DM, (not on statin, zetia or repatha- intolerance, had severe myositis which left him debilitated), Prostate CA s/p prostatectomy in 2013 presumed to be in remission, NAFLD, AS S/P TAVR 01/ 2021 complicated with PAF on Eliquis s/p PPM (MICRA), Colon CA with past surgery  (03/21) and past chemotherapy chest / epigastric pain , initially afvss, ECG paced rhythm negative scarbossa,  labs with troponin of 472, mildly elevated lfts, mid hyperglycemia, LA 3.5, admitted for NSTEMI.     1. NSTEMI   2. T2DM with hyperglycemia   3. Elevated Lfts 2/2 NAFLD  4. Elevated LA   5. Leukocytosis  6. Hypokalemia    CAD, NSTEMI 03/2022 s/p PCI stent RCA, HTN, HLD (not on statin, zetia or repatha- intolerance, had severe myositis which left him debilitated), prostate CA s/p prostatectomy in 2013 presumed to be in remission, NAFLD, AS S/P TAVR 01/ 2021 complicated with PAF on Eliquis s/p PPM (MICRA), colon CA with past surgery  (03/21) and past chemotherapy    Plan:  -CE peaked @ 2699,  no need  to trend further  -S/p aspirin/plavix load ; intolerance to statin / zetia / repatha    -will start home dose toprol 50 mg; nitro prn for pain  -Heparin drip per ACS protocol, ptt q 6 hours maintain aptt 50-70    -F/u TTE   -Cardiac catheterization in am   -F/u cardiology recommendations, consulted.  -insulin sliding scale  -replete potassium   -C/w other home meds as above -- BP elevated c/w amlodipine, lisinopril; add home hctz dose;    dvt ppx: heparin gtt   gi ppx: npo, possible cath     Dispo: pending clinical improvement.

## 2022-11-15 NOTE — DISCHARGE NOTE PROVIDER - NSDCMRMEDTOKEN_GEN_ALL_CORE_FT
acetaminophen 325 mg oral tablet: 2 tab(s) orally every 6 hours, As needed, Temp greater or equal to 38C (100.4F), Mild Pain (1 - 3)  amiodarone 100 mg oral tablet: 1 tab(s) orally once a day  amLODIPine 10 mg oral tablet: 1 tab(s) orally once a day  aspirin 81 mg oral delayed release tablet: 1 tab(s) orally once a day  clopidogrel 75 mg oral tablet: 1 tab(s) orally once a day  Eliquis 5 mg oral tablet: 1 tab(s) orally 2 times a day   hydroCHLOROthiazide 25 mg oral tablet: 1 tab(s) orally once a day  Janumet 50 mg-1000 mg oral tablet: 1 tab(s) orally 2 times a day    lisinopril 40 mg oral tablet: 1 tab(s) orally once a day  metoprolol succinate 50 mg oral tablet, extended release: 1 tab(s) orally once a day  Vitamin B12 1000 mcg oral tablet: 1 tab(s) orally once a day  Vitamin D3 2000 intl units oral capsule: 1 cap(s) orally once a day

## 2022-11-15 NOTE — DISCHARGE NOTE NURSING/CASE MANAGEMENT/SOCIAL WORK - PATIENT PORTAL LINK FT
You can access the FollowMyHealth Patient Portal offered by [facility] by registering at the following website: [email or web address]. By joining [employer]’s FollowMyHealth portal, you will also be able to view your health information using other applications (apps) compatible with our system.

## 2022-11-15 NOTE — DISCHARGE NOTE PROVIDER - CARE PROVIDER_API CALL
Chata Barkley)  Internal Medicine  [address] [place] [address], [address]  [place] [address]  Phone: [phone]  Fax: [phone]  Follow Up Time: Routine

## 2022-11-18 NOTE — H&P ADULT - NSVTERISKREFERASSESS_GEN_ALL_CORE
B12 extremely low.  Magnesium also low.  I have called in daily supplementation. 
Refer to the Assessment tab to view/cancel completed assessment.

## 2022-12-08 ENCOUNTER — APPOINTMENT (OUTPATIENT)
Dept: ELECTROPHYSIOLOGY | Facility: CLINIC | Age: 72
End: 2022-12-08
Payer: MEDICARE

## 2022-12-08 ENCOUNTER — NON-APPOINTMENT (OUTPATIENT)
Age: 72
End: 2022-12-08

## 2022-12-08 PROCEDURE — 93294 REM INTERROG EVL PM/LDLS PM: CPT

## 2022-12-08 PROCEDURE — 93296 REM INTERROG EVL PM/IDS: CPT

## 2022-12-16 NOTE — H&P PST ADULT - NSICDXPASTMEDICALHX_GEN_ALL_CORE_FT
-- DO NOT REPLY / DO NOT REPLY ALL --  -- Message is from Engagement Center Operations (ECO) --    General Patient Message     Patient is returning a call back to Bindu regarding redoing his lab work. Patient would like a call back in regards to if he will need an appointment or not?      Caller Information       Type Contact Phone/Fax    12/16/2022 01:12 PM CST Phone (Incoming) Wilfredo Silveriosalena SHERMAN (Self) [phone] (M)        Alternative phone number: none    Can a detailed message be left? Yes    Message Turnaround:     Is it Working Hours? Yes - Working Hours     [place]:    Please give this turnaround time to the caller:   \"This message will be sent to [state Provider's name]. The clinical team will fulfill your request as soon as they review your message.\"                
PAST MEDICAL HISTORY:  Aortic valve stenosis, etiology of cardiac valve disease unspecified     Asthma denies any hospitalizations, denies any intubations. Stopped taking Advair several years ago.    Fatty liver     Hyperlipidemia, unspecified hyperlipidemia type     Hypertension     Malignant neoplasm of colon, unspecified part of colon diagnosed 11/2020    Myositis statin induced;  IVIG - last dose 12/2020    follows with Dr. Rosenthal    Prostate cancer     T2DM (type 2 diabetes mellitus)

## 2023-02-17 ENCOUNTER — OFFICE (OUTPATIENT)
Dept: URBAN - METROPOLITAN AREA CLINIC 90 | Facility: CLINIC | Age: 73
Setting detail: OPHTHALMOLOGY
End: 2023-02-17
Payer: MEDICARE

## 2023-02-17 DIAGNOSIS — H16.223: ICD-10-CM

## 2023-02-17 DIAGNOSIS — H25.13: ICD-10-CM

## 2023-02-17 DIAGNOSIS — H02.832: ICD-10-CM

## 2023-02-17 DIAGNOSIS — E11.3211: ICD-10-CM

## 2023-02-17 DIAGNOSIS — H40.013: ICD-10-CM

## 2023-02-17 DIAGNOSIS — H02.835: ICD-10-CM

## 2023-02-17 DIAGNOSIS — H11.442: ICD-10-CM

## 2023-02-17 DIAGNOSIS — H35.3131: ICD-10-CM

## 2023-02-17 DIAGNOSIS — D31.31: ICD-10-CM

## 2023-02-17 DIAGNOSIS — H02.403: ICD-10-CM

## 2023-02-17 DIAGNOSIS — D31.32: ICD-10-CM

## 2023-02-17 DIAGNOSIS — H35.371: ICD-10-CM

## 2023-02-17 PROCEDURE — 99214 OFFICE O/P EST MOD 30 MIN: CPT | Performed by: OPHTHALMOLOGY

## 2023-02-17 PROCEDURE — 92250 FUNDUS PHOTOGRAPHY W/I&R: CPT | Performed by: OPHTHALMOLOGY

## 2023-02-17 ASSESSMENT — KERATOMETRY
OD_K1POWER_DIOPTERS: 47.50
OS_K1POWER_DIOPTERS: 47.00
OD_AXISANGLE_DEGREES: 009
OD_K2POWER_DIOPTERS: 48.25
OS_AXISANGLE_DEGREES: 090
METHOD_AUTO_MANUAL: AUTO
OS_K2POWER_DIOPTERS: 47.00

## 2023-02-17 ASSESSMENT — REFRACTION_MANIFEST
OD_VA2: 20/30-
OS_AXIS: 095
OD_ADD: +2.25
OD_VA1: 20/30+2
OS_AXIS: 088
OD_CYLINDER: -1.75
OS_CYLINDER: -0.75
OS_VA2: 20/30-
OS_CYLINDER: -0.75
OD_ADD: +2.75
OS_SPHERE: +1.25
OS_ADD: +2.25
OD_SPHERE: +1.50
OS_VA1: 20/40
OS_VA1: 20/25
OS_SPHERE: +0.25
OD_VA1: 20/25
OD_AXIS: 075
OD_CYLINDER: -1.50
OD_AXIS: 088
OD_SPHERE: +1.25
OS_ADD: +2.75

## 2023-02-17 ASSESSMENT — PACHYMETRY
OS_CT_CORRECTION: -5
OS_CT_UM: 617
OD_CT_UM: 584
OD_CT_CORRECTION: -3

## 2023-02-17 ASSESSMENT — REFRACTION_CURRENTRX
OS_VPRISM_DIRECTION: PROGS
OS_SPHERE: +0.75
OD_CYLINDER: -1.75
OS_ADD: +2.25
OD_OVR_VA: 20/
OD_ADD: +2.25
OS_OVR_VA: 20/
OD_SPHERE: +1.25
OS_VPRISM_DIRECTION: PROGS
OS_SPHERE: +1.25
OS_AXIS: 092
OS_OVR_VA: 20/
OS_CYLINDER: -0.75
OD_VPRISM_DIRECTION: PROGS
OD_CYLINDER: -1.75
OD_AXIS: 095
OS_ADD: +2.25
OD_VPRISM_DIRECTION: PROGS
OD_ADD: +2.25
OD_SPHERE: +1.50
OS_AXIS: 073
OS_CYLINDER: -0.75
OD_AXIS: 089
OD_OVR_VA: 20/

## 2023-02-17 ASSESSMENT — AXIALLENGTH_DERIVED
OS_AL: 22.4166
OD_AL: 21.918
OS_AL: 22.0703
OD_AL: 21.876
OD_AL: 21.876

## 2023-02-17 ASSESSMENT — TONOMETRY
OD_IOP_MMHG: 15
OS_IOP_MMHG: 16

## 2023-02-17 ASSESSMENT — REFRACTION_AUTOREFRACTION
OS_AXIS: 089
OS_SPHERE: PLANO
OD_AXIS: 070
OD_CYLINDER: -2.25
OD_SPHERE: +1.75
OS_CYLINDER: -2.50

## 2023-02-17 ASSESSMENT — SPHEQUIV_DERIVED
OS_SPHEQUIV: 0.875
OD_SPHEQUIV: 0.5
OS_SPHEQUIV: -0.125
OD_SPHEQUIV: 0.625
OD_SPHEQUIV: 0.625

## 2023-02-17 ASSESSMENT — LID POSITION - DERMATOCHALASIS
OS_DERMATOCHALASIS: LLL 1+
OD_DERMATOCHALASIS: RLL 1+

## 2023-02-17 ASSESSMENT — VISUAL ACUITY
OD_BCVA: 20/70
OS_BCVA: 20/30

## 2023-02-17 ASSESSMENT — CONFRONTATIONAL VISUAL FIELD TEST (CVF)
OS_FINDINGS: FULL
OD_FINDINGS: FULL

## 2023-02-17 ASSESSMENT — LID POSITION - PTOSIS
OD_PTOSIS: RUL
OS_PTOSIS: LUL

## 2023-03-09 ENCOUNTER — OFFICE (OUTPATIENT)
Dept: URBAN - METROPOLITAN AREA CLINIC 32 | Facility: CLINIC | Age: 73
Setting detail: OPHTHALMOLOGY
End: 2023-03-09
Payer: MEDICARE

## 2023-03-09 DIAGNOSIS — H43.393: ICD-10-CM

## 2023-03-09 DIAGNOSIS — D31.32: ICD-10-CM

## 2023-03-09 DIAGNOSIS — E11.3292: ICD-10-CM

## 2023-03-09 DIAGNOSIS — E11.3311: ICD-10-CM

## 2023-03-09 DIAGNOSIS — H35.371: ICD-10-CM

## 2023-03-09 DIAGNOSIS — H35.3131: ICD-10-CM

## 2023-03-09 DIAGNOSIS — D31.31: ICD-10-CM

## 2023-03-09 PROCEDURE — 92134 CPTRZ OPH DX IMG PST SGM RTA: CPT | Performed by: OPHTHALMOLOGY

## 2023-03-09 PROCEDURE — 92201 OPSCPY EXTND RTA DRAW UNI/BI: CPT | Performed by: OPHTHALMOLOGY

## 2023-03-09 PROCEDURE — 76512 OPH US DX B-SCAN: CPT | Performed by: OPHTHALMOLOGY

## 2023-03-09 PROCEDURE — 99213 OFFICE O/P EST LOW 20 MIN: CPT | Performed by: OPHTHALMOLOGY

## 2023-03-09 ASSESSMENT — VISUAL ACUITY
OD_BCVA: 20/60-2
OS_BCVA: 20/20-2

## 2023-03-09 ASSESSMENT — SPHEQUIV_DERIVED: OD_SPHEQUIV: 0.625

## 2023-03-09 ASSESSMENT — LID POSITION - PTOSIS
OS_PTOSIS: LUL
OD_PTOSIS: RUL

## 2023-03-09 ASSESSMENT — REFRACTION_AUTOREFRACTION
OS_SPHERE: PLANO
OD_CYLINDER: -2.25
OD_SPHERE: +1.75
OS_AXIS: 089
OD_AXIS: 070
OS_CYLINDER: -2.50

## 2023-03-09 ASSESSMENT — KERATOMETRY
OD_AXISANGLE_DEGREES: 009
METHOD_AUTO_MANUAL: AUTO
OD_K2POWER_DIOPTERS: 48.25
OS_AXISANGLE_DEGREES: 090
OS_K2POWER_DIOPTERS: 47.00
OD_K1POWER_DIOPTERS: 47.50
OS_K1POWER_DIOPTERS: 47.00

## 2023-03-09 ASSESSMENT — LID POSITION - DERMATOCHALASIS
OD_DERMATOCHALASIS: RLL 1+
OS_DERMATOCHALASIS: LLL 1+

## 2023-03-09 ASSESSMENT — CONFRONTATIONAL VISUAL FIELD TEST (CVF)
OS_FINDINGS: FULL
OD_FINDINGS: FULL

## 2023-03-09 ASSESSMENT — AXIALLENGTH_DERIVED: OD_AL: 21.876

## 2023-03-13 ENCOUNTER — FORM ENCOUNTER (OUTPATIENT)
Age: 73
End: 2023-03-13

## 2023-03-13 ENCOUNTER — APPOINTMENT (OUTPATIENT)
Dept: ELECTROPHYSIOLOGY | Facility: CLINIC | Age: 73
End: 2023-03-13
Payer: MEDICARE

## 2023-03-13 ENCOUNTER — NON-APPOINTMENT (OUTPATIENT)
Age: 73
End: 2023-03-13

## 2023-03-13 PROCEDURE — 93296 REM INTERROG EVL PM/IDS: CPT

## 2023-03-13 PROCEDURE — 93294 REM INTERROG EVL PM/LDLS PM: CPT

## 2023-05-18 NOTE — DISCHARGE NOTE PROVIDER - NSDCFUADDAPPT_GEN_ALL_CORE_FT
awaiting transfer, no change
follow up appt with Dr. Pantera Young on Tuesday, January 19 @ 9:15am  follow up appt with your Cardiologist, Dr. Sanchez in 2 -3 weeks  follow up appt with your PCP DR. Cahta Barkley in 1 month

## 2023-06-15 ENCOUNTER — APPOINTMENT (OUTPATIENT)
Dept: ELECTROPHYSIOLOGY | Facility: CLINIC | Age: 73
End: 2023-06-15

## 2023-06-21 NOTE — H&P ADULT - GASTROINTESTINAL DETAILS
16 no rigidity/no distention/normal/soft/nontender/no organomegaly/no masses palpable/no guarding/no rebound tenderness/no bruit/bowel sounds normal

## 2023-07-03 ENCOUNTER — NON-APPOINTMENT (OUTPATIENT)
Age: 73
End: 2023-07-03

## 2023-07-05 ENCOUNTER — APPOINTMENT (OUTPATIENT)
Dept: VASCULAR SURGERY | Facility: CLINIC | Age: 73
End: 2023-07-05
Payer: MEDICARE

## 2023-07-05 ENCOUNTER — NON-APPOINTMENT (OUTPATIENT)
Age: 73
End: 2023-07-05

## 2023-07-05 VITALS
BODY MASS INDEX: 27.92 KG/M2 | SYSTOLIC BLOOD PRESSURE: 158 MMHG | HEART RATE: 69 BPM | HEIGHT: 70 IN | DIASTOLIC BLOOD PRESSURE: 68 MMHG | WEIGHT: 195 LBS | TEMPERATURE: 97.7 F

## 2023-07-05 PROCEDURE — 93970 EXTREMITY STUDY: CPT

## 2023-07-05 PROCEDURE — 99203 OFFICE O/P NEW LOW 30 MIN: CPT

## 2023-07-05 RX ORDER — HYDROCHLOROTHIAZIDE 25 MG/1
25 TABLET ORAL
Qty: 90 | Refills: 0 | Status: ACTIVE | COMMUNITY
Start: 2023-06-23

## 2023-07-05 RX ORDER — CLOPIDOGREL BISULFATE 75 MG/1
75 TABLET, FILM COATED ORAL
Qty: 90 | Refills: 0 | Status: ACTIVE | COMMUNITY
Start: 2022-12-16

## 2023-07-12 RX ORDER — SILVER SULFADIAZINE 10 MG/G
1 CREAM TOPICAL TWICE DAILY
Qty: 1 | Refills: 1 | Status: ACTIVE | COMMUNITY
Start: 2023-07-12 | End: 1900-01-01

## 2023-07-13 NOTE — PHYSICAL EXAM
[Respiratory Effort] : normal respiratory effort [Normal Rate and Rhythm] : normal rate and rhythm [1+] : left 1+ [Ankle Swelling (On Exam)] : present [] : bilaterally [Ankle Swelling Bilaterally] : severe [Skin Ulcer] : ulcer [Alert] : alert [Oriented to Person] : oriented to person [Oriented to Place] : oriented to place [Oriented to Time] : oriented to time [Calm] : calm [Varicose Veins Of Lower Extremities] : not present [Abdomen Tenderness] : ~T ~M No abdominal tenderness [de-identified] : appears stated age [de-identified] : normocephalic, atraumatic [de-identified] : supple

## 2023-07-13 NOTE — ASSESSMENT
[FreeTextEntry1] : Problem #1 chronic lymphedema of bilateral lower extremities\par - Lymphedema I89.0 secondary to non-ambulatory status resulting in significant lower extremity edema and lymphatic drainage compromise.  Patient has attempted use of compression, elevation and exercise for a period of over 4 weeks without any significant improvement in symptoms or swelling and swelling extends proximally into the truncal region, noting truncal related lymphedema. Hyperpigmentation noted.  \par \par \par

## 2023-07-13 NOTE — ADDENDUM
[FreeTextEntry1] : Patient also tried and failed a 1x basic pump trial (entre  set at 30mmHg for 20 minutes) on 7/6/23. Due to the basic pump increasing current truncal lymphedema and exacerbating current pain levels on their sensitive skin due to the static pressure, strongly ruling out further use of any basic pump and recommending flexitouch to treat patients lymphedema and skin changes. Patient has also attempted self MLD in the past when possible without an improvement in distal or proximal symptoms.

## 2023-07-13 NOTE — HISTORY OF PRESENT ILLNESS
[FreeTextEntry1] : 73 year old man with history of aortic stenosis s/p TAVR, asthma, colon cancer, AV block, diabetes, hypercholesterolemia, hypertension, prostate cancer, kidney cancer, and statin-induced toxic myositis presents for evaluation of bilateral lower extremity edema. He is now wheelchair-bound due to the myositis. Prior to being wheelchair-bound, he had no history of edema. He does have open ulcerations of his left lower leg.

## 2023-08-09 NOTE — H&P CARDIOLOGY - NS MD HP PULSE DORSALIS
breast enlargement/nipple/areola darkened and large
[de-identified] : Cardiologist: Dr. Norwood  85 yo F with history of HTN, HL, DM, CVA (left sided weakness), Mobitz 2 s/p DC PPM. Patient had COVID PNA earlier in the year and was hospitalized from 1/14-1/18/22. She received Remdesivir. She presents in a wheelchair. She is intermittently falling asleep. She reports shoulder pain.  
right normal/left normal

## 2023-09-07 ENCOUNTER — NON-APPOINTMENT (OUTPATIENT)
Age: 73
End: 2023-09-07

## 2023-09-07 ENCOUNTER — APPOINTMENT (OUTPATIENT)
Dept: ELECTROPHYSIOLOGY | Facility: CLINIC | Age: 73
End: 2023-09-07
Payer: MEDICARE

## 2023-09-07 PROCEDURE — 93296 REM INTERROG EVL PM/IDS: CPT

## 2023-09-07 PROCEDURE — 93294 REM INTERROG EVL PM/LDLS PM: CPT

## 2023-10-03 ENCOUNTER — OFFICE (OUTPATIENT)
Dept: URBAN - METROPOLITAN AREA CLINIC 90 | Facility: CLINIC | Age: 73
Setting detail: OPHTHALMOLOGY
End: 2023-10-03
Payer: MEDICARE

## 2023-10-03 DIAGNOSIS — H40.013: ICD-10-CM

## 2023-10-03 DIAGNOSIS — D31.32: ICD-10-CM

## 2023-10-03 DIAGNOSIS — D31.31: ICD-10-CM

## 2023-10-03 DIAGNOSIS — H35.3131: ICD-10-CM

## 2023-10-03 DIAGNOSIS — H43.393: ICD-10-CM

## 2023-10-03 DIAGNOSIS — H35.371: ICD-10-CM

## 2023-10-03 DIAGNOSIS — E11.3311: ICD-10-CM

## 2023-10-03 DIAGNOSIS — H25.13: ICD-10-CM

## 2023-10-03 DIAGNOSIS — E11.3292: ICD-10-CM

## 2023-10-03 PROCEDURE — 92134 CPTRZ OPH DX IMG PST SGM RTA: CPT | Performed by: OPHTHALMOLOGY

## 2023-10-03 PROCEDURE — 92014 COMPRE OPH EXAM EST PT 1/>: CPT | Performed by: OPHTHALMOLOGY

## 2023-10-03 ASSESSMENT — LID POSITION - PTOSIS
OD_PTOSIS: RUL
OS_PTOSIS: LUL

## 2023-10-03 ASSESSMENT — CONFRONTATIONAL VISUAL FIELD TEST (CVF)
OD_FINDINGS: FULL
OS_FINDINGS: FULL

## 2023-10-03 ASSESSMENT — TONOMETRY
OS_IOP_MMHG: 20
OD_IOP_MMHG: 20

## 2023-10-03 ASSESSMENT — LID POSITION - DERMATOCHALASIS
OD_DERMATOCHALASIS: RLL 1+
OS_DERMATOCHALASIS: LLL 1+

## 2023-10-03 ASSESSMENT — PACHYMETRY
OD_CT_CORRECTION: -3
OS_CT_UM: 617
OD_CT_UM: 584
OS_CT_CORRECTION: -5

## 2023-10-04 ASSESSMENT — REFRACTION_AUTOREFRACTION
OD_AXIS: 070
OD_CYLINDER: -2.25
OS_CYLINDER: -2.50
OS_SPHERE: PLANO
OD_SPHERE: UNABLE
OS_AXIS: 089

## 2023-10-04 ASSESSMENT — VISUAL ACUITY
OD_BCVA: 20/70
OS_BCVA: 20/30-2

## 2023-10-04 ASSESSMENT — KERATOMETRY
OS_AXISANGLE_DEGREES: 090
OD_K1POWER_DIOPTERS: 47.50
METHOD_AUTO_MANUAL: AUTO
OS_K2POWER_DIOPTERS: 47.00
OS_K1POWER_DIOPTERS: 47.00
OD_K2POWER_DIOPTERS: 48.25
OD_AXISANGLE_DEGREES: 009

## 2023-10-04 ASSESSMENT — REFRACTION_CURRENTRX
OS_SPHERE: +1.25
OD_SPHERE: +1.50
OD_CYLINDER: -1.75
OS_ADD: +2.00
OS_AXIS: 089
OS_CYLINDER: -0.75
OD_AXIS: 093
OS_OVR_VA: 20/
OD_ADD: +2.00
OD_OVR_VA: 20/

## 2023-10-04 ASSESSMENT — REFRACTION_MANIFEST
OD_VA1: 20/40+
OD_CYLINDER: -1.75
OD_SPHERE: +1.50
OS_SPHERE: PLANO
OS_AXIS: 090
OD_AXIS: 095
OS_CYLINDER: -1.25
OS_VA1: 20/50-

## 2023-10-04 ASSESSMENT — AXIALLENGTH_DERIVED: OD_AL: 21.876

## 2023-10-04 ASSESSMENT — SPHEQUIV_DERIVED: OD_SPHEQUIV: 0.625

## 2023-10-17 NOTE — PATIENT PROFILE ADULT - FUNCTIONAL ASSESSMENT - DAILY ACTIVITY 6.
Take ibuprofen as needed for pain (600 mg every 6 hours as needed with food)  
4 = No assist / stand by assistance

## 2023-10-24 ENCOUNTER — OFFICE (OUTPATIENT)
Dept: URBAN - METROPOLITAN AREA CLINIC 32 | Facility: CLINIC | Age: 73
Setting detail: OPHTHALMOLOGY
End: 2023-10-24
Payer: MEDICARE

## 2023-10-24 DIAGNOSIS — D31.32: ICD-10-CM

## 2023-10-24 DIAGNOSIS — D31.31: ICD-10-CM

## 2023-10-24 DIAGNOSIS — H43.393: ICD-10-CM

## 2023-10-24 DIAGNOSIS — E11.3292: ICD-10-CM

## 2023-10-24 DIAGNOSIS — E11.3311: ICD-10-CM

## 2023-10-24 DIAGNOSIS — H35.371: ICD-10-CM

## 2023-10-24 DIAGNOSIS — H35.3131: ICD-10-CM

## 2023-10-24 PROCEDURE — 99213 OFFICE O/P EST LOW 20 MIN: CPT | Mod: 57 | Performed by: OPHTHALMOLOGY

## 2023-10-24 PROCEDURE — 92235 FLUORESCEIN ANGRPH MLTIFRAME: CPT | Performed by: OPHTHALMOLOGY

## 2023-10-24 PROCEDURE — 92134 CPTRZ OPH DX IMG PST SGM RTA: CPT | Performed by: OPHTHALMOLOGY

## 2023-10-24 PROCEDURE — 76512 OPH US DX B-SCAN: CPT | Mod: 50 | Performed by: OPHTHALMOLOGY

## 2023-10-24 PROCEDURE — 67210 TREATMENT OF RETINAL LESION: CPT | Mod: RT | Performed by: OPHTHALMOLOGY

## 2023-10-24 ASSESSMENT — KERATOMETRY
OS_K1POWER_DIOPTERS: 47.00
OS_AXISANGLE_DEGREES: 090
METHOD_AUTO_MANUAL: AUTO
OS_K2POWER_DIOPTERS: 47.00
OD_AXISANGLE_DEGREES: 009
OD_K1POWER_DIOPTERS: 47.50
OD_K2POWER_DIOPTERS: 48.25

## 2023-10-24 ASSESSMENT — REFRACTION_AUTOREFRACTION
OD_AXIS: 070
OD_CYLINDER: -2.25
OS_CYLINDER: -2.50
OS_AXIS: 089
OS_SPHERE: PLANO
OD_SPHERE: UNABLE

## 2023-10-24 ASSESSMENT — VISUAL ACUITY
OS_BCVA: 20/30
OD_BCVA: 20/50-1

## 2023-10-24 ASSESSMENT — LID POSITION - DERMATOCHALASIS
OD_DERMATOCHALASIS: RLL 1+
OS_DERMATOCHALASIS: LLL 1+

## 2023-10-24 ASSESSMENT — LID POSITION - PTOSIS
OS_PTOSIS: LUL
OD_PTOSIS: RUL

## 2023-10-24 ASSESSMENT — CONFRONTATIONAL VISUAL FIELD TEST (CVF)
OD_FINDINGS: FULL
OS_FINDINGS: FULL

## 2023-11-08 ENCOUNTER — OFFICE (OUTPATIENT)
Dept: URBAN - METROPOLITAN AREA CLINIC 90 | Facility: CLINIC | Age: 73
Setting detail: OPHTHALMOLOGY
End: 2023-11-08
Payer: MEDICARE

## 2023-11-08 DIAGNOSIS — H25.13: ICD-10-CM

## 2023-11-08 DIAGNOSIS — H25.12: ICD-10-CM

## 2023-11-08 PROCEDURE — 92136 OPHTHALMIC BIOMETRY: CPT | Mod: 26,LT | Performed by: OPHTHALMOLOGY

## 2023-11-08 PROCEDURE — 92136 OPHTHALMIC BIOMETRY: CPT | Mod: TC | Performed by: OPHTHALMOLOGY

## 2023-11-08 ASSESSMENT — LID POSITION - DERMATOCHALASIS
OD_DERMATOCHALASIS: RLL 1+
OS_DERMATOCHALASIS: LLL 1+

## 2023-11-08 ASSESSMENT — CONFRONTATIONAL VISUAL FIELD TEST (CVF)
OS_FINDINGS: FULL
OD_FINDINGS: FULL

## 2023-11-08 ASSESSMENT — LID POSITION - PTOSIS
OD_PTOSIS: RUL
OS_PTOSIS: LUL

## 2023-11-09 ASSESSMENT — REFRACTION_AUTOREFRACTION
OS_CYLINDER: -2.50
OD_SPHERE: UNABLE
OS_SPHERE: PLANO
OD_CYLINDER: -2.25
OD_AXIS: 070
OS_AXIS: 089

## 2023-11-27 ENCOUNTER — AMBULATORY SURGERY CENTER (OUTPATIENT)
Dept: URBAN - METROPOLITAN AREA CLINIC 91 | Facility: CLINIC | Age: 73
Setting detail: OPHTHALMOLOGY
End: 2023-11-27
Payer: MEDICARE

## 2023-11-27 DIAGNOSIS — H25.22: ICD-10-CM

## 2023-11-27 PROCEDURE — 66984 XCAPSL CTRC RMVL W/O ECP: CPT | Mod: 79,LT | Performed by: OPHTHALMOLOGY

## 2023-11-28 ENCOUNTER — RX ONLY (RX ONLY)
Age: 73
End: 2023-11-28

## 2023-11-28 ENCOUNTER — OFFICE (OUTPATIENT)
Dept: URBAN - METROPOLITAN AREA CLINIC 90 | Facility: CLINIC | Age: 73
Setting detail: OPHTHALMOLOGY
End: 2023-11-28
Payer: MEDICARE

## 2023-11-28 DIAGNOSIS — Z96.1: ICD-10-CM

## 2023-11-28 PROCEDURE — 99024 POSTOP FOLLOW-UP VISIT: CPT | Performed by: OPHTHALMOLOGY

## 2023-11-28 ASSESSMENT — CONFRONTATIONAL VISUAL FIELD TEST (CVF)
OD_FINDINGS: FULL
OS_FINDINGS: FULL

## 2023-11-28 ASSESSMENT — REFRACTION_AUTOREFRACTION
OD_CYLINDER: -2.25
OS_AXIS: 089
OS_CYLINDER: -2.50
OS_SPHERE: PLANO
OD_AXIS: 070
OD_SPHERE: UNABLE

## 2023-11-28 ASSESSMENT — CORNEAL EDEMA CLINICAL DESCRIPTION: OS_CORNEALEDEMA: CENTRALLY

## 2023-11-28 ASSESSMENT — CORNEAL EDEMA - FOLDS/STRIAE: OS_FOLDSSTRIAE: 1+

## 2023-11-29 ENCOUNTER — APPOINTMENT (OUTPATIENT)
Dept: PULMONOLOGY | Facility: CLINIC | Age: 73
End: 2023-11-29
Payer: MEDICARE

## 2023-11-29 ENCOUNTER — NON-APPOINTMENT (OUTPATIENT)
Age: 73
End: 2023-11-29

## 2023-11-29 VITALS — DIASTOLIC BLOOD PRESSURE: 73 MMHG | OXYGEN SATURATION: 97 % | SYSTOLIC BLOOD PRESSURE: 157 MMHG | HEART RATE: 53 BPM

## 2023-11-29 DIAGNOSIS — I10 ESSENTIAL (PRIMARY) HYPERTENSION: ICD-10-CM

## 2023-11-29 DIAGNOSIS — C18.9 MALIGNANT NEOPLASM OF COLON, UNSPECIFIED: ICD-10-CM

## 2023-11-29 DIAGNOSIS — R16.1 SPLENOMEGALY, NOT ELSEWHERE CLASSIFIED: ICD-10-CM

## 2023-11-29 DIAGNOSIS — C64.9 MALIGNANT NEOPLASM OF UNSPECIFIED KIDNEY, EXCEPT RENAL PELVIS: ICD-10-CM

## 2023-11-29 DIAGNOSIS — C61 MALIGNANT NEOPLASM OF PROSTATE: ICD-10-CM

## 2023-11-29 DIAGNOSIS — M60.9 MYOSITIS, UNSPECIFIED: ICD-10-CM

## 2023-11-29 DIAGNOSIS — Z00.00 ENCOUNTER FOR GENERAL ADULT MEDICAL EXAMINATION W/OUT ABNORMAL FINDINGS: ICD-10-CM

## 2023-11-29 DIAGNOSIS — J45.909 UNSPECIFIED ASTHMA, UNCOMPLICATED: ICD-10-CM

## 2023-11-29 DIAGNOSIS — Z01.812 ENCOUNTER FOR PREPROCEDURAL LABORATORY EXAMINATION: ICD-10-CM

## 2023-11-29 DIAGNOSIS — I35.9 NONRHEUMATIC AORTIC VALVE DISORDER, UNSPECIFIED: ICD-10-CM

## 2023-11-29 DIAGNOSIS — Z95.2 PRESENCE OF PROSTHETIC HEART VALVE: ICD-10-CM

## 2023-11-29 DIAGNOSIS — R06.02 SHORTNESS OF BREATH: ICD-10-CM

## 2023-11-29 DIAGNOSIS — E11.9 TYPE 2 DIABETES MELLITUS W/OUT COMPLICATIONS: ICD-10-CM

## 2023-11-29 LAB — POCT - HEMOGLOBIN (HGB), QUANTITATIVE, TRANSCUTANEOUS: 11.9

## 2023-11-29 PROCEDURE — 71045 X-RAY EXAM CHEST 1 VIEW: CPT

## 2023-11-29 PROCEDURE — 94060 EVALUATION OF WHEEZING: CPT

## 2023-11-29 PROCEDURE — 99205 OFFICE O/P NEW HI 60 MIN: CPT | Mod: 25

## 2023-11-29 PROCEDURE — 94727 GAS DIL/WSHOT DETER LNG VOL: CPT

## 2023-11-29 PROCEDURE — ZZZZZ: CPT

## 2023-11-29 PROCEDURE — 88738 HGB QUANT TRANSCUTANEOUS: CPT

## 2023-11-29 PROCEDURE — 93000 ELECTROCARDIOGRAM COMPLETE: CPT

## 2023-11-29 PROCEDURE — 36415 COLL VENOUS BLD VENIPUNCTURE: CPT

## 2023-11-29 PROCEDURE — 94729 DIFFUSING CAPACITY: CPT

## 2023-11-29 PROCEDURE — 81003 URINALYSIS AUTO W/O SCOPE: CPT | Mod: QW

## 2023-11-29 RX ORDER — FUROSEMIDE 20 MG/1
20 TABLET ORAL DAILY
Refills: 0 | Status: ACTIVE | COMMUNITY
Start: 2023-11-29

## 2023-11-29 RX ORDER — HYDROCHLOROTHIAZIDE 25 MG/1
25 TABLET ORAL DAILY
Refills: 0 | Status: ACTIVE | COMMUNITY
Start: 2021-02-26

## 2023-11-30 ENCOUNTER — NON-APPOINTMENT (OUTPATIENT)
Age: 73
End: 2023-11-30

## 2023-11-30 LAB
ALBUMIN SERPL ELPH-MCNC: 3.9 G/DL
ALP BLD-CCNC: 48 U/L
ALT SERPL-CCNC: 57 U/L
ANION GAP SERPL CALC-SCNC: 14 MMOL/L
AST SERPL-CCNC: 34 U/L
BASOPHILS # BLD AUTO: 0.05 K/UL
BASOPHILS NFR BLD AUTO: 0.6 %
BILIRUB SERPL-MCNC: 0.8 MG/DL
BUN SERPL-MCNC: 19 MG/DL
CALCIUM SERPL-MCNC: 9.9 MG/DL
CHLORIDE SERPL-SCNC: 101 MMOL/L
CHOLEST SERPL-MCNC: 123 MG/DL
CO2 SERPL-SCNC: 26 MMOL/L
CREAT SERPL-MCNC: 0.58 MG/DL
EGFR: 103 ML/MIN/1.73M2
EOSINOPHIL # BLD AUTO: 0.34 K/UL
EOSINOPHIL NFR BLD AUTO: 4.4 %
GLUCOSE SERPL-MCNC: 126 MG/DL
HCT VFR BLD CALC: 35.9 %
HDLC SERPL-MCNC: 27 MG/DL
HGB BLD-MCNC: 11.3 G/DL
IMM GRANULOCYTES NFR BLD AUTO: 0.3 %
LDLC SERPL CALC-MCNC: 71 MG/DL
LYMPHOCYTES # BLD AUTO: 1.29 K/UL
LYMPHOCYTES NFR BLD AUTO: 16.7 %
MAN DIFF?: NORMAL
MCHC RBC-ENTMCNC: 30.2 PG
MCHC RBC-ENTMCNC: 31.5 GM/DL
MCV RBC AUTO: 96 FL
MONOCYTES # BLD AUTO: 1.14 K/UL
MONOCYTES NFR BLD AUTO: 14.8 %
NEUTROPHILS # BLD AUTO: 4.88 K/UL
NEUTROPHILS NFR BLD AUTO: 63.2 %
NONHDLC SERPL-MCNC: 95 MG/DL
PLATELET # BLD AUTO: 256 K/UL
POTASSIUM SERPL-SCNC: 4 MMOL/L
PROT SERPL-MCNC: 7.8 G/DL
RBC # BLD: 3.74 M/UL
RBC # FLD: 14.5 %
SODIUM SERPL-SCNC: 142 MMOL/L
TRIGL SERPL-MCNC: 135 MG/DL
WBC # FLD AUTO: 7.72 K/UL

## 2023-12-05 ENCOUNTER — OFFICE (OUTPATIENT)
Dept: URBAN - METROPOLITAN AREA CLINIC 90 | Facility: CLINIC | Age: 73
Setting detail: OPHTHALMOLOGY
End: 2023-12-05
Payer: MEDICARE

## 2023-12-05 DIAGNOSIS — Z96.1: ICD-10-CM

## 2023-12-05 PROCEDURE — 99024 POSTOP FOLLOW-UP VISIT: CPT | Performed by: OPHTHALMOLOGY

## 2023-12-05 ASSESSMENT — CONFRONTATIONAL VISUAL FIELD TEST (CVF)
OD_FINDINGS: FULL
OS_FINDINGS: FULL

## 2023-12-05 ASSESSMENT — REFRACTION_AUTOREFRACTION
OS_SPHERE: -1.25
OD_CYLINDER: -2.50
OD_AXIS: 080
OS_CYLINDER: -1.25
OS_AXIS: 086

## 2023-12-05 ASSESSMENT — CORNEAL EDEMA CLINICAL DESCRIPTION: OS_CORNEALEDEMA: ABSENT

## 2023-12-05 ASSESSMENT — SPHEQUIV_DERIVED: OS_SPHEQUIV: -1.875

## 2023-12-05 ASSESSMENT — CORNEAL EDEMA - FOLDS/STRIAE: OS_FOLDSSTRIAE: ABSENT

## 2023-12-12 ENCOUNTER — INPATIENT (INPATIENT)
Facility: HOSPITAL | Age: 73
LOS: 3 days | Discharge: HOME HEALTH | DRG: 375 | End: 2023-12-16
Attending: STUDENT IN AN ORGANIZED HEALTH CARE EDUCATION/TRAINING PROGRAM | Admitting: STUDENT IN AN ORGANIZED HEALTH CARE EDUCATION/TRAINING PROGRAM
Payer: MEDICARE

## 2023-12-12 VITALS
SYSTOLIC BLOOD PRESSURE: 168 MMHG | WEIGHT: 195.11 LBS | DIASTOLIC BLOOD PRESSURE: 69 MMHG | HEART RATE: 54 BPM | RESPIRATION RATE: 18 BRPM | HEIGHT: 70 IN | OXYGEN SATURATION: 99 % | TEMPERATURE: 97 F

## 2023-12-12 DIAGNOSIS — I10 ESSENTIAL (PRIMARY) HYPERTENSION: ICD-10-CM

## 2023-12-12 DIAGNOSIS — R10.9 UNSPECIFIED ABDOMINAL PAIN: ICD-10-CM

## 2023-12-12 DIAGNOSIS — I25.10 ATHEROSCLEROTIC HEART DISEASE OF NATIVE CORONARY ARTERY WITHOUT ANGINA PECTORIS: ICD-10-CM

## 2023-12-12 DIAGNOSIS — Z98.890 OTHER SPECIFIED POSTPROCEDURAL STATES: Chronic | ICD-10-CM

## 2023-12-12 DIAGNOSIS — I48.20 CHRONIC ATRIAL FIBRILLATION, UNSPECIFIED: ICD-10-CM

## 2023-12-12 DIAGNOSIS — Z90.79 ACQUIRED ABSENCE OF OTHER GENITAL ORGAN(S): Chronic | ICD-10-CM

## 2023-12-12 DIAGNOSIS — Z87.39 PERSONAL HISTORY OF OTHER DISEASES OF THE MUSCULOSKELETAL SYSTEM AND CONNECTIVE TISSUE: ICD-10-CM

## 2023-12-12 DIAGNOSIS — K81.0 ACUTE CHOLECYSTITIS: Chronic | ICD-10-CM

## 2023-12-12 DIAGNOSIS — C18.9 MALIGNANT NEOPLASM OF COLON, UNSPECIFIED: ICD-10-CM

## 2023-12-12 DIAGNOSIS — R11.2 NAUSEA WITH VOMITING, UNSPECIFIED: ICD-10-CM

## 2023-12-12 LAB
ALBUMIN SERPL ELPH-MCNC: 4.2 G/DL — SIGNIFICANT CHANGE UP (ref 3.3–5)
ALBUMIN SERPL ELPH-MCNC: 4.2 G/DL — SIGNIFICANT CHANGE UP (ref 3.3–5)
ALP SERPL-CCNC: 52 U/L — SIGNIFICANT CHANGE UP (ref 40–120)
ALP SERPL-CCNC: 52 U/L — SIGNIFICANT CHANGE UP (ref 40–120)
ALT FLD-CCNC: 59 U/L — HIGH (ref 10–45)
ALT FLD-CCNC: 59 U/L — HIGH (ref 10–45)
ANION GAP SERPL CALC-SCNC: 30 MMOL/L — HIGH (ref 5–17)
ANION GAP SERPL CALC-SCNC: 30 MMOL/L — HIGH (ref 5–17)
APPEARANCE UR: CLEAR — SIGNIFICANT CHANGE UP
APPEARANCE UR: CLEAR — SIGNIFICANT CHANGE UP
AST SERPL-CCNC: 27 U/L — SIGNIFICANT CHANGE UP (ref 10–40)
AST SERPL-CCNC: 27 U/L — SIGNIFICANT CHANGE UP (ref 10–40)
BACTERIA # UR AUTO: NEGATIVE /HPF — SIGNIFICANT CHANGE UP
BACTERIA # UR AUTO: NEGATIVE /HPF — SIGNIFICANT CHANGE UP
BASE EXCESS BLDV CALC-SCNC: -8 MMOL/L — LOW (ref -2–3)
BASE EXCESS BLDV CALC-SCNC: -8 MMOL/L — LOW (ref -2–3)
BASE EXCESS BLDV CALC-SCNC: -9.3 MMOL/L — LOW (ref -2–3)
BASE EXCESS BLDV CALC-SCNC: -9.3 MMOL/L — LOW (ref -2–3)
BASOPHILS # BLD AUTO: 0.05 K/UL — SIGNIFICANT CHANGE UP (ref 0–0.2)
BASOPHILS # BLD AUTO: 0.05 K/UL — SIGNIFICANT CHANGE UP (ref 0–0.2)
BASOPHILS NFR BLD AUTO: 0.5 % — SIGNIFICANT CHANGE UP (ref 0–2)
BASOPHILS NFR BLD AUTO: 0.5 % — SIGNIFICANT CHANGE UP (ref 0–2)
BILIRUB SERPL-MCNC: 1.1 MG/DL — SIGNIFICANT CHANGE UP (ref 0.2–1.2)
BILIRUB SERPL-MCNC: 1.1 MG/DL — SIGNIFICANT CHANGE UP (ref 0.2–1.2)
BILIRUB UR-MCNC: ABNORMAL
BILIRUB UR-MCNC: ABNORMAL
BUN SERPL-MCNC: 39 MG/DL — HIGH (ref 7–23)
BUN SERPL-MCNC: 39 MG/DL — HIGH (ref 7–23)
CA-I SERPL-SCNC: 1.29 MMOL/L — SIGNIFICANT CHANGE UP (ref 1.15–1.33)
CA-I SERPL-SCNC: 1.29 MMOL/L — SIGNIFICANT CHANGE UP (ref 1.15–1.33)
CA-I SERPL-SCNC: 1.31 MMOL/L — SIGNIFICANT CHANGE UP (ref 1.15–1.33)
CA-I SERPL-SCNC: 1.31 MMOL/L — SIGNIFICANT CHANGE UP (ref 1.15–1.33)
CALCIUM SERPL-MCNC: 10.3 MG/DL — SIGNIFICANT CHANGE UP (ref 8.4–10.5)
CALCIUM SERPL-MCNC: 10.3 MG/DL — SIGNIFICANT CHANGE UP (ref 8.4–10.5)
CAST: >63 /LPF — HIGH (ref 0–4)
CAST: >63 /LPF — HIGH (ref 0–4)
CHLORIDE BLDV-SCNC: 101 MMOL/L — SIGNIFICANT CHANGE UP (ref 96–108)
CHLORIDE BLDV-SCNC: 101 MMOL/L — SIGNIFICANT CHANGE UP (ref 96–108)
CHLORIDE BLDV-SCNC: 103 MMOL/L — SIGNIFICANT CHANGE UP (ref 96–108)
CHLORIDE BLDV-SCNC: 103 MMOL/L — SIGNIFICANT CHANGE UP (ref 96–108)
CHLORIDE SERPL-SCNC: 96 MMOL/L — SIGNIFICANT CHANGE UP (ref 96–108)
CHLORIDE SERPL-SCNC: 96 MMOL/L — SIGNIFICANT CHANGE UP (ref 96–108)
CO2 BLDV-SCNC: 18 MMOL/L — LOW (ref 22–26)
CO2 SERPL-SCNC: 16 MMOL/L — LOW (ref 22–31)
CO2 SERPL-SCNC: 16 MMOL/L — LOW (ref 22–31)
COLOR SPEC: SIGNIFICANT CHANGE UP
COLOR SPEC: SIGNIFICANT CHANGE UP
CREAT SERPL-MCNC: 0.82 MG/DL — SIGNIFICANT CHANGE UP (ref 0.5–1.3)
CREAT SERPL-MCNC: 0.82 MG/DL — SIGNIFICANT CHANGE UP (ref 0.5–1.3)
DIFF PNL FLD: NEGATIVE — SIGNIFICANT CHANGE UP
DIFF PNL FLD: NEGATIVE — SIGNIFICANT CHANGE UP
EGFR: 93 ML/MIN/1.73M2 — SIGNIFICANT CHANGE UP
EGFR: 93 ML/MIN/1.73M2 — SIGNIFICANT CHANGE UP
EOSINOPHIL # BLD AUTO: 0.07 K/UL — SIGNIFICANT CHANGE UP (ref 0–0.5)
EOSINOPHIL # BLD AUTO: 0.07 K/UL — SIGNIFICANT CHANGE UP (ref 0–0.5)
EOSINOPHIL NFR BLD AUTO: 0.7 % — SIGNIFICANT CHANGE UP (ref 0–6)
EOSINOPHIL NFR BLD AUTO: 0.7 % — SIGNIFICANT CHANGE UP (ref 0–6)
GAS PNL BLDV: 139 MMOL/L — SIGNIFICANT CHANGE UP (ref 136–145)
GAS PNL BLDV: SIGNIFICANT CHANGE UP
GLUCOSE BLDC GLUCOMTR-MCNC: 143 MG/DL — HIGH (ref 70–99)
GLUCOSE BLDC GLUCOMTR-MCNC: 143 MG/DL — HIGH (ref 70–99)
GLUCOSE BLDV-MCNC: 132 MG/DL — HIGH (ref 70–99)
GLUCOSE BLDV-MCNC: 132 MG/DL — HIGH (ref 70–99)
GLUCOSE BLDV-MCNC: 144 MG/DL — HIGH (ref 70–99)
GLUCOSE BLDV-MCNC: 144 MG/DL — HIGH (ref 70–99)
GLUCOSE SERPL-MCNC: 137 MG/DL — HIGH (ref 70–99)
GLUCOSE SERPL-MCNC: 137 MG/DL — HIGH (ref 70–99)
GLUCOSE UR QL: NEGATIVE MG/DL — SIGNIFICANT CHANGE UP
GLUCOSE UR QL: NEGATIVE MG/DL — SIGNIFICANT CHANGE UP
HCO3 BLDV-SCNC: 17 MMOL/L — LOW (ref 22–29)
HCT VFR BLD CALC: 38.5 % — LOW (ref 39–50)
HCT VFR BLD CALC: 38.5 % — LOW (ref 39–50)
HCT VFR BLDA CALC: 33 % — LOW (ref 39–51)
HCT VFR BLDA CALC: 33 % — LOW (ref 39–51)
HCT VFR BLDA CALC: 37 % — LOW (ref 39–51)
HCT VFR BLDA CALC: 37 % — LOW (ref 39–51)
HGB BLD CALC-MCNC: 11.1 G/DL — LOW (ref 12.6–17.4)
HGB BLD CALC-MCNC: 11.1 G/DL — LOW (ref 12.6–17.4)
HGB BLD CALC-MCNC: 12.4 G/DL — LOW (ref 12.6–17.4)
HGB BLD CALC-MCNC: 12.4 G/DL — LOW (ref 12.6–17.4)
HGB BLD-MCNC: 12.1 G/DL — LOW (ref 13–17)
HGB BLD-MCNC: 12.1 G/DL — LOW (ref 13–17)
HYALINE CASTS # UR AUTO: PRESENT
HYALINE CASTS # UR AUTO: PRESENT
IMM GRANULOCYTES NFR BLD AUTO: 0.5 % — SIGNIFICANT CHANGE UP (ref 0–0.9)
IMM GRANULOCYTES NFR BLD AUTO: 0.5 % — SIGNIFICANT CHANGE UP (ref 0–0.9)
KETONES UR-MCNC: 40 MG/DL
KETONES UR-MCNC: 40 MG/DL
LACTATE BLDV-MCNC: 1.7 MMOL/L — SIGNIFICANT CHANGE UP (ref 0.5–2)
LACTATE BLDV-MCNC: 1.7 MMOL/L — SIGNIFICANT CHANGE UP (ref 0.5–2)
LACTATE BLDV-MCNC: 3.3 MMOL/L — HIGH (ref 0.5–2)
LACTATE BLDV-MCNC: 3.3 MMOL/L — HIGH (ref 0.5–2)
LEUKOCYTE ESTERASE UR-ACNC: NEGATIVE — SIGNIFICANT CHANGE UP
LEUKOCYTE ESTERASE UR-ACNC: NEGATIVE — SIGNIFICANT CHANGE UP
LIDOCAIN IGE QN: 53 U/L — SIGNIFICANT CHANGE UP (ref 7–60)
LIDOCAIN IGE QN: 53 U/L — SIGNIFICANT CHANGE UP (ref 7–60)
LYMPHOCYTES # BLD AUTO: 0.94 K/UL — LOW (ref 1–3.3)
LYMPHOCYTES # BLD AUTO: 0.94 K/UL — LOW (ref 1–3.3)
LYMPHOCYTES # BLD AUTO: 8.7 % — LOW (ref 13–44)
LYMPHOCYTES # BLD AUTO: 8.7 % — LOW (ref 13–44)
MCHC RBC-ENTMCNC: 29.5 PG — SIGNIFICANT CHANGE UP (ref 27–34)
MCHC RBC-ENTMCNC: 29.5 PG — SIGNIFICANT CHANGE UP (ref 27–34)
MCHC RBC-ENTMCNC: 31.4 GM/DL — LOW (ref 32–36)
MCHC RBC-ENTMCNC: 31.4 GM/DL — LOW (ref 32–36)
MCV RBC AUTO: 93.9 FL — SIGNIFICANT CHANGE UP (ref 80–100)
MCV RBC AUTO: 93.9 FL — SIGNIFICANT CHANGE UP (ref 80–100)
MONOCYTES # BLD AUTO: 1.13 K/UL — HIGH (ref 0–0.9)
MONOCYTES # BLD AUTO: 1.13 K/UL — HIGH (ref 0–0.9)
MONOCYTES NFR BLD AUTO: 10.5 % — SIGNIFICANT CHANGE UP (ref 2–14)
MONOCYTES NFR BLD AUTO: 10.5 % — SIGNIFICANT CHANGE UP (ref 2–14)
NEUTROPHILS # BLD AUTO: 8.52 K/UL — HIGH (ref 1.8–7.4)
NEUTROPHILS # BLD AUTO: 8.52 K/UL — HIGH (ref 1.8–7.4)
NEUTROPHILS NFR BLD AUTO: 79.1 % — HIGH (ref 43–77)
NEUTROPHILS NFR BLD AUTO: 79.1 % — HIGH (ref 43–77)
NITRITE UR-MCNC: NEGATIVE — SIGNIFICANT CHANGE UP
NITRITE UR-MCNC: NEGATIVE — SIGNIFICANT CHANGE UP
NRBC # BLD: 0 /100 WBCS — SIGNIFICANT CHANGE UP (ref 0–0)
NRBC # BLD: 0 /100 WBCS — SIGNIFICANT CHANGE UP (ref 0–0)
PCO2 BLDV: 31 MMHG — LOW (ref 42–55)
PCO2 BLDV: 31 MMHG — LOW (ref 42–55)
PCO2 BLDV: 38 MMHG — LOW (ref 42–55)
PCO2 BLDV: 38 MMHG — LOW (ref 42–55)
PH BLDV: 7.26 — LOW (ref 7.32–7.43)
PH BLDV: 7.26 — LOW (ref 7.32–7.43)
PH BLDV: 7.34 — SIGNIFICANT CHANGE UP (ref 7.32–7.43)
PH BLDV: 7.34 — SIGNIFICANT CHANGE UP (ref 7.32–7.43)
PH UR: 5 — SIGNIFICANT CHANGE UP (ref 5–8)
PH UR: 5 — SIGNIFICANT CHANGE UP (ref 5–8)
PLATELET # BLD AUTO: 257 K/UL — SIGNIFICANT CHANGE UP (ref 150–400)
PLATELET # BLD AUTO: 257 K/UL — SIGNIFICANT CHANGE UP (ref 150–400)
PO2 BLDV: 38 MMHG — SIGNIFICANT CHANGE UP (ref 25–45)
PO2 BLDV: 38 MMHG — SIGNIFICANT CHANGE UP (ref 25–45)
PO2 BLDV: 43 MMHG — SIGNIFICANT CHANGE UP (ref 25–45)
PO2 BLDV: 43 MMHG — SIGNIFICANT CHANGE UP (ref 25–45)
POTASSIUM BLDV-SCNC: 3.9 MMOL/L — SIGNIFICANT CHANGE UP (ref 3.5–5.1)
POTASSIUM BLDV-SCNC: 3.9 MMOL/L — SIGNIFICANT CHANGE UP (ref 3.5–5.1)
POTASSIUM BLDV-SCNC: 4.5 MMOL/L — SIGNIFICANT CHANGE UP (ref 3.5–5.1)
POTASSIUM BLDV-SCNC: 4.5 MMOL/L — SIGNIFICANT CHANGE UP (ref 3.5–5.1)
POTASSIUM SERPL-MCNC: 4 MMOL/L — SIGNIFICANT CHANGE UP (ref 3.5–5.3)
POTASSIUM SERPL-MCNC: 4 MMOL/L — SIGNIFICANT CHANGE UP (ref 3.5–5.3)
POTASSIUM SERPL-SCNC: 4 MMOL/L — SIGNIFICANT CHANGE UP (ref 3.5–5.3)
POTASSIUM SERPL-SCNC: 4 MMOL/L — SIGNIFICANT CHANGE UP (ref 3.5–5.3)
PROT SERPL-MCNC: 8.3 G/DL — SIGNIFICANT CHANGE UP (ref 6–8.3)
PROT SERPL-MCNC: 8.3 G/DL — SIGNIFICANT CHANGE UP (ref 6–8.3)
PROT UR-MCNC: 30 MG/DL
PROT UR-MCNC: 30 MG/DL
RBC # BLD: 4.1 M/UL — LOW (ref 4.2–5.8)
RBC # BLD: 4.1 M/UL — LOW (ref 4.2–5.8)
RBC # FLD: 13.8 % — SIGNIFICANT CHANGE UP (ref 10.3–14.5)
RBC # FLD: 13.8 % — SIGNIFICANT CHANGE UP (ref 10.3–14.5)
RBC CASTS # UR COMP ASSIST: 2 /HPF — SIGNIFICANT CHANGE UP (ref 0–4)
RBC CASTS # UR COMP ASSIST: 2 /HPF — SIGNIFICANT CHANGE UP (ref 0–4)
REVIEW: SIGNIFICANT CHANGE UP
REVIEW: SIGNIFICANT CHANGE UP
SAO2 % BLDV: 43 % — LOW (ref 67–88)
SAO2 % BLDV: 43 % — LOW (ref 67–88)
SAO2 % BLDV: 63.3 % — LOW (ref 67–88)
SAO2 % BLDV: 63.3 % — LOW (ref 67–88)
SODIUM SERPL-SCNC: 142 MMOL/L — SIGNIFICANT CHANGE UP (ref 135–145)
SODIUM SERPL-SCNC: 142 MMOL/L — SIGNIFICANT CHANGE UP (ref 135–145)
SP GR SPEC: 1.02 — SIGNIFICANT CHANGE UP (ref 1–1.03)
SP GR SPEC: 1.02 — SIGNIFICANT CHANGE UP (ref 1–1.03)
SQUAMOUS # UR AUTO: 2 /HPF — SIGNIFICANT CHANGE UP (ref 0–5)
SQUAMOUS # UR AUTO: 2 /HPF — SIGNIFICANT CHANGE UP (ref 0–5)
UROBILINOGEN FLD QL: 1 MG/DL — SIGNIFICANT CHANGE UP (ref 0.2–1)
UROBILINOGEN FLD QL: 1 MG/DL — SIGNIFICANT CHANGE UP (ref 0.2–1)
WBC # BLD: 10.76 K/UL — HIGH (ref 3.8–10.5)
WBC # BLD: 10.76 K/UL — HIGH (ref 3.8–10.5)
WBC # FLD AUTO: 10.76 K/UL — HIGH (ref 3.8–10.5)
WBC # FLD AUTO: 10.76 K/UL — HIGH (ref 3.8–10.5)
WBC UR QL: 1 /HPF — SIGNIFICANT CHANGE UP (ref 0–5)
WBC UR QL: 1 /HPF — SIGNIFICANT CHANGE UP (ref 0–5)

## 2023-12-12 PROCEDURE — 99285 EMERGENCY DEPT VISIT HI MDM: CPT

## 2023-12-12 PROCEDURE — 99223 1ST HOSP IP/OBS HIGH 75: CPT

## 2023-12-12 PROCEDURE — 74177 CT ABD & PELVIS W/CONTRAST: CPT | Mod: 26,MA

## 2023-12-12 PROCEDURE — 71260 CT THORAX DX C+: CPT | Mod: 26,MA

## 2023-12-12 RX ORDER — PREGABALIN 225 MG/1
1000 CAPSULE ORAL DAILY
Refills: 0 | Status: DISCONTINUED | OUTPATIENT
Start: 2023-12-12 | End: 2023-12-16

## 2023-12-12 RX ORDER — AMIODARONE HYDROCHLORIDE 400 MG/1
100 TABLET ORAL DAILY
Refills: 0 | Status: DISCONTINUED | OUTPATIENT
Start: 2023-12-12 | End: 2023-12-16

## 2023-12-12 RX ORDER — METOPROLOL TARTRATE 50 MG
50 TABLET ORAL DAILY
Refills: 0 | Status: DISCONTINUED | OUTPATIENT
Start: 2023-12-12 | End: 2023-12-16

## 2023-12-12 RX ORDER — SODIUM CHLORIDE 9 MG/ML
1000 INJECTION, SOLUTION INTRAVENOUS ONCE
Refills: 0 | Status: COMPLETED | OUTPATIENT
Start: 2023-12-12 | End: 2023-12-12

## 2023-12-12 RX ORDER — CHOLECALCIFEROL (VITAMIN D3) 125 MCG
1000 CAPSULE ORAL DAILY
Refills: 0 | Status: DISCONTINUED | OUTPATIENT
Start: 2023-12-12 | End: 2023-12-16

## 2023-12-12 RX ORDER — LANOLIN ALCOHOL/MO/W.PET/CERES
3 CREAM (GRAM) TOPICAL AT BEDTIME
Refills: 0 | Status: DISCONTINUED | OUTPATIENT
Start: 2023-12-12 | End: 2023-12-16

## 2023-12-12 RX ORDER — LISINOPRIL 2.5 MG/1
40 TABLET ORAL DAILY
Refills: 0 | Status: DISCONTINUED | OUTPATIENT
Start: 2023-12-12 | End: 2023-12-16

## 2023-12-12 RX ORDER — INSULIN LISPRO 100/ML
VIAL (ML) SUBCUTANEOUS
Refills: 0 | Status: DISCONTINUED | OUTPATIENT
Start: 2023-12-12 | End: 2023-12-16

## 2023-12-12 RX ORDER — CHOLECALCIFEROL (VITAMIN D3) 125 MCG
1 CAPSULE ORAL
Qty: 0 | Refills: 0 | DISCHARGE

## 2023-12-12 RX ORDER — DEXTROSE 50 % IN WATER 50 %
15 SYRINGE (ML) INTRAVENOUS ONCE
Refills: 0 | Status: DISCONTINUED | OUTPATIENT
Start: 2023-12-12 | End: 2023-12-16

## 2023-12-12 RX ORDER — SODIUM CHLORIDE 9 MG/ML
1000 INJECTION, SOLUTION INTRAVENOUS
Refills: 0 | Status: DISCONTINUED | OUTPATIENT
Start: 2023-12-12 | End: 2023-12-16

## 2023-12-12 RX ORDER — ACETAMINOPHEN 500 MG
650 TABLET ORAL EVERY 6 HOURS
Refills: 0 | Status: DISCONTINUED | OUTPATIENT
Start: 2023-12-12 | End: 2023-12-16

## 2023-12-12 RX ORDER — AMIODARONE HYDROCHLORIDE 400 MG/1
1 TABLET ORAL
Qty: 0 | Refills: 0 | DISCHARGE

## 2023-12-12 RX ORDER — GLUCAGON INJECTION, SOLUTION 0.5 MG/.1ML
1 INJECTION, SOLUTION SUBCUTANEOUS ONCE
Refills: 0 | Status: DISCONTINUED | OUTPATIENT
Start: 2023-12-12 | End: 2023-12-16

## 2023-12-12 RX ORDER — INSULIN LISPRO 100/ML
VIAL (ML) SUBCUTANEOUS AT BEDTIME
Refills: 0 | Status: DISCONTINUED | OUTPATIENT
Start: 2023-12-12 | End: 2023-12-16

## 2023-12-12 RX ORDER — FAMOTIDINE 10 MG/ML
20 INJECTION INTRAVENOUS ONCE
Refills: 0 | Status: COMPLETED | OUTPATIENT
Start: 2023-12-12 | End: 2023-12-12

## 2023-12-12 RX ORDER — SODIUM CHLORIDE 9 MG/ML
500 INJECTION INTRAMUSCULAR; INTRAVENOUS; SUBCUTANEOUS
Refills: 0 | Status: COMPLETED | OUTPATIENT
Start: 2023-12-12 | End: 2023-12-13

## 2023-12-12 RX ORDER — PREGABALIN 225 MG/1
1 CAPSULE ORAL
Qty: 0 | Refills: 0 | DISCHARGE

## 2023-12-12 RX ORDER — AMLODIPINE BESYLATE 2.5 MG/1
10 TABLET ORAL DAILY
Refills: 0 | Status: DISCONTINUED | OUTPATIENT
Start: 2023-12-12 | End: 2023-12-16

## 2023-12-12 RX ORDER — DEXTROSE 50 % IN WATER 50 %
12.5 SYRINGE (ML) INTRAVENOUS ONCE
Refills: 0 | Status: DISCONTINUED | OUTPATIENT
Start: 2023-12-12 | End: 2023-12-16

## 2023-12-12 RX ORDER — APIXABAN 2.5 MG/1
5 TABLET, FILM COATED ORAL EVERY 12 HOURS
Refills: 0 | Status: DISCONTINUED | OUTPATIENT
Start: 2023-12-12 | End: 2023-12-16

## 2023-12-12 RX ORDER — ASPIRIN/CALCIUM CARB/MAGNESIUM 324 MG
81 TABLET ORAL DAILY
Refills: 0 | Status: DISCONTINUED | OUTPATIENT
Start: 2023-12-12 | End: 2023-12-16

## 2023-12-12 RX ORDER — DEXTROSE 50 % IN WATER 50 %
25 SYRINGE (ML) INTRAVENOUS ONCE
Refills: 0 | Status: DISCONTINUED | OUTPATIENT
Start: 2023-12-12 | End: 2023-12-16

## 2023-12-12 RX ADMIN — FAMOTIDINE 20 MILLIGRAM(S): 10 INJECTION INTRAVENOUS at 13:24

## 2023-12-12 RX ADMIN — SODIUM CHLORIDE 1000 MILLILITER(S): 9 INJECTION, SOLUTION INTRAVENOUS at 13:24

## 2023-12-12 RX ADMIN — APIXABAN 5 MILLIGRAM(S): 2.5 TABLET, FILM COATED ORAL at 23:28

## 2023-12-12 NOTE — ED PROVIDER NOTE - PROGRESS NOTE DETAILS
CT a/p with no acute findings. [facility] hospitalist paged for admission. - Charlene Madden PA-C CT a/p with no acute findings. [facility] hospitalist paged for admission. - Charlene Madden PA-C

## 2023-12-12 NOTE — PATIENT PROFILE ADULT - FALL HARM RISK - HARM RISK INTERVENTIONS
Assistance with ambulation/Assistance OOB with selected safe patient handling equipment/Communicate Risk of Fall with Harm to all staff/Discuss with provider need for PT consult/Monitor gait and stability/Provide patient with walking aids - walker, cane, crutches/Reinforce activity limits and safety measures with patient and family/Tailored Fall Risk Interventions/Visual Cue: Yellow wristband and red socks/Bed in lowest position, wheels locked, appropriate side rails in place/Call bell, personal items and telephone in reach/Instruct patient to call for assistance before getting out of bed or chair/Non-slip footwear when patient is out of bed/[place] to call system/Physically safe environment - no spills, clutter or unnecessary equipment/Purposeful Proactive Rounding/Room/bathroom lighting operational, light cord in reach Assistance with ambulation/Assistance OOB with selected safe patient handling equipment/Communicate Risk of Fall with Harm to all staff/Discuss with provider need for PT consult/Monitor gait and stability/Provide patient with walking aids - walker, cane, crutches/Reinforce activity limits and safety measures with patient and family/Tailored Fall Risk Interventions/Visual Cue: Yellow wristband and red socks/Bed in lowest position, wheels locked, appropriate side rails in place/Call bell, personal items and telephone in reach/Instruct patient to call for assistance before getting out of bed or chair/Non-slip footwear when patient is out of bed/[place] to call system/Physically safe environment - no spills, clutter or unnecessary equipment/Purposeful Proactive Rounding/Room/bathroom lighting operational, light cord in reach

## 2023-12-12 NOTE — ED PROVIDER NOTE - ATTENDING APP SHARED VISIT CONTRIBUTION OF CARE
Attending MD Carmona:   I personally have seen and examined this patient.  Physician assistant note reviewed and agree on plan of care and except where noted.  See below for details.     Seen in [place] [facility], accompanied by wife Haley    Meds: Amlodipine 10mg, Metoprolol 50mg, Lisinopril 40mg, HCTZ 25mg, Amiodarone 100mg, Eliquis 5mg BID, Janumet 50/1000 BID, ASA, Furosemide 20mg, IVIG Ganumex C (last infusion cancelled for elevated ESR)    73M with PMH/PSH including HTN, CAD, NSTEMI s/p PCI, DM2, HLD, statin induced toxic myositis, prostate ca s/p prostatectomy, AS s/p TAVR, AFib on Eliquis, s/p PPM, colon ca s/p resection, chemo, renal ca (monitoring, no tx) presents to the ED with abdominal pain and bloating, nausea, vomiting for ~4 days.  Reports has had decreased appetite and minimal po intake.  Reports whenever he attempts po, has nausea, vomiting. Reports last BM two days ago, denies bloody or black stools. Denies dysuria, hematuria, change in urinary habits including frequency, urgency. Denies fevers, chills, URI symptoms.  Denies chest pain, shortness of breath.      Exam:   General: NAD  HENT: head NCAT, airway patent  Eyes: anicteric, no conjunctival injection   Lungs: lungs CTAB with good inspiratory effort, no wheezing, no rhonchi, no rales  Cardiac: +S1S2, no obvious r/g, irregular  GI: abdomen soft with +BS, NT, ND  : no CVAT  MSK: ranging neck and extremities freely, bilateral LE edema with erythema, thickened skin, some weeping areas  Neuro: moving all extremities spontaneously, nonfocal  Psych: normal mood and affect     A/P: 73M with abdominal pain, distention, nausea, vomiting, last BM two days ago, will evaluate for but not limited to SBO,     TO BE COMPLETED Attending MD Carmona:   I personally have seen and examined this patient.  Physician assistant note reviewed and agree on plan of care and except where noted.  See below for details.     Seen in [place] [facility], accompanied by wife Haley    Meds: Amlodipine 10mg, Metoprolol 50mg, Lisinopril 40mg, HCTZ 25mg, Amiodarone 100mg, Eliquis 5mg BID, Janumet 50/1000 BID, ASA, Furosemide 20mg, IVIG Ganumex C (last infusion cancelled for elevated ESR)    73M with PMH/PSH including HTN, CAD, NSTEMI s/p PCI, DM2, HLD, statin induced toxic myositis, prostate ca s/p prostatectomy, AS s/p TAVR, AFib on Eliquis, s/p PPM, colon ca s/p resection, chemo, renal ca (monitoring, no tx) presents to the ED with abdominal pain and bloating, nausea, vomiting for ~4 days.  Reports has had decreased appetite and minimal po intake.  Reports whenever he attempts po, has nausea, vomiting. Reports last BM two days ago, denies bloody or black stools. Denies dysuria, hematuria, change in urinary habits including frequency, urgency. Denies fevers, chills, URI symptoms.  Denies chest pain, shortness of breath.      Exam:   General: NAD  HENT: head NCAT, airway patent  Eyes: anicteric, no conjunctival injection   Lungs: lungs CTAB with good inspiratory effort, no wheezing, no rhonchi, no rales  Cardiac: +S1S2, no obvious r/g, irregular  GI: abdomen soft with +BS, NT, ND  : no CVAT  MSK: ranging neck and extremities freely, bilateral LE edema with erythema, thickened skin, some weeping areas  Neuro: moving all extremities spontaneously, nonfocal  Psych: normal mood and affect     A/P: 73M with abdominal pain, distention, nausea, vomiting, last BM two days ago, will evaluate for but not limited to SBO, colitis, gastritis, will obtain labs, CTAP, will give meds, analgesia, ivfs, will obtain screening EKG

## 2023-12-12 NOTE — H&P ADULT - ASSESSMENT
73M w/PMH of HTN, CAD, NSTEMI s/p PCI (on baby ASA), T2DM, HLD, statin induced myositis on Gamunex-c infusions , prostate CA s/p prostatectomy, AS s/p TAVR, A.fib on Eliquis s/p PPM, colon CA s/p surgery and chemo, kidney CA (being monitored, no current treatment), p/w abdominal pain , N/V x few days

## 2023-12-12 NOTE — H&P ADULT - PROBLEM SELECTOR PLAN 5
on ? IVIG infusions , last session November , seems unlikely contributing since several weeks since last dose   - outpatient follow up as scheduled   - check CPK in am

## 2023-12-12 NOTE — ED ADULT NURSE REASSESSMENT NOTE - NS ED NURSE REASSESS COMMENT FT1
Received report from JUJU Purvis RN. Patient presents to the ED with complaints of nausea, vomiting, abdominal pain. Notes pain 4/10 at present. AOx4 and speaking coherently. Pt placed in position of comfort. Bed in lowest position, wheels locked, appropriate side rails raised. Pt denies needs at this time.

## 2023-12-12 NOTE — PATIENT PROFILE ADULT - FUNCTIONAL ASSESSMENT - BASIC MOBILITY 6.
2-calculated by average/Not able to assess (calculate score using [facility] averaging method)  2-calculated by average/Not able to assess (calculate score using [facility] averaging method)

## 2023-12-12 NOTE — PATIENT PROFILE ADULT - FUNCTIONAL ASSESSMENT - DAILY ACTIVITY 5.
I discussed this case with the resident. I agree with the Resident's plan.       3 = A little assistance

## 2023-12-12 NOTE — H&P ADULT - HISTORY OF PRESENT ILLNESS
Patient is a 73 year old male w/PMH of HTN, CAD, NSTEMI s/p PCI (on baby ASA), T2DM, HLD, statin induced myositis, prostate CA s/p prostatectomy, AS s/p TAVR, A.fib on Eliquis s/p PPM, colon CA s/p surgery and chemo, kidney CA (being monitored, no current treatment), presenting with abdominal pain and bloating as well as nausea and vomiting over the past three days.  Patient is a 73 year old male w/PMH of HTN, CAD, NSTEMI s/p PCI (on baby ASA), T2DM, HLD, statin induced myositis on Gamunex-c infusions , prostate CA s/p prostatectomy, AS s/p TAVR, A.fib on Eliquis s/p PPM, colon CA s/p surgery and chemo, kidney CA (being monitored, no current treatment), presenting with abdominal pain and bloating as well as nausea and vomiting over the past three days.   Patient reports intermittent GI symptoms over the past several weeks which he associates with his treated for Myositis ,   over the past few days , he reports left lower quadrant pain, described as dull ,abdominal bloating , and intermittent nausea and NB/NB  vomiting. He reports decreased appetite and poor po intake during this time. He also reports intermittent cough with white phlegm  occurring at the same time. He reports no fever or chills , no chest pain or shortness of breath.

## 2023-12-12 NOTE — H&P ADULT - TIME BILLING
Chart review , obtain history,  examination of patient , answering questions and concerns , ordering labs and medications , and documentation

## 2023-12-12 NOTE — H&P ADULT - PROBLEM SELECTOR PLAN 4
uncontrolled , due to missed medications , since < 180 and asymptomatic , can resume home meds as scheduled   - continue amlodipine   - continue HCTZ   - continue lisinopril   - continue metoprolol

## 2023-12-12 NOTE — ED ADULT NURSE NOTE - OBJECTIVE STATEMENT
73Y M AXO3 PMH of kidney, prostate, and colon cancer, HTN, AFIB on Eliquis and HLD and no pertinent PSH presented to the ED c/o L and R lower abdominal pain x 4 days. Pt endorses n/v and "bloating." Pt reports decreased PO intake being unable to "hold down food or liquids." Upon arrival to the ED, the pt is well appearing, has bilateral even and unlabored chest rise, and ambulatory with wheelchair assistance. Upon assessment, pt has even and bilateral peripheral pulses, ROM, PERRLA, gross neuro intact, and soft, non-tender, and distended abdomen. Bilateral lower extremity swelling and redness noted with minimal weeping edema of top L lower extremity. Pt denies fevers, chills, chest pain, SOB, headaches, dizziness, lightheadedness, urinary symptoms, and black or bloody stools. IV access obtained, bed in lowest position, and side rails up. Comfort and safety provided, pt's wife at bedside.

## 2023-12-12 NOTE — ED PROVIDER NOTE - NS ED ATTENDING STATEMENT MOD
This was a shared visit with the PERLA. I reviewed and verified the documentation and independently performed the documented:

## 2023-12-12 NOTE — H&P ADULT - PROBLEM SELECTOR PLAN 1
no acute pathology noted on CT AP ,  will advance diet as tolerated , Qtc prolonged on ekg patient is V -paced and likely inaccurate, however would avoid antiemetics unless absolutely necessary   -Advance diet as tolerated   - IF symptoms persist , GI consultation can be arranged by day team   - continue gentle IV hydration   - hold lasix for now , can resume once tolerating PO   - repeat ekg in am  ,

## 2023-12-12 NOTE — H&P ADULT - NSHPREVIEWOFSYSTEMS_GEN_ALL_CORE
CONSTITUTIONAL: + weakness, no  fevers or chills  EYES/ENT: No visual changes;  No dysphagia  NECK: No pain or stiffness  RESPIRATORY: +  cough, no wheezing, hemoptysis; No shortness of breath  CARDIOVASCULAR: No chest pain or palpitations; +  lower extremity edema  EXTREMITIES: +  le edema, no cyanosis, clubbing  GASTROINTESTINAL: + abdominal  pain. +  nausea,  + vomiting,  no  hematemesis; No diarrhea or constipation. No melena or hematochezia.  BACK: No back pain  GENITOURINARY: No dysuria, frequency or hematuria  NEUROLOGICAL: No numbness or weakness  MSK: no joint swelling or pain  SKIN:  + redness b/l LE , No itching, burning,  no open lesions   PSYCH: no agitation  All other review of systems is negative unless indicated above.

## 2023-12-12 NOTE — ED ADULT NURSE NOTE - NSFALLRISKINTERV_ED_ALL_ED
Assistance OOB with selected safe patient handling equipment if applicable/Assistance with ambulation/Communicate fall risk and risk factors to all staff, patient, and family/Monitor gait and stability/Provide patient with walking aids/Provide visual cue: yellow wristband, yellow gown, etc/Reinforce activity limits and safety measures with patient and family/Call bell, personal items and telephone in reach/Instruct patient to call for assistance before getting out of bed/chair/stretcher/Non-slip footwear applied when patient is off stretcher/[place] to call system/Physically safe environment - no spills, clutter or unnecessary equipment/Purposeful Proactive Rounding/Room/bathroom lighting operational, light cord in reach Assistance OOB with selected safe patient handling equipment if applicable/Assistance with ambulation/Communicate fall risk and risk factors to all staff, patient, and family/Monitor gait and stability/Provide patient with walking aids/Provide visual cue: yellow wristband, yellow gown, etc/Reinforce activity limits and safety measures with patient and family/Call bell, personal items and telephone in reach/Instruct patient to call for assistance before getting out of bed/chair/stretcher/Non-slip footwear applied when patient is off stretcher/[place] to call system/Physically safe environment - no spills, clutter or unnecessary equipment/Purposeful Proactive Rounding/Room/bathroom lighting operational, light cord in reach

## 2023-12-12 NOTE — ED PROVIDER NOTE - OBJECTIVE STATEMENT
74yo M with PMH of HTN, CAD, NSTEMI s/p PCI (on baby ASA), T2DM, HLD, statin induced myositis, prostate CA s/p prostatectomy, AS S/P TAVR, A.fib on Eliquis s/p PPM, colon CA s/p surgery and chemo, kidney CA (being monitored, no current treatment), presenting with abdominal pain and bloating, and n/v x 3 days. Patient reports abdominal pain is dull and diffuse, and has n/v whenever he tried to eat or drink. Reports his last BM was 2 days ago. Has been passing gas. Of note, also with b/l LE edema and redness, is s/p 2 courses of abx, and was told likely due to treatment for myositis. Denies fever/chills, CP, SOB, diarrhea, melena, BRBPR, urinary symptoms. 72yo M with PMH of HTN, CAD, NSTEMI s/p PCI (on baby ASA), T2DM, HLD, statin induced myositis, prostate CA s/p prostatectomy, AS S/P TAVR, A.fib on Eliquis s/p PPM, colon CA s/p surgery and chemo, kidney CA (being monitored, no current treatment), presenting with abdominal pain and bloating, and n/v x 3 days. Patient reports abdominal pain is dull and diffuse, and has n/v whenever he tried to eat or drink. Reports his last BM was 2 days ago. Has been passing gas. Of note, also with b/l LE edema and redness, is s/p 2 courses of abx, and was told likely due to treatment for myositis. Denies fever/chills, CP, SOB, diarrhea, melena, BRBPR, urinary symptoms. 72yo M with PMH of HTN, CAD, NSTEMI s/p PCI (on baby ASA), T2DM, HLD, statin induced myositis, prostate CA s/p prostatectomy, AS s/p TAVR, A.fib on Eliquis s/p PPM, colon CA s/p surgery and chemo, kidney CA (being monitored, no current treatment), presenting with abdominal pain and bloating, and n/v x 3 days. Patient reports abdominal pain is dull and diffuse, and has n/v whenever he tries to eat or drink. Reports his last BM was 2 days ago. Has been passing gas. Of note, also with b/l LE edema and redness, is s/p 2 courses of abx, and was told likely due to treatment for myositis. Denies fever/chills, CP, SOB, diarrhea, melena, BRBPR, urinary symptoms.

## 2023-12-12 NOTE — H&P ADULT - NSHPPHYSICALEXAM_GEN_ALL_CORE
Vital Signs Last 24 Hrs  T(C): 36.6 (12 Dec 2023 20:10), Max: 36.6 (12 Dec 2023 20:10)  T(F): 97.9 (12 Dec 2023 20:10), Max: 97.9 (12 Dec 2023 20:10)  HR: 58 (12 Dec 2023 20:10) (54 - 60)  BP: 169/64 (12 Dec 2023 20:10) (163/62 - 169/64)  BP(mean): 96 (12 Dec 2023 13:30) (96 - 96)  RR: 18 (12 Dec 2023 20:10) (18 - 19)  SpO2: 97% (12 Dec 2023 20:10) (97% - 99%)    Parameters below as of 12 Dec 2023 20:10  Patient On (Oxygen Delivery Method): room air Vital Signs Last 24 Hrs  T(C): 36.6 (12 Dec 2023 20:10), Max: 36.6 (12 Dec 2023 20:10)  T(F): 97.9 (12 Dec 2023 20:10), Max: 97.9 (12 Dec 2023 20:10)  HR: 58 (12 Dec 2023 20:10) (54 - 60)  BP: 169/64 (12 Dec 2023 20:10) (163/62 - 169/64)  BP(mean): 96 (12 Dec 2023 13:30) (96 - 96)  RR: 18 (12 Dec 2023 20:10) (18 - 19)  SpO2: 97% (12 Dec 2023 20:10) (97% - 99%)    Parameters below as of 12 Dec 2023 20:10  Patient On (Oxygen Delivery Method): room air    GENERAL:  mild  distress,  breathing regular non labored   HEAD:  Atraumatic, Normocephalic  ENT: EOMI, PERRLA, conjunctiva and sclera clear,  moist mucosa no pharyngeal erythema or exudates   NECK: supple , no JVD   CHEST/LUNG: Clear to auscultation bilaterally; No wheeze, equal breath sounds bilaterally   BACK: No spinal tenderness,  No CVA tenderness   HEART: Regular rate and rhythm; No murmurs, rubs, or gallops  ABDOMEN: Soft, minimal tenderness in LLQ , no rebound or guarding , mildly distended; Bowel sounds present  EXTREMITIES:  No clubbing, cyanosis, + b/l  edema   MSK: No joint swelling or effusions, ROM intact   PSYCH: Normal behavior/affect  NEUROLOGY: AAOx3, non-focal, cranial nerves intact  SKIN: stasis dermatitis on b/l  LE , skin otherwise normal color, No rashes or lesions

## 2023-12-12 NOTE — H&P ADULT - NSHPLABSRESULTS_GEN_ALL_CORE
Labs personally reviewed:                          12.1   10. )-----------( 257      ( 12 Dec 2023 12:34 )             38.5     12-    142  |  96  |  39<H>  ----------------------------<  137<H>  4.0   |  16<L>  |  0.82    Ca    10.3      12 Dec 2023 12:34    TPro  8.3  /  Alb  4.2  /  TBili  1.1  /  DBili  x   /  AST  27  /  ALT  59<H>  /  AlkPhos  52  12    CARDIAC MARKERS ( 12 Dec 2023 12:34 )  x     / x     / 143 U/L / x     / x          LIVER FUNCTIONS - ( 12 Dec 2023 12:34 )  Alb: 4.2 g/dL / Pro: 8.3 g/dL / ALK PHOS: 52 U/L / ALT: 59 U/L / AST: 27 U/L / GGT: x             Urinalysis Basic - ( 12 Dec 2023 17:21 )    Color: Dark Yellow / Appearance: Clear / S.025 / pH: x  Gluc: x / Ketone: 40 mg/dL  / Bili: Large / Urobili: 1.0 mg/dL   Blood: x / Protein: 30 mg/dL / Nitrite: Negative   Leuk Esterase: Negative / RBC: 2 /HPF / WBC 1 /HPF   Sq Epi: x / Non Sq Epi: 2 /HPF / Bacteria: Negative /HPF      CAPILLARY BLOOD GLUCOSE          Imaging:    CT chest  Abdomen pelvis: personally reviewed: No acute findings in the chest abdomen or pelvis.  EKG personally reviewed: Bradycardia at 57 bpm QTC , 577 Labs personally reviewed:                          12.1   10. )-----------( 257      ( 12 Dec 2023 12:34 )             38.5     12-    142  |  96  |  39<H>  ----------------------------<  137<H>  4.0   |  16<L>  |  0.82    Ca    10.3      12 Dec 2023 12:34    TPro  8.3  /  Alb  4.2  /  TBili  1.1  /  DBili  x   /  AST  27  /  ALT  59<H>  /  AlkPhos  52  12    CARDIAC MARKERS ( 12 Dec 2023 12:34 )  x     / x     / 143 U/L / x     / x          LIVER FUNCTIONS - ( 12 Dec 2023 12:34 )  Alb: 4.2 g/dL / Pro: 8.3 g/dL / ALK PHOS: 52 U/L / ALT: 59 U/L / AST: 27 U/L / GGT: x             Urinalysis Basic - ( 12 Dec 2023 17:21 )    Color: Dark Yellow / Appearance: Clear / S.025 / pH: x  Gluc: x / Ketone: 40 mg/dL  / Bili: Large / Urobili: 1.0 mg/dL   Blood: x / Protein: 30 mg/dL / Nitrite: Negative   Leuk Esterase: Negative / RBC: 2 /HPF / WBC 1 /HPF   Sq Epi: x / Non Sq Epi: 2 /HPF / Bacteria: Negative /HPF      CAPILLARY BLOOD GLUCOSE          Imaging:    CT chest  Abdomen pelvis: personally reviewed: No acute findings in the chest abdomen or pelvis.  EKG personally reviewed:  v- paced  57 bpm , qtc 577

## 2023-12-13 LAB
A1C WITH ESTIMATED AVERAGE GLUCOSE RESULT: 6 % — HIGH (ref 4–5.6)
A1C WITH ESTIMATED AVERAGE GLUCOSE RESULT: 6 % — HIGH (ref 4–5.6)
ALBUMIN SERPL ELPH-MCNC: 3.5 G/DL — SIGNIFICANT CHANGE UP (ref 3.3–5)
ALBUMIN SERPL ELPH-MCNC: 3.5 G/DL — SIGNIFICANT CHANGE UP (ref 3.3–5)
ALP SERPL-CCNC: 47 U/L — SIGNIFICANT CHANGE UP (ref 40–120)
ALP SERPL-CCNC: 47 U/L — SIGNIFICANT CHANGE UP (ref 40–120)
ALT FLD-CCNC: 43 U/L — SIGNIFICANT CHANGE UP (ref 10–45)
ALT FLD-CCNC: 43 U/L — SIGNIFICANT CHANGE UP (ref 10–45)
ANION GAP SERPL CALC-SCNC: 27 MMOL/L — HIGH (ref 5–17)
ANION GAP SERPL CALC-SCNC: 27 MMOL/L — HIGH (ref 5–17)
APTT BLD: 35.8 SEC — HIGH (ref 24.5–35.6)
APTT BLD: 35.8 SEC — HIGH (ref 24.5–35.6)
AST SERPL-CCNC: 15 U/L — SIGNIFICANT CHANGE UP (ref 10–40)
AST SERPL-CCNC: 15 U/L — SIGNIFICANT CHANGE UP (ref 10–40)
BILIRUB SERPL-MCNC: 0.9 MG/DL — SIGNIFICANT CHANGE UP (ref 0.2–1.2)
BILIRUB SERPL-MCNC: 0.9 MG/DL — SIGNIFICANT CHANGE UP (ref 0.2–1.2)
BUN SERPL-MCNC: 40 MG/DL — HIGH (ref 7–23)
BUN SERPL-MCNC: 40 MG/DL — HIGH (ref 7–23)
CALCIUM SERPL-MCNC: 9.7 MG/DL — SIGNIFICANT CHANGE UP (ref 8.4–10.5)
CALCIUM SERPL-MCNC: 9.7 MG/DL — SIGNIFICANT CHANGE UP (ref 8.4–10.5)
CHLORIDE SERPL-SCNC: 102 MMOL/L — SIGNIFICANT CHANGE UP (ref 96–108)
CHLORIDE SERPL-SCNC: 102 MMOL/L — SIGNIFICANT CHANGE UP (ref 96–108)
CK SERPL-CCNC: 77 U/L — SIGNIFICANT CHANGE UP (ref 30–200)
CK SERPL-CCNC: 77 U/L — SIGNIFICANT CHANGE UP (ref 30–200)
CO2 SERPL-SCNC: 15 MMOL/L — LOW (ref 22–31)
CO2 SERPL-SCNC: 15 MMOL/L — LOW (ref 22–31)
CREAT SERPL-MCNC: 0.86 MG/DL — SIGNIFICANT CHANGE UP (ref 0.5–1.3)
CREAT SERPL-MCNC: 0.86 MG/DL — SIGNIFICANT CHANGE UP (ref 0.5–1.3)
EGFR: 91 ML/MIN/1.73M2 — SIGNIFICANT CHANGE UP
EGFR: 91 ML/MIN/1.73M2 — SIGNIFICANT CHANGE UP
ESTIMATED AVERAGE GLUCOSE: 126 MG/DL — HIGH (ref 68–114)
ESTIMATED AVERAGE GLUCOSE: 126 MG/DL — HIGH (ref 68–114)
GLUCOSE BLDC GLUCOMTR-MCNC: 121 MG/DL — HIGH (ref 70–99)
GLUCOSE BLDC GLUCOMTR-MCNC: 121 MG/DL — HIGH (ref 70–99)
GLUCOSE BLDC GLUCOMTR-MCNC: 138 MG/DL — HIGH (ref 70–99)
GLUCOSE BLDC GLUCOMTR-MCNC: 177 MG/DL — HIGH (ref 70–99)
GLUCOSE BLDC GLUCOMTR-MCNC: 177 MG/DL — HIGH (ref 70–99)
GLUCOSE BLDC GLUCOMTR-MCNC: 178 MG/DL — HIGH (ref 70–99)
GLUCOSE BLDC GLUCOMTR-MCNC: 178 MG/DL — HIGH (ref 70–99)
GLUCOSE SERPL-MCNC: 141 MG/DL — HIGH (ref 70–99)
GLUCOSE SERPL-MCNC: 141 MG/DL — HIGH (ref 70–99)
HCT VFR BLD CALC: 34.8 % — LOW (ref 39–50)
HCT VFR BLD CALC: 34.8 % — LOW (ref 39–50)
HGB BLD-MCNC: 11.2 G/DL — LOW (ref 13–17)
HGB BLD-MCNC: 11.2 G/DL — LOW (ref 13–17)
INR BLD: 1.9 RATIO — HIGH (ref 0.85–1.18)
INR BLD: 1.9 RATIO — HIGH (ref 0.85–1.18)
MCHC RBC-ENTMCNC: 29.5 PG — SIGNIFICANT CHANGE UP (ref 27–34)
MCHC RBC-ENTMCNC: 29.5 PG — SIGNIFICANT CHANGE UP (ref 27–34)
MCHC RBC-ENTMCNC: 32.2 GM/DL — SIGNIFICANT CHANGE UP (ref 32–36)
MCHC RBC-ENTMCNC: 32.2 GM/DL — SIGNIFICANT CHANGE UP (ref 32–36)
MCV RBC AUTO: 91.6 FL — SIGNIFICANT CHANGE UP (ref 80–100)
MCV RBC AUTO: 91.6 FL — SIGNIFICANT CHANGE UP (ref 80–100)
NRBC # BLD: 0 /100 WBCS — SIGNIFICANT CHANGE UP (ref 0–0)
NRBC # BLD: 0 /100 WBCS — SIGNIFICANT CHANGE UP (ref 0–0)
PLATELET # BLD AUTO: 222 K/UL — SIGNIFICANT CHANGE UP (ref 150–400)
PLATELET # BLD AUTO: 222 K/UL — SIGNIFICANT CHANGE UP (ref 150–400)
POTASSIUM SERPL-MCNC: 3.8 MMOL/L — SIGNIFICANT CHANGE UP (ref 3.5–5.3)
POTASSIUM SERPL-MCNC: 3.8 MMOL/L — SIGNIFICANT CHANGE UP (ref 3.5–5.3)
POTASSIUM SERPL-SCNC: 3.8 MMOL/L — SIGNIFICANT CHANGE UP (ref 3.5–5.3)
POTASSIUM SERPL-SCNC: 3.8 MMOL/L — SIGNIFICANT CHANGE UP (ref 3.5–5.3)
PROT SERPL-MCNC: 7.1 G/DL — SIGNIFICANT CHANGE UP (ref 6–8.3)
PROT SERPL-MCNC: 7.1 G/DL — SIGNIFICANT CHANGE UP (ref 6–8.3)
PROTHROM AB SERPL-ACNC: 20.5 SEC — HIGH (ref 9.5–13)
PROTHROM AB SERPL-ACNC: 20.5 SEC — HIGH (ref 9.5–13)
RBC # BLD: 3.8 M/UL — LOW (ref 4.2–5.8)
RBC # BLD: 3.8 M/UL — LOW (ref 4.2–5.8)
RBC # FLD: 13.9 % — SIGNIFICANT CHANGE UP (ref 10.3–14.5)
RBC # FLD: 13.9 % — SIGNIFICANT CHANGE UP (ref 10.3–14.5)
SODIUM SERPL-SCNC: 144 MMOL/L — SIGNIFICANT CHANGE UP (ref 135–145)
SODIUM SERPL-SCNC: 144 MMOL/L — SIGNIFICANT CHANGE UP (ref 135–145)
WBC # BLD: 8.33 K/UL — SIGNIFICANT CHANGE UP (ref 3.8–10.5)
WBC # BLD: 8.33 K/UL — SIGNIFICANT CHANGE UP (ref 3.8–10.5)
WBC # FLD AUTO: 8.33 K/UL — SIGNIFICANT CHANGE UP (ref 3.8–10.5)
WBC # FLD AUTO: 8.33 K/UL — SIGNIFICANT CHANGE UP (ref 3.8–10.5)

## 2023-12-13 PROCEDURE — 93010 ELECTROCARDIOGRAM REPORT: CPT

## 2023-12-13 RX ORDER — CHLORHEXIDINE GLUCONATE 213 G/1000ML
1 SOLUTION TOPICAL DAILY
Refills: 0 | Status: DISCONTINUED | OUTPATIENT
Start: 2023-12-13 | End: 2023-12-16

## 2023-12-13 RX ORDER — METOCLOPRAMIDE HCL 10 MG
10 TABLET ORAL EVERY 6 HOURS
Refills: 0 | Status: COMPLETED | OUTPATIENT
Start: 2023-12-13 | End: 2023-12-14

## 2023-12-13 RX ADMIN — Medication 10 MILLIGRAM(S): at 20:25

## 2023-12-13 RX ADMIN — LISINOPRIL 40 MILLIGRAM(S): 2.5 TABLET ORAL at 05:45

## 2023-12-13 RX ADMIN — APIXABAN 5 MILLIGRAM(S): 2.5 TABLET, FILM COATED ORAL at 17:25

## 2023-12-13 RX ADMIN — PREGABALIN 1000 MICROGRAM(S): 225 CAPSULE ORAL at 11:26

## 2023-12-13 RX ADMIN — Medication 10 MILLIGRAM(S): at 13:52

## 2023-12-13 RX ADMIN — Medication 50 MILLIGRAM(S): at 11:27

## 2023-12-13 RX ADMIN — AMLODIPINE BESYLATE 10 MILLIGRAM(S): 2.5 TABLET ORAL at 05:45

## 2023-12-13 RX ADMIN — AMIODARONE HYDROCHLORIDE 100 MILLIGRAM(S): 400 TABLET ORAL at 05:45

## 2023-12-13 RX ADMIN — CHLORHEXIDINE GLUCONATE 1 APPLICATION(S): 213 SOLUTION TOPICAL at 11:07

## 2023-12-13 RX ADMIN — SODIUM CHLORIDE 75 MILLILITER(S): 9 INJECTION INTRAMUSCULAR; INTRAVENOUS; SUBCUTANEOUS at 00:40

## 2023-12-13 RX ADMIN — APIXABAN 5 MILLIGRAM(S): 2.5 TABLET, FILM COATED ORAL at 05:46

## 2023-12-13 RX ADMIN — Medication 1: at 12:25

## 2023-12-13 RX ADMIN — Medication 81 MILLIGRAM(S): at 11:26

## 2023-12-13 RX ADMIN — Medication 1000 UNIT(S): at 11:26

## 2023-12-13 RX ADMIN — Medication 1: at 08:50

## 2023-12-13 NOTE — PROGRESS NOTE ADULT - SUBJECTIVE AND OBJECTIVE BOX
Patient is a 73y old  Male who presents with a chief complaint of abdominal pain x few days (12 Dec 2023 21:53)      SUBJECTIVE / OVERNIGHT EVENTS:  Patient seen and examined.   Still with nausea and bloating.   Passing flatus       Vital Signs Last 24 Hrs  T(C): 36.4 (13 Dec 2023 07:20), Max: 36.7 (12 Dec 2023 22:00)  T(F): 97.6 (13 Dec 2023 07:20), Max: 98 (12 Dec 2023 22:00)  HR: 113 (13 Dec 2023 07:20) (58 - 113)  BP: 149/79 (13 Dec 2023 07:20) (149/79 - 172/55)  BP(mean): --  RR: 18 (13 Dec 2023 07:20) (18 - 18)  SpO2: 95% (12 Dec 2023 23:52) (95% - 97%)    Parameters below as of 12 Dec 2023 23:52  Patient On (Oxygen Delivery Method): room air      I&O's Summary    12 Dec 2023 07:01  -  13 Dec 2023 07:00  --------------------------------------------------------  IN: 0 mL / OUT: 500 mL / NET: -500 mL        PHYSICAL EXAM:  GENERAL: NAD, AAOx3  HEAD:  Atraumatic, Normocephalic  EYES: EOMI; conjunctiva and sclera clear  NECK: Supple, No JVD, No LAD  CHEST/LUNG: B/L air entry; No wheezes, rales or rhonci   HEART: Regular rate and rhythm; No murmurs, rubs, or gallops  ABDOMEN: Soft, Nontender, Nondistended; Bowel sounds present  EXTREMITIES:  2+ Peripheral Pulses, No clubbing, cyanosis, or edema  SKIN: No rashes or lesions    LABS:                        11.2   8.33  )-----------( 222      ( 13 Dec 2023 07:15 )             34.8     12-13    144  |  102  |  40<H>  ----------------------------<  141<H>  3.8   |  15<L>  |  0.86    Ca    9.7      13 Dec 2023 07:09    TPro  7.1  /  Alb  3.5  /  TBili  0.9  /  DBili  x   /  AST  15  /  ALT  43  /  AlkPhos  47  12-13    PT/INR - ( 13 Dec 2023 07:15 )   PT: 20.5 sec;   INR: 1.90 ratio         PTT - ( 13 Dec 2023 07:15 )  PTT:35.8 sec  CAPILLARY BLOOD GLUCOSE      POCT Blood Glucose.: 177 mg/dL (13 Dec 2023 12:18)  POCT Blood Glucose.: 178 mg/dL (13 Dec 2023 08:46)  POCT Blood Glucose.: 143 mg/dL (12 Dec 2023 23:45)    CARDIAC MARKERS ( 13 Dec 2023 07:09 )  x     / x     / 77 U/L / x     / x      CARDIAC MARKERS ( 12 Dec 2023 12:34 )  x     / x     / 143 U/L / x     / x          Urinalysis Basic - ( 13 Dec 2023 07:09 )    Color: x / Appearance: x / SG: x / pH: x  Gluc: 141 mg/dL / Ketone: x  / Bili: x / Urobili: x   Blood: x / Protein: x / Nitrite: x   Leuk Esterase: x / RBC: x / WBC x   Sq Epi: x / Non Sq Epi: x / Bacteria: x        RADIOLOGY & ADDITIONAL TESTS:    Imaging Personally Reviewed:  [x] YES  [ ] NO    Consultant(s) Notes Reviewed:  [x] YES  [ ] NO      MEDICATIONS  (STANDING):  aMIOdarone    Tablet 100 milliGRAM(s) Oral daily  amLODIPine   Tablet 10 milliGRAM(s) Oral daily  apixaban 5 milliGRAM(s) Oral every 12 hours  aspirin enteric coated 81 milliGRAM(s) Oral daily  chlorhexidine 2% Cloths 1 Application(s) Topical daily  cholecalciferol 1000 Unit(s) Oral daily  cyanocobalamin 1000 MICROGram(s) Oral daily  dextrose 5%. 1000 milliLiter(s) (100 mL/Hr) IV Continuous <Continuous>  dextrose 5%. 1000 milliLiter(s) (50 mL/Hr) IV Continuous <Continuous>  dextrose 50% Injectable 12.5 Gram(s) IV Push once  dextrose 50% Injectable 25 Gram(s) IV Push once  glucagon  Injectable 1 milliGRAM(s) IntraMuscular once  hydrochlorothiazide 25 milliGRAM(s) Oral daily  insulin lispro (ADMELOG) corrective regimen sliding scale   SubCutaneous three times a day before meals  insulin lispro (ADMELOG) corrective regimen sliding scale   SubCutaneous at bedtime  lisinopril 40 milliGRAM(s) Oral daily  metoclopramide Injectable 10 milliGRAM(s) IV Push every 6 hours  metoprolol succinate ER 50 milliGRAM(s) Oral daily    MEDICATIONS  (PRN):  acetaminophen     Tablet .. 650 milliGRAM(s) Oral every 6 hours PRN Temp greater or equal to 38C (100.4F), Mild Pain (1 - 3)  dextrose Oral Gel 15 Gram(s) Oral once PRN Blood Glucose LESS THAN 70 milliGRAM(s)/deciliter  melatonin 3 milliGRAM(s) Oral at bedtime PRN Insomnia      Care Discussed with Consultants/Other Providers [x] YES  [ ] NO    HEALTH ISSUES - PROBLEM Dx:  Nausea and vomiting    Abdominal pain    CAD (coronary artery disease)    Chronic atrial fibrillation    H/O myositis    HTN (hypertension)    Colon cancer

## 2023-12-13 NOTE — PROGRESS NOTE ADULT - ASSESSMENT
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   - no acute pathology noted on CT AP ,  will advance diet as tolerated , Qtc prolonged on ekg patient is V -paced and likely inaccurate  - Advance diet as tolerated   - unclear etiology; will give reglan trial and monitor   - GI consulted   - continue gentle IV hydration   - hold lasix for now , can resume once tolerating PO   - repeat ekg in am  ,    Abdominal pain.   - no acute findings on imaging   - Tylenol prn.    CAD (coronary artery disease).   - continue asa   - no statin given h/o myositis.    HTN (hypertension).   - uncontrolled , due to missed medications , since < 180 and asymptomatic , can resume home meds as scheduled   - continue amlodipine   - continue HCTZ   - continue lisinopril   - continue metoprolol.    H/O myositis.   on ? IVIG infusions , last session November , seems unlikely contributing since several weeks since last dose  - was getting IVIG from heme onc ; f/u rec , consulted.   - outpatient follow up as scheduled   - check CPK in am.    Chronic atrial fibrillation.   ·  Plan: - continue Eliquis   - continue amiodarone.    Colon cancer.   - outpatient c/u as scheduled.  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   - no acute pathology noted on CT AP ,  will advance diet as tolerated , Qtc prolonged on ekg patient is V -paced and likely inaccurate  - Advance diet as tolerated   - unclear etiology; will give reglan trial and monitor   - GI consulted   - continue gentle IV hydration   - hold lasix for now , can resume once tolerating PO   - repeat ekg in am  ,    Abdominal pain.   - no acute findings on imaging   - Tylenol prn.    CAD (coronary artery disease).   - continue asa   - no statin given h/o myositis.    HTN (hypertension).   - uncontrolled , due to missed medications , since < 180 and asymptomatic , can resume home meds as scheduled   - continue amlodipine   - continue HCTZ   - continue lisinopril   - continue metoprolol.    H/O myositis.   on ? IVIG infusions , last session November , seems unlikely contributing since several weeks since last dose  - was getting IVIG from heme onc ; f/u rec , consulted.   - outpatient follow up as scheduled   - check CPK in am.    Chronic atrial fibrillation.   ·  Plan: - continue Eliquis   - continue amiodarone.    Colon cancer.   - outpatient c/u as scheduled.  - heme onc consulted     Brody Gresham MD   [facility]  [phone]

## 2023-12-13 NOTE — CONSULT NOTE ADULT - ASSESSMENT
73 year old male with acute abdominal pain, bloating, decreased gas, suspect ileus/partial SBO, possibly due to enteritis vs adhesive scar tissue from prior surgery. CT abdomen reviewed. Mildly tympanic/distended on exam.  -agree with trial of reglan and clear liquids for now, if continues to improve can advance diet tomorrow        Advanced care planning forms were discussed. Code status including forceful chest compressions, defibrillation and intubation were discussed. The risks benefits and alternatives to pertinent gastrointestinal procedures and interventions were discussed in detail and all questions were answered. Duration: 15 Minutes.    [facility]  Nicola De La Torre M.D.   [address], [place]  Office: [phone]   73 year old male with acute abdominal pain, bloating, decreased gas, suspect ileus/partial SBO, possibly due to enteritis vs adhesive scar tissue from prior surgery. CT abdomen reviewed. Mildly tympanic/distended on exam.  -agree with trial of reglan and clear liquids for now, if continues to improve can advance diet tomorrow        Advanced care planning forms were discussed. Code status including forceful chest compressions, defibrillation and intubation were discussed. The risks benefits and alternatives to pertinent gastrointestinal procedures and interventions were discussed in detail and all questions were answered. Duration: 15 Minutes.    [facility] [place]  Nicola De La Torre M.D.   [address], [place]  Office: [phone]

## 2023-12-13 NOTE — CONSULT NOTE ADULT - SUBJECTIVE AND OBJECTIVE BOX
[facility]   HEMATOLOGY/ONCOLOGY CONSULT NOTE     HPI:  Patient is a 73y Male with PMHx of   ROS: pertinent positives and negatives as per HPI    Allergies: statins (Other)  Zetia (Other)      PMHx:  Asthma    Hypertension    Diabetes mellitus    Prostate cancer    Lipoma    Heart murmur    Aortic valve stenosis, etiology of cardiac valve disease unspecified    Malignant neoplasm of colon, unspecified part of colon    Fatty liver    Hyperlipidemia, unspecified hyperlipidemia type    T2DM (type 2 diabetes mellitus)    Myositis      SurgHx:   S/P TURP    S/P prostatectomy    S/P excision of lipoma    Acute cholecystitis      FHx:   Family history of aneurysm    Family history of heart disease (Child)    Family history of hypertension in father (Father)    Family history of early CAD (Child)      SocHx:     Meds:   MEDICATIONS  (STANDING):  aMIOdarone    Tablet 100 milliGRAM(s) Oral daily  amLODIPine   Tablet 10 milliGRAM(s) Oral daily  apixaban 5 milliGRAM(s) Oral every 12 hours  aspirin enteric coated 81 milliGRAM(s) Oral daily  chlorhexidine 2% Cloths 1 Application(s) Topical daily  cholecalciferol 1000 Unit(s) Oral daily  cyanocobalamin 1000 MICROGram(s) Oral daily  dextrose 5%. 1000 milliLiter(s) (100 mL/Hr) IV Continuous <Continuous>  dextrose 5%. 1000 milliLiter(s) (50 mL/Hr) IV Continuous <Continuous>  dextrose 50% Injectable 12.5 Gram(s) IV Push once  dextrose 50% Injectable 25 Gram(s) IV Push once  glucagon  Injectable 1 milliGRAM(s) IntraMuscular once  hydrochlorothiazide 25 milliGRAM(s) Oral daily  insulin lispro (ADMELOG) corrective regimen sliding scale   SubCutaneous three times a day before meals  insulin lispro (ADMELOG) corrective regimen sliding scale   SubCutaneous at bedtime  lisinopril 40 milliGRAM(s) Oral daily  metoclopramide Injectable 10 milliGRAM(s) IV Push every 6 hours  metoprolol succinate ER 50 milliGRAM(s) Oral daily    MEDICATIONS  (PRN):  acetaminophen     Tablet .. 650 milliGRAM(s) Oral every 6 hours PRN Temp greater or equal to 38C (100.4F), Mild Pain (1 - 3)  dextrose Oral Gel 15 Gram(s) Oral once PRN Blood Glucose LESS THAN 70 milliGRAM(s)/deciliter  melatonin 3 milliGRAM(s) Oral at bedtime PRN Insomnia      T(C): 36.4 (12-13-23 @ 07:20), Max: 36.7 (12-12-23 @ 22:00)  T(F): 97.6 (12-13-23 @ 07:20), Max: 98 (12-12-23 @ 22:00)  HR: 113 (12-13-23 @ 07:20) (58 - 113)  BP: 149/79 (12-13-23 @ 07:20) (149/79 - 172/55)  RR: 18 (12-13-23 @ 07:20) (18 - 18)  SpO2: 95% (12-12-23 @ 23:52) (95% - 97%)    Physical Exam:  Gen:   HEENT:   Chest:   Cardiac:  Abd:   Ext:   Neuro:   Integument:     Labs:  CBC Full  -  ( 13 Dec 2023 07:15 )  WBC Count : 8.33 K/uL  RBC Count : 3.80 M/uL  Hemoglobin : 11.2 g/dL  Hematocrit : 34.8 %  Platelet Count - Automated : 222 K/uL  Mean Cell Volume : 91.6 fl  Mean Cell Hemoglobin : 29.5 pg  Mean Cell Hemoglobin Concentration : 32.2 gm/dL  Auto Neutrophil # : x  Auto Lymphocyte # : x  Auto Monocyte # : x  Auto Eosinophil # : x  Auto Basophil # : x  Auto Neutrophil % : x  Auto Lymphocyte % : x  Auto Monocyte % : x  Auto Eosinophil % : x  Auto Basophil % : x    12-13    144  |  102  |  40<H>  ----------------------------<  141<H>  3.8   |  15<L>  |  0.86    Ca    9.7      13 Dec 2023 07:09    TPro  7.1  /  Alb  3.5  /  TBili  0.9  /  DBili  x   /  AST  15  /  ALT  43  /  AlkPhos  47  12-13    PT/INR - ( 13 Dec 2023 07:15 )   PT: 20.5 sec;   INR: 1.90 ratio         PTT - ( 13 Dec 2023 07:15 )  PTT:35.8 sec  Bilirubin Total: 0.9 mg/dL (12-13-23 @ 07:09)   [facility]   HEMATOLOGY/ONCOLOGY CONSULT NOTE     HPI:  Mr. 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     Onc Hx:  right-sided  4.8 cm  invasive moderately differentiated colon adenocarcinoma  extending into the frederic colonic adipose tissue no lymphovascular or perineural invasion  5/35 lymph nodes involved with metastatic carcinoma no loss of mismatch repair protein (MMRp) noted therefore a stage IIIB T3 N2a status post right hemicolectomy on 03/25/2021  and then completed 12 cycles of 5 FU leucovorin every 2 weeks x6 months completed on 10/27/2021. Has not had any evidence of disease since that time.  prior to the diagnosis of his colon cancer he never had a colonoscopy.  ROS: pertinent positives and negatives as per HPI    Allergies: statins (Other)  Zetia (Other)      PMHx:  Asthma  Hypertension  Diabetes mellitus  Prostate cancer  Lipoma  Heart murmur  Aortic valve stenosis, etiology of cardiac valve disease unspecified  Malignant neoplasm of colon, unspecified part of colon  Fatty liver  Hyperlipidemia, unspecified hyperlipidemia type  T2DM (type 2 diabetes mellitus)  Myositis    SurgHx:   S/P TURP  S/P prostatectomy  S/P excision of lipoma  Acute cholecystitis    FHx:   Family history of aneurysm  Family history of heart disease (Child)  Family history of hypertension in father (Father)  Family history of early CAD (Child)    SocHx:     Meds:   MEDICATIONS  (STANDING):  aMIOdarone    Tablet 100 milliGRAM(s) Oral daily  amLODIPine   Tablet 10 milliGRAM(s) Oral daily  apixaban 5 milliGRAM(s) Oral every 12 hours  aspirin enteric coated 81 milliGRAM(s) Oral daily  chlorhexidine 2% Cloths 1 Application(s) Topical daily  cholecalciferol 1000 Unit(s) Oral daily  cyanocobalamin 1000 MICROGram(s) Oral daily  dextrose 5%. 1000 milliLiter(s) (100 mL/Hr) IV Continuous <Continuous>  dextrose 5%. 1000 milliLiter(s) (50 mL/Hr) IV Continuous <Continuous>  dextrose 50% Injectable 12.5 Gram(s) IV Push once  dextrose 50% Injectable 25 Gram(s) IV Push once  glucagon  Injectable 1 milliGRAM(s) IntraMuscular once  hydrochlorothiazide 25 milliGRAM(s) Oral daily  insulin lispro (ADMELOG) corrective regimen sliding scale   SubCutaneous three times a day before meals  insulin lispro (ADMELOG) corrective regimen sliding scale   SubCutaneous at bedtime  lisinopril 40 milliGRAM(s) Oral daily  metoclopramide Injectable 10 milliGRAM(s) IV Push every 6 hours  metoprolol succinate ER 50 milliGRAM(s) Oral daily    MEDICATIONS  (PRN):  acetaminophen     Tablet .. 650 milliGRAM(s) Oral every 6 hours PRN Temp greater or equal to 38C (100.4F), Mild Pain (1 - 3)  dextrose Oral Gel 15 Gram(s) Oral once PRN Blood Glucose LESS THAN 70 milliGRAM(s)/deciliter  melatonin 3 milliGRAM(s) Oral at bedtime PRN Insomnia      T(C): 36.4 (12-13-23 @ 07:20), Max: 36.7 (12-12-23 @ 22:00)  T(F): 97.6 (12-13-23 @ 07:20), Max: 98 (12-12-23 @ 22:00)  HR: 113 (12-13-23 @ 07:20) (58 - 113)  BP: 149/79 (12-13-23 @ 07:20) (149/79 - 172/55)  RR: 18 (12-13-23 @ 07:20) (18 - 18)  SpO2: 95% (12-12-23 @ 23:52) (95% - 97%)    Physical Exam:  Gen:   HEENT:   Chest:   Cardiac:  Abd:   Ext:   Neuro:   Integument:     Labs:  CBC Full  -  ( 13 Dec 2023 07:15 )  WBC Count : 8.33 K/uL  RBC Count : 3.80 M/uL  Hemoglobin : 11.2 g/dL  Hematocrit : 34.8 %  Platelet Count - Automated : 222 K/uL  Mean Cell Volume : 91.6 fl  Mean Cell Hemoglobin : 29.5 pg  Mean Cell Hemoglobin Concentration : 32.2 gm/dL    12-13    144  |  102  |  40<H>  ----------------------------<  141<H>  3.8   |  15<L>  |  0.86    Ca    9.7      13 Dec 2023 07:09    TPro  7.1  /  Alb  3.5  /  TBili  0.9  /  DBili  x   /  AST  15  /  ALT  43  /  AlkPhos  47  12-13    PT/INR - ( 13 Dec 2023 07:15 )   PT: 20.5 sec;   INR: 1.90 ratio         PTT - ( 13 Dec 2023 07:15 )  PTT:35.8 sec  Bilirubin Total: 0.9 mg/dL (12-13-23 @ 07:09)   [facility]   HEMATOLOGY/ONCOLOGY CONSULT NOTE     HPI:  Mr. 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 Outpatient  and TSH was undetectable.     Onc Hx:  right-sided  4.8 cm  invasive moderately differentiated colon adenocarcinoma  extending into the frederic colonic adipose tissue no lymphovascular or perineural invasion  5/35 lymph nodes involved with metastatic carcinoma no loss of mismatch repair protein (MMRp) noted therefore a stage IIIB T3 N2a status post right hemicolectomy on 03/25/2021  and then completed 12 cycles of 5 FU leucovorin every 2 weeks x6 months completed on 10/27/2021. Has not had any evidence of disease since that time.  prior to the diagnosis of his colon cancer he never had a colonoscopy.  ROS: pertinent positives and negatives as per HPI    Allergies: statins (Other)  Zetia (Other)      PMHx:  Asthma  Hypertension  Diabetes mellitus  Prostate cancer  Lipoma  Heart murmur  Aortic valve stenosis, etiology of cardiac valve disease unspecified  Malignant neoplasm of colon, unspecified part of colon  Fatty liver  Hyperlipidemia, unspecified hyperlipidemia type  T2DM (type 2 diabetes mellitus)  Myositis    SurgHx:   S/P TURP  S/P prostatectomy  S/P excision of lipoma  Acute cholecystitis    FHx:   Family history of aneurysm  Family history of heart disease (Child)  Family history of hypertension in father (Father)  Family history of early CAD (Child)    SocHx:     Meds:   MEDICATIONS  (STANDING):  aMIOdarone    Tablet 100 milliGRAM(s) Oral daily  amLODIPine   Tablet 10 milliGRAM(s) Oral daily  apixaban 5 milliGRAM(s) Oral every 12 hours  aspirin enteric coated 81 milliGRAM(s) Oral daily  chlorhexidine 2% Cloths 1 Application(s) Topical daily  cholecalciferol 1000 Unit(s) Oral daily  cyanocobalamin 1000 MICROGram(s) Oral daily  dextrose 5%. 1000 milliLiter(s) (100 mL/Hr) IV Continuous <Continuous>  dextrose 5%. 1000 milliLiter(s) (50 mL/Hr) IV Continuous <Continuous>  dextrose 50% Injectable 12.5 Gram(s) IV Push once  dextrose 50% Injectable 25 Gram(s) IV Push once  glucagon  Injectable 1 milliGRAM(s) IntraMuscular once  hydrochlorothiazide 25 milliGRAM(s) Oral daily  insulin lispro (ADMELOG) corrective regimen sliding scale   SubCutaneous three times a day before meals  insulin lispro (ADMELOG) corrective regimen sliding scale   SubCutaneous at bedtime  lisinopril 40 milliGRAM(s) Oral daily  metoclopramide Injectable 10 milliGRAM(s) IV Push every 6 hours  metoprolol succinate ER 50 milliGRAM(s) Oral daily    MEDICATIONS  (PRN):  acetaminophen     Tablet .. 650 milliGRAM(s) Oral every 6 hours PRN Temp greater or equal to 38C (100.4F), Mild Pain (1 - 3)  dextrose Oral Gel 15 Gram(s) Oral once PRN Blood Glucose LESS THAN 70 milliGRAM(s)/deciliter  melatonin 3 milliGRAM(s) Oral at bedtime PRN Insomnia      T(C): 36.4 (12-13-23 @ 07:20), Max: 36.7 (12-12-23 @ 22:00)  T(F): 97.6 (12-13-23 @ 07:20), Max: 98 (12-12-23 @ 22:00)  HR: 113 (12-13-23 @ 07:20) (58 - 113)  BP: 149/79 (12-13-23 @ 07:20) (149/79 - 172/55)  RR: 18 (12-13-23 @ 07:20) (18 - 18)  SpO2: 95% (12-12-23 @ 23:52) (95% - 97%)    Physical Exam:  Gen: NAD  HEENT: moist mucous membranes  Chest:  Equal chest rise, speaks full sentences  Cardiac: irregular  Abd: distended, no gross hepatomegaly or splenomegaly  Ext: 3+ edema bilaterally   Neuro: AAOX3 with normal mood and affect    Labs:                        11.2   8.33  )-----------( 222      ( 13 Dec 2023 07:15 )             34.8     CBC Full  -  ( 13 Dec 2023 07:15 )  WBC Count : 8.33 K/uL  RBC Count : 3.80 M/uL  Hemoglobin : 11.2 g/dL  Hematocrit : 34.8 %  Platelet Count - Automated : 222 K/uL  Mean Cell Volume : 91.6 fl  Mean Cell Hemoglobin : 29.5 pg  Mean Cell Hemoglobin Concentration : 32.2 gm/dL    12-13    144  |  102  |  40<H>  ----------------------------<  141<H>  3.8   |  15<L>  |  0.86    Ca    9.7      13 Dec 2023 07:09    TPro  7.1  /  Alb  3.5  /  TBili  0.9  /  DBili  x   /  AST  15  /  ALT  43  /  AlkPhos  47  12-13    PT/INR - ( 13 Dec 2023 07:15 )   PT: 20.5 sec;   INR: 1.90 ratio         PTT - ( 13 Dec 2023 07:15 )  PTT:35.8 sec  Bilirubin Total: 0.9 mg/dL (12-13-23 @ 07:09)

## 2023-12-13 NOTE — CONSULT NOTE ADULT - ASSESSMENT
Mr. 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     Onc Hx:  right-sided  4.8 cm  invasive moderately differentiated colon adenocarcinoma  extending into the frederic colonic adipose tissue no lymphovascular or perineural invasion  5/35 lymph nodes involved with metastatic carcinoma no loss of mismatch repair protein (MMRp) noted therefore a stage IIIB T3 N2a status post right hemicolectomy on 03/25/2021  and then completed 12 cycles of 5 FU leucovorin every 2 weeks x6 months completed on 10/27/2021. Has not had any evidence of disease since that time.  prior to the diagnosis of his colon cancer he never had a colonoscopy.    Overall impression:  Mr. Alberto has a history of stage IIIB right colon adenocarcinoma status post hemicolectomy and adjuvant 5 FU leucovorin completing all therapy on 10/27/2021 and has been on surveillance since then under the care of Dr. Gross.  most recent outpatient CT abdomen and pelvis on 06/01/2023 showed no suspicious lesions except for a wall thickening versus focal peristalsis in the distal transverse colon and at that time the right kidney lesion was 1.5 cm. Inpatient CT C/A/P without new masses or  new lymphadenopathy.  Labs here remarkable mild anemia Hb 11.2,  outpatient most recent CBC with hemoglobin 11.8,  outpatient iron profile with ferritin 166 saturation 23% and CEA 1.27 on 11/21/2023.   GI  consulted recs pending    # Personal Hx of stage IIIB right colon adenocarcinoma  -  status post hemicolectomy and chemotherapy completed in 2021  -  CT abdomen pelvis without active disease noted  -  CEA as outpatient stable at 1.27  3 weeks ago    #  Nausea vomiting poor appetite  -  unclear cause at this time team to advance diet as tolerated    #  Normocytic anemia  -  hemoglobin stable compared to outpatient no signs of active GI bleed  -  transfuse to maintain hemoglobin greater than 7    # Right papillary renal carcinoma  -  followed as an outpatient with Urology  - CT A/P  shows a 2.5 cm lesion  -  follow up Urology    #  Coagulopathy  -  likely due to Eliquis        Thank you for allowing me to participate in the care of Mr. Alberto, please do not hesitate to call or text me if you have further questions or concerns.     Gibran Bautista MD  [facility]   Division of Hematology/Oncology  [address] [place] [address], [address]  [place] [address]  P: [phone]  F: [phone]    Attestation:   Total time spent on the encounter: >** minutes   ----Including ** min face-to-face interaction in addition to chart review, reviewing treatment plan, and managing the patient’s chronic diagnoses as listed in the assessment----      Mr. 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     Onc Hx:  right-sided  4.8 cm  invasive moderately differentiated colon adenocarcinoma  extending into the frederic colonic adipose tissue no lymphovascular or perineural invasion  5/35 lymph nodes involved with metastatic carcinoma no loss of mismatch repair protein (MMRp) noted therefore a stage IIIB T3 N2a status post right hemicolectomy on 03/25/2021  and then completed 12 cycles of 5 FU leucovorin every 2 weeks x6 months completed on 10/27/2021. Has not had any evidence of disease since that time.  prior to the diagnosis of his colon cancer he never had a colonoscopy.    Overall impression:  Mr. Alberto has a history of stage IIIB right colon adenocarcinoma status post hemicolectomy and adjuvant 5 FU leucovorin completing all therapy on 10/27/2021 and has been on surveillance since then under the care of Dr. Gross.  most recent outpatient CT abdomen and pelvis on 06/01/2023 showed no suspicious lesions except for a wall thickening versus focal peristalsis in the distal transverse colon and at that time the right kidney lesion was 1.5 cm. Inpatient CT C/A/P without new masses or  new lymphadenopathy.  Labs here remarkable mild anemia Hb 11.2,  outpatient most recent CBC with hemoglobin 11.8,  outpatient iron profile with ferritin 166 saturation 23% and CEA 1.27 on 11/21/2023.   GI  consulted recs pending    # Personal Hx of stage IIIB right colon adenocarcinoma  -  status post hemicolectomy and chemotherapy completed in 2021  -  CT abdomen pelvis without active disease noted  -  CEA as outpatient stable at 1.27  3 weeks ago    #  Nausea vomiting poor appetite  -  unclear cause at this time team to advance diet as tolerated    #  Normocytic anemia  -  hemoglobin stable compared to outpatient no signs of active GI bleed  -  transfuse to maintain hemoglobin greater than 7    # Right papillary renal carcinoma  -  followed as an outpatient with Urology  - CT A/P  shows a 2.5 cm lesion  -  follow up Urology    #  Coagulopathy  -  likely due to Eliquis        Thank you for allowing me to participate in the care of Mr. Alberto, please do not hesitate to call or text me if you have further questions or concerns.     Gibran Bautista MD  [facility]   Division of Hematology/Oncology  [address] [place] [address], [address]  [place] [address]  P: [phone]  F: [phone]    Attestation:   Total time spent on the encounter: >** minutes   ----Including ** min face-to-face interaction in addition to chart review, reviewing treatment plan, and managing the patient’s chronic diagnoses as listed in the assessment----      Mr. 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     Onc Hx:  right-sided  4.8 cm  invasive moderately differentiated colon adenocarcinoma  extending into the frederic colonic adipose tissue no lymphovascular or perineural invasion  5/35 lymph nodes involved with metastatic carcinoma no loss of mismatch repair protein (MMRp) noted therefore a stage IIIB T3 N2a status post right hemicolectomy on 03/25/2021  and then completed 12 cycles of 5 FU leucovorin every 2 weeks x6 months completed on 10/27/2021. Has not had any evidence of disease since that time.  prior to the diagnosis of his colon cancer he never had a colonoscopy.    Overall impression:  Mr. Alberto has a history of stage IIIB right colon adenocarcinoma status post hemicolectomy and adjuvant 5 FU leucovorin completing all therapy on 10/27/2021 and has been on surveillance since then under the care of Dr. Gross.  most recent outpatient CT abdomen and pelvis on 06/01/2023 showed no suspicious lesions except for a wall thickening versus focal peristalsis in the distal transverse colon and at that time the right kidney lesion was 1.5 cm. Inpatient CT C/A/P without new masses or  new lymphadenopathy.  Labs here remarkable mild anemia Hb 11.2,  outpatient most recent CBC with hemoglobin 11.8,  outpatient iron profile with ferritin 166 saturation 23% and CEA 1.27 on 11/21/2023.   GI  consulted recs pending. ESR elevated to 127 as outpatient and TSH was undetectable, unclear. Recommend Endocrinology consult as per PCP preference.     # Personal Hx of stage IIIB right colon adenocarcinoma  -  status post hemicolectomy and chemotherapy completed in 2021  -  CT abdomen pelvis without active disease noted  -  CEA as outpatient stable at 1.27  3 weeks ago    #  Nausea vomiting poor appetite  -  unclear cause at this time team to advance diet as tolerated    #  Normocytic anemia  -  hemoglobin stable compared to outpatient no signs of active GI bleed  -  transfuse to maintain hemoglobin greater than 7    # Right papillary renal carcinoma  -  followed as an outpatient with Urology  - CT A/P  shows a 2.5 cm lesion  -  follow up Urology    #  Coagulopathy  -  likely due to Eliquis    # Undetectable TSH  - Obtain repeat inpatient   - Endocrinology consult       Thank you for allowing me to participate in the care of Mr. Alberto, please do not hesitate to call or text me if you have further questions or concerns.     Gibran Bautista MD  [facility]   Division of Hematology/Oncology  [address] [place] [address], [address]  [place] [address]  P: [phone]  F: [phone]    Attestation:   Total time spent on the encounter: >60 minutes   ----Including face-to-face interaction in addition to chart review, reviewing treatment plan, and managing the patient’s chronic diagnoses as listed in the assessment----      Mr. 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     Onc Hx:  right-sided  4.8 cm  invasive moderately differentiated colon adenocarcinoma  extending into the frederic colonic adipose tissue no lymphovascular or perineural invasion  5/35 lymph nodes involved with metastatic carcinoma no loss of mismatch repair protein (MMRp) noted therefore a stage IIIB T3 N2a status post right hemicolectomy on 03/25/2021  and then completed 12 cycles of 5 FU leucovorin every 2 weeks x6 months completed on 10/27/2021. Has not had any evidence of disease since that time.  prior to the diagnosis of his colon cancer he never had a colonoscopy.    Overall impression:  Mr. Ablerto has a history of stage IIIB right colon adenocarcinoma status post hemicolectomy and adjuvant 5 FU leucovorin completing all therapy on 10/27/2021 and has been on surveillance since then under the care of Dr. Gross.  most recent outpatient CT abdomen and pelvis on 06/01/2023 showed no suspicious lesions except for a wall thickening versus focal peristalsis in the distal transverse colon and at that time the right kidney lesion was 1.5 cm. Inpatient CT C/A/P without new masses or  new lymphadenopathy.  Labs here remarkable mild anemia Hb 11.2,  outpatient most recent CBC with hemoglobin 11.8,  outpatient iron profile with ferritin 166 saturation 23% and CEA 1.27 on 11/21/2023.   GI  consulted recs pending. ESR elevated to 127 as outpatient and TSH was undetectable, unclear. Recommend Endocrinology consult as per PCP preference.     # Personal Hx of stage IIIB right colon adenocarcinoma  -  status post hemicolectomy and chemotherapy completed in 2021  -  CT abdomen pelvis without active disease noted  -  CEA as outpatient stable at 1.27  3 weeks ago    #  Nausea vomiting poor appetite  -  unclear cause at this time team to advance diet as tolerated    #  Normocytic anemia  -  hemoglobin stable compared to outpatient no signs of active GI bleed  -  transfuse to maintain hemoglobin greater than 7    # Right papillary renal carcinoma  -  followed as an outpatient with Urology  - CT A/P  shows a 2.5 cm lesion  -  follow up Urology    #  Coagulopathy  -  likely due to Eliquis    # Undetectable TSH  - Obtain repeat inpatient   - Endocrinology consult       Thank you for allowing me to participate in the care of Mr. Alberto, please do not hesitate to call or text me if you have further questions or concerns.     Gibran Bautista MD  [facility]   Division of Hematology/Oncology  [address] [place] [address], [address]  [place] [address]  P: [phone]  F: [phone]    Attestation:   Total time spent on the encounter: >60 minutes   ----Including face-to-face interaction in addition to chart review, reviewing treatment plan, and managing the patient’s chronic diagnoses as listed in the assessment----      Mr. 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     Onc Hx:  right-sided  4.8 cm  invasive moderately differentiated colon adenocarcinoma  extending into the frederic colonic adipose tissue no lymphovascular or perineural invasion  5/35 lymph nodes involved with metastatic carcinoma no loss of mismatch repair protein (MMRp) noted therefore a stage IIIB T3 N2a status post right hemicolectomy on 03/25/2021  and then completed 12 cycles of 5 FU leucovorin every 2 weeks x6 months completed on 10/27/2021. Has not had any evidence of disease since that time.  prior to the diagnosis of his colon cancer he never had a colonoscopy.    Overall impression:  Mr. Alberto has a history of stage IIIB right colon adenocarcinoma status post hemicolectomy and adjuvant 5 FU leucovorin completing all therapy on 10/27/2021 and has been on surveillance since then under the care of Dr. Gross.  most recent outpatient CT abdomen and pelvis on 06/01/2023 showed no suspicious lesions except for a wall thickening versus focal peristalsis in the distal transverse colon and at that time the right kidney lesion was 1.5 cm. Inpatient CT C/A/P without new masses or  new lymphadenopathy.  Labs here remarkable mild anemia Hb 11.2,  outpatient most recent CBC with hemoglobin 11.8,  outpatient iron profile with ferritin 166 saturation 23% and CEA 1.27 on 11/21/2023.   GI  consulted recs pending. ESR elevated to 127 as outpatient and TSH was undetectable, unclear. Recommend Endocrinology consult as per PCP preference.     # Personal Hx of stage IIIB right colon adenocarcinoma  -  status post hemicolectomy and chemotherapy completed in 2021  -  CT abdomen pelvis without active disease noted  -  CEA as outpatient stable at 1.27  3 weeks ago    #  Nausea vomiting poor appetite  -  unclear cause at this time team to advance diet as tolerated  - Due to IVIG or thyroid disease?     #  Normocytic anemia  -  hemoglobin stable compared to outpatient no signs of active GI bleed  -  transfuse to maintain hemoglobin greater than 7    # Right papillary renal carcinoma  -  followed as an outpatient with Urology  - CT A/P  shows a 2.5 cm lesion  -  follow up Urology    #  Coagulopathy  -  likely due to Eliquis    # Undetectable TSH  - Obtain repeat inpatient   - Endocrinology consulted will see pt       Thank you for allowing me to participate in the care of Mr. Alberto, please do not hesitate to call or text me if you have further questions or concerns.     Gibran Bautista MD  [facility]   Division of Hematology/Oncology  [address] [place] [address], [address]  [place] [address]  P: [phone]  F: [phone]    Attestation:   Total time spent on the encounter: >60 minutes   ----Including face-to-face interaction in addition to chart review, reviewing treatment plan, and managing the patient’s chronic diagnoses as listed in the assessment----      Mr. 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     Onc Hx:  right-sided  4.8 cm  invasive moderately differentiated colon adenocarcinoma  extending into the frederic colonic adipose tissue no lymphovascular or perineural invasion  5/35 lymph nodes involved with metastatic carcinoma no loss of mismatch repair protein (MMRp) noted therefore a stage IIIB T3 N2a status post right hemicolectomy on 03/25/2021  and then completed 12 cycles of 5 FU leucovorin every 2 weeks x6 months completed on 10/27/2021. Has not had any evidence of disease since that time.  prior to the diagnosis of his colon cancer he never had a colonoscopy.    Overall impression:  Mr. Alberto has a history of stage IIIB right colon adenocarcinoma status post hemicolectomy and adjuvant 5 FU leucovorin completing all therapy on 10/27/2021 and has been on surveillance since then under the care of Dr. Gross.  most recent outpatient CT abdomen and pelvis on 06/01/2023 showed no suspicious lesions except for a wall thickening versus focal peristalsis in the distal transverse colon and at that time the right kidney lesion was 1.5 cm. Inpatient CT C/A/P without new masses or  new lymphadenopathy.  Labs here remarkable mild anemia Hb 11.2,  outpatient most recent CBC with hemoglobin 11.8,  outpatient iron profile with ferritin 166 saturation 23% and CEA 1.27 on 11/21/2023.   GI  consulted recs pending. ESR elevated to 127 as outpatient and TSH was undetectable, unclear. Recommend Endocrinology consult as per PCP preference.     # Personal Hx of stage IIIB right colon adenocarcinoma  -  status post hemicolectomy and chemotherapy completed in 2021  -  CT abdomen pelvis without active disease noted  -  CEA as outpatient stable at 1.27  3 weeks ago    #  Nausea vomiting poor appetite  -  unclear cause at this time team to advance diet as tolerated  - Due to IVIG or thyroid disease?     #  Normocytic anemia  -  hemoglobin stable compared to outpatient no signs of active GI bleed  -  transfuse to maintain hemoglobin greater than 7    # Right papillary renal carcinoma  -  followed as an outpatient with Urology  - CT A/P  shows a 2.5 cm lesion  -  follow up Urology    #  Coagulopathy  -  likely due to Eliquis    # Undetectable TSH  - Obtain repeat inpatient   - Endocrinology consulted will see pt       Thank you for allowing me to participate in the care of Mr. Alberto, please do not hesitate to call or text me if you have further questions or concerns.     Gibran Bautista MD  [facility]   Division of Hematology/Oncology  [address] [place] [address], [address]  [place] [address]  P: [phone]  F: [phone]    Attestation:   Total time spent on the encounter: >60 minutes   ----Including face-to-face interaction in addition to chart review, reviewing treatment plan, and managing the patient’s chronic diagnoses as listed in the assessment----

## 2023-12-13 NOTE — CONSULT NOTE ADULT - SUBJECTIVE AND OBJECTIVE BOX
Chief Complaint:  Patient is a 73y old  Male who presents with a chief complaint of abdominal pain x few days (13 Dec 2023 15:04)      Date of service: 23 @ 22:30    HPI:    The patient is a 73 year old man with hx of colon cancer s/p resection who presents with abdominal pain since Friday. He also endorses bloating and nausea. He was not passing gas, today and yesterday however he passed gas and he feels some relief.  He is not currenlty on chemotherapy, but he gets gamma globulin for myositis.    Allergies:  statins (Other)  Zetia (Other)      Home Medications:    Hospital Medications:  acetaminophen     Tablet .. 650 milliGRAM(s) Oral every 6 hours PRN  aMIOdarone    Tablet 100 milliGRAM(s) Oral daily  amLODIPine   Tablet 10 milliGRAM(s) Oral daily  apixaban 5 milliGRAM(s) Oral every 12 hours  aspirin enteric coated 81 milliGRAM(s) Oral daily  chlorhexidine 2% Cloths 1 Application(s) Topical daily  cholecalciferol 1000 Unit(s) Oral daily  cyanocobalamin 1000 MICROGram(s) Oral daily  dextrose 5%. 1000 milliLiter(s) IV Continuous <Continuous>  dextrose 5%. 1000 milliLiter(s) IV Continuous <Continuous>  dextrose 50% Injectable 12.5 Gram(s) IV Push once  dextrose 50% Injectable 25 Gram(s) IV Push once  dextrose Oral Gel 15 Gram(s) Oral once PRN  glucagon  Injectable 1 milliGRAM(s) IntraMuscular once  hydrochlorothiazide 25 milliGRAM(s) Oral daily  insulin lispro (ADMELOG) corrective regimen sliding scale   SubCutaneous three times a day before meals  insulin lispro (ADMELOG) corrective regimen sliding scale   SubCutaneous at bedtime  lisinopril 40 milliGRAM(s) Oral daily  melatonin 3 milliGRAM(s) Oral at bedtime PRN  metoclopramide Injectable 10 milliGRAM(s) IV Push every 6 hours  metoprolol succinate ER 50 milliGRAM(s) Oral daily      PMHX/PSHX:  Asthma    Hypertension    Diabetes mellitus    Prostate cancer    Lipoma    Heart murmur    Aortic valve stenosis, etiology of cardiac valve disease unspecified    Malignant neoplasm of colon, unspecified part of colon    Fatty liver    Hyperlipidemia, unspecified hyperlipidemia type    T2DM (type 2 diabetes mellitus)    Myositis    S/P TURP    S/P prostatectomy    S/P excision of lipoma    Acute cholecystitis        Family history:  Family history of aneurysm    Family history of heart disease (Child)    Family history of hypertension in father (Father)    Family history of early CAD (Child)        Social History:   Denies ethanol use.  Denies illicit drug use.    ROS:     General:  No wt loss, fevers, chills, night sweats, fatigue,   Eyes:  Good vision, no reported pain  ENT:  No sore throat, pain, runny nose, dysphagia  CV:  No pain, palpitations, hypo/hypertension  Resp:  No dyspnea, cough, tachypnea, wheezing  GI:  See HPI  :  No pain, bleeding, incontinence, nocturia  Muscle:  No pain, weakness  Neuro:  No weakness, tingling, memory problems  Psych:  No fatigue, insomnia, mood problems, depression  Endocrine:  No polyuria, polydipsia, cold/heat intolerance  Heme:  No petechiae, ecchymosis, easy bruisability  Integumentary:  No rash, edema      PHYSICAL EXAM:     GENERAL:  Appears stated age, well-groomed, well-nourished, no distress  HEENT:  NC/AT,  conjunctivae anicteric, clear and pink,   NECK: supple, trachea midline  CHEST:  Full & symmetric excursion, no increased effort, breath sounds clear  HEART:  Regular rhythm, no JVD  ABDOMEN:  Soft, non-tender, mildly tympanic-distended, normoactive bowel sounds,  no masses , no hepatosplenomegaly  EXTREMITIES:  no cyanosis,clubbing or edema  SKIN:  No rash, erythema, or, ecchymoses, no jaundice  NEURO:  Alert, non-focal, no asterixis  PSYCH: Appropriate affect, oriented to place and time  RECTAL: Deferred      Vital Signs:  Vital Signs Last 24 Hrs  T(C): 36.4 (13 Dec 2023 16:02), Max: 36.6 (12 Dec 2023 23:52)  T(F): 97.5 (13 Dec 2023 16:02), Max: 97.8 (12 Dec 2023 23:52)  HR: 60 (13 Dec 2023 16:02) (60 - 113)  BP: 136/55 (13 Dec 2023 16:02) (136/55 - 172/55)  BP(mean): --  RR: 18 (13 Dec 2023 16:02) (18 - 18)  SpO2: 96% (13 Dec 2023 16:02) (95% - 96%)    Parameters below as of 13 Dec 2023 16:02  Patient On (Oxygen Delivery Method): room air      Daily     Daily Weight in k.6 (13 Dec 2023 07:20)    LABS: Labs personally reviewed by me:                        11.2   8.33  )-----------( 222      ( 13 Dec 2023 07:15 )             34.8         144  |  102  |  40<H>  ----------------------------<  141<H>  3.8   |  15<L>  |  0.86    Ca    9.7      13 Dec 2023 07:09    TPro  7.1  /  Alb  3.5  /  TBili  0.9  /  DBili  x   /  AST  15  /  ALT  43  /  AlkPhos  47  12-13    LIVER FUNCTIONS - ( 13 Dec 2023 07:09 )  Alb: 3.5 g/dL / Pro: 7.1 g/dL / ALK PHOS: 47 U/L / ALT: 43 U/L / AST: 15 U/L / GGT: x           PT/INR - ( 13 Dec 2023 07:15 )   PT: 20.5 sec;   INR: 1.90 ratio         PTT - ( 13 Dec 2023 07:15 )  PTT:35.8 sec  Urinalysis Basic - ( 13 Dec 2023 07:09 )    Color: x / Appearance: x / SG: x / pH: x  Gluc: 141 mg/dL / Ketone: x  / Bili: x / Urobili: x   Blood: x / Protein: x / Nitrite: x   Leuk Esterase: x / RBC: x / WBC x   Sq Epi: x / Non Sq Epi: x / Bacteria: x          Imaging personally reviewed by me:

## 2023-12-14 LAB
ANION GAP SERPL CALC-SCNC: 20 MMOL/L — HIGH (ref 5–17)
ANION GAP SERPL CALC-SCNC: 20 MMOL/L — HIGH (ref 5–17)
BUN SERPL-MCNC: 51 MG/DL — HIGH (ref 7–23)
BUN SERPL-MCNC: 51 MG/DL — HIGH (ref 7–23)
CALCIUM SERPL-MCNC: 9.5 MG/DL — SIGNIFICANT CHANGE UP (ref 8.4–10.5)
CALCIUM SERPL-MCNC: 9.5 MG/DL — SIGNIFICANT CHANGE UP (ref 8.4–10.5)
CHLORIDE SERPL-SCNC: 99 MMOL/L — SIGNIFICANT CHANGE UP (ref 96–108)
CHLORIDE SERPL-SCNC: 99 MMOL/L — SIGNIFICANT CHANGE UP (ref 96–108)
CO2 SERPL-SCNC: 22 MMOL/L — SIGNIFICANT CHANGE UP (ref 22–31)
CO2 SERPL-SCNC: 22 MMOL/L — SIGNIFICANT CHANGE UP (ref 22–31)
CREAT SERPL-MCNC: 1.26 MG/DL — SIGNIFICANT CHANGE UP (ref 0.5–1.3)
CREAT SERPL-MCNC: 1.26 MG/DL — SIGNIFICANT CHANGE UP (ref 0.5–1.3)
EGFR: 60 ML/MIN/1.73M2 — SIGNIFICANT CHANGE UP
EGFR: 60 ML/MIN/1.73M2 — SIGNIFICANT CHANGE UP
GLUCOSE BLDC GLUCOMTR-MCNC: 129 MG/DL — HIGH (ref 70–99)
GLUCOSE BLDC GLUCOMTR-MCNC: 129 MG/DL — HIGH (ref 70–99)
GLUCOSE BLDC GLUCOMTR-MCNC: 136 MG/DL — HIGH (ref 70–99)
GLUCOSE BLDC GLUCOMTR-MCNC: 136 MG/DL — HIGH (ref 70–99)
GLUCOSE BLDC GLUCOMTR-MCNC: 149 MG/DL — HIGH (ref 70–99)
GLUCOSE BLDC GLUCOMTR-MCNC: 149 MG/DL — HIGH (ref 70–99)
GLUCOSE BLDC GLUCOMTR-MCNC: 211 MG/DL — HIGH (ref 70–99)
GLUCOSE BLDC GLUCOMTR-MCNC: 211 MG/DL — HIGH (ref 70–99)
GLUCOSE SERPL-MCNC: 109 MG/DL — HIGH (ref 70–99)
GLUCOSE SERPL-MCNC: 109 MG/DL — HIGH (ref 70–99)
HCT VFR BLD CALC: 34.3 % — LOW (ref 39–50)
HCT VFR BLD CALC: 34.3 % — LOW (ref 39–50)
HGB BLD-MCNC: 11.1 G/DL — LOW (ref 13–17)
HGB BLD-MCNC: 11.1 G/DL — LOW (ref 13–17)
MCHC RBC-ENTMCNC: 29.4 PG — SIGNIFICANT CHANGE UP (ref 27–34)
MCHC RBC-ENTMCNC: 29.4 PG — SIGNIFICANT CHANGE UP (ref 27–34)
MCHC RBC-ENTMCNC: 32.4 GM/DL — SIGNIFICANT CHANGE UP (ref 32–36)
MCHC RBC-ENTMCNC: 32.4 GM/DL — SIGNIFICANT CHANGE UP (ref 32–36)
MCV RBC AUTO: 91 FL — SIGNIFICANT CHANGE UP (ref 80–100)
MCV RBC AUTO: 91 FL — SIGNIFICANT CHANGE UP (ref 80–100)
NRBC # BLD: 0 /100 WBCS — SIGNIFICANT CHANGE UP (ref 0–0)
NRBC # BLD: 0 /100 WBCS — SIGNIFICANT CHANGE UP (ref 0–0)
PLATELET # BLD AUTO: 206 K/UL — SIGNIFICANT CHANGE UP (ref 150–400)
PLATELET # BLD AUTO: 206 K/UL — SIGNIFICANT CHANGE UP (ref 150–400)
POTASSIUM SERPL-MCNC: 3.5 MMOL/L — SIGNIFICANT CHANGE UP (ref 3.5–5.3)
POTASSIUM SERPL-MCNC: 3.5 MMOL/L — SIGNIFICANT CHANGE UP (ref 3.5–5.3)
POTASSIUM SERPL-SCNC: 3.5 MMOL/L — SIGNIFICANT CHANGE UP (ref 3.5–5.3)
POTASSIUM SERPL-SCNC: 3.5 MMOL/L — SIGNIFICANT CHANGE UP (ref 3.5–5.3)
RBC # BLD: 3.77 M/UL — LOW (ref 4.2–5.8)
RBC # BLD: 3.77 M/UL — LOW (ref 4.2–5.8)
RBC # FLD: 13.8 % — SIGNIFICANT CHANGE UP (ref 10.3–14.5)
RBC # FLD: 13.8 % — SIGNIFICANT CHANGE UP (ref 10.3–14.5)
SODIUM SERPL-SCNC: 141 MMOL/L — SIGNIFICANT CHANGE UP (ref 135–145)
SODIUM SERPL-SCNC: 141 MMOL/L — SIGNIFICANT CHANGE UP (ref 135–145)
WBC # BLD: 7.5 K/UL — SIGNIFICANT CHANGE UP (ref 3.8–10.5)
WBC # BLD: 7.5 K/UL — SIGNIFICANT CHANGE UP (ref 3.8–10.5)
WBC # FLD AUTO: 7.5 K/UL — SIGNIFICANT CHANGE UP (ref 3.8–10.5)
WBC # FLD AUTO: 7.5 K/UL — SIGNIFICANT CHANGE UP (ref 3.8–10.5)

## 2023-12-14 RX ORDER — SODIUM CHLORIDE 9 MG/ML
1000 INJECTION, SOLUTION INTRAVENOUS
Refills: 0 | Status: DISCONTINUED | OUTPATIENT
Start: 2023-12-14 | End: 2023-12-16

## 2023-12-14 RX ORDER — METOCLOPRAMIDE HCL 10 MG
10 TABLET ORAL EVERY 8 HOURS
Refills: 0 | Status: DISCONTINUED | OUTPATIENT
Start: 2023-12-14 | End: 2023-12-16

## 2023-12-14 RX ADMIN — APIXABAN 5 MILLIGRAM(S): 2.5 TABLET, FILM COATED ORAL at 17:07

## 2023-12-14 RX ADMIN — Medication 10 MILLIGRAM(S): at 22:54

## 2023-12-14 RX ADMIN — LISINOPRIL 40 MILLIGRAM(S): 2.5 TABLET ORAL at 05:39

## 2023-12-14 RX ADMIN — Medication 1000 UNIT(S): at 13:10

## 2023-12-14 RX ADMIN — Medication 2: at 17:06

## 2023-12-14 RX ADMIN — PREGABALIN 1000 MICROGRAM(S): 225 CAPSULE ORAL at 13:10

## 2023-12-14 RX ADMIN — Medication 81 MILLIGRAM(S): at 13:10

## 2023-12-14 RX ADMIN — APIXABAN 5 MILLIGRAM(S): 2.5 TABLET, FILM COATED ORAL at 05:39

## 2023-12-14 RX ADMIN — Medication 50 MILLIGRAM(S): at 05:40

## 2023-12-14 RX ADMIN — Medication 10 MILLIGRAM(S): at 14:44

## 2023-12-14 RX ADMIN — AMLODIPINE BESYLATE 10 MILLIGRAM(S): 2.5 TABLET ORAL at 05:40

## 2023-12-14 RX ADMIN — AMIODARONE HYDROCHLORIDE 100 MILLIGRAM(S): 400 TABLET ORAL at 05:41

## 2023-12-14 RX ADMIN — CHLORHEXIDINE GLUCONATE 1 APPLICATION(S): 213 SOLUTION TOPICAL at 12:20

## 2023-12-14 RX ADMIN — Medication 10 MILLIGRAM(S): at 05:41

## 2023-12-14 RX ADMIN — SODIUM CHLORIDE 75 MILLILITER(S): 9 INJECTION, SOLUTION INTRAVENOUS at 13:09

## 2023-12-14 RX ADMIN — Medication 10 MILLIGRAM(S): at 00:33

## 2023-12-14 NOTE — CONSULT NOTE ADULT - ASSESSMENT
73y old Male with history of T2DM, Afib on Eliquis, CAD, HTN, HLD, Renal cell carcinoma, Colon cancer, and Prostate cancer here with abdominal pain. Endocrine consulted for suppressed TSH.    1. Hyperthyroidism  2. Chronic amiodarone use  - Amiodarone can cause two different types of hyperthyroidism: type I is usually due to underlying toxic multinodular goiter or latent graves' disease and type 2 is a destructive thyroiditis. Management differs for both.  - For now, will repeat TSH and check TT3, FT4 along with TSI Ab, TRAB and TPO Ab to see if there's evidence of underlying Graves' disease which would point to type I  - Hold off on methimazole at this time until the cause is determined as the treatment for type II is typically steroids    3. T2DM  - FS mostly at goal  - Continue low Admelog correctional scale before meals and bedtime  - Monitor FS before meals and bedtime  -Follow hospital hypoglycemic protocol    4. Afib on Eliquis  - No indication to stop Amiodarone as the thyroid disease can be managed with medication  - Continue with Eliquis    Will continue to follow.    Jefe Owusu MD  [facility]    [address] [place] [address]    T [phone]  F [phone]   73y old Male with history of T2DM, Afib on Eliquis, CAD, HTN, HLD, Renal cell carcinoma, Colon cancer, and Prostate cancer here with abdominal pain. Endocrine consulted for suppressed TSH.    1. Hyperthyroidism  2. Chronic amiodarone use  - Amiodarone can cause two different types of hyperthyroidism: type I is usually due to underlying toxic multinodular goiter or latent graves' disease and type 2 is a destructive thyroiditis. Management differs for both.  - For now, will repeat TSH and check TT3, FT4 along with TSI Ab, TRAB and TPO Ab to see if there's evidence of underlying Graves' disease which would point to type I  - Hold off on methimazole at this time until the cause is determined as the treatment for type II is typically steroids    3. T2DM  - FS mostly at goal  - Continue low Admelog correctional scale before meals and bedtime  - Monitor FS before meals and bedtime  -Follow hospital hypoglycemic protocol    4. Afib on Eliquis  - No indication to stop Amiodarone as the thyroid disease can be managed with medication  - Continue with Eliquis    Will continue to follow.    Jefe Owusu MD  [facility]    [address] [place] [address]    T [phone]  F [phone]

## 2023-12-14 NOTE — PROGRESS NOTE ADULT - SUBJECTIVE AND OBJECTIVE BOX
Patient is a 73y old  Male who presents with a chief complaint of abdominal pain x few days (14 Dec 2023 17:15)      SUBJECTIVE / OVERNIGHT EVENTS:  Patient seen and examined.   Feels a little better.       Vital Signs Last 24 Hrs  T(C): 36.3 (14 Dec 2023 16:11), Max: 36.6 (14 Dec 2023 00:18)  T(F): 97.4 (14 Dec 2023 16:11), Max: 97.9 (14 Dec 2023 00:18)  HR: 55 (14 Dec 2023 16:11) (55 - 92)  BP: 140/68 (14 Dec 2023 16:11) (140/68 - 162/65)  BP(mean): --  RR: 18 (14 Dec 2023 16:11) (18 - 18)  SpO2: 97% (14 Dec 2023 16:11) (93% - 97%)    Parameters below as of 14 Dec 2023 16:11  Patient On (Oxygen Delivery Method): room air      I&O's Summary    13 Dec 2023 07:01  -  14 Dec 2023 07:00  --------------------------------------------------------  IN: 240 mL / OUT: 550 mL / NET: -310 mL        PHYSICAL EXAM:  GENERAL: NAD, AAOx3  HEAD:  Atraumatic, Normocephalic  EYES: EOMI; conjunctiva and sclera clear  NECK: Supple, No JVD, No LAD  CHEST/LUNG: B/L air entry; No wheezes, rales or rhonci   HEART: Regular rate and rhythm; No murmurs, rubs, or gallops  ABDOMEN: Soft, Nontender, Nondistended; Bowel sounds present  EXTREMITIES:  2+ Peripheral Pulses, No clubbing, cyanosis, or edema  SKIN: No rashes or lesions    LABS:                        11.1   7.50  )-----------( 206      ( 14 Dec 2023 07:02 )             34.3     12-14    141  |  99  |  51<H>  ----------------------------<  109<H>  3.5   |  22  |  1.26    Ca    9.5      14 Dec 2023 07:02    TPro  7.1  /  Alb  3.5  /  TBili  0.9  /  DBili  x   /  AST  15  /  ALT  43  /  AlkPhos  47  12-13    PT/INR - ( 13 Dec 2023 07:15 )   PT: 20.5 sec;   INR: 1.90 ratio         PTT - ( 13 Dec 2023 07:15 )  PTT:35.8 sec  CAPILLARY BLOOD GLUCOSE      POCT Blood Glucose.: 211 mg/dL (14 Dec 2023 16:34)  POCT Blood Glucose.: 136 mg/dL (14 Dec 2023 12:12)  POCT Blood Glucose.: 129 mg/dL (14 Dec 2023 08:21)  POCT Blood Glucose.: 121 mg/dL (13 Dec 2023 21:16)    CARDIAC MARKERS ( 13 Dec 2023 07:09 )  x     / x     / 77 U/L / x     / x          Urinalysis Basic - ( 14 Dec 2023 07:02 )    Color: x / Appearance: x / SG: x / pH: x  Gluc: 109 mg/dL / Ketone: x  / Bili: x / Urobili: x   Blood: x / Protein: x / Nitrite: x   Leuk Esterase: x / RBC: x / WBC x   Sq Epi: x / Non Sq Epi: x / Bacteria: x        RADIOLOGY & ADDITIONAL TESTS:    Imaging Personally Reviewed:  [x] YES  [ ] NO    Consultant(s) Notes Reviewed:  [x] YES  [ ] NO      MEDICATIONS  (STANDING):  aMIOdarone    Tablet 100 milliGRAM(s) Oral daily  amLODIPine   Tablet 10 milliGRAM(s) Oral daily  apixaban 5 milliGRAM(s) Oral every 12 hours  aspirin enteric coated 81 milliGRAM(s) Oral daily  chlorhexidine 2% Cloths 1 Application(s) Topical daily  cholecalciferol 1000 Unit(s) Oral daily  cyanocobalamin 1000 MICROGram(s) Oral daily  dextrose 5%. 1000 milliLiter(s) (50 mL/Hr) IV Continuous <Continuous>  dextrose 5%. 1000 milliLiter(s) (100 mL/Hr) IV Continuous <Continuous>  dextrose 50% Injectable 25 Gram(s) IV Push once  dextrose 50% Injectable 12.5 Gram(s) IV Push once  glucagon  Injectable 1 milliGRAM(s) IntraMuscular once  hydrochlorothiazide 25 milliGRAM(s) Oral daily  insulin lispro (ADMELOG) corrective regimen sliding scale   SubCutaneous three times a day before meals  insulin lispro (ADMELOG) corrective regimen sliding scale   SubCutaneous at bedtime  lactated ringers. 1000 milliLiter(s) (75 mL/Hr) IV Continuous <Continuous>  lisinopril 40 milliGRAM(s) Oral daily  metoclopramide Injectable 10 milliGRAM(s) IV Push every 8 hours  metoprolol succinate ER 50 milliGRAM(s) Oral daily    MEDICATIONS  (PRN):  acetaminophen     Tablet .. 650 milliGRAM(s) Oral every 6 hours PRN Temp greater or equal to 38C (100.4F), Mild Pain (1 - 3)  dextrose Oral Gel 15 Gram(s) Oral once PRN Blood Glucose LESS THAN 70 milliGRAM(s)/deciliter  melatonin 3 milliGRAM(s) Oral at bedtime PRN Insomnia      Care Discussed with Consultants/Other Providers [x] YES  [ ] NO    HEALTH ISSUES - PROBLEM Dx:  Nausea and vomiting    Abdominal pain    CAD (coronary artery disease)    Chronic atrial fibrillation    H/O myositis    HTN (hypertension)    Colon cancer

## 2023-12-14 NOTE — PROGRESS NOTE ADULT - SUBJECTIVE AND OBJECTIVE BOX
INTERVAL HPI/OVERNIGHT EVENTS:    Passing Flatus, no bm yet  denied any nausea or vomiting  tolerating his diet this morning     MEDICATIONS  (STANDING):  aMIOdarone    Tablet 100 milliGRAM(s) Oral daily  amLODIPine   Tablet 10 milliGRAM(s) Oral daily  apixaban 5 milliGRAM(s) Oral every 12 hours  aspirin enteric coated 81 milliGRAM(s) Oral daily  chlorhexidine 2% Cloths 1 Application(s) Topical daily  cholecalciferol 1000 Unit(s) Oral daily  cyanocobalamin 1000 MICROGram(s) Oral daily  dextrose 5%. 1000 milliLiter(s) (100 mL/Hr) IV Continuous <Continuous>  dextrose 5%. 1000 milliLiter(s) (50 mL/Hr) IV Continuous <Continuous>  dextrose 50% Injectable 12.5 Gram(s) IV Push once  dextrose 50% Injectable 25 Gram(s) IV Push once  glucagon  Injectable 1 milliGRAM(s) IntraMuscular once  hydrochlorothiazide 25 milliGRAM(s) Oral daily  insulin lispro (ADMELOG) corrective regimen sliding scale   SubCutaneous three times a day before meals  insulin lispro (ADMELOG) corrective regimen sliding scale   SubCutaneous at bedtime  lactated ringers. 1000 milliLiter(s) (75 mL/Hr) IV Continuous <Continuous>  lisinopril 40 milliGRAM(s) Oral daily  metoclopramide Injectable 10 milliGRAM(s) IV Push every 8 hours  metoprolol succinate ER 50 milliGRAM(s) Oral daily    MEDICATIONS  (PRN):  acetaminophen     Tablet .. 650 milliGRAM(s) Oral every 6 hours PRN Temp greater or equal to 38C (100.4F), Mild Pain (1 - 3)  dextrose Oral Gel 15 Gram(s) Oral once PRN Blood Glucose LESS THAN 70 milliGRAM(s)/deciliter  melatonin 3 milliGRAM(s) Oral at bedtime PRN Insomnia      Allergies    statins (Other)  Zetia (Other)    Intolerances        Review of Systems:    General:  No wt loss, fevers, chills, night sweats, fatigue   Eyes:  Good vision, no reported pain  ENT:  No sore throat, pain, runny nose, dysphagia  CV:  No pain, palpitations, hypo/hypertension  Resp:  No dyspnea, cough, tachypnea, wheezing  GI:  No pain, No nausea, No vomiting, No diarrhea, No constipation, No weight loss, No fever, No pruritis, No rectal bleeding, No melena, No dysphagia  :  No pain, bleeding, incontinence, nocturia  Muscle:  No pain, weakness  Neuro:  No weakness, tingling, memory problems  Psych:  No fatigue, insomnia, mood problems, depression  Endocrine:  No polyuria, polydypsia, cold/heat intolerance  Heme:  No petechiae, ecchymosis, easy bruisability  Skin:  No rash, tattoos, scars, edema      Vital Signs Last 24 Hrs  T(C): 36.5 (14 Dec 2023 08:47), Max: 36.6 (14 Dec 2023 00:18)  T(F): 97.7 (14 Dec 2023 08:47), Max: 97.9 (14 Dec 2023 00:18)  HR: 92 (14 Dec 2023 08:47) (60 - 92)  BP: 153/69 (14 Dec 2023 08:47) (136/55 - 162/65)  BP(mean): --  RR: 18 (14 Dec 2023 08:47) (18 - 18)  SpO2: 97% (14 Dec 2023 08:47) (93% - 97%)    Parameters below as of 14 Dec 2023 08:47  Patient On (Oxygen Delivery Method): room air        PHYSICAL EXAM:    Constitutional: NAD  HEENT: EOMI, throat clear  Neck: No LAD, supple  Respiratory: CTA and P  Cardiovascular: S1 and S2, RRR, no M  Gastrointestinal: BS+, soft, NT/+ softly distended, neg HSM,  Extremities: No peripheral edema, neg clubbing, cyanosis  Vascular: 2+ peripheral pulses  Neurological: A/O x 3, no focal deficits  Psychiatric: Normal mood, normal affect  Skin: No rashes      LABS:                        11.1   7.50  )-----------( 206      ( 14 Dec 2023 07:02 )             34.3     12-14    141  |  99  |  51<H>  ----------------------------<  109<H>  3.5   |  22  |  1.26    Ca    9.5      14 Dec 2023 07:02    TPro  7.1  /  Alb  3.5  /  TBili  0.9  /  DBili  x   /  AST  15  /  ALT  43  /  AlkPhos  47  12-13    PT/INR - ( 13 Dec 2023 07:15 )   PT: 20.5 sec;   INR: 1.90 ratio         PTT - ( 13 Dec 2023 07:15 )  PTT:35.8 sec  Urinalysis Basic - ( 14 Dec 2023 07:02 )    Color: x / Appearance: x / SG: x / pH: x  Gluc: 109 mg/dL / Ketone: x  / Bili: x / Urobili: x   Blood: x / Protein: x / Nitrite: x   Leuk Esterase: x / RBC: x / WBC x   Sq Epi: x / Non Sq Epi: x / Bacteria: x        RADIOLOGY & ADDITIONAL TESTS:

## 2023-12-14 NOTE — PROGRESS NOTE ADULT - SUBJECTIVE AND OBJECTIVE BOX
[facility]   HEMATOLOGY/ONCOLOGY INPATIENT PROGRESS NOTE     Interval Hx:   12/14/2023: Mr. Alberto was seen at bedside this morning, no new complaints, no overnight events noted, GI recs noted possible ileus vs partial SBO in setting of hx of abdominal surgery/adhesions     Meds:   MEDICATIONS  (STANDING):  aMIOdarone    Tablet 100 milliGRAM(s) Oral daily  amLODIPine   Tablet 10 milliGRAM(s) Oral daily  apixaban 5 milliGRAM(s) Oral every 12 hours  aspirin enteric coated 81 milliGRAM(s) Oral daily  chlorhexidine 2% Cloths 1 Application(s) Topical daily  cholecalciferol 1000 Unit(s) Oral daily  cyanocobalamin 1000 MICROGram(s) Oral daily  dextrose 5%. 1000 milliLiter(s) (50 mL/Hr) IV Continuous <Continuous>  dextrose 5%. 1000 milliLiter(s) (100 mL/Hr) IV Continuous <Continuous>  dextrose 50% Injectable 25 Gram(s) IV Push once  dextrose 50% Injectable 12.5 Gram(s) IV Push once  glucagon  Injectable 1 milliGRAM(s) IntraMuscular once  hydrochlorothiazide 25 milliGRAM(s) Oral daily  insulin lispro (ADMELOG) corrective regimen sliding scale   SubCutaneous three times a day before meals  insulin lispro (ADMELOG) corrective regimen sliding scale   SubCutaneous at bedtime  lisinopril 40 milliGRAM(s) Oral daily  metoprolol succinate ER 50 milliGRAM(s) Oral daily    MEDICATIONS  (PRN):  acetaminophen     Tablet .. 650 milliGRAM(s) Oral every 6 hours PRN Temp greater or equal to 38C (100.4F), Mild Pain (1 - 3)  dextrose Oral Gel 15 Gram(s) Oral once PRN Blood Glucose LESS THAN 70 milliGRAM(s)/deciliter  melatonin 3 milliGRAM(s) Oral at bedtime PRN Insomnia    Vital Signs Last 24 Hrs  T(C): 36.6 (14 Dec 2023 00:18), Max: 36.6 (14 Dec 2023 00:18)  T(F): 97.9 (14 Dec 2023 00:18), Max: 97.9 (14 Dec 2023 00:18)  HR: 89 (14 Dec 2023 00:18) (60 - 113)  BP: 154/79 (14 Dec 2023 03:06) (136/55 - 162/65)  BP(mean): --  RR: 18 (14 Dec 2023 00:18) (18 - 18)  SpO2: 93% (14 Dec 2023 00:18) (93% - 96%)    Parameters below as of 14 Dec 2023 00:18  Patient On (Oxygen Delivery Method): room air    Physical Exam:  Gen: NAD  HEENT: moist mucous membranes  Chest:  Equal chest rise, speaks full sentences  Cardiac: irregular  Abd: distended, no gross hepatomegaly or splenomegaly  Ext: 3+ edema bilaterally   Neuro: AAOX3 with normal mood and affect      Labs:                        11.2   8.33  )-----------( 222      ( 13 Dec 2023 07:15 )             34.8     CBC Full  -  ( 13 Dec 2023 07:15 )  WBC Count : 8.33 K/uL  RBC Count : 3.80 M/uL  Hemoglobin : 11.2 g/dL  Hematocrit : 34.8 %  Platelet Count - Automated : 222 K/uL  Mean Cell Volume : 91.6 fl  Mean Cell Hemoglobin : 29.5 pg  Mean Cell Hemoglobin Concentration : 32.2 gm/dL    12-13    144  |  102  |  40<H>  ----------------------------<  141<H>  3.8   |  15<L>  |  0.86    Ca    9.7      13 Dec 2023 07:09    TPro  7.1  /  Alb  3.5  /  TBili  0.9  /  DBili  x   /  AST  15  /  ALT  43  /  AlkPhos  47  12-13    PT/INR - ( 13 Dec 2023 07:15 )   PT: 20.5 sec;   INR: 1.90 ratio         PTT - ( 13 Dec 2023 07:15 )  PTT:35.8 sec

## 2023-12-14 NOTE — PROGRESS NOTE ADULT - ASSESSMENT
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   - no acute pathology noted on CT AP ,  will advance diet as tolerated , Qtc prolonged on ekg patient is V -paced and likely inaccurate  - Advance diet as tolerated   - unclear etiology; will give reglan trial and monitor   - GI consulted   - continue gentle IV hydration   - hold lasix for now , can resume once tolerating PO   - enema per GI    Abdominal pain.   - no acute findings on imaging   - Tylenol prn.    CAD (coronary artery disease).   - continue asa   - no statin given h/o myositis.    HTN (hypertension).   - uncontrolled , due to missed medications , since < 180 and asymptomatic , can resume home meds as scheduled   - continue amlodipine   - continue HCTZ   - continue lisinopril   - continue metoprolol.    H/O myositis.   on ? IVIG infusions , last session November , seems unlikely contributing since several weeks since last dose  - was getting IVIG from heme onc ; f/u rec , consulted.   - outpatient follow up as scheduled   - check CPK in am.    Chronic atrial fibrillation.   ·  Plan: - continue Eliquis   - continue amiodarone.    Colon cancer.   - outpatient c/u as scheduled.  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   - no acute pathology noted on CT AP ,  will advance diet as tolerated , Qtc prolonged on ekg patient is V -paced and likely inaccurate  - Advance diet as tolerated   - unclear etiology; will give reglan trial and monitor   - GI consulted   - continue gentle IV hydration   - hold lasix for now , can resume once tolerating PO   - enema per GI    Abdominal pain.   - no acute findings on imaging   - Tylenol prn.    CAD (coronary artery disease).   - continue asa   - no statin given h/o myositis.    HTN (hypertension).   - uncontrolled , due to missed medications , since < 180 and asymptomatic , can resume home meds as scheduled   - continue amlodipine   - continue HCTZ   - continue lisinopril   - continue metoprolol.    H/O myositis.   on ? IVIG infusions , last session November , seems unlikely contributing since several weeks since last dose  - was getting IVIG from heme onc ; f/u rec , consulted.   - outpatient follow up as scheduled   - check CPK in am.    Chronic atrial fibrillation.   ·  Plan: - continue Eliquis   - continue amiodarone.    Colon cancer.   - outpatient c/u as scheduled.  - heme onc consulted     Brody Gresham MD   [facility]  [phone]

## 2023-12-14 NOTE — PROGRESS NOTE ADULT - ASSESSMENT
73 year old male with acute abdominal pain, bloating, decreased gas, suspect ileus/pSBO, possibly due to enteritis vs adhesive scar tissue from prior surgery. CT abdomen reviewed. Mildly tympanic/distended on exam.  -Doing well with Reglan, now passing flatus and resolution of n/v  -can consider giving enema   -oob/ambulate as able   -advance diet as tolerated       Advanced care planning forms were discussed. Code status including forceful chest compressions, defibrillation and intubation were discussed. The risks benefits and alternatives to pertinent gastrointestinal procedures and interventions were discussed in detail and all questions were answered. Duration: 15 Minutes.    [facility]  Nicola De La Torre M.D.   [address], [place]  Office: [phone]   73 year old male with acute abdominal pain, bloating, decreased gas, suspect ileus/pSBO, possibly due to enteritis vs adhesive scar tissue from prior surgery. CT abdomen reviewed. Mildly tympanic/distended on exam.  -Doing well with Reglan, now passing flatus and resolution of n/v  -can consider giving enema   -oob/ambulate as able   -advance diet as tolerated       Advanced care planning forms were discussed. Code status including forceful chest compressions, defibrillation and intubation were discussed. The risks benefits and alternatives to pertinent gastrointestinal procedures and interventions were discussed in detail and all questions were answered. Duration: 15 Minutes.    [facility]  Nicola De La Torre M.D.   [address], [place]  Office: [phone]

## 2023-12-14 NOTE — CONSULT NOTE ADULT - SUBJECTIVE AND OBJECTIVE BOX
[facility] Initial Consult Note    HPI  73y old Male with history of T2DM, Afib on Eliquis, CAD, HTN, HLD, Renal cell carcinoma, Colon cancer, and Prostate cancer here with abdominal pain. Endocrine consulted for suppressed TSH.    History was obtained from outpatient chart review and patient.    Regarding the thyroid, the patient denies having any personal or family history of thyroid disease. However, he is on Amiodarone for Afib for at least a year. Review of his outpatient labs shows most recent TSH on 11/14 was suppressed at TSH <0.015. Prior to that TSH in July 2023 was elevated at 5.50. In 10/2022 his TSH was normal. Denies symptoms of hyperthyroidism. He was told by his PCP in November to follow up with his Endocrinologist regarding the suppressed TSH however he didn't get the chance to.    For diabetes, he reports he's well controlled on Janumet  mg BID. His a1c on admission was 6% and has minimal insulin requirements on sliding scale. His outpatient Endocrinologist is Dr. Joaquin Carlos on [facility].     ROS  Denies chest pain, shortness of breath, abdominal pain, nausea, vomiting, diarrhea, dizziness, lightheadedness. Reports good appetite.    Vital Signs Last 24 Hrs  T(C): 36.4 (12-14-23 @ 19:59), Max: 36.6 (12-14-23 @ 00:18)  HR: 58 (12-14-23 @ 19:59) (55 - 92)  BP: 166/69 (12-14-23 @ 19:59) (140/68 - 166/69)  RR: 18 (12-14-23 @ 19:59) (18 - 18)  SpO2: 96% (12-14-23 @ 19:59) (93% - 97%)    Physical Exam  Gen: NAD, alert, awake  HEENT: NC/AT, moist mucous membranes, no thyromegaly  Chest: normal respiratory effort  Heart: +S1 S2  Abd: non distended  Ext: warm, dry, no ulcers    Medications  acetaminophen     Tablet .. 650 milliGRAM(s) Oral every 6 hours PRN  aMIOdarone    Tablet 100 milliGRAM(s) Oral daily  amLODIPine   Tablet 10 milliGRAM(s) Oral daily  apixaban 5 milliGRAM(s) Oral every 12 hours  aspirin enteric coated 81 milliGRAM(s) Oral daily  chlorhexidine 2% Cloths 1 Application(s) Topical daily  cholecalciferol 1000 Unit(s) Oral daily  cyanocobalamin 1000 MICROGram(s) Oral daily  dextrose 5%. 1000 milliLiter(s) IV Continuous <Continuous>  dextrose 5%. 1000 milliLiter(s) IV Continuous <Continuous>  dextrose 50% Injectable 12.5 Gram(s) IV Push once  dextrose 50% Injectable 25 Gram(s) IV Push once  dextrose Oral Gel 15 Gram(s) Oral once PRN  glucagon  Injectable 1 milliGRAM(s) IntraMuscular once  hydrochlorothiazide 25 milliGRAM(s) Oral daily  insulin lispro (ADMELOG) corrective regimen sliding scale   SubCutaneous at bedtime  insulin lispro (ADMELOG) corrective regimen sliding scale   SubCutaneous three times a day before meals  lactated ringers. 1000 milliLiter(s) IV Continuous <Continuous>  lisinopril 40 milliGRAM(s) Oral daily  melatonin 3 milliGRAM(s) Oral at bedtime PRN  metoclopramide Injectable 10 milliGRAM(s) IV Push every 8 hours  metoprolol succinate ER 50 milliGRAM(s) Oral daily    Pertinent labs/imaging  POCT Blood Glucose.: 211 mg/dL (12-14-23 @ 16:34)  POCT Blood Glucose.: 136 mg/dL (12-14-23 @ 12:12)  POCT Blood Glucose.: 129 mg/dL (12-14-23 @ 08:21)  POCT Blood Glucose.: 121 mg/dL (12-13-23 @ 21:16)    eGFR: 60 mL/min/1.73m2 (12-14-23 @ 07:02)  eGFR: 91 mL/min/1.73m2 (12-13-23 @ 07:09)   [facility] Initial Consult Note    HPI  73y old Male with history of T2DM, Afib on Eliquis, CAD, HTN, HLD, Renal cell carcinoma, Colon cancer, and Prostate cancer here with abdominal pain. Endocrine consulted for suppressed TSH.    History was obtained from outpatient chart review and patient.    Regarding the thyroid, the patient denies having any personal or family history of thyroid disease. However, he is on Amiodarone for Afib for at least a year. Review of his outpatient labs shows most recent TSH on 11/14 was suppressed at TSH <0.015. Prior to that TSH in July 2023 was elevated at 5.50. In 10/2022 his TSH was normal. Denies symptoms of hyperthyroidism. He was told by his PCP in November to follow up with his Endocrinologist regarding the suppressed TSH however he didn't get the chance to.    For diabetes, he reports he's well controlled on Janumet  mg BID. His a1c on admission was 6% and has minimal insulin requirements on sliding scale. His outpatient Endocrinologist is Dr. Joaquin Carlos on [facility].     ROS  Denies chest pain, shortness of breath, abdominal pain, nausea, vomiting, diarrhea, dizziness, lightheadedness. Reports good appetite.    Vital Signs Last 24 Hrs  T(C): 36.4 (12-14-23 @ 19:59), Max: 36.6 (12-14-23 @ 00:18)  HR: 58 (12-14-23 @ 19:59) (55 - 92)  BP: 166/69 (12-14-23 @ 19:59) (140/68 - 166/69)  RR: 18 (12-14-23 @ 19:59) (18 - 18)  SpO2: 96% (12-14-23 @ 19:59) (93% - 97%)    Physical Exam  Gen: NAD, alert, awake  HEENT: NC/AT, moist mucous membranes, no thyromegaly  Chest: normal respiratory effort  Heart: +S1 S2  Abd: non distended  Ext: warm, dry, no ulcers    Medications  acetaminophen     Tablet .. 650 milliGRAM(s) Oral every 6 hours PRN  aMIOdarone    Tablet 100 milliGRAM(s) Oral daily  amLODIPine   Tablet 10 milliGRAM(s) Oral daily  apixaban 5 milliGRAM(s) Oral every 12 hours  aspirin enteric coated 81 milliGRAM(s) Oral daily  chlorhexidine 2% Cloths 1 Application(s) Topical daily  cholecalciferol 1000 Unit(s) Oral daily  cyanocobalamin 1000 MICROGram(s) Oral daily  dextrose 5%. 1000 milliLiter(s) IV Continuous <Continuous>  dextrose 5%. 1000 milliLiter(s) IV Continuous <Continuous>  dextrose 50% Injectable 12.5 Gram(s) IV Push once  dextrose 50% Injectable 25 Gram(s) IV Push once  dextrose Oral Gel 15 Gram(s) Oral once PRN  glucagon  Injectable 1 milliGRAM(s) IntraMuscular once  hydrochlorothiazide 25 milliGRAM(s) Oral daily  insulin lispro (ADMELOG) corrective regimen sliding scale   SubCutaneous at bedtime  insulin lispro (ADMELOG) corrective regimen sliding scale   SubCutaneous three times a day before meals  lactated ringers. 1000 milliLiter(s) IV Continuous <Continuous>  lisinopril 40 milliGRAM(s) Oral daily  melatonin 3 milliGRAM(s) Oral at bedtime PRN  metoclopramide Injectable 10 milliGRAM(s) IV Push every 8 hours  metoprolol succinate ER 50 milliGRAM(s) Oral daily    Pertinent labs/imaging  POCT Blood Glucose.: 211 mg/dL (12-14-23 @ 16:34)  POCT Blood Glucose.: 136 mg/dL (12-14-23 @ 12:12)  POCT Blood Glucose.: 129 mg/dL (12-14-23 @ 08:21)  POCT Blood Glucose.: 121 mg/dL (12-13-23 @ 21:16)    eGFR: 60 mL/min/1.73m2 (12-14-23 @ 07:02)  eGFR: 91 mL/min/1.73m2 (12-13-23 @ 07:09)

## 2023-12-15 LAB
ANION GAP SERPL CALC-SCNC: 14 MMOL/L — SIGNIFICANT CHANGE UP (ref 5–17)
ANION GAP SERPL CALC-SCNC: 14 MMOL/L — SIGNIFICANT CHANGE UP (ref 5–17)
BUN SERPL-MCNC: 54 MG/DL — HIGH (ref 7–23)
BUN SERPL-MCNC: 54 MG/DL — HIGH (ref 7–23)
CALCIUM SERPL-MCNC: 9.2 MG/DL — SIGNIFICANT CHANGE UP (ref 8.4–10.5)
CALCIUM SERPL-MCNC: 9.2 MG/DL — SIGNIFICANT CHANGE UP (ref 8.4–10.5)
CHLORIDE SERPL-SCNC: 101 MMOL/L — SIGNIFICANT CHANGE UP (ref 96–108)
CHLORIDE SERPL-SCNC: 101 MMOL/L — SIGNIFICANT CHANGE UP (ref 96–108)
CO2 SERPL-SCNC: 24 MMOL/L — SIGNIFICANT CHANGE UP (ref 22–31)
CO2 SERPL-SCNC: 24 MMOL/L — SIGNIFICANT CHANGE UP (ref 22–31)
CREAT SERPL-MCNC: 1.16 MG/DL — SIGNIFICANT CHANGE UP (ref 0.5–1.3)
CREAT SERPL-MCNC: 1.16 MG/DL — SIGNIFICANT CHANGE UP (ref 0.5–1.3)
EGFR: 67 ML/MIN/1.73M2 — SIGNIFICANT CHANGE UP
EGFR: 67 ML/MIN/1.73M2 — SIGNIFICANT CHANGE UP
GLUCOSE BLDC GLUCOMTR-MCNC: 135 MG/DL — HIGH (ref 70–99)
GLUCOSE BLDC GLUCOMTR-MCNC: 135 MG/DL — HIGH (ref 70–99)
GLUCOSE BLDC GLUCOMTR-MCNC: 161 MG/DL — HIGH (ref 70–99)
GLUCOSE BLDC GLUCOMTR-MCNC: 161 MG/DL — HIGH (ref 70–99)
GLUCOSE BLDC GLUCOMTR-MCNC: 164 MG/DL — HIGH (ref 70–99)
GLUCOSE BLDC GLUCOMTR-MCNC: 164 MG/DL — HIGH (ref 70–99)
GLUCOSE BLDC GLUCOMTR-MCNC: 189 MG/DL — HIGH (ref 70–99)
GLUCOSE BLDC GLUCOMTR-MCNC: 189 MG/DL — HIGH (ref 70–99)
GLUCOSE SERPL-MCNC: 177 MG/DL — HIGH (ref 70–99)
GLUCOSE SERPL-MCNC: 177 MG/DL — HIGH (ref 70–99)
HCT VFR BLD CALC: 34 % — LOW (ref 39–50)
HCT VFR BLD CALC: 34 % — LOW (ref 39–50)
HGB BLD-MCNC: 11.4 G/DL — LOW (ref 13–17)
HGB BLD-MCNC: 11.4 G/DL — LOW (ref 13–17)
MCHC RBC-ENTMCNC: 30.2 PG — SIGNIFICANT CHANGE UP (ref 27–34)
MCHC RBC-ENTMCNC: 30.2 PG — SIGNIFICANT CHANGE UP (ref 27–34)
MCHC RBC-ENTMCNC: 33.5 GM/DL — SIGNIFICANT CHANGE UP (ref 32–36)
MCHC RBC-ENTMCNC: 33.5 GM/DL — SIGNIFICANT CHANGE UP (ref 32–36)
MCV RBC AUTO: 89.9 FL — SIGNIFICANT CHANGE UP (ref 80–100)
MCV RBC AUTO: 89.9 FL — SIGNIFICANT CHANGE UP (ref 80–100)
NRBC # BLD: 0 /100 WBCS — SIGNIFICANT CHANGE UP (ref 0–0)
NRBC # BLD: 0 /100 WBCS — SIGNIFICANT CHANGE UP (ref 0–0)
PLATELET # BLD AUTO: 204 K/UL — SIGNIFICANT CHANGE UP (ref 150–400)
PLATELET # BLD AUTO: 204 K/UL — SIGNIFICANT CHANGE UP (ref 150–400)
POTASSIUM SERPL-MCNC: 3.5 MMOL/L — SIGNIFICANT CHANGE UP (ref 3.5–5.3)
POTASSIUM SERPL-MCNC: 3.5 MMOL/L — SIGNIFICANT CHANGE UP (ref 3.5–5.3)
POTASSIUM SERPL-SCNC: 3.5 MMOL/L — SIGNIFICANT CHANGE UP (ref 3.5–5.3)
POTASSIUM SERPL-SCNC: 3.5 MMOL/L — SIGNIFICANT CHANGE UP (ref 3.5–5.3)
RBC # BLD: 3.78 M/UL — LOW (ref 4.2–5.8)
RBC # BLD: 3.78 M/UL — LOW (ref 4.2–5.8)
RBC # FLD: 13.7 % — SIGNIFICANT CHANGE UP (ref 10.3–14.5)
RBC # FLD: 13.7 % — SIGNIFICANT CHANGE UP (ref 10.3–14.5)
SODIUM SERPL-SCNC: 139 MMOL/L — SIGNIFICANT CHANGE UP (ref 135–145)
SODIUM SERPL-SCNC: 139 MMOL/L — SIGNIFICANT CHANGE UP (ref 135–145)
T3 SERPL-MCNC: 169 NG/DL — SIGNIFICANT CHANGE UP (ref 80–200)
T3 SERPL-MCNC: 169 NG/DL — SIGNIFICANT CHANGE UP (ref 80–200)
T4 FREE SERPL-MCNC: 7.1 NG/DL — HIGH (ref 0.9–1.8)
T4 FREE SERPL-MCNC: 7.1 NG/DL — HIGH (ref 0.9–1.8)
THYROPEROXIDASE AB SERPL-ACNC: 20.4 IU/ML — SIGNIFICANT CHANGE UP
THYROPEROXIDASE AB SERPL-ACNC: 20.4 IU/ML — SIGNIFICANT CHANGE UP
TSH SERPL-MCNC: <0.01 UIU/ML — LOW (ref 0.27–4.2)
TSH SERPL-MCNC: <0.01 UIU/ML — LOW (ref 0.27–4.2)
WBC # BLD: 7.74 K/UL — SIGNIFICANT CHANGE UP (ref 3.8–10.5)
WBC # BLD: 7.74 K/UL — SIGNIFICANT CHANGE UP (ref 3.8–10.5)
WBC # FLD AUTO: 7.74 K/UL — SIGNIFICANT CHANGE UP (ref 3.8–10.5)
WBC # FLD AUTO: 7.74 K/UL — SIGNIFICANT CHANGE UP (ref 3.8–10.5)

## 2023-12-15 RX ADMIN — Medication 10 MILLIGRAM(S): at 15:45

## 2023-12-15 RX ADMIN — Medication 10 MILLIGRAM(S): at 21:49

## 2023-12-15 RX ADMIN — CHLORHEXIDINE GLUCONATE 1 APPLICATION(S): 213 SOLUTION TOPICAL at 10:50

## 2023-12-15 RX ADMIN — LISINOPRIL 40 MILLIGRAM(S): 2.5 TABLET ORAL at 05:59

## 2023-12-15 RX ADMIN — Medication 10 MILLIGRAM(S): at 05:58

## 2023-12-15 RX ADMIN — Medication 1: at 12:26

## 2023-12-15 RX ADMIN — AMLODIPINE BESYLATE 10 MILLIGRAM(S): 2.5 TABLET ORAL at 05:58

## 2023-12-15 RX ADMIN — Medication 1000 UNIT(S): at 12:25

## 2023-12-15 RX ADMIN — APIXABAN 5 MILLIGRAM(S): 2.5 TABLET, FILM COATED ORAL at 17:36

## 2023-12-15 RX ADMIN — Medication 81 MILLIGRAM(S): at 12:25

## 2023-12-15 RX ADMIN — AMIODARONE HYDROCHLORIDE 100 MILLIGRAM(S): 400 TABLET ORAL at 05:59

## 2023-12-15 RX ADMIN — PREGABALIN 1000 MICROGRAM(S): 225 CAPSULE ORAL at 12:26

## 2023-12-15 RX ADMIN — APIXABAN 5 MILLIGRAM(S): 2.5 TABLET, FILM COATED ORAL at 05:59

## 2023-12-15 RX ADMIN — Medication 50 MILLIGRAM(S): at 05:58

## 2023-12-15 NOTE — PHYSICAL THERAPY INITIAL EVALUATION ADULT - ADDITIONAL COMMENTS
Pt reports he lives with spouse in co-op with ramp & 3 stairs to entrance.  Prior to admission pt ambulating independently in household with cane & RW. Pt is not a community ambulator. Pt has walk-in shower with shower chair, lift chair, and spouse does grocery shopping.

## 2023-12-15 NOTE — PHYSICAL THERAPY INITIAL EVALUATION ADULT - NSPTDISCHREC_GEN_A_CORE
We will contact you date for vascular surgery  We will call for ultrasound date and echocardiogram date  Compression stockings over the counter [facility]

## 2023-12-15 NOTE — PHYSICAL THERAPY INITIAL EVALUATION ADULT - TRANSFER TRAINING, PT EVAL
GOAL: Pt will perform ALL transfers independently, w/use of appropriate assistive device as needed, in 3 weeks.

## 2023-12-15 NOTE — PHYSICAL THERAPY INITIAL EVALUATION ADULT - PLANNED THERAPY INTERVENTIONS, PT EVAL
Goal: Patient will negotiate up and down 3 steps with unilateral rail independently, within 3 weeks./balance training/bed mobility training/gait training/transfer training

## 2023-12-15 NOTE — PROGRESS NOTE ADULT - SUBJECTIVE AND OBJECTIVE BOX
[facility] Progress Note    MAR, chart notes, fingersticks and labs reviewed    Subjective: patient overall feels well, his diet is advanced    Objective  Vital Signs  T(C): 36.4 (12-15-23 @ 15:46), Max: 36.4 (12-14-23 @ 19:59)  HR: 56 (12-15-23 @ 15:46) (54 - 58)  BP: 160/70 (12-15-23 @ 15:46) (160/70 - 179/66)  RR: 18 (12-15-23 @ 15:46) (18 - 18)  SpO2: 96% (12-15-23 @ 15:46) (93% - 96%)    Physical Exam  Gen: NAD, alert, awake, sitting in chair, wife and son present  HEENT: NC/AT, EOMI  Neck: supple  Chest: breathing comfortably  Heart: +s1 +s2    Medications  acetaminophen     Tablet .. 650 milliGRAM(s) Oral every 6 hours PRN  aMIOdarone    Tablet 100 milliGRAM(s) Oral daily  amLODIPine   Tablet 10 milliGRAM(s) Oral daily  apixaban 5 milliGRAM(s) Oral every 12 hours  aspirin enteric coated 81 milliGRAM(s) Oral daily  chlorhexidine 2% Cloths 1 Application(s) Topical daily  cholecalciferol 1000 Unit(s) Oral daily  cyanocobalamin 1000 MICROGram(s) Oral daily  dextrose 5%. 1000 milliLiter(s) IV Continuous <Continuous>  dextrose 5%. 1000 milliLiter(s) IV Continuous <Continuous>  dextrose 50% Injectable 12.5 Gram(s) IV Push once  dextrose 50% Injectable 25 Gram(s) IV Push once  dextrose Oral Gel 15 Gram(s) Oral once PRN  glucagon  Injectable 1 milliGRAM(s) IntraMuscular once  hydrochlorothiazide 25 milliGRAM(s) Oral daily  insulin lispro (ADMELOG) corrective regimen sliding scale   SubCutaneous at bedtime  insulin lispro (ADMELOG) corrective regimen sliding scale   SubCutaneous three times a day before meals  lactated ringers. 1000 milliLiter(s) IV Continuous <Continuous>  lisinopril 40 milliGRAM(s) Oral daily  melatonin 3 milliGRAM(s) Oral at bedtime PRN  metoclopramide Injectable 10 milliGRAM(s) IV Push every 8 hours  metoprolol succinate ER 50 milliGRAM(s) Oral daily    Pertinent labs/Imaging  POCT Blood Glucose.: 161 mg/dL (12-15-23 @ 16:35)  POCT Blood Glucose.: 189 mg/dL (12-15-23 @ 12:05)  POCT Blood Glucose.: 135 mg/dL (12-15-23 @ 08:16)  POCT Blood Glucose.: 149 mg/dL (12-14-23 @ 21:19)    eGFR: 67 mL/min/1.73m2 (12-15-23 @ 12:49)  eGFR: 60 mL/min/1.73m2 (12-14-23 @ 07:02)

## 2023-12-15 NOTE — PROGRESS NOTE ADULT - ASSESSMENT
73 year old male with acute abdominal pain, bloating, decreased gas, suspect ileus/pSBO, possibly due to enteritis vs adhesive scar tissue from prior surgery. CT abdomen reviewed. Mildly tympanic/distended on exam.  -Doing well with Reglan, now passing flatus and resolution of n/v  -seems to have resolving ileus, diet advanced      Advanced care planning forms were discussed. Code status including forceful chest compressions, defibrillation and intubation were discussed. The risks benefits and alternatives to pertinent gastrointestinal procedures and interventions were discussed in detail and all questions were answered. Duration: 15 Minutes.    [facility]  Nicola De La Torre M.D.   [address], [place]  Office: [phone]   73 year old male with acute abdominal pain, bloating, decreased gas, suspect ileus/pSBO, possibly due to enteritis vs adhesive scar tissue from prior surgery. CT abdomen reviewed. Mildly tympanic/distended on exam.  -Doing well with Reglan, now passing flatus and resolution of n/v  -seems to have resolving ileus, diet advanced      Advanced care planning forms were discussed. Code status including forceful chest compressions, defibrillation and intubation were discussed. The risks benefits and alternatives to pertinent gastrointestinal procedures and interventions were discussed in detail and all questions were answered. Duration: 15 Minutes.    [facility]  Nicola De La Torre M.D.   [address], [place]  Office: [phone]

## 2023-12-15 NOTE — PROGRESS NOTE ADULT - SUBJECTIVE AND OBJECTIVE BOX
[facility]   HEMATOLOGY/ONCOLOGY INPATIENT PROGRESS NOTE     Interval Hx:   12/15/2023: Mr. Alberto was seen at bedside, continues with abdominal distension however symptoms improved, no overnight events noted, Endocrinology eval pending    Meds:   MEDICATIONS  (STANDING):  aMIOdarone    Tablet 100 milliGRAM(s) Oral daily  amLODIPine   Tablet 10 milliGRAM(s) Oral daily  apixaban 5 milliGRAM(s) Oral every 12 hours  aspirin enteric coated 81 milliGRAM(s) Oral daily  chlorhexidine 2% Cloths 1 Application(s) Topical daily  cholecalciferol 1000 Unit(s) Oral daily  cyanocobalamin 1000 MICROGram(s) Oral daily  dextrose 5%. 1000 milliLiter(s) (100 mL/Hr) IV Continuous <Continuous>  dextrose 5%. 1000 milliLiter(s) (50 mL/Hr) IV Continuous <Continuous>  dextrose 50% Injectable 12.5 Gram(s) IV Push once  dextrose 50% Injectable 25 Gram(s) IV Push once  glucagon  Injectable 1 milliGRAM(s) IntraMuscular once  hydrochlorothiazide 25 milliGRAM(s) Oral daily  insulin lispro (ADMELOG) corrective regimen sliding scale   SubCutaneous three times a day before meals  insulin lispro (ADMELOG) corrective regimen sliding scale   SubCutaneous at bedtime  lactated ringers. 1000 milliLiter(s) (75 mL/Hr) IV Continuous <Continuous>  lisinopril 40 milliGRAM(s) Oral daily  metoclopramide Injectable 10 milliGRAM(s) IV Push every 8 hours  metoprolol succinate ER 50 milliGRAM(s) Oral daily    MEDICATIONS  (PRN):  acetaminophen     Tablet .. 650 milliGRAM(s) Oral every 6 hours PRN Temp greater or equal to 38C (100.4F), Mild Pain (1 - 3)  dextrose Oral Gel 15 Gram(s) Oral once PRN Blood Glucose LESS THAN 70 milliGRAM(s)/deciliter  melatonin 3 milliGRAM(s) Oral at bedtime PRN Insomnia      Vital Signs Last 24 Hrs  T(C): 36.4 (14 Dec 2023 23:53), Max: 36.5 (14 Dec 2023 08:47)  T(F): 97.5 (14 Dec 2023 23:53), Max: 97.7 (14 Dec 2023 08:47)  HR: 58 (14 Dec 2023 23:53) (55 - 92)  BP: 169/70 (14 Dec 2023 23:53) (140/68 - 169/70)  BP(mean): --  RR: 18 (14 Dec 2023 23:53) (18 - 18)  SpO2: 93% (14 Dec 2023 23:53) (93% - 97%)    Parameters below as of 14 Dec 2023 23:53  Patient On (Oxygen Delivery Method): room air      Physical Exam:  Gen: NAD  HEENT: moist mucous membranes  Chest:  Equal chest rise, speaks full sentences  Cardiac: irregular  Abd: distended, no gross hepatomegaly or splenomegaly  Ext: 3+ edema bilaterally   Neuro: AAOX3 with normal mood and affect    Labs:                        11.1   7.50  )-----------( 206      ( 14 Dec 2023 07:02 )             34.3     CBC Full  -  ( 14 Dec 2023 07:02 )  WBC Count : 7.50 K/uL  RBC Count : 3.77 M/uL  Hemoglobin : 11.1 g/dL  Hematocrit : 34.3 %  Platelet Count - Automated : 206 K/uL  Mean Cell Volume : 91.0 fl  Mean Cell Hemoglobin : 29.4 pg  Mean Cell Hemoglobin Concentration : 32.4 gm/dL    12-14    141  |  99  |  51<H>  ----------------------------<  109<H>  3.5   |  22  |  1.26    Ca    9.5      14 Dec 2023 07:02

## 2023-12-15 NOTE — PROGRESS NOTE ADULT - SUBJECTIVE AND OBJECTIVE BOX
Patient is a 73y old  Male who presents with a chief complaint of abdominal pain x few days (15 Dec 2023 11:50)      SUBJECTIVE / OVERNIGHT EVENTS:  Patient seen and examined.   Feels a little better.       Vital Signs Last 24 Hrs  T(C): 36.4 (15 Dec 2023 07:43), Max: 36.4 (14 Dec 2023 19:59)  T(F): 97.6 (15 Dec 2023 07:43), Max: 97.6 (15 Dec 2023 07:43)  HR: 55 (15 Dec 2023 09:30) (54 - 58)  BP: 169/81 (15 Dec 2023 09:30) (140/68 - 179/66)  BP(mean): --  RR: 18 (15 Dec 2023 07:43) (18 - 18)  SpO2: 95% (15 Dec 2023 07:43) (93% - 97%)    Parameters below as of 15 Dec 2023 07:43  Patient On (Oxygen Delivery Method): room air      I&O's Summary    14 Dec 2023 07:01  -  15 Dec 2023 07:00  --------------------------------------------------------  IN: 1350 mL / OUT: 300 mL / NET: 1050 mL        PHYSICAL EXAM:  GENERAL: NAD, AAOx3  HEAD:  Atraumatic, Normocephalic  EYES: EOMI; conjunctiva and sclera clear  NECK: Supple, No JVD, No LAD  CHEST/LUNG: B/L air entry; No wheezes, rales or rhonci   HEART: Regular rate and rhythm; No murmurs, rubs, or gallops  ABDOMEN: Soft, Nontender, Nondistended; Bowel sounds present  EXTREMITIES:  2+ Peripheral Pulses, No clubbing, cyanosis, or edema  SKIN: No rashes or lesions  LABS:                        11.4   7.74  )-----------( 204      ( 15 Dec 2023 12:49 )             34.0     12-15    139  |  101  |  54<H>  ----------------------------<  177<H>  3.5   |  24  |  1.16    Ca    9.2      15 Dec 2023 12:49        CAPILLARY BLOOD GLUCOSE      POCT Blood Glucose.: 189 mg/dL (15 Dec 2023 12:05)  POCT Blood Glucose.: 135 mg/dL (15 Dec 2023 08:16)  POCT Blood Glucose.: 149 mg/dL (14 Dec 2023 21:19)  POCT Blood Glucose.: 211 mg/dL (14 Dec 2023 16:34)        Urinalysis Basic - ( 15 Dec 2023 12:49 )    Color: x / Appearance: x / SG: x / pH: x  Gluc: 177 mg/dL / Ketone: x  / Bili: x / Urobili: x   Blood: x / Protein: x / Nitrite: x   Leuk Esterase: x / RBC: x / WBC x   Sq Epi: x / Non Sq Epi: x / Bacteria: x        RADIOLOGY & ADDITIONAL TESTS:    Imaging Personally Reviewed:  [x] YES  [ ] NO    Consultant(s) Notes Reviewed:  [x] YES  [ ] NO      MEDICATIONS  (STANDING):  aMIOdarone    Tablet 100 milliGRAM(s) Oral daily  amLODIPine   Tablet 10 milliGRAM(s) Oral daily  apixaban 5 milliGRAM(s) Oral every 12 hours  aspirin enteric coated 81 milliGRAM(s) Oral daily  chlorhexidine 2% Cloths 1 Application(s) Topical daily  cholecalciferol 1000 Unit(s) Oral daily  cyanocobalamin 1000 MICROGram(s) Oral daily  dextrose 5%. 1000 milliLiter(s) (100 mL/Hr) IV Continuous <Continuous>  dextrose 5%. 1000 milliLiter(s) (50 mL/Hr) IV Continuous <Continuous>  dextrose 50% Injectable 12.5 Gram(s) IV Push once  dextrose 50% Injectable 25 Gram(s) IV Push once  glucagon  Injectable 1 milliGRAM(s) IntraMuscular once  hydrochlorothiazide 25 milliGRAM(s) Oral daily  insulin lispro (ADMELOG) corrective regimen sliding scale   SubCutaneous three times a day before meals  insulin lispro (ADMELOG) corrective regimen sliding scale   SubCutaneous at bedtime  lactated ringers. 1000 milliLiter(s) (75 mL/Hr) IV Continuous <Continuous>  lisinopril 40 milliGRAM(s) Oral daily  metoclopramide Injectable 10 milliGRAM(s) IV Push every 8 hours  metoprolol succinate ER 50 milliGRAM(s) Oral daily    MEDICATIONS  (PRN):  acetaminophen     Tablet .. 650 milliGRAM(s) Oral every 6 hours PRN Temp greater or equal to 38C (100.4F), Mild Pain (1 - 3)  dextrose Oral Gel 15 Gram(s) Oral once PRN Blood Glucose LESS THAN 70 milliGRAM(s)/deciliter  melatonin 3 milliGRAM(s) Oral at bedtime PRN Insomnia      Care Discussed with Consultants/Other Providers [x] YES  [ ] NO    HEALTH ISSUES - PROBLEM Dx:  Nausea and vomiting    Abdominal pain    CAD (coronary artery disease)    Chronic atrial fibrillation    H/O myositis    HTN (hypertension)    Colon cancer

## 2023-12-15 NOTE — PHYSICAL THERAPY INITIAL EVALUATION ADULT - MANUAL MUSCLE TESTING RESULTS, REHAB EVAL
BUE grossly at least 3/5 throughout , BLE hip flex 2-/5, knee flex 3/5, ankle DF/PF 2-/5/grossly assessed due to

## 2023-12-15 NOTE — PROGRESS NOTE ADULT - ASSESSMENT
73y old Male with history of T2DM, Afib on Eliquis, CAD, HTN, HLD, Renal cell carcinoma, Colon cancer, and Prostate cancer here with abdominal pain. Endocrine following for hyperthyroidism.    1. Hyperthyroidism  2. Chronic amiodarone use  - Labs show hyperthyroidism with suppressed TSH and elevated FT4  - Antibodies are pending and will take several days to result  - Hold off on methimazole at this time until the cause is determined as the treatment for type II is typically steroids    3. T2DM  - FS at goal  - Continue low Admelog correctional scale before meals and bedtime  - Monitor FS before meals and bedtime  - Follow hospital hypoglycemic protocol    4. Afib on Eliquis  - No indication to stop Amiodarone as the thyroid disease can be managed with medication  - Continue with Eliquis    Will continue to follow.    Jefe Owusu MD  [facility]    [address] [place] [address]    T [phone]  F [phone]   73y old Male with history of T2DM, Afib on Eliquis, CAD, HTN, HLD, Renal cell carcinoma, Colon cancer, and Prostate cancer here with abdominal pain. Endocrine following for hyperthyroidism.    1. Hyperthyroidism  2. Chronic amiodarone use  - Labs show hyperthyroidism with suppressed TSH and elevated FT4  - Antibodies are pending and will take several days to result  - Hold off on methimazole at this time until the cause is determined as the treatment for type II is typically steroids    3. T2DM  - FS at goal  - Continue low Admelog correctional scale before meals and bedtime  - Monitor FS before meals and bedtime  - Follow hospital hypoglycemic protocol    4. Afib on Eliquis  - No indication to stop Amiodarone as the thyroid disease can be managed with medication  - Continue with Eliquis    Will continue to follow.    Jefe Owusu MD  [facility]    [address] [place] [address]    T [phone]  F [phone]   73y old Male with history of T2DM, Afib on Eliquis, CAD, HTN, HLD, Renal cell carcinoma, Colon cancer, and Prostate cancer here with abdominal pain. Endocrine following for hyperthyroidism.    1. Hyperthyroidism  2. Chronic amiodarone use  - Labs show hyperthyroidism with suppressed TSH and elevated FT4  - Antibodies are pending and will take several days to result  - Hold off on methimazole at this time until the cause is determined as the treatment for type II is typically steroids  - The diagnosis, evaluation and management was discussed in detail with the patient and his wife and son    3. T2DM  - FS at goal  - Continue low Admelog correctional scale before meals and bedtime  - Monitor FS before meals and bedtime  - Follow hospital hypoglycemic protocol    4. Afib on Eliquis  - No indication to stop Amiodarone as the thyroid disease can be managed with medication  - Continue with Eliquis    Will continue to follow.    Jefe Owusu MD  [facility]    [address] [place] [id number]    T [phone]  F [phone]   73y old Male with history of T2DM, Afib on Eliquis, CAD, HTN, HLD, Renal cell carcinoma, Colon cancer, and Prostate cancer here with abdominal pain. Endocrine following for hyperthyroidism.    1. Hyperthyroidism  2. Chronic amiodarone use  - Labs show hyperthyroidism with suppressed TSH and elevated FT4  - Antibodies are pending and will take several days to result  - Hold off on methimazole at this time until the cause is determined as the treatment for type II is typically steroids  - The diagnosis, evaluation and management was discussed in detail with the patient and his wife and son    3. T2DM  - FS at goal  - Continue low Admelog correctional scale before meals and bedtime  - Monitor FS before meals and bedtime  - Follow hospital hypoglycemic protocol    4. Afib on Eliquis  - No indication to stop Amiodarone as the thyroid disease can be managed with medication  - Continue with Eliquis    Will continue to follow.    Jefe Owusu MD  [facility]    [address] [place] [id number]    T [phone]  F [phone]   Patient/Caregiver provided printed discharge information.

## 2023-12-15 NOTE — PHYSICAL THERAPY INITIAL EVALUATION ADULT - PERTINENT HX OF CURRENT PROBLEM, REHAB EVAL
74yo M with PMH of HTN, CAD, NSTEMI s/p PCI (on baby ASA), T2DM, HLD, statin induced myositis, prostate CA s/p prostatectomy, AS s/p TAVR, A.fib on Eliquis s/p PPM, colon CA s/p surgery and chemo, kidney CA (being monitored, no current treatment), presenting with abdominal pain and bloating, and n/v x 3 days.    CT A/P: No acute findings in the chest abdomen or pelvis. 72yo M with PMH of HTN, CAD, NSTEMI s/p PCI (on baby ASA), T2DM, HLD, statin induced myositis, prostate CA s/p prostatectomy, AS s/p TAVR, A.fib on Eliquis s/p PPM, colon CA s/p surgery and chemo, kidney CA (being monitored, no current treatment), presenting with abdominal pain and bloating, and n/v x 3 days.    CT A/P: No acute findings in the chest abdomen or pelvis.

## 2023-12-15 NOTE — PHYSICAL THERAPY INITIAL EVALUATION ADULT - ACTIVE RANGE OF MOTION EXAMINATION, REHAB EVAL
BLE ankle DF/PF limited due to edema/bilateral upper extremity Active ROM was WFL (within functional limits)/bilateral  lower extremity Active ROM was WFL (within functional limits)

## 2023-12-15 NOTE — PROGRESS NOTE ADULT - ASSESSMENT
Mr. 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     Onc Hx:  right-sided  4.8 cm  invasive moderately differentiated colon adenocarcinoma  extending into the frederic colonic adipose tissue no lymphovascular or perineural invasion  5/35 lymph nodes involved with metastatic carcinoma no loss of mismatch repair protein (MMRp) noted therefore a stage IIIB T3 N2a status post right hemicolectomy on 03/25/2021  and then completed 12 cycles of 5 FU leucovorin every 2 weeks x6 months completed on 10/27/2021. Has not had any evidence of disease since that time.  prior to the diagnosis of his colon cancer he never had a colonoscopy.    Overall impression:  Mr. Alberto has a history of stage IIIB right colon adenocarcinoma status post hemicolectomy and adjuvant 5 FU leucovorin completing all therapy on 10/27/2021 and has been on surveillance since then under the care of Dr. Gross.  most recent outpatient CT abdomen and pelvis on 06/01/2023 showed no suspicious lesions except for a wall thickening versus focal peristalsis in the distal transverse colon and at that time the right kidney lesion was 1.5 cm. Inpatient CT C/A/P without new masses or  new lymphadenopathy.  Labs here remarkable mild anemia Hb 11.2,  outpatient most recent CBC with hemoglobin 11.8,  outpatient iron profile with ferritin 166 saturation 23% and CEA 1.27 on 11/21/2023.   GI  consulted recs pending. ESR elevated to 127 as outpatient and TSH was undetectable, unclear. Recommend Endocrinology consult thyroid dysfunction can be due to amiodarone, workup pending     # Personal Hx of stage IIIB right colon adenocarcinoma  -  status post hemicolectomy and chemotherapy completed in 2021  -  CT abdomen pelvis without active disease noted  -  CEA as outpatient stable at 1.27  3 weeks ago    #  Nausea vomiting poor appetite  # Partial SBO vs ileus   - unclear cause at this time team to advance diet as tolerated  - Due to IVIG or thyroid disease?   - GI recs noted possible ileus vs partial SBO in setting of hx of abdominal surgery/adhesions   - Trial of reglan and advance diet as tolerated and monitor per GI     #  Normocytic anemia  -  hemoglobin stable compared to outpatient no signs of active GI bleed  -  transfuse to maintain hemoglobin greater than 7    # Right papillary renal carcinoma  -  followed as an outpatient with Urology  - CT A/P  shows a 2.5 cm lesion  -  follow up Urology    #  Coagulopathy  -  likely due to Eliquis    # Hyperthyroidism   - Endocrinology consulted eval pending for possible amiodarone related thyroid-toxicity       Thank you for allowing me to participate in the care of Mr. Alberto, please do not hesitate to call or text me if you have further questions or concerns.     Gibran Bautista MD  [facility]   Division of Hematology/Oncology  [address] [place] [address], [address]  [place] [address]  P: [phone]  F: [phone]    Attestation:    ----Including face-to-face interaction in addition to chart review, reviewing treatment plan, and managing the patient’s chronic diagnoses as listed in the assessment----      Mr. 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     Onc Hx:  right-sided  4.8 cm  invasive moderately differentiated colon adenocarcinoma  extending into the frederic colonic adipose tissue no lymphovascular or perineural invasion  5/35 lymph nodes involved with metastatic carcinoma no loss of mismatch repair protein (MMRp) noted therefore a stage IIIB T3 N2a status post right hemicolectomy on 03/25/2021  and then completed 12 cycles of 5 FU leucovorin every 2 weeks x6 months completed on 10/27/2021. Has not had any evidence of disease since that time.  prior to the diagnosis of his colon cancer he never had a colonoscopy.    Overall impression:  Mr. Alberto has a history of stage IIIB right colon adenocarcinoma status post hemicolectomy and adjuvant 5 FU leucovorin completing all therapy on 10/27/2021 and has been on surveillance since then under the care of Dr. Gross.  most recent outpatient CT abdomen and pelvis on 06/01/2023 showed no suspicious lesions except for a wall thickening versus focal peristalsis in the distal transverse colon and at that time the right kidney lesion was 1.5 cm. Inpatient CT C/A/P without new masses or  new lymphadenopathy.  Labs here remarkable mild anemia Hb 11.2,  outpatient most recent CBC with hemoglobin 11.8,  outpatient iron profile with ferritin 166 saturation 23% and CEA 1.27 on 11/21/2023.   GI  consulted recs pending. ESR elevated to 127 as outpatient and TSH was undetectable, unclear. Recommend Endocrinology consult thyroid dysfunction can be due to amiodarone, workup pending     # Personal Hx of stage IIIB right colon adenocarcinoma  -  status post hemicolectomy and chemotherapy completed in 2021  -  CT abdomen pelvis without active disease noted  -  CEA as outpatient stable at 1.27  3 weeks ago    #  Nausea vomiting poor appetite  # Partial SBO vs ileus   - unclear cause at this time team to advance diet as tolerated  - Due to IVIG or thyroid disease?   - GI recs noted possible ileus vs partial SBO in setting of hx of abdominal surgery/adhesions   - Trial of reglan and advance diet as tolerated and monitor per GI     #  Normocytic anemia  -  hemoglobin stable compared to outpatient no signs of active GI bleed  -  transfuse to maintain hemoglobin greater than 7    # Right papillary renal carcinoma  -  followed as an outpatient with Urology  - CT A/P  shows a 2.5 cm lesion  -  follow up Urology    #  Coagulopathy  -  likely due to Eliquis    # Hyperthyroidism   - Endocrinology consulted eval pending for possible amiodarone related thyroid-toxicity       Thank you for allowing me to participate in the care of Mr. Alberto, please do not hesitate to call or text me if you have further questions or concerns.     Gibran Bautista MD  [facility] [place]   Division of Hematology/Oncology  [address] [place] [address], [address]  [place] [address]  P: [phone]  F: [phone]    Attestation:    ----Including face-to-face interaction in addition to chart review, reviewing treatment plan, and managing the patient’s chronic diagnoses as listed in the assessment----

## 2023-12-15 NOTE — PROGRESS NOTE ADULT - ASSESSMENT
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   - no acute pathology noted on CT AP ,  will advance diet as tolerated , Qtc prolonged on ekg patient is V -paced and likely inaccurate  - Advance diet as tolerated   - unclear etiology; will give reglan trial and monitor   - GI consulted   - continue gentle IV hydration   - enema per GI    Abdominal pain.   - no acute findings on imaging   - Tylenol prn.    CAD (coronary artery disease).   - continue asa   - no statin given h/o myositis.    HTN (hypertension).   - uncontrolled , due to missed medications , since < 180 and asymptomatic , can resume home meds as scheduled   - continue amlodipine   - continue HCTZ   - continue lisinopril   - continue metoprolol.    H/O myositis.   on ? IVIG infusions , last session November , seems unlikely contributing since several weeks since last dose  - was getting IVIG from heme onc ; f/u rec , consulted.   - outpatient follow up as scheduled   - check CPK in am.    Chronic atrial fibrillation.   ·  Plan: - continue Eliquis   - continue amiodarone.    Colon cancer.   - outpatient c/u as scheduled.  - heme onc consulted     Brody Gresham MD   [facility]  [phone]    DIspo: likely dc tomorrow 12/16.    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   - no acute pathology noted on CT AP ,  will advance diet as tolerated , Qtc prolonged on ekg patient is V -paced and likely inaccurate  - Advance diet as tolerated   - unclear etiology; will give reglan trial and monitor   - GI consulted   - continue gentle IV hydration   - enema per GI    Abdominal pain.   - no acute findings on imaging   - Tylenol prn.    CAD (coronary artery disease).   - continue asa   - no statin given h/o myositis.    HTN (hypertension).   - uncontrolled , due to missed medications , since < 180 and asymptomatic , can resume home meds as scheduled   - continue amlodipine   - continue HCTZ   - continue lisinopril   - continue metoprolol.    H/O myositis.   on ? IVIG infusions , last session November , seems unlikely contributing since several weeks since last dose  - was getting IVIG from heme onc ; f/u rec , consulted.   - outpatient follow up as scheduled   - check CPK in am.    Chronic atrial fibrillation.   ·  Plan: - continue Eliquis   - continue amiodarone.    Colon cancer.   - outpatient c/u as scheduled.  - heme onc consulted     Brody Gresham MD   [facility]  [phone]    DIspo: likely dc tomorrow 12/16.

## 2023-12-16 ENCOUNTER — TRANSCRIPTION ENCOUNTER (OUTPATIENT)
Age: 73
End: 2023-12-16

## 2023-12-16 VITALS
TEMPERATURE: 99 F | DIASTOLIC BLOOD PRESSURE: 78 MMHG | RESPIRATION RATE: 18 BRPM | HEART RATE: 82 BPM | SYSTOLIC BLOOD PRESSURE: 168 MMHG | OXYGEN SATURATION: 98 %

## 2023-12-16 DIAGNOSIS — K56.7 ILEUS, UNSPECIFIED: ICD-10-CM

## 2023-12-16 LAB
GLUCOSE BLDC GLUCOMTR-MCNC: 170 MG/DL — HIGH (ref 70–99)
GLUCOSE BLDC GLUCOMTR-MCNC: 170 MG/DL — HIGH (ref 70–99)
GLUCOSE BLDC GLUCOMTR-MCNC: 205 MG/DL — HIGH (ref 70–99)
GLUCOSE BLDC GLUCOMTR-MCNC: 205 MG/DL — HIGH (ref 70–99)
TSI ACT/NOR SER: <0.1 IU/L — SIGNIFICANT CHANGE UP (ref 0–0.55)
TSI ACT/NOR SER: <0.1 IU/L — SIGNIFICANT CHANGE UP (ref 0–0.55)

## 2023-12-16 PROCEDURE — 80053 COMPREHEN METABOLIC PANEL: CPT

## 2023-12-16 PROCEDURE — 83605 ASSAY OF LACTIC ACID: CPT

## 2023-12-16 PROCEDURE — 85025 COMPLETE CBC W/AUTO DIFF WBC: CPT

## 2023-12-16 PROCEDURE — 82550 ASSAY OF CK (CPK): CPT

## 2023-12-16 PROCEDURE — 84132 ASSAY OF SERUM POTASSIUM: CPT

## 2023-12-16 PROCEDURE — 85014 HEMATOCRIT: CPT

## 2023-12-16 PROCEDURE — 71260 CT THORAX DX C+: CPT | Mod: MA

## 2023-12-16 PROCEDURE — 85730 THROMBOPLASTIN TIME PARTIAL: CPT

## 2023-12-16 PROCEDURE — 82330 ASSAY OF CALCIUM: CPT

## 2023-12-16 PROCEDURE — 82435 ASSAY OF BLOOD CHLORIDE: CPT

## 2023-12-16 PROCEDURE — 99285 EMERGENCY DEPT VISIT HI MDM: CPT

## 2023-12-16 PROCEDURE — 84480 ASSAY TRIIODOTHYRONINE (T3): CPT

## 2023-12-16 PROCEDURE — 84445 ASSAY OF TSI GLOBULIN: CPT

## 2023-12-16 PROCEDURE — 80048 BASIC METABOLIC PNL TOTAL CA: CPT

## 2023-12-16 PROCEDURE — 82962 GLUCOSE BLOOD TEST: CPT

## 2023-12-16 PROCEDURE — 84295 ASSAY OF SERUM SODIUM: CPT

## 2023-12-16 PROCEDURE — 93005 ELECTROCARDIOGRAM TRACING: CPT

## 2023-12-16 PROCEDURE — 83036 HEMOGLOBIN GLYCOSYLATED A1C: CPT

## 2023-12-16 PROCEDURE — 83690 ASSAY OF LIPASE: CPT

## 2023-12-16 PROCEDURE — 84443 ASSAY THYROID STIM HORMONE: CPT

## 2023-12-16 PROCEDURE — 83520 IMMUNOASSAY QUANT NOS NONAB: CPT

## 2023-12-16 PROCEDURE — 84439 ASSAY OF FREE THYROXINE: CPT

## 2023-12-16 PROCEDURE — 85610 PROTHROMBIN TIME: CPT

## 2023-12-16 PROCEDURE — 85018 HEMOGLOBIN: CPT

## 2023-12-16 PROCEDURE — 74177 CT ABD & PELVIS W/CONTRAST: CPT | Mod: MA

## 2023-12-16 PROCEDURE — 82947 ASSAY GLUCOSE BLOOD QUANT: CPT

## 2023-12-16 PROCEDURE — 81001 URINALYSIS AUTO W/SCOPE: CPT

## 2023-12-16 PROCEDURE — 86376 MICROSOMAL ANTIBODY EACH: CPT

## 2023-12-16 PROCEDURE — 82803 BLOOD GASES ANY COMBINATION: CPT

## 2023-12-16 PROCEDURE — 36415 COLL VENOUS BLD VENIPUNCTURE: CPT

## 2023-12-16 PROCEDURE — 85027 COMPLETE CBC AUTOMATED: CPT

## 2023-12-16 PROCEDURE — 96374 THER/PROPH/DIAG INJ IV PUSH: CPT

## 2023-12-16 PROCEDURE — 97162 PT EVAL MOD COMPLEX 30 MIN: CPT

## 2023-12-16 RX ORDER — APIXABAN 2.5 MG/1
1 TABLET, FILM COATED ORAL
Qty: 0 | Refills: 0 | DISCHARGE
Start: 2023-12-16

## 2023-12-16 RX ORDER — LISINOPRIL 2.5 MG/1
1 TABLET ORAL
Qty: 0 | Refills: 0 | DISCHARGE
Start: 2023-12-16

## 2023-12-16 RX ORDER — METOPROLOL TARTRATE 50 MG
1 TABLET ORAL
Qty: 0 | Refills: 0 | DISCHARGE
Start: 2023-12-16

## 2023-12-16 RX ORDER — AMLODIPINE BESYLATE 2.5 MG/1
1 TABLET ORAL
Qty: 0 | Refills: 0 | DISCHARGE
Start: 2023-12-16

## 2023-12-16 RX ORDER — FUROSEMIDE 40 MG
1 TABLET ORAL
Refills: 0 | DISCHARGE

## 2023-12-16 RX ORDER — METOPROLOL TARTRATE 50 MG
1 TABLET ORAL
Qty: 0 | Refills: 0 | DISCHARGE

## 2023-12-16 RX ADMIN — Medication 1000 UNIT(S): at 11:25

## 2023-12-16 RX ADMIN — APIXABAN 5 MILLIGRAM(S): 2.5 TABLET, FILM COATED ORAL at 05:03

## 2023-12-16 RX ADMIN — Medication 10 MILLIGRAM(S): at 05:04

## 2023-12-16 RX ADMIN — CHLORHEXIDINE GLUCONATE 1 APPLICATION(S): 213 SOLUTION TOPICAL at 11:29

## 2023-12-16 RX ADMIN — Medication 81 MILLIGRAM(S): at 11:25

## 2023-12-16 RX ADMIN — Medication 1: at 08:14

## 2023-12-16 RX ADMIN — PREGABALIN 1000 MICROGRAM(S): 225 CAPSULE ORAL at 11:25

## 2023-12-16 RX ADMIN — AMIODARONE HYDROCHLORIDE 100 MILLIGRAM(S): 400 TABLET ORAL at 05:03

## 2023-12-16 RX ADMIN — LISINOPRIL 40 MILLIGRAM(S): 2.5 TABLET ORAL at 05:03

## 2023-12-16 RX ADMIN — Medication 50 MILLIGRAM(S): at 05:03

## 2023-12-16 RX ADMIN — Medication 300 UNIT(S): at 13:45

## 2023-12-16 RX ADMIN — AMLODIPINE BESYLATE 10 MILLIGRAM(S): 2.5 TABLET ORAL at 05:03

## 2023-12-16 RX ADMIN — Medication 2: at 12:05

## 2023-12-16 NOTE — PROGRESS NOTE ADULT - REASON FOR ADMISSION
abdominal pain x few days

## 2023-12-16 NOTE — DISCHARGE NOTE PROVIDER - CARE PROVIDER_API CALL
Nicola De La Torre  Gastroenterology  [address] [place] [address]  Phone: [phone]  Fax: [phone]  Follow Up Time:     Gibran Bautista  [facility]  [address], [address]  [place] [address]  Phone: [phone]  Fax: [phone]  Follow Up Time:     Chata Barkley  Internal Medicine  [address] [place] [address], [address]  [place] [address]  Phone: [phone]  Fax: [phone]  Follow Up Time:    Nicola De La Torre  Gastroenterology  [address] [place] [address]  Phone: [phone]  Fax: [phone]  Follow Up Time:     Gibran Bautista  [facility]  [address], [address]  [place] [address]  Phone: [phone]  Fax: [phone]  Follow Up Time:     Chata Barkley  Internal Medicine  [address] [place] [address], [address]  [place] [address]  Phone: [phone]  Fax: [phone]  Follow Up Time:

## 2023-12-16 NOTE — DISCHARGE NOTE NURSING/CASE MANAGEMENT/SOCIAL WORK - NSDCPEFALRISK_GEN_ALL_CORE
For information on Fall & Injury Prevention, visit: [email or web address] OR  [email or web address] OR  https://www.cdc.gov/steadi/patient.html For information on Fall & Injury Prevention, visit: [email or web address] OR  [email or web address] OR  https://www.cdc.gov/steadi/patient.html

## 2023-12-16 NOTE — DISCHARGE NOTE PROVIDER - NSDCCPCAREPLAN_GEN_ALL_CORE_FT
PRINCIPAL DISCHARGE DIAGNOSIS  Diagnosis: Nausea and vomiting  Assessment and Plan of Treatment: resolved, f/up with GI IN 1-2 weeks      SECONDARY DISCHARGE DIAGNOSES  Diagnosis: Ileus  Assessment and Plan of Treatment: possible ileus per GI, resolved, f/up with GI    Diagnosis: H/O myositis  Assessment and Plan of Treatment: stable, f/up with Hm/Onc and GI    Diagnosis: Colon cancer  Assessment and Plan of Treatment: stable, f/up with Hm/Onc    Diagnosis: HTN (hypertension)  Assessment and Plan of Treatment: controlled, cont current home meds, Lasix on hold    Diagnosis: Chronic atrial fibrillation  Assessment and Plan of Treatment: controlled, cont current home meds    Diagnosis: CAD (coronary artery disease)  Assessment and Plan of Treatment: stable, cont low dose aspirin    Diagnosis: Abdominal pain  Assessment and Plan of Treatment: resolved

## 2023-12-16 NOTE — PROGRESS NOTE ADULT - SUBJECTIVE AND OBJECTIVE BOX
INTERVAL HPI/OVERNIGHT EVENTS:      pt resting in bed.          acetaminophen     Tablet .. 650 milliGRAM(s) Oral every 6 hours PRN  aMIOdarone    Tablet 100 milliGRAM(s) Oral daily  amLODIPine   Tablet 10 milliGRAM(s) Oral daily  apixaban 5 milliGRAM(s) Oral every 12 hours  aspirin enteric coated 81 milliGRAM(s) Oral daily  chlorhexidine 2% Cloths 1 Application(s) Topical daily  cholecalciferol 1000 Unit(s) Oral daily  cyanocobalamin 1000 MICROGram(s) Oral daily  dextrose 5%. 1000 milliLiter(s) IV Continuous <Continuous>  dextrose 5%. 1000 milliLiter(s) IV Continuous <Continuous>  dextrose 50% Injectable 12.5 Gram(s) IV Push once  dextrose 50% Injectable 25 Gram(s) IV Push once  dextrose Oral Gel 15 Gram(s) Oral once PRN  glucagon  Injectable 1 milliGRAM(s) IntraMuscular once  hydrochlorothiazide 25 milliGRAM(s) Oral daily  insulin lispro (ADMELOG) corrective regimen sliding scale   SubCutaneous three times a day before meals  insulin lispro (ADMELOG) corrective regimen sliding scale   SubCutaneous at bedtime  lactated ringers. 1000 milliLiter(s) IV Continuous <Continuous>  lisinopril 40 milliGRAM(s) Oral daily  melatonin 3 milliGRAM(s) Oral at bedtime PRN  metoclopramide Injectable 10 milliGRAM(s) IV Push every 8 hours  metoprolol succinate ER 50 milliGRAM(s) Oral daily                            11.4   7.74  )-----------( 204      ( 15 Dec 2023 12:49 )             34.0       Hemoglobin: 11.4 g/dL (12-15 @ 12:49)  Hemoglobin: 11.1 g/dL (12-14 @ 07:02)  Hemoglobin: 11.2 g/dL (12-13 @ 07:15)  Hemoglobin: 12.1 g/dL (12-12 @ 12:34)      12-15    139  |  101  |  54<H>  ----------------------------<  177<H>  3.5   |  24  |  1.16    Ca    9.2      15 Dec 2023 12:49      Creatinine Trend: 1.16<--, 1.26<--, 0.86<--, 0.82<--    COAGS:           T(C): 36.7 (12-16-23 @ 09:41), Max: 36.7 (12-16-23 @ 09:41)  HR: 54 (12-16-23 @ 09:41) (54 - 69)  BP: 168/69 (12-16-23 @ 09:41) (148/74 - 168/69)  RR: 18 (12-16-23 @ 09:41) (18 - 18)  SpO2: 98% (12-16-23 @ 09:41) (92% - 98%)  Wt(kg): --    I&O's Summary    15 Dec 2023 07:01  -  16 Dec 2023 07:00  --------------------------------------------------------  IN: 240 mL / OUT: 650 mL / NET: -410 mL    16 Dec 2023 07:01  -  16 Dec 2023 12:25  --------------------------------------------------------  IN: 240 mL / OUT: 0 mL / NET: 240 mL        Review of Systems:    General:  No wt loss, fevers, chills, night sweats, fatigue   Eyes:  Good vision, no reported pain  ENT:  No sore throat, pain, runny nose, dysphagia  CV:  No pain, palpitations, hypo/hypertension  Resp:  No dyspnea, cough, tachypnea, wheezing  GI:  No pain, No nausea, No vomiting, No diarrhea, No constipation, No weight loss, No fever, No pruritis, No rectal bleeding, No melena, No dysphagia  :  No pain, bleeding, incontinence, nocturia  Muscle:  No pain, weakness  Neuro:  No weakness, tingling, memory problems  Psych:  No fatigue, insomnia, mood problems, depression  Endocrine:  No polyuria, polydypsia, cold/heat intolerance  Heme:  No petechiae, ecchymosis, easy bruisability  Skin:  No rash, tattoos, scars, edema         PHYSICAL EXAM:    Constitutional: NAD  HEENT: EOMI, throat clear  Neck: No LAD, supple  Respiratory: CTA and P  Cardiovascular: S1 and S2, RRR, no M  Gastrointestinal: BS+, soft, NT/+ softly distended, neg HSM,  Extremities: No peripheral edema, neg clubbing, cyanosis  Vascular: 2+ peripheral pulses  Neurological: A/O x 3, no focal deficits  Psychiatric: Normal mood, normal affect  Skin: No rashes

## 2023-12-16 NOTE — DISCHARGE NOTE PROVIDER - NSDCCPGOAL_GEN_ALL_CORE_FT
[facility]  Today's date 6/25/2021  Admission date 6/22/2021  Hospital Room 510/01   Referring Provider RANDY Ordaz MD    Reason for initial visit:  Evaluation and management of hyperglycemia.     History of Present Illness:    Current history is provided by The patient and The chart  This is a 49 year old Male who is admitted on 6/22 with symptomatic hyperglycemia.   Remote h/o liver failure and kidney failure (rattle snake bite). Was on HD Nov 2020 to Feb 2021.   Denies h/o DM.     During this hospitalization  Pt's BG level on admission had been 1342. Na 114. AG 13. GFR 33, bicarb 22.    A1c was obtained - pending due to \"hemoglobin variant is present which interferes with the quantitation of Hemoglobin A1C.\"     Interval history  Patient is upset that he is in the hospital still; wants to be discharge now  Eating well; had Saltines last night  Patient didn't watch diabetes videos which was encouraged yesterday     Ref. Range 6/24/2021 10:01   TOTAL CORTISOL Latest Ref Range: 3.4 - 22.5 mcg/dL 5.4   ACTH - pending     Ref. Range 6/24/2021 10:01   TRIIODOTHYRONINE, FREE Latest Ref Range: 2.2 - 4.0 pg/mL 1.7 (L)   T4, FREE Latest Ref Range: 0.8 - 1.5 ng/dL 1.0   TSH Latest Ref Range: 0.350 - 5.000 mcUnits/mL 1.298     Review of Systems:  Patient denies chest pain, SOB, nausea emesis, fever, chills.    Blood sugars and insulin were reviewed while hospitalized     Recent Labs   Lab 06/24/21  0928 06/24/21  1035 06/24/21  1136 06/24/21  1244 06/24/21  1327 06/24/21  1655 06/24/21  2045 06/25/21  0725 06/25/21  0734   GLUCOSE BEDSIDE 250* 234* 135* 123* 174* 163* 166* 190* 185*     Current DM regimen  Lantus 24 units nightly  Humalog 8 units with meals with correction scale    Past Medical History:    Chronic kidney disease                                        Liver disease                                                 Cerebral infarction (CMS/HCC)                                   Comment: age  24    Myocardial infarction (CMS/HCC)                               Arthritis                                                     Gastroesophageal reflux disease                               Pneumonia                                                     Sinusitis, chronic                                            Fracture                                                      Thyroid condition                                             Blood clot associated with vein wall inflammat*               Anemia                                                        Chronic pain                                                Past Surgical History:   Procedure Laterality Date   • Bladder repair     • Facial     • Fracture surgery     • Kidney surgery         ALLERGIES:   Allergen Reactions   • Raspberry   (Food Or Med) ANAPHYLAXIS     Hospital medications:  • insulin glargine  24 Units Subcutaneous Nightly   • insulin lispro   Subcutaneous TID WC   • insulin lispro  8 Units Subcutaneous TID AC   • calcium carbonate  1,000 mg Oral Once   • pantoprazole  40 mg Oral Nightly   • sodium chloride (PF)  2 mL Intracatheter 2 times per day   • heparin (porcine)  5,000 Units Subcutaneous 3 times per day   • cefTRIAXone (ROCEPHIN) IV  2,000 mg Intravenous Daily       Physical Exam:    Visit Vitals  /73 (BP Location: LUE - Left upper extremity, Patient Position: Semi-Paniagua's)   Pulse 76   Temp 98 °F (36.7 °C) (Oral)   Resp 18   Ht 6' 2\" (1.88 m)   Wt 91.9 kg   SpO2 98%   BMI 26.01 kg/m²     Constitutional:  Resting, well developed, well nourished, in no acute distress.  Psychiatric:   Normal mood and affect.  Neurologic:  Awake, alert and oriented x3.  Speech normal.     Lab Review:  Hemoglobin A1C (%)   Date Value   06/22/2021 11.3 (H)     Sodium (mmol/L)   Date Value   06/25/2021 139     Potassium (mmol/L)   Date Value   06/25/2021 4.2     Chloride (mmol/L)   Date Value   06/25/2021 112 (H)     Glucose (mg/dL)   Date Value    06/25/2021 191 (H)     Calcium (mg/dL)   Date Value   06/25/2021 8.6     Carbon Dioxide (mmol/L)   Date Value   06/25/2021 21     BUN (mg/dL)   Date Value   06/25/2021 30 (H)     Creatinine (mg/dL)   Date Value   06/25/2021 1.96 (H)     TSH (mcUnits/mL)   Date Value   06/24/2021 1.298     GOT/AST (Units/L)   Date Value   06/23/2021 16     GPT/ALT (Units/L)   Date Value   06/23/2021 42     No results found for: GGTP  Alkaline Phosphatase (Units/L)   Date Value   06/23/2021 100     Bilirubin, Total (mg/dL)   Date Value   06/23/2021 0.7       Assessment:  · New onset of DM - most likely type 2, but need to rule out type 1 - uncontrolled.   · Solitary kidney - Cr 1.96 today  · H/o intermittent HD due to rattle snake bite  · Hx of liver disease      Plan:  Blood sugars are improving; patient had saltines overnight    A1-C 11.3 on 06/22/21    Management:  · increase lantus 24 units at bedtime  · continue Humalog 8 units with meals; add Humalog correction scale with meals  Blood Sugar less than 150 mg/dL - Give 0 units of Correction Dose  150-199 mg/dL - GIVE 1 unit of Correction Dose  200-249 mg/dL - GIVE 2 units of Correction Dose  250-299 mg/dL - GIVE 3 units of Correction Dose  300-349 mg/dL - GIVE 4 units of Correction Dose  350 mg/dL or greater - GIVE 5 units of Correction Dose and Notify MD  · Continue on carb consistent diet.  · Labs:  ·  ACTH - still pending   · NAOMI MARCOS ordered today to rule out DM type 1; patient is aware of this and needs to stay of insulin for now since labs are pending.    Patient can be discharged from endocrine standpoint.    Discharge:  · Patient will need Lantus and Novolog PENS (appear to be preferred by insurance) for discharge - regimen per hospital plan  · rx for insulin pen needles, glucometer, test strips, lancets, sharp container (done)  · Patient will follow up with established PCP in [place] within next week  · Patient to follow up with diabetic educator and nutritionist as  tiffanie.    Anni Meredith PA-C  Discussed and supervised by Dr. Betty Roberts       To get better and follow your care plan as instructed.

## 2023-12-16 NOTE — PROGRESS NOTE ADULT - PROVIDER SPECIALTY LIST ADULT
Heme/Onc
Heme/Onc
Gastroenterology
Internal Medicine
Endocrinology
Gastroenterology
Internal Medicine
Endocrinology
Gastroenterology
Heme/Onc

## 2023-12-16 NOTE — DISCHARGE NOTE PROVIDER - HOSPITAL COURSE
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   His N/V and abd pain were resolved and tolerating low fiber solid food. He is cleared for discharge home today, spoke to Attending and CM. Wife is @ bedside.

## 2023-12-16 NOTE — PROGRESS NOTE ADULT - SUBJECTIVE AND OBJECTIVE BOX
[facility]   HEMATOLOGY/ONCOLOGY INPATIENT PROGRESS NOTE     Interval Hx:   12/16/23    Meds:   MEDICATIONS  (STANDING):  aMIOdarone    Tablet 100 milliGRAM(s) Oral daily  amLODIPine   Tablet 10 milliGRAM(s) Oral daily  apixaban 5 milliGRAM(s) Oral every 12 hours  aspirin enteric coated 81 milliGRAM(s) Oral daily  chlorhexidine 2% Cloths 1 Application(s) Topical daily  cholecalciferol 1000 Unit(s) Oral daily  cyanocobalamin 1000 MICROGram(s) Oral daily  dextrose 5%. 1000 milliLiter(s) (50 mL/Hr) IV Continuous <Continuous>  dextrose 5%. 1000 milliLiter(s) (100 mL/Hr) IV Continuous <Continuous>  dextrose 50% Injectable 25 Gram(s) IV Push once  dextrose 50% Injectable 12.5 Gram(s) IV Push once  glucagon  Injectable 1 milliGRAM(s) IntraMuscular once  hydrochlorothiazide 25 milliGRAM(s) Oral daily  insulin lispro (ADMELOG) corrective regimen sliding scale   SubCutaneous three times a day before meals  insulin lispro (ADMELOG) corrective regimen sliding scale   SubCutaneous at bedtime  lactated ringers. 1000 milliLiter(s) (75 mL/Hr) IV Continuous <Continuous>  lisinopril 40 milliGRAM(s) Oral daily  metoclopramide Injectable 10 milliGRAM(s) IV Push every 8 hours  metoprolol succinate ER 50 milliGRAM(s) Oral daily    MEDICATIONS  (PRN):  acetaminophen     Tablet .. 650 milliGRAM(s) Oral every 6 hours PRN Temp greater or equal to 38C (100.4F), Mild Pain (1 - 3)  dextrose Oral Gel 15 Gram(s) Oral once PRN Blood Glucose LESS THAN 70 milliGRAM(s)/deciliter  melatonin 3 milliGRAM(s) Oral at bedtime PRN Insomnia    Vital Signs Last 24 Hrs  T(C): 36.5 (16 Dec 2023 00:08), Max: 36.5 (16 Dec 2023 00:08)  T(F): 97.7 (16 Dec 2023 00:08), Max: 97.7 (16 Dec 2023 00:08)  HR: 58 (16 Dec 2023 00:08) (54 - 58)  BP: 152/71 (16 Dec 2023 00:08) (152/71 - 179/66)  BP(mean): --  RR: 18 (16 Dec 2023 00:08) (18 - 18)  SpO2: 92% (16 Dec 2023 00:08) (92% - 96%)    Parameters below as of 16 Dec 2023 00:08  Patient On (Oxygen Delivery Method): room air    Physical Exam:  Gen: NAD  HEENT: moist mucous membranes  Chest:  Equal chest rise, speaks full sentences  Cardiac: irregular  Abd: distended, no gross hepatomegaly or splenomegaly  Ext: 3+ edema bilaterally   Neuro: AAOX3 with normal mood and affect    Labs:                        11.4   7.74  )-----------( 204      ( 15 Dec 2023 12:49 )             34.0     CBC Full  -  ( 15 Dec 2023 12:49 )  WBC Count : 7.74 K/uL  RBC Count : 3.78 M/uL  Hemoglobin : 11.4 g/dL  Hematocrit : 34.0 %  Platelet Count - Automated : 204 K/uL  Mean Cell Volume : 89.9 fl  Mean Cell Hemoglobin : 30.2 pg  Mean Cell Hemoglobin Concentration : 33.5 gm/dL  Auto Neutrophil # : x  Auto Lymphocyte # : x  Auto Monocyte # : x  Auto Eosinophil # : x  Auto Basophil # : x  Auto Neutrophil % : x  Auto Lymphocyte % : x  Auto Monocyte % : x  Auto Eosinophil % : x  Auto Basophil % : x    12-15    139  |  101  |  54<H>  ----------------------------<  177<H>  3.5   |  24  |  1.16    Ca    9.2      15 Dec 2023 12:49         [facility]   HEMATOLOGY/ONCOLOGY INPATIENT PROGRESS NOTE     Interval Hx:   12/16/23: Mr. Alberto was seen at bedside, no overnight events other than bedside enema completed with minimal stool outpatient per pt and nursing staff. Has appetite, thyroid studies show hyperthyroidism with Free T4 7.1 and undetectable TSH     Meds:   MEDICATIONS  (STANDING):  aMIOdarone    Tablet 100 milliGRAM(s) Oral daily  amLODIPine   Tablet 10 milliGRAM(s) Oral daily  apixaban 5 milliGRAM(s) Oral every 12 hours  aspirin enteric coated 81 milliGRAM(s) Oral daily  chlorhexidine 2% Cloths 1 Application(s) Topical daily  cholecalciferol 1000 Unit(s) Oral daily  cyanocobalamin 1000 MICROGram(s) Oral daily  dextrose 5%. 1000 milliLiter(s) (50 mL/Hr) IV Continuous <Continuous>  dextrose 5%. 1000 milliLiter(s) (100 mL/Hr) IV Continuous <Continuous>  dextrose 50% Injectable 25 Gram(s) IV Push once  dextrose 50% Injectable 12.5 Gram(s) IV Push once  glucagon  Injectable 1 milliGRAM(s) IntraMuscular once  hydrochlorothiazide 25 milliGRAM(s) Oral daily  insulin lispro (ADMELOG) corrective regimen sliding scale   SubCutaneous three times a day before meals  insulin lispro (ADMELOG) corrective regimen sliding scale   SubCutaneous at bedtime  lactated ringers. 1000 milliLiter(s) (75 mL/Hr) IV Continuous <Continuous>  lisinopril 40 milliGRAM(s) Oral daily  metoclopramide Injectable 10 milliGRAM(s) IV Push every 8 hours  metoprolol succinate ER 50 milliGRAM(s) Oral daily    MEDICATIONS  (PRN):  acetaminophen     Tablet .. 650 milliGRAM(s) Oral every 6 hours PRN Temp greater or equal to 38C (100.4F), Mild Pain (1 - 3)  dextrose Oral Gel 15 Gram(s) Oral once PRN Blood Glucose LESS THAN 70 milliGRAM(s)/deciliter  melatonin 3 milliGRAM(s) Oral at bedtime PRN Insomnia    Vital Signs Last 24 Hrs  T(C): 36.5 (16 Dec 2023 00:08), Max: 36.5 (16 Dec 2023 00:08)  T(F): 97.7 (16 Dec 2023 00:08), Max: 97.7 (16 Dec 2023 00:08)  HR: 58 (16 Dec 2023 00:08) (54 - 58)  BP: 152/71 (16 Dec 2023 00:08) (152/71 - 179/66)  BP(mean): --  RR: 18 (16 Dec 2023 00:08) (18 - 18)  SpO2: 92% (16 Dec 2023 00:08) (92% - 96%)    Parameters below as of 16 Dec 2023 00:08  Patient On (Oxygen Delivery Method): room air    Physical Exam:  Gen: NAD  HEENT: moist mucous membranes  Chest:  Equal chest rise, speaks full sentences  Cardiac: irregular  Abd: distended, no gross hepatomegaly or splenomegaly  Ext: 3+ edema bilaterally   Neuro: AAOX3 with normal mood and affect    Labs:                        11.4   7.74  )-----------( 204      ( 15 Dec 2023 12:49 )             34.0     CBC Full  -  ( 15 Dec 2023 12:49 )  WBC Count : 7.74 K/uL  RBC Count : 3.78 M/uL  Hemoglobin : 11.4 g/dL  Hematocrit : 34.0 %  Platelet Count - Automated : 204 K/uL  Mean Cell Volume : 89.9 fl  Mean Cell Hemoglobin : 30.2 pg  Mean Cell Hemoglobin Concentration : 33.5 gm/dL    12-15    139  |  101  |  54<H>  ----------------------------<  177<H>  3.5   |  24  |  1.16    Ca    9.2      15 Dec 2023 12:49         [facility]ospit[facility]   HEMATOLOGY/ONCOLOGY INPATIENT PROGRESS NOTE     Interval Hx:   12/16/23: Mr. Alberto was seen at bedside, no overnight events other than bedside enema completed with minimal stool outpatient per pt and nursing staff. Has appetite, thyroid studies show hyperthyroidism with Free T4 7.1 and undetectable TSH     Meds:   MEDICATIONS  (STANDING):  aMIOdarone    Tablet 100 milliGRAM(s) Oral daily  amLODIPine   Tablet 10 milliGRAM(s) Oral daily  apixaban 5 milliGRAM(s) Oral every 12 hours  aspirin enteric coated 81 milliGRAM(s) Oral daily  chlorhexidine 2% Cloths 1 Application(s) Topical daily  cholecalciferol 1000 Unit(s) Oral daily  cyanocobalamin 1000 MICROGram(s) Oral daily  dextrose 5%. 1000 milliLiter(s) (50 mL/Hr) IV Continuous <Continuous>  dextrose 5%. 1000 milliLiter(s) (100 mL/Hr) IV Continuous <Continuous>  dextrose 50% Injectable 25 Gram(s) IV Push once  dextrose 50% Injectable 12.5 Gram(s) IV Push once  glucagon  Injectable 1 milliGRAM(s) IntraMuscular once  hydrochlorothiazide 25 milliGRAM(s) Oral daily  insulin lispro (ADMELOG) corrective regimen sliding scale   SubCutaneous three times a day before meals  insulin lispro (ADMELOG) corrective regimen sliding scale   SubCutaneous at bedtime  lactated ringers. 1000 milliLiter(s) (75 mL/Hr) IV Continuous <Continuous>  lisinopril 40 milliGRAM(s) Oral daily  metoclopramide Injectable 10 milliGRAM(s) IV Push every 8 hours  metoprolol succinate ER 50 milliGRAM(s) Oral daily    MEDICATIONS  (PRN):  acetaminophen     Tablet .. 650 milliGRAM(s) Oral every 6 hours PRN Temp greater or equal to 38C (100.4F), Mild Pain (1 - 3)  dextrose Oral Gel 15 Gram(s) Oral once PRN Blood Glucose LESS THAN 70 milliGRAM(s)/deciliter  melatonin 3 milliGRAM(s) Oral at bedtime PRN Insomnia    Vital Signs Last 24 Hrs  T(C): 36.5 (16 Dec 2023 00:08), Max: 36.5 (16 Dec 2023 00:08)  T(F): 97.7 (16 Dec 2023 00:08), Max: 97.7 (16 Dec 2023 00:08)  HR: 58 (16 Dec 2023 00:08) (54 - 58)  BP: 152/71 (16 Dec 2023 00:08) (152/71 - 179/66)  BP(mean): --  RR: 18 (16 Dec 2023 00:08) (18 - 18)  SpO2: 92% (16 Dec 2023 00:08) (92% - 96%)    Parameters below as of 16 Dec 2023 00:08  Patient On (Oxygen Delivery Method): room air    Physical Exam:  Gen: NAD  HEENT: moist mucous membranes  Chest:  Equal chest rise, speaks full sentences  Cardiac: irregular  Abd: distended, no gross hepatomegaly or splenomegaly  Ext: 3+ edema bilaterally   Neuro: AAOX3 with normal mood and affect    Labs:                        11.4   7.74  )-----------( 204      ( 15 Dec 2023 12:49 )             34.0     CBC Full  -  ( 15 Dec 2023 12:49 )  WBC Count : 7.74 K/uL  RBC Count : 3.78 M/uL  Hemoglobin : 11.4 g/dL  Hematocrit : 34.0 %  Platelet Count - Automated : 204 K/uL  Mean Cell Volume : 89.9 fl  Mean Cell Hemoglobin : 30.2 pg  Mean Cell Hemoglobin Concentration : 33.5 gm/dL    12-15    139  |  101  |  54<H>  ----------------------------<  177<H>  3.5   |  24  |  1.16    Ca    9.2      15 Dec 2023 12:49

## 2023-12-16 NOTE — DISCHARGE NOTE PROVIDER - PROVIDER TOKENS
PROVIDER:[TOKEN:[[id number]]],PROVIDER:[TOKEN:[[id number]]],PROVIDER:[TOKEN:[[id number]]] PROVIDER:[TOKEN:[[id number]]],PROVIDER:[TOKEN:[[id number]]],PROVIDER:[TOKEN:[[id number]]]

## 2023-12-16 NOTE — PROGRESS NOTE ADULT - ASSESSMENT
73y old Male with history of T2DM, Afib on Eliquis, CAD, HTN, HLD, Renal cell carcinoma, Colon cancer, and Prostate cancer here with abdominal pain. Endocrine following for hyperthyroidism.    1. Hyperthyroidism  2. Chronic amiodarone use  - Labs show hyperthyroidism with suppressed TSH and elevated FT4  - TPO Ab normal, TSI Ab and TRAB pending which are specific for Graves' disease  - Hold off on methimazole at this time until the cause is determined as the treatment for type II is typically steroids    3. T2DM  - FS mostly at goal  - Continue low Admelog correctional scale before meals and bedtime  - Monitor FS before meals and bedtime  - Follow hospital hypoglycemic protocol    4. Afib on Eliquis  - No indication to stop Amiodarone as the thyroid disease can be managed with medication  - Continue with Eliquis    Discharge recs  - Resume Janumet  mg BID  - No methimazole at this time  - Will call the patient once antibodies in and also reach out to his outpatient Endocrinologist (Dr. Joaquin Carlos)    Jefe Owusu MD  [facility] Division of [facility]    [address] [place] [address]    T [phone]  F [phone]   73y old Male with history of T2DM, Afib on Eliquis, CAD, HTN, HLD, Renal cell carcinoma, Colon cancer, and Prostate cancer here with abdominal pain. Endocrine following for hyperthyroidism.    1. Hyperthyroidism  2. Chronic amiodarone use  - Labs show hyperthyroidism with suppressed TSH and elevated FT4  - TPO Ab normal, TSI Ab and TRAB pending which are specific for Graves' disease  - Hold off on methimazole at this time until the cause is determined as the treatment for type II is typically steroids    3. T2DM  - FS mostly at goal  - Continue low Admelog correctional scale before meals and bedtime  - Monitor FS before meals and bedtime  - Follow hospital hypoglycemic protocol    4. Afib on Eliquis  - No indication to stop Amiodarone as the thyroid disease can be managed with medication  - Continue with Eliquis    Discharge recs  - Resume Janumet  mg BID  - No methimazole at this time  - Will call the patient once antibodies in and also reach out to his outpatient Endocrinologist (Dr. Joaquin Carlos)    Jefe Owusu MD  [facility]    [address] [place] [address]    T [phone]  F [phone]

## 2023-12-16 NOTE — PROGRESS NOTE ADULT - SUBJECTIVE AND OBJECTIVE BOX
[facility] Progress Note    MAR, chart notes, fingersticks and labs reviewed    Subjective: most likely going home today    Objective  Vital Signs  T(C): 36.7 (12-16-23 @ 09:41), Max: 36.7 (12-16-23 @ 09:41)  HR: 54 (12-16-23 @ 09:41) (54 - 69)  BP: 168/69 (12-16-23 @ 09:41) (148/74 - 168/69)  RR: 18 (12-16-23 @ 09:41) (18 - 18)  SpO2: 98% (12-16-23 @ 09:41) (92% - 98%)    Physical Exam  Gen: NAD, alert, awake, wife present  HEENT: NC/AT, EOMI  Neck: supple  Chest: breathing comfortably  Heart: +s1 +s2    Medications  acetaminophen     Tablet .. 650 milliGRAM(s) Oral every 6 hours PRN  aMIOdarone    Tablet 100 milliGRAM(s) Oral daily  amLODIPine   Tablet 10 milliGRAM(s) Oral daily  apixaban 5 milliGRAM(s) Oral every 12 hours  aspirin enteric coated 81 milliGRAM(s) Oral daily  chlorhexidine 2% Cloths 1 Application(s) Topical daily  cholecalciferol 1000 Unit(s) Oral daily  cyanocobalamin 1000 MICROGram(s) Oral daily  dextrose 5%. 1000 milliLiter(s) IV Continuous <Continuous>  dextrose 5%. 1000 milliLiter(s) IV Continuous <Continuous>  dextrose 50% Injectable 12.5 Gram(s) IV Push once  dextrose 50% Injectable 25 Gram(s) IV Push once  dextrose Oral Gel 15 Gram(s) Oral once PRN  glucagon  Injectable 1 milliGRAM(s) IntraMuscular once  hydrochlorothiazide 25 milliGRAM(s) Oral daily  insulin lispro (ADMELOG) corrective regimen sliding scale   SubCutaneous three times a day before meals  insulin lispro (ADMELOG) corrective regimen sliding scale   SubCutaneous at bedtime  lactated ringers. 1000 milliLiter(s) IV Continuous <Continuous>  lisinopril 40 milliGRAM(s) Oral daily  melatonin 3 milliGRAM(s) Oral at bedtime PRN  metoclopramide Injectable 10 milliGRAM(s) IV Push every 8 hours  metoprolol succinate ER 50 milliGRAM(s) Oral daily    Pertinent labs/Imaging  POCT Blood Glucose.: 205 mg/dL (12-16-23 @ 11:57)  POCT Blood Glucose.: 170 mg/dL (12-16-23 @ 07:57)  POCT Blood Glucose.: 164 mg/dL (12-15-23 @ 21:15)  POCT Blood Glucose.: 161 mg/dL (12-15-23 @ 16:35)  POCT Blood Glucose.: 189 mg/dL (12-15-23 @ 12:05)    eGFR: 67 mL/min/1.73m2 (12-15-23 @ 12:49)

## 2023-12-16 NOTE — PROGRESS NOTE ADULT - ASSESSMENT
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   - no acute pathology noted on CT AP ,  will advance diet as tolerated , Qtc prolonged on ekg patient is V -paced and likely inaccurate  - Advance diet as tolerated   - unclear etiology; will give reglan trial and monitor   - GI consulted   - continue gentle IV hydration   - enema per GI    Abdominal pain.   - no acute findings on imaging   - Tylenol prn.    CAD (coronary artery disease).   - continue asa   - no statin given h/o myositis.    HTN (hypertension).   - uncontrolled , due to missed medications , since < 180 and asymptomatic , can resume home meds as scheduled   - continue amlodipine   - continue HCTZ   - continue lisinopril   - continue metoprolol.    H/O myositis.   on ? IVIG infusions , last session November , seems unlikely contributing since several weeks since last dose  - was getting IVIG from heme onc ; f/u rec , consulted.   - outpatient follow up as scheduled   - check CPK in am.    Chronic atrial fibrillation.   ·  Plan: - continue Eliquis   - continue amiodarone.    Colon cancer.   - outpatient c/u as scheduled.  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   - no acute pathology noted on CT AP ,  will advance diet as tolerated , Qtc prolonged on ekg patient is V -paced and likely inaccurate  - Advance diet as tolerated   - unclear etiology; will give reglan trial and monitor   - GI consulted   - continue gentle IV hydration   - enema per GI    Abdominal pain.   - no acute findings on imaging   - Tylenol prn.    CAD (coronary artery disease).   - continue asa   - no statin given h/o myositis.    HTN (hypertension).   - uncontrolled , due to missed medications , since < 180 and asymptomatic , can resume home meds as scheduled   - continue amlodipine   - continue HCTZ   - continue lisinopril   - continue metoprolol.    H/O myositis.   on ? IVIG infusions , last session November , seems unlikely contributing since several weeks since last dose  - was getting IVIG from heme onc ; f/u rec , consulted.   - outpatient follow up as scheduled   - check CPK in am.    Chronic atrial fibrillation.   ·  Plan: - continue Eliquis   - continue amiodarone.    Colon cancer.   - outpatient c/u as scheduled.  - heme onc consulted     Brody Gresham MD   [facility]  [phone]    DIspo: dc today

## 2023-12-16 NOTE — PROGRESS NOTE ADULT - SUBJECTIVE AND OBJECTIVE BOX
Patient is a 73y old  Male who presents with a chief complaint of abdominal pain x few days (16 Dec 2023 12:24)      SUBJECTIVE / OVERNIGHT EVENTS:  Patient seen and examined.   Feels better. Wants to go home .      Vital Signs Last 24 Hrs  T(C): 36.7 (16 Dec 2023 09:41), Max: 36.7 (16 Dec 2023 09:41)  T(F): 98.1 (16 Dec 2023 09:41), Max: 98.1 (16 Dec 2023 09:41)  HR: 54 (16 Dec 2023 09:41) (54 - 69)  BP: 168/69 (16 Dec 2023 09:41) (148/74 - 168/69)  BP(mean): --  RR: 18 (16 Dec 2023 09:41) (18 - 18)  SpO2: 98% (16 Dec 2023 09:41) (92% - 98%)    Parameters below as of 16 Dec 2023 09:41  Patient On (Oxygen Delivery Method): room air      I&O's Summary    15 Dec 2023 07:01  -  16 Dec 2023 07:00  --------------------------------------------------------  IN: 240 mL / OUT: 650 mL / NET: -410 mL    16 Dec 2023 07:01  -  16 Dec 2023 14:03  --------------------------------------------------------  IN: 240 mL / OUT: 0 mL / NET: 240 mL        PHYSICAL EXAM:  GENERAL: NAD, AAOx3  HEAD:  Atraumatic, Normocephalic  EYES: EOMI; conjunctiva and sclera clear  NECK: Supple, No JVD, No LAD  CHEST/LUNG: B/L air entry; No wheezes, rales or rhonci   HEART: Regular rate and rhythm; No murmurs, rubs, or gallops  ABDOMEN: Soft, Nontender, Nondistended; Bowel sounds present  EXTREMITIES:  2+ Peripheral Pulses, No clubbing, cyanosis, or edema  SKIN: No rashes or lesions    LABS:                        11.4   7.74  )-----------( 204      ( 15 Dec 2023 12:49 )             34.0     12-15    139  |  101  |  54<H>  ----------------------------<  177<H>  3.5   |  24  |  1.16    Ca    9.2      15 Dec 2023 12:49        CAPILLARY BLOOD GLUCOSE      POCT Blood Glucose.: 205 mg/dL (16 Dec 2023 11:57)  POCT Blood Glucose.: 170 mg/dL (16 Dec 2023 07:57)  POCT Blood Glucose.: 164 mg/dL (15 Dec 2023 21:15)  POCT Blood Glucose.: 161 mg/dL (15 Dec 2023 16:35)        Urinalysis Basic - ( 15 Dec 2023 12:49 )    Color: x / Appearance: x / SG: x / pH: x  Gluc: 177 mg/dL / Ketone: x  / Bili: x / Urobili: x   Blood: x / Protein: x / Nitrite: x   Leuk Esterase: x / RBC: x / WBC x   Sq Epi: x / Non Sq Epi: x / Bacteria: x        RADIOLOGY & ADDITIONAL TESTS:    Imaging Personally Reviewed:  [x] YES  [ ] NO    Consultant(s) Notes Reviewed:  [x] YES  [ ] NO      MEDICATIONS  (STANDING):  aMIOdarone    Tablet 100 milliGRAM(s) Oral daily  amLODIPine   Tablet 10 milliGRAM(s) Oral daily  apixaban 5 milliGRAM(s) Oral every 12 hours  aspirin enteric coated 81 milliGRAM(s) Oral daily  chlorhexidine 2% Cloths 1 Application(s) Topical daily  cholecalciferol 1000 Unit(s) Oral daily  cyanocobalamin 1000 MICROGram(s) Oral daily  dextrose 5%. 1000 milliLiter(s) (50 mL/Hr) IV Continuous <Continuous>  dextrose 5%. 1000 milliLiter(s) (100 mL/Hr) IV Continuous <Continuous>  dextrose 50% Injectable 12.5 Gram(s) IV Push once  dextrose 50% Injectable 25 Gram(s) IV Push once  glucagon  Injectable 1 milliGRAM(s) IntraMuscular once  heparin  Lock Flush 100 Units/mL Injectable 300 Unit(s) IV Push once  hydrochlorothiazide 25 milliGRAM(s) Oral daily  insulin lispro (ADMELOG) corrective regimen sliding scale   SubCutaneous three times a day before meals  insulin lispro (ADMELOG) corrective regimen sliding scale   SubCutaneous at bedtime  lactated ringers. 1000 milliLiter(s) (75 mL/Hr) IV Continuous <Continuous>  lisinopril 40 milliGRAM(s) Oral daily  metoclopramide Injectable 10 milliGRAM(s) IV Push every 8 hours  metoprolol succinate ER 50 milliGRAM(s) Oral daily    MEDICATIONS  (PRN):  acetaminophen     Tablet .. 650 milliGRAM(s) Oral every 6 hours PRN Temp greater or equal to 38C (100.4F), Mild Pain (1 - 3)  dextrose Oral Gel 15 Gram(s) Oral once PRN Blood Glucose LESS THAN 70 milliGRAM(s)/deciliter  melatonin 3 milliGRAM(s) Oral at bedtime PRN Insomnia      Care Discussed with Consultants/Other Providers [x] YES  [ ] NO    HEALTH ISSUES - PROBLEM Dx:  Nausea and vomiting    Abdominal pain    CAD (coronary artery disease)    Chronic atrial fibrillation    H/O myositis    HTN (hypertension)    Colon cancer    Ileus

## 2023-12-16 NOTE — PROGRESS NOTE ADULT - ASSESSMENT
73 year old male with acute abdominal pain, bloating, decreased gas, suspect ileus/pSBO, possibly due to enteritis vs adhesive scar tissue from prior surgery. CT abdomen reviewed. Mildly tympanic/distended on exam.  -Doing well with Reglan, now passing flatus and resolution of n/v  -seems to have resolving ileus, diet advanced

## 2023-12-16 NOTE — DISCHARGE NOTE PROVIDER - NSDCMRMEDTOKEN_GEN_ALL_CORE_FT
acetaminophen 325 mg oral tablet: 2 tab(s) orally every 6 hours, As needed, Temp greater or equal to 38C (100.4F), Mild Pain (1 - 3)  amiodarone 100 mg oral tablet: 1 tab(s) orally once a day  amLODIPine 10 mg oral tablet: 1 tab(s) orally once a day  apixaban 5 mg oral tablet: 1 tab(s) orally every 12 hours  aspirin 81 mg oral delayed release tablet: 1 tab(s) orally once a day  hydroCHLOROthiazide 25 mg oral tablet: 1 tab(s) orally once a day  Janumet 50 mg-1000 mg oral tablet: 1 tab(s) orally 2 times a day    lisinopril 40 mg oral tablet: 1 tab(s) orally once a day  metoprolol succinate 50 mg oral tablet, extended release: 1 tab(s) orally once a day  Vitamin B12 1000 mcg oral tablet: 1 tab(s) orally once a day  Vitamin D3 2000 intl units oral capsule: 1 cap(s) orally once a day

## 2023-12-16 NOTE — PROGRESS NOTE ADULT - ASSESSMENT
Mr. 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     Onc Hx:  right-sided  4.8 cm  invasive moderately differentiated colon adenocarcinoma  extending into the frederic colonic adipose tissue no lymphovascular or perineural invasion  5/35 lymph nodes involved with metastatic carcinoma no loss of mismatch repair protein (MMRp) noted therefore a stage IIIB T3 N2a status post right hemicolectomy on 03/25/2021  and then completed 12 cycles of 5 FU leucovorin every 2 weeks x6 months completed on 10/27/2021. Has not had any evidence of disease since that time.  prior to the diagnosis of his colon cancer he never had a colonoscopy.    Overall impression:  Mr. Alberto has a history of stage IIIB right colon adenocarcinoma status post hemicolectomy and adjuvant 5 FU leucovorin completing all therapy on 10/27/2021 and has been on surveillance since then under the care of Dr. Gross.  most recent outpatient CT abdomen and pelvis on 06/01/2023 showed no suspicious lesions except for a wall thickening versus focal peristalsis in the distal transverse colon and at that time the right kidney lesion was 1.5 cm. Inpatient CT C/A/P without new masses or  new lymphadenopathy.  Labs here remarkable mild anemia Hb 11.2,  outpatient most recent CBC with hemoglobin 11.8,  outpatient iron profile with ferritin 166 saturation 23% and CEA 1.27 on 11/21/2023.   GI  consulted recs pending. ESR elevated to 127 as outpatient and TSH was undetectable, unclear. Recommend Endocrinology consult thyroid dysfunction can be due to amiodarone, workup pending     # Personal Hx of stage IIIB right colon adenocarcinoma  -  status post hemicolectomy and chemotherapy completed in 2021  -  CT abdomen pelvis without active disease noted  -  CEA as outpatient stable at 1.27  3 weeks ago    #  Nausea vomiting poor appetite  # Partial SBO vs ileus   - unclear cause at this time team to advance diet as tolerated  - Due to IVIG or thyroid disease?   - GI recs noted possible ileus vs partial SBO in setting of hx of abdominal surgery/adhesions   - Trial of reglan and advance diet as tolerated and monitor per GI     #  Normocytic anemia  -  hemoglobin stable compared to outpatient no signs of active GI bleed  -  transfuse to maintain hemoglobin greater than 7    # Right papillary renal carcinoma  -  followed as an outpatient with Urology  - CT A/P  shows a 2.5 cm lesion  -  follow up Urology    #  Coagulopathy  -  likely due to Eliquis    # Hyperthyroidism   - Endocrinology consulted eval pending for possible amiodarone related thyroid-toxicity       Thank you for allowing me to participate in the care of Mr. Alberto, please do not hesitate to call or text me if you have further questions or concerns.     Gibran Bautista MD  [facility] [place]   Division of Hematology/Oncology  [address] [place] [address], [address]  [place] [address]  P: [phone]  F: [phone]    Attestation:    ----Including face-to-face interaction in addition to chart review, reviewing treatment plan, and managing the patient’s chronic diagnoses as listed in the assessment----      Mr. 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     Onc Hx:  right-sided  4.8 cm  invasive moderately differentiated colon adenocarcinoma  extending into the frederic colonic adipose tissue no lymphovascular or perineural invasion  5/35 lymph nodes involved with metastatic carcinoma no loss of mismatch repair protein (MMRp) noted therefore a stage IIIB T3 N2a status post right hemicolectomy on 03/25/2021  and then completed 12 cycles of 5 FU leucovorin every 2 weeks x6 months completed on 10/27/2021. Has not had any evidence of disease since that time.  prior to the diagnosis of his colon cancer he never had a colonoscopy.    Overall impression:  Mr. Alberto has a history of stage IIIB right colon adenocarcinoma status post hemicolectomy and adjuvant 5 FU leucovorin completing all therapy on 10/27/2021 and has been on surveillance since then under the care of Dr. Gross.  most recent outpatient CT abdomen and pelvis on 06/01/2023 showed no suspicious lesions except for a wall thickening versus focal peristalsis in the distal transverse colon and at that time the right kidney lesion was 1.5 cm. Inpatient CT C/A/P without new masses or  new lymphadenopathy.  Labs here remarkable mild anemia Hb 11.2,  outpatient most recent CBC with hemoglobin 11.8,  outpatient iron profile with ferritin 166 saturation 23% and CEA 1.27 on 11/21/2023.   GI  consulted recs pending. ESR elevated to 127 as outpatient and TSH was undetectable, unclear. Recommend Endocrinology consult thyroid dysfunction can be due to amiodarone, workup pending     # Personal Hx of stage IIIB right colon adenocarcinoma  -  status post hemicolectomy and chemotherapy completed in 2021  -  CT abdomen pelvis without active disease noted  -  CEA as outpatient stable at 1.27  3 weeks ago    #  Nausea vomiting poor appetite  # Partial SBO vs ileus   - unclear cause at this time team to advance diet as tolerated  - Due to IVIG or thyroid disease?   - GI recs noted possible ileus vs partial SBO in setting of hx of abdominal surgery/adhesions   - Trial of reglan and advance diet as tolerated and monitor per GI     #  Normocytic anemia  -  hemoglobin stable compared to outpatient no signs of active GI bleed  -  transfuse to maintain hemoglobin greater than 7    # Right papillary renal carcinoma  -  followed as an outpatient with Urology  - CT A/P  shows a 2.5 cm lesion  -  follow up Urology    #  Coagulopathy  -  likely due to Eliquis    # Hyperthyroidism   - Endocrinology consulted eval pending for possible amiodarone related thyroid-toxicity       Thank you for allowing me to participate in the care of Mr. Alberto, please do not hesitate to call or text me if you have further questions or concerns.     Gibran Bautista MD  [facility] [place]   Division of Hematology/Oncology  [address] [place] [address], [address]  [place] [address]  P: [phone]  F: [phone]    Attestation:    ----Including face-to-face interaction in addition to chart review, reviewing treatment plan, and managing the patient’s chronic diagnoses as listed in the assessment----      Mr. 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     Onc Hx:  right-sided  4.8 cm  invasive moderately differentiated colon adenocarcinoma  extending into the frederic colonic adipose tissue no lymphovascular or perineural invasion  5/35 lymph nodes involved with metastatic carcinoma no loss of mismatch repair protein (MMRp) noted therefore a stage IIIB T3 N2a status post right hemicolectomy on 03/25/2021  and then completed 12 cycles of 5 FU leucovorin every 2 weeks x6 months completed on 10/27/2021. Has not had any evidence of disease since that time.  prior to the diagnosis of his colon cancer he never had a colonoscopy.    Overall impression:  Mr. Alberto has a history of stage IIIB right colon adenocarcinoma status post hemicolectomy and adjuvant 5 FU leucovorin completing all therapy on 10/27/2021 and has been on surveillance since then under the care of Dr. Gross.  most recent outpatient CT abdomen and pelvis on 06/01/2023 showed no suspicious lesions except for a wall thickening versus focal peristalsis in the distal transverse colon and at that time the right kidney lesion was 1.5 cm. Inpatient CT C/A/P without new masses or  new lymphadenopathy.  Labs here remarkable mild anemia Hb 11.2,  outpatient most recent CBC with hemoglobin 11.8,  outpatient iron profile with ferritin 166 saturation 23% and CEA 1.27 on 11/21/2023.   GI  consulted recs pending. ESR elevated to 127 as outpatient and TSH was undetectable, unclear. Recommend Endocrinology consult thyroid dysfunction can be due to amiodarone, workup showing hyperthyroidism with Free T4 7.1     # Personal Hx of stage IIIB right colon adenocarcinoma  -  status post hemicolectomy and chemotherapy completed in 2021  -  CT abdomen pelvis without active disease noted  -  CEA as outpatient stable at 1.27  3 weeks ago    #  Nausea vomiting poor appetite  # Partial SBO vs ileus   - unclear cause at this time team to advance diet as tolerated  - Due to IVIG or thyroid disease?   - GI recs noted possible ileus vs partial SBO in setting of hx of abdominal surgery/adhesions   - Trial of reglan and advance diet as tolerated and monitor per GI, enema without improvement     #  Normocytic anemia  -  hemoglobin stable compared to outpatient no signs of active GI bleed  -  transfuse to maintain hemoglobin greater than 7    # Right papillary renal carcinoma  -  followed as an outpatient with Urology  - CT A/P  shows a 2.5 cm lesion  -  follow up Urology    #  Coagulopathy  -  likely due to Eliquis    # Hyperthyroidism   - Endocrinology consulted eval pending for possible amiodarone related thyroid-toxicity   - Free T4 7.1, TSH undetectable      Thank you for allowing me to participate in the care of Mr. Alberto, please do not hesitate to call or text me if you have further questions or concerns.     Gibran Bautista MD  [facility] [place]   Division of Hematology/Oncology  [address], [address]  [place] [address]  P: [phone]  F: [phone]    Attestation:    ----Including face-to-face interaction in addition to chart review, reviewing treatment plan, and managing the patient’s chronic diagnoses as listed in the assessment----      Mr. 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     Onc Hx:  right-sided  4.8 cm  invasive moderately differentiated colon adenocarcinoma  extending into the frederic colonic adipose tissue no lymphovascular or perineural invasion  5/35 lymph nodes involved with metastatic carcinoma no loss of mismatch repair protein (MMRp) noted therefore a stage IIIB T3 N2a status post right hemicolectomy on 03/25/2021  and then completed 12 cycles of 5 FU leucovorin every 2 weeks x6 months completed on 10/27/2021. Has not had any evidence of disease since that time.  prior to the diagnosis of his colon cancer he never had a colonoscopy.    Overall impression:  Mr. Alberto has a history of stage IIIB right colon adenocarcinoma status post hemicolectomy and adjuvant 5 FU leucovorin completing all therapy on 10/27/2021 and has been on surveillance since then under the care of Dr. Gross.  most recent outpatient CT abdomen and pelvis on 06/01/2023 showed no suspicious lesions except for a wall thickening versus focal peristalsis in the distal transverse colon and at that time the right kidney lesion was 1.5 cm. Inpatient CT C/A/P without new masses or  new lymphadenopathy.  Labs here remarkable mild anemia Hb 11.2,  outpatient most recent CBC with hemoglobin 11.8,  outpatient iron profile with ferritin 166 saturation 23% and CEA 1.27 on 11/21/2023.   GI  consulted recs pending. ESR elevated to 127 as outpatient and TSH was undetectable, unclear. Recommend Endocrinology consult thyroid dysfunction can be due to amiodarone, workup showing hyperthyroidism with Free T4 7.1     # Personal Hx of stage IIIB right colon adenocarcinoma  -  status post hemicolectomy and chemotherapy completed in 2021  -  CT abdomen pelvis without active disease noted  -  CEA as outpatient stable at 1.27  3 weeks ago    #  Nausea vomiting poor appetite  # Partial SBO vs ileus   - unclear cause at this time team to advance diet as tolerated  - Due to IVIG or thyroid disease?   - GI recs noted possible ileus vs partial SBO in setting of hx of abdominal surgery/adhesions   - Trial of reglan and advance diet as tolerated and monitor per GI, enema without improvement     #  Normocytic anemia  -  hemoglobin stable compared to outpatient no signs of active GI bleed  -  transfuse to maintain hemoglobin greater than 7    # Right papillary renal carcinoma  -  followed as an outpatient with Urology  - CT A/P  shows a 2.5 cm lesion  -  follow up Urology    #  Coagulopathy  -  likely due to Eliquis    # Hyperthyroidism   - Endocrinology consulted eval pending for possible amiodarone related thyroid-toxicity   - Free T4 7.1, TSH undetectable      Thank you for allowing me to participate in the care of Mr. Alberto, please do not hesitate to call or text me if you have further questions or concerns.     Gibran Bautista MD  [facility] [place]   Division of Hematology/Oncology  [address], [address]  [place] [address]  P: [phone]  F: [phone]    Attestation:    ----Including face-to-face interaction in addition to chart review, reviewing treatment plan, and managing the patient’s chronic diagnoses as listed in the assessment----

## 2023-12-16 NOTE — DISCHARGE NOTE NURSING/CASE MANAGEMENT/SOCIAL WORK - PATIENT PORTAL LINK FT
You can access the FollowMyHealth Patient Portal offered by [facility] by registering at the following website: [email or web address]. By joining [facility]’s FollowMyHealth portal, you will also be able to view your health information using other applications (apps) compatible with our system. You can access the FollowMyHealth Patient Portal offered by [facility] by registering at the following website: [email or web address]. By joining [facility]’s FollowMyHealth portal, you will also be able to view your health information using other applications (apps) compatible with our system.

## 2023-12-16 NOTE — DISCHARGE NOTE PROVIDER - NSDCFUSCHEDAPPT_GEN_ALL_CORE_FT
[facility]  ELECTROPH [address]  Scheduled Appointment: 01/05/2024     [facility]  ELECTROPH [address]  Scheduled Appointment: 01/05/2024

## 2023-12-16 NOTE — CHART NOTE - NSCHARTNOTEFT_GEN_A_CORE
Standard Wheelchair - Due to patient's deconditioning and generalized weakness, secondary to myositis, patient will require a standard wheelchair.  This is necessary to achieve daily tasks and therapies which cannot be achieved with the use of a cane or rolling walker.  Patient and family are in agreement with standard wheelchair use at home and assistance will be provided if needed.   Ramos Burkett (PA) [employer] # [id number] Standard Wheelchair - Due to patient's deconditioning and generalized weakness, secondary to myositis, patient will require a standard wheelchair.  This is necessary to achieve daily tasks and therapies which cannot be achieved with the use of a cane or rolling walker.  Patient and family are in agreement with standard wheelchair use at home and assistance will be provided if needed.   Ramos Burkett (PA) [employer] # [id number] Transport Chair - Due to patient's deconditioning and generalized weakness, secondary to myositis, patient will require a transport chair.  Patient is unable to self propel in a standard wheelchair.  This is necessary to achieve daily tasks and therapies which cannot be achieved with the use of a cane or rolling walker.  Patient and family are in agreement with transport chair use at home and assistance will be provided if needed.  Ramos Burkett (PA) [employer] # [id number] Transport Chair - Due to patient's deconditioning and generalized weakness, secondary to myositis, patient will require a transport chair.  Patient is unable to self propel in a standard wheelchair.  This is necessary to achieve daily tasks and therapies which cannot be achieved with the use of a cane or rolling walker.  Patient and family are in agreement with transport chair use at home and assistance will be provided if needed.  Ramos Burkett (PA) [employer] # [id number]

## 2023-12-17 LAB
TSH RECEP AB FLD-ACNC: <1.1 IU/L — SIGNIFICANT CHANGE UP (ref 0–1.75)
TSH RECEP AB FLD-ACNC: <1.1 IU/L — SIGNIFICANT CHANGE UP (ref 0–1.75)

## 2023-12-26 ENCOUNTER — OFFICE (OUTPATIENT)
Dept: URBAN - METROPOLITAN AREA CLINIC 90 | Facility: CLINIC | Age: 73
Setting detail: OPHTHALMOLOGY
End: 2023-12-26
Payer: MEDICARE

## 2023-12-26 DIAGNOSIS — Z96.1: ICD-10-CM

## 2023-12-26 DIAGNOSIS — H52.13: ICD-10-CM

## 2023-12-26 PROCEDURE — 99024 POSTOP FOLLOW-UP VISIT: CPT | Performed by: OPHTHALMOLOGY

## 2023-12-26 PROCEDURE — 92015 DETERMINE REFRACTIVE STATE: CPT | Performed by: OPHTHALMOLOGY

## 2023-12-26 ASSESSMENT — REFRACTION_MANIFEST
OD_SPHERE: +1.50
OD_CYLINDER: -1.50
OS_AXIS: 080
OS_CYLINDER: -2.00
OS_VA1: 20/25+
OD_AXIS: 080
OS_SPHERE: -1.50
OD_VA1: 20/30

## 2023-12-26 ASSESSMENT — LID POSITION - DERMATOCHALASIS
OS_DERMATOCHALASIS: LLL 1+
OD_DERMATOCHALASIS: RLL 1+

## 2023-12-26 ASSESSMENT — SPHEQUIV_DERIVED
OS_SPHEQUIV: -2.125
OD_SPHEQUIV: 0.5
OD_SPHEQUIV: 0.75
OS_SPHEQUIV: -2.5

## 2023-12-26 ASSESSMENT — REFRACTION_AUTOREFRACTION
OD_AXIS: 078
OD_CYLINDER: -2.00
OS_SPHERE: -1.25
OS_CYLINDER: -1.75
OS_AXIS: 082
OD_SPHERE: +1.50

## 2023-12-26 ASSESSMENT — LID POSITION - PTOSIS
OD_PTOSIS: RUL
OS_PTOSIS: LUL

## 2023-12-26 ASSESSMENT — CONFRONTATIONAL VISUAL FIELD TEST (CVF)
OS_FINDINGS: FULL
OD_FINDINGS: FULL

## 2024-01-05 ENCOUNTER — APPOINTMENT (OUTPATIENT)
Dept: ELECTROPHYSIOLOGY | Facility: CLINIC | Age: 74
End: 2024-01-05

## 2024-02-20 NOTE — DISCHARGE NOTE PROVIDER - NSRESEARCHGRANT_HIDDEN_GEN_A_CORE
[Consult Closing:] : Thank you for allowing me to participate in the care of this patient.  If you have any questions, please do not hesitate to contact me. [Sincerely yours,] : Sincerely yours, [FreeTextEntry3] : Amilcar Lancaster MD  of Radiation Medicine, Quality and  of Radiation Medicine [facility] [employer] [facility] at [facility]/Critical access hospital Yes

## 2024-03-07 ENCOUNTER — APPOINTMENT (OUTPATIENT)
Dept: ELECTROPHYSIOLOGY | Facility: CLINIC | Age: 74
End: 2024-03-07
Payer: MEDICARE

## 2024-03-07 ENCOUNTER — NON-APPOINTMENT (OUTPATIENT)
Age: 74
End: 2024-03-07

## 2024-03-07 PROCEDURE — 93294 REM INTERROG EVL PM/LDLS PM: CPT

## 2024-03-07 PROCEDURE — 93296 REM INTERROG EVL PM/IDS: CPT

## 2024-03-27 ENCOUNTER — APPOINTMENT (OUTPATIENT)
Dept: ELECTROPHYSIOLOGY | Facility: CLINIC | Age: 74
End: 2024-03-27
Payer: MEDICARE

## 2024-03-27 ENCOUNTER — NON-APPOINTMENT (OUTPATIENT)
Age: 74
End: 2024-03-27

## 2024-03-27 VITALS
DIASTOLIC BLOOD PRESSURE: 78 MMHG | WEIGHT: 175 LBS | HEIGHT: 70 IN | SYSTOLIC BLOOD PRESSURE: 150 MMHG | OXYGEN SATURATION: 100 % | BODY MASS INDEX: 25.05 KG/M2 | HEART RATE: 57 BPM

## 2024-03-27 DIAGNOSIS — I44.2 ATRIOVENTRICULAR BLOCK, COMPLETE: ICD-10-CM

## 2024-03-27 PROCEDURE — 93279 PRGRMG DEV EVAL PM/LDLS PM: CPT

## 2024-03-27 PROCEDURE — 93000 ELECTROCARDIOGRAM COMPLETE: CPT | Mod: 59

## 2024-04-05 NOTE — H&P ADULT - NSHPADDITIONALINFOADULT_GEN_ALL_CORE
06-Apr-2024 07:16 NIGHT HOSPITALIST:    Patient/ spouse aware of course and agree with plan/care as above.   Given comorbidities, patient's long term prognosis is guarded.   Emotional support provided to patient/ spouse.   Care reviewed with covering NP/PA for endorsement to the [facility].    Deng Jones MD  Available on [employer] Teams. NIGHT HOSPITALIST:    Patient/ spouse aware of course and agree with plan/care as above.   Given comorbidities, patient's long term prognosis is guarded.   Emotional support provided to patient/ spouse.   Care reviewed with covering NP/PAYuliana for endorsement to the [facility] Hospitalist Group.    Deng Jones MD  Available on [employer] Teams.

## 2024-04-22 NOTE — ED ADULT NURSE NOTE - TEMPLATE LIST FOR HEAD TO TOE ASSESSMENT
"Pt with extensive hx of H/N cancer. Presents w/ continued/worsening drainage from the neck for months with recent worsening of symptoms for past 2 weeks w/ N/V. Pt returned from [place] where she received antibiotics, "detox" and hyperbaric chamber as treatment. Endorses difficulty with swallowing 2/2 to oral trush. Large neck mass on imaging w/ large central collection of air which appears to extend superficially to the skin surface right mid neck anterior laterally; unable to r/o superimposed abscess. Meeting 2/4 SIRS (HR & WBC) w/ elevated lactate, but nml BP. Blood cx negative. Pt not surgical candidate for debridement per ENT evaluation.   - ID consulted; appreciate recs  - Continue Vancomycin + Flagyl   - Continue fluconazole  - Given that surgical debridement is not an option (and thus source control not attainable), follow-up with ID to discuss ultimate recs for abx course   - Will require awake tracheostomy prior to PEG placement by general surgery. Discuss alternative options with IR/GI.   " Cardiac

## 2024-05-17 NOTE — PRE-ANESTHESIA EVALUATION ADULT - BSA (M2)
2.08 Previously admitted for atrial flutter with RVR in 4/2024 s/p ablation and loop recorder insertion.    PMR reached out to confirm it's ok to remove sutures.  Anticoagulated on Eliquis and rate controlled off medications.

## 2024-05-27 NOTE — ED ADULT TRIAGE NOTE - CHIEF COMPLAINT QUOTE
TIPS placed 4/12 for acute GI bleeding and hx of gastric and esophageal varices.  Had gastric portal vein coiling and embolization.    5/10 Liver Doppler US with: Sluggish flow in the portal vein end of the TIPS and bidirectional flow in the left portal vein, which could represent TIPS malfunction.   Patient had outpatient IR appointment the day after admission scheduled (5/16) to evaluate for necessity of TIPs revision. Will obtain updated liver doppler, consider inpatient IR consult pending results vs outpatient follow-up. Liver US concerning for low velocities within the portal venous side of the tips shunt c/f TIPS dysfunction. Currently declining lactulose due to reports of several diarrheal BMs, only one charted BM. I discussed with her the benefit of lactulose and she expressed understanding.    Liver US showed patent TIPS but still concerning for possible TIPS dysfunction.     --consider IR consult if indicated for possible intervention   pt complains of R arm weakness, slight numbness and tingling. Pt unable to fully close hand and states cannot grab things. Pt states last normal was midnight, woke up with symptoms. Hx of HTN, DM, HLD. Pt has baseline lower extremity weakness for years, currently waiting to follow up with rheumatologist for elevated CPK. Pt was evaluated by MD Krishna, no stroke code called. pt complains of R arm weakness, slight numbness and tingling. Pt unable to fully close hand and states cannot grab things. Pt states last normal was midnight, woke up with symptoms. Hx of HTN, DM, HLD. Pt has baseline lower extremity weakness for years, currently waiting to follow up with rheumatologist for elevated CPK. No change in speech, ability to ambulate, or facial droop. Pt was evaluated by MD Krishna, no stroke code called.

## 2024-06-06 ENCOUNTER — APPOINTMENT (OUTPATIENT)
Dept: ELECTROPHYSIOLOGY | Facility: CLINIC | Age: 74
End: 2024-06-06
Payer: MEDICARE

## 2024-06-06 ENCOUNTER — NON-APPOINTMENT (OUTPATIENT)
Age: 74
End: 2024-06-06

## 2024-06-06 PROCEDURE — 93296 REM INTERROG EVL PM/IDS: CPT

## 2024-06-06 PROCEDURE — 93294 REM INTERROG EVL PM/LDLS PM: CPT

## 2024-08-12 NOTE — PHARMACOTHERAPY INTERVENTION NOTE - INTERVENTION CATEGORIES
Med Reconciliation Xeljanz Counseling: I discussed with the patient the risks of Xeljanz therapy including increased risk of infection, liver issues, headache, diarrhea, or cold symptoms. Live vaccines should be avoided. They were instructed to call if they have any problems. Opzelura Pregnancy And Lactation Text: There is insufficient data to evaluate drug-associated risk for major birth defects, miscarriage, or other adverse maternal or fetal outcomes.  There is a pregnancy registry that monitors pregnancy outcomes in pregnant persons exposed to the medication during pregnancy.  It is unknown if this medication is excreted in breast milk.  Do not breastfeed during treatment and for about 4 weeks after the last dose. Colchicine Pregnancy And Lactation Text: This medication is Pregnancy Category C and isn't considered safe during pregnancy. It is excreted in breast milk. Stelara Pregnancy And Lactation Text: This medication is Pregnancy Category B and is considered safe during pregnancy. It is unknown if this medication is excreted in breast milk. Cosentyx Counseling:  I discussed with the patient the risks of Cosentyx including but not limited to worsening of Crohn's disease, immunosuppression, allergic reactions and infections.  The patient understands that monitoring is required including a PPD at baseline and must alert us or the primary physician if symptoms of infection or other concerning signs are noted. Albendazole Counseling:  I discussed with the patient the risks of albendazole including but not limited to cytopenia, kidney damage, nausea/vomiting and severe allergy.  The patient understands that this medication is being used in an off-label manner. Sski Pregnancy And Lactation Text: This medication is Pregnancy Category D and isn't considered safe during pregnancy. It is excreted in breast milk. Detail Level: Detailed Wartpeel Pregnancy And Lactation Text: This medication is Pregnancy Category X and contraindicated in pregnancy and in women who may become pregnant. It is unknown if this medication is excreted in breast milk. Drysol Counseling:  I discussed with the patient the risks of drysol/aluminum chloride including but not limited to skin rash, itching, irritation, burning. Low Dose Naltrexone Counseling- I discussed with the patient the potential risks and side effects of low dose naltrexone including but not limited to: more vivid dreams, headaches, nausea, vomiting, abdominal pain, fatigue, dizziness, and anxiety. High Dose Vitamin A Counseling: Side effects reviewed, pt to contact office should one occur. Methotrexate Counseling:  Patient counseled regarding adverse effects of methotrexate including but not limited to nausea, vomiting, abnormalities in liver function tests. Patients may develop mouth sores, rash, diarrhea, and abnormalities in blood counts. The patient understands that monitoring is required including LFT's and blood counts.  There is a rare possibility of scarring of the liver and lung problems that can occur when taking methotrexate. Persistent nausea, loss of appetite, pale stools, dark urine, cough, and shortness of breath should be reported immediately. Patient advised to discontinue methotrexate treatment at least three months before attempting to become pregnant.  I discussed the need for folate supplements while taking methotrexate.  These supplements can decrease side effects during methotrexate treatment. The patient verbalized understanding of the proper use and possible adverse effects of methotrexate.  All of the patient's questions and concerns were addressed. Dutasteride Pregnancy And Lactation Text: This medication is absolutely contraindicated in women, especially during pregnancy and breast feeding. Feminization of male fetuses is possible if taking while pregnant. Azathioprine Counseling:  I discussed with the patient the risks of azathioprine including but not limited to myelosuppression, immunosuppression, hepatotoxicity, lymphoma, and infections.  The patient understands that monitoring is required including baseline LFTs, Creatinine, possible TPMP genotyping and weekly CBCs for the first month and then every 2 weeks thereafter.  The patient verbalized understanding of the proper use and possible adverse effects of azathioprine.  All of the patient's questions and concerns were addressed. Siliq Pregnancy And Lactation Text: The risk during pregnancy and breastfeeding is uncertain with this medication. Terbinafine Pregnancy And Lactation Text: This medication is Pregnancy Category B and is considered safe during pregnancy. It is also excreted in breast milk and breast feeding isn't recommended. Soolantra Counseling: I discussed with the patients the risks of topial Soolantra. This is a medicine which decreases the number of mites and inflammation in the skin. You experience burning, stinging, eye irritation or allergic reactions.  Please call our office if you develop any problems from using this medication. Fluconazole Pregnancy And Lactation Text: This medication is Pregnancy Category C and it isn't know if it is safe during pregnancy. It is also excreted in breast milk. Litfulo Counseling: I discussed with the patient the risks of Litfulo therapy including but not limited to upper respiratory tract infections, shingles, cold sores, and nausea. Live vaccines should be avoided.  This medication has been linked to serious infections; higher rate of mortality; malignancy and lymphoproliferative disorders; major adverse cardiovascular events; thrombosis; gastrointestinal perforations; neutropenia; lymphopenia; anemia; liver enzyme elevations; and lipid elevations. Solaraze Counseling:  I discussed with the patient the risks of Solaraze including but not limited to erythema, scaling, itching, weeping, crusting, and pain. Use Enhanced Medication Counseling?: No Doxycycline Pregnancy And Lactation Text: This medication is Pregnancy Category D and not consider safe during pregnancy. It is also excreted in breast milk but is considered safe for shorter treatment courses. Oral Minoxidil Pregnancy And Lactation Text: This medication should only be used when clearly needed if you are pregnant, attempting to become pregnant or breast feeding. Imiquimod Pregnancy And Lactation Text: This medication is Pregnancy Category C. It is unknown if this medication is excreted in breast milk. Dapsone Counseling: I discussed with the patient the risks of dapsone including but not limited to hemolytic anemia, agranulocytosis, rashes, methemoglobinemia, kidney failure, peripheral neuropathy, headaches, GI upset, and liver toxicity.  Patients who start dapsone require monitoring including baseline LFTs and weekly CBCs for the first month, then every month thereafter.  The patient verbalized understanding of the proper use and possible adverse effects of dapsone.  All of the patient's questions and concerns were addressed. Topical Ketoconazole Pregnancy And Lactation Text: This medication is Pregnancy Category B and is considered safe during pregnancy. It is unknown if it is excreted in breast milk. Benzoyl Peroxide Pregnancy And Lactation Text: This medication is Pregnancy Category C. It is unknown if benzoyl peroxide is excreted in breast milk. Methotrexate Pregnancy And Lactation Text: This medication is Pregnancy Category X and is known to cause fetal harm. This medication is excreted in breast milk. Picato Counseling:  I discussed with the patient the risks of Picato including but not limited to erythema, scaling, itching, weeping, crusting, and pain. Winlevi Counseling:  I discussed with the patient the risks of topical clascoterone including but not limited to erythema, scaling, itching, and stinging. Patient voiced their understanding. Taltz Counseling: I discussed with the patient the risks of ixekizumab including but not limited to immunosuppression, serious infections, worsening of inflammatory bowel disease and drug reactions.  The patient understands that monitoring is required including a PPD at baseline and must alert us or the primary physician if symptoms of infection or other concerning signs are noted. Thalidomide Counseling: I discussed with the patient the risks of thalidomide including but not limited to birth defects, anxiety, weakness, chest pain, dizziness, cough and severe allergy. Albendazole Pregnancy And Lactation Text: This medication is Pregnancy Category C and it isn't known if it is safe during pregnancy. It is also excreted in breast milk. Azithromycin Pregnancy And Lactation Text: This medication is considered safe during pregnancy and is also secreted in breast milk. Opioid Counseling: I discussed with the patient the potential side effects of opioids including but not limited to addiction, altered mental status, and depression. I stressed avoiding alcohol, benzodiazepines, muscle relaxants and sleep aids unless specifically okayed by a physician. The patient verbalized understanding of the proper use and possible adverse effects of opioids. All of the patient's questions and concerns were addressed. They were instructed to flush the remaining pills down the toilet if they did not need them for pain. Xeljorgez Pregnancy And Lactation Text: This medication is Pregnancy Category D and is not considered safe during pregnancy.  The risk during breast feeding is also uncertain. High Dose Vitamin A Pregnancy And Lactation Text: High dose vitamin A therapy is contraindicated during pregnancy and breast feeding. Drysol Pregnancy And Lactation Text: This medication is considered safe during pregnancy and breast feeding. Soolantra Pregnancy And Lactation Text: This medication is Pregnancy Category C. This medication is considered safe during breast feeding. Solaraze Pregnancy And Lactation Text: This medication is Pregnancy Category B and is considered safe. There is some data to suggest avoiding during the third trimester. It is unknown if this medication is excreted in breast milk. Erivedge Pregnancy And Lactation Text: This medication is Pregnancy Category X and is absolutely contraindicated during pregnancy. It is unknown if it is excreted in breast milk. Finasteride Male Counseling: Finasteride Counseling:  I discussed with the patient the risks of use of finasteride including but not limited to decreased libido, decreased ejaculate volume, gynecomastia, and depression. Women should not handle medication.  All of the patient's questions and concerns were addressed. Rifampin Counseling: I discussed with the patient the risks of rifampin including but not limited to liver damage, kidney damage, red-orange body fluids, nausea/vomiting and severe allergy. Low Dose Naltrexone Pregnancy And Lactation Text: Naltrexone is pregnancy category C.  There have been no adequate and well-controlled studies in pregnant women.  It should be used in pregnancy only if the potential benefit justifies the potential risk to the fetus.   Limited data indicates that naltrexone is minimally excreted into breastmilk. Klisyri Counseling:  I discussed with the patient the risks of Klisyri including but not limited to erythema, scaling, itching, weeping, crusting, and pain. Simponi Counseling:  I discussed with the patient the risks of golimumab including but not limited to myelosuppression, immunosuppression, autoimmune hepatitis, demyelinating diseases, lymphoma, and serious infections.  The patient understands that monitoring is required including a PPD at baseline and must alert us or the primary physician if symptoms of infection or other concerning signs are noted. Azathioprine Pregnancy And Lactation Text: This medication is Pregnancy Category D and isn't considered safe during pregnancy. It is unknown if this medication is excreted in breast milk. Litfulo Pregnancy And Lactation Text: Based on animal studies, Lifulo may cause embryo-fetal harm when administered to pregnant women.  The medication should not be used in pregnancy.  Breastfeeding is not recommended during treatment. Griseofulvin Counseling:  I discussed with the patient the risks of griseofulvin including but not limited to photosensitivity, cytopenia, liver damage, nausea/vomiting and severe allergy.  The patient understands that this medication is best absorbed when taken with a fatty meal (e.g., ice cream or french fries). Ilumya Counseling: I discussed with the patient the risks of tildrakizumab including but not limited to immunosuppression, malignancy, posterior leukoencephalopathy syndrome, and serious infections.  The patient understands that monitoring is required including a PPD at baseline and must alert us or the primary physician if symptoms of infection or other concerning signs are noted. Topical Metronidazole Counseling: Metronidazole is a topical antibiotic medication. You may experience burning, stinging, redness, or allergic reactions.  Please call our office if you develop any problems from using this medication. Cimetidine Counseling:  I discussed with the patient the risks of Cimetidine including but not limited to gynecomastia, headache, diarrhea, nausea, drowsiness, arrhythmias, pancreatitis, skin rashes, psychosis, bone marrow suppression and kidney toxicity. Erythromycin Counseling:  I discussed with the patient the risks of erythromycin including but not limited to GI upset, allergic reaction, drug rash, diarrhea, increase in liver enzymes, and yeast infections. Carac Counseling:  I discussed with the patient the risks of Carac including but not limited to erythema, scaling, itching, weeping, crusting, and pain. Otezla Counseling: The side effects of Otezla were discussed with the patient, including but not limited to worsening or new depression, weight loss, diarrhea, nausea, upper respiratory tract infection, and headache. Patient instructed to call the office should any adverse effect occur.  The patient verbalized understanding of the proper use and possible adverse effects of Otezla.  All the patient's questions and concerns were addressed. Dapsone Pregnancy And Lactation Text: This medication is Pregnancy Category C and is not considered safe during pregnancy or breast feeding. Opioid Pregnancy And Lactation Text: These medications can lead to premature delivery and should be avoided during pregnancy. These medications are also present in breast milk in small amounts. Libtayo Counseling- I discussed with the patient the risks of Libtayo including but not limited to nausea, vomiting, diarrhea, and bone or muscle pain.  The patient verbalized understanding of the proper use and possible adverse effects of Libtayo.  All of the patient's questions and concerns were addressed. Dupixent Counseling: I discussed with the patient the risks of dupilumab including but not limited to eye infection and irritation, cold sores, injection site reactions, worsening of asthma, allergic reactions and increased risk of parasitic infection.  Live vaccines should be avoided while taking dupilumab. Dupilumab will also interact with certain medications such as warfarin and cyclosporine. The patient understands that monitoring is required and they must alert us or the primary physician if symptoms of infection or other concerning signs are noted. Ivermectin Counseling:  Patient instructed to take medication on an empty stomach with a full glass of water.  Patient informed of potential adverse effects including but not limited to nausea, diarrhea, dizziness, itching, and swelling of the extremities or lymph nodes.  The patient verbalized understanding of the proper use and possible adverse effects of ivermectin.  All of the patient's questions and concerns were addressed. Bactrim Counseling:  I discussed with the patient the risks of sulfa antibiotics including but not limited to GI upset, allergic reaction, drug rash, diarrhea, dizziness, photosensitivity, and yeast infections.  Rarely, more serious reactions can occur including but not limited to aplastic anemia, agranulocytosis, methemoglobinemia, blood dyscrasias, liver or kidney failure, lung infiltrates or desquamative/blistering drug rashes. Winlevi Pregnancy And Lactation Text: This medication is considered safe during pregnancy and breastfeeding. Elidel Counseling: Patient may experience a mild burning sensation during topical application. Elidel is not approved in children less than 2 years of age. There have been case reports of hematologic and skin malignancies in patients using topical calcineurin inhibitors although causality is questionable. Prednisone Counseling:  I discussed with the patient the risks of prolonged use of prednisone including but not limited to weight gain, insomnia, osteoporosis, mood changes, diabetes, susceptibility to infection, glaucoma and high blood pressure.  In cases where prednisone use is prolonged, patients should be monitored with blood pressure checks, serum glucose levels and an eye exam.  Additionally, the patient may need to be placed on GI prophylaxis, PCP prophylaxis, and calcium and vitamin D supplementation and/or a bisphosphonate.  The patient verbalized understanding of the proper use and the possible adverse effects of prednisone.  All of the patient's questions and concerns were addressed. Finasteride Female Counseling: Finasteride Counseling:  I discussed with the patient the risks of use of finasteride including but not limited to decreased libido and sexual dysfunction. Explained the teratogenic nature of the medication and stressed the importance of not getting pregnant during treatment. All of the patient's questions and concerns were addressed. Topical Retinoid counseling:  Patient advised to apply a pea-sized amount only at bedtime and wait 30 minutes after washing their face before applying.  If too drying, patient may add a non-comedogenic moisturizer. The patient verbalized understanding of the proper use and possible adverse effects of retinoids.  All of the patient's questions and concerns were addressed. Olumiant Counseling: I discussed with the patient the risks of Olumiant therapy including but not limited to upper respiratory tract infections, shingles, cold sores, and nausea. Live vaccines should be avoided.  This medication has been linked to serious infections; higher rate of mortality; malignancy and lymphoproliferative disorders; major adverse cardiovascular events; thrombosis; gastrointestinal perforations; neutropenia; lymphopenia; anemia; liver enzyme elevations; and lipid elevations. Rifampin Pregnancy And Lactation Text: This medication is Pregnancy Category C and it isn't know if it is safe during pregnancy. It is also excreted in breast milk and should not be used if you are breast feeding. Niacinamide Counseling: I recommended taking niacin or niacinamide, also know as vitamin B3, twice daily. Recent evidence suggests that taking vitamin B3 (500 mg twice daily) can reduce the risk of actinic keratoses and non-melanoma skin cancers. Side effects of vitamin B3 include flushing and headache. Klisyri Pregnancy And Lactation Text: It is unknown if this medication can harm a developing fetus or if it is excreted in breast milk. Acitretin Counseling:  I discussed with the patient the risks of acitretin including but not limited to hair loss, dry lips/skin/eyes, liver damage, hyperlipidemia, depression/suicidal ideation, photosensitivity.  Serious rare side effects can include but are not limited to pancreatitis, pseudotumor cerebri, bony changes, clot formation/stroke/heart attack.  Patient understands that alcohol is contraindicated since it can result in liver toxicity and significantly prolong the elimination of the drug by many years. Cellcept Counseling:  I discussed with the patient the risks of mycophenolate mofetil including but not limited to infection/immunosuppression, GI upset, hypokalemia, hypercholesterolemia, bone marrow suppression, lymphoproliferative disorders, malignancy, GI ulceration/bleed/perforation, colitis, interstitial lung disease, kidney failure, progressive multifocal leukoencephalopathy, and birth defects.  The patient understands that monitoring is required including a baseline creatinine and regular CBC testing. In addition, patient must alert us immediately if symptoms of infection or other concerning signs are noted. Topical Metronidazole Pregnancy And Lactation Text: This medication is Pregnancy Category B and considered safe during pregnancy.  It is also considered safe to use while breastfeeding. Otezla Pregnancy And Lactation Text: This medication is Pregnancy Category C and it isn't known if it is safe during pregnancy. It is unknown if it is excreted in breast milk. Griseofulvin Pregnancy And Lactation Text: This medication is Pregnancy Category X and is known to cause serious birth defects. It is unknown if this medication is excreted in breast milk but breast feeding should be avoided. Erythromycin Pregnancy And Lactation Text: This medication is Pregnancy Category B and is considered safe during pregnancy. It is also excreted in breast milk. Gabapentin Counseling: I discussed with the patient the risks of gabapentin including but not limited to dizziness, somnolence, fatigue and ataxia. Tremfya Counseling: I discussed with the patient the risks of guselkumab including but not limited to immunosuppression, serious infections, worsening of inflammatory bowel disease and drug reactions.  The patient understands that monitoring is required including a PPD at baseline and must alert us or the primary physician if symptoms of infection or other concerning signs are noted. Dupixent Pregnancy And Lactation Text: This medication likely crosses the placenta but the risk for the fetus is uncertain. This medication is excreted in breast milk. VTAMA Counseling: I discussed with the patient that VTAMA is not for use in the eyes, mouth or mouth. They should call the office if they develop any signs of allergic reactions to VTAMA. The patient verbalized understanding of the proper use and possible adverse effects of VTAMA.  All of the patient's questions and concerns were addressed. Tranexamic Acid Counseling:  Patient advised of the small risk of bleeding problems with tranexamic acid. They were also instructed to call if they developed any nausea, vomiting or diarrhea. All of the patient's questions and concerns were addressed. Bactrim Pregnancy And Lactation Text: This medication is Pregnancy Category D and is known to cause fetal risk.  It is also excreted in breast milk. Niacinamide Pregnancy And Lactation Text: These medications are considered safe during pregnancy. Protopic Counseling: Patient may experience a mild burning sensation during topical application. Protopic is not approved in children less than 2 years of age. There have been case reports of hematologic and skin malignancies in patients using topical calcineurin inhibitors although causality is questionable. Libtayo Pregnancy And Lactation Text: This medication is contraindicated in pregnancy and when breast feeding. Finasteride Pregnancy And Lactation Text: This medication is absolutely contraindicated during pregnancy. It is unknown if it is excreted in breast milk. Skyrizi Counseling: I discussed with the patient the risks of risankizumab-rzaa including but not limited to immunosuppression, and serious infections.  The patient understands that monitoring is required including a PPD at baseline and must alert us or the primary physician if symptoms of infection or other concerning signs are noted. Itraconazole Counseling:  I discussed with the patient the risks of itraconazole including but not limited to liver damage, nausea/vomiting, neuropathy, and severe allergy.  The patient understands that this medication is best absorbed when taken with acidic beverages such as non-diet cola or ginger ale.  The patient understands that monitoring is required including baseline LFTs and repeat LFTs at intervals.  The patient understands that they are to contact us or the primary physician if concerning signs are noted. Sarecycline Counseling: Patient advised regarding possible photosensitivity and discoloration of the teeth, skin, lips, tongue and gums.  Patient instructed to avoid sunlight, if possible.  When exposed to sunlight, patients should wear protective clothing, sunglasses, and sunscreen.  The patient was instructed to call the office immediately if the following severe adverse effects occur:  hearing changes, easy bruising/bleeding, severe headache, or vision changes.  The patient verbalized understanding of the proper use and possible adverse effects of sarecycline.  All of the patient's questions and concerns were addressed. Arava Counseling:  Patient counseled regarding adverse effects of Arava including but not limited to nausea, vomiting, abnormalities in liver function tests. Patients may develop mouth sores, rash, diarrhea, and abnormalities in blood counts. The patient understands that monitoring is required including LFTs and blood counts.  There is a rare possibility of scarring of the liver and lung problems that can occur when taking methotrexate. Persistent nausea, loss of appetite, pale stools, dark urine, cough, and shortness of breath should be reported immediately. Patient advised to discontinue Arava treatment and consult with a physician prior to attempting conception. The patient will have to undergo a treatment to eliminate Arava from the body prior to conception. Minoxidil Counseling: Minoxidil is a topical medication which can increase blood flow where it is applied. It is uncertain how this medication increases hair growth. Side effects are uncommon and include stinging and allergic reactions. Olumiant Pregnancy And Lactation Text: Based on animal studies, Olumiant may cause embryo-fetal harm when administered to pregnant women.  The medication should not be used in pregnancy.  Breastfeeding is not recommended during treatment. Acitretin Pregnancy And Lactation Text: This medication is Pregnancy Category X and should not be given to women who are pregnant or may become pregnant in the future. This medication is excreted in breast milk. Topical Steroids Counseling: I discussed with the patient that prolonged use of topical steroids can result in the increased appearance of superficial blood vessels (telangiectasias), lightening (hypopigmentation) and thinning of the skin (atrophy).  Patient understands to avoid using high potency steroids in skin folds, the groin or the face.  The patient verbalized understanding of the proper use and possible adverse effects of topical steroids.  All of the patient's questions and concerns were addressed. Doxepin Counseling:  Patient advised that the medication is sedating and not to drive a car after taking this medication. Patient informed of potential adverse effects including but not limited to dry mouth, urinary retention, and blurry vision.  The patient verbalized understanding of the proper use and possible adverse effects of doxepin.  All of the patient's questions and concerns were addressed. Metronidazole Counseling:  I discussed with the patient the risks of metronidazole including but not limited to seizures, nausea/vomiting, a metallic taste in the mouth, nausea/vomiting and severe allergy. Oxybutynin Counseling:  I discussed with the patient the risks of oxybutynin including but not limited to skin rash, drowsiness, dry mouth, difficulty urinating, and blurred vision. Infliximab Counseling:  I discussed with the patient the risks of infliximab including but not limited to myelosuppression, immunosuppression, autoimmune hepatitis, demyelinating diseases, lymphoma, and serious infections.  The patient understands that monitoring is required including a PPD at baseline and must alert us or the primary physician if symptoms of infection or other concerning signs are noted. Calcipotriene Counseling:  I discussed with the patient the risks of calcipotriene including but not limited to erythema, scaling, itching, and irritation. Vtama Pregnancy And Lactation Text: It is unknown if this medication can cause problems during pregnancy and breastfeeding. Enbrel Counseling:  I discussed with the patient the risks of etanercept including but not limited to myelosuppression, immunosuppression, autoimmune hepatitis, demyelinating diseases, lymphoma, and infections.  The patient understands that monitoring is required including a PPD at baseline and must alert us or the primary physician if symptoms of infection or other concerning signs are noted. Protopic Pregnancy And Lactation Text: This medication is Pregnancy Category C. It is unknown if this medication is excreted in breast milk when applied topically. Cephalexin Counseling: I counseled the patient regarding use of cephalexin as an antibiotic for prophylactic and/or therapeutic purposes. Cephalexin (commonly prescribed under brand name Keflex) is a cephalosporin antibiotic which is active against numerous classes of bacteria, including most skin bacteria. Side effects may include nausea, diarrhea, gastrointestinal upset, rash, hives, yeast infections, and in rare cases, hepatitis, kidney disease, seizures, fever, confusion, neurologic symptoms, and others. Patients with severe allergies to penicillin medications are cautioned that there is about a 10% incidence of cross-reactivity with cephalosporins. When possible, patients with penicillin allergies should use alternatives to cephalosporins for antibiotic therapy. Eucrisa Counseling: Patient may experience a mild burning sensation during topical application. Eucrisa is not approved in children less than 2 years of age. Aklief counseling:  Patient advised to apply a pea-sized amount only at bedtime and wait 30 minutes after washing their face before applying.  If too drying, patient may add a non-comedogenic moisturizer.  The most commonly reported side effects including irritation, redness, scaling, dryness, stinging, burning, itching, and increased risk of sunburn.  The patient verbalized understanding of the proper use and possible adverse effects of retinoids.  All of the patient's questions and concerns were addressed. Tazorac Counseling:  Patient advised that medication is irritating and drying.  Patient may need to apply sparingly and wash off after an hour before eventually leaving it on overnight.  The patient verbalized understanding of the proper use and possible adverse effects of tazorac.  All of the patient's questions and concerns were addressed. Odomzo Counseling- I discussed with the patient the risks of Odomzo including but not limited to nausea, vomiting, diarrhea, constipation, weight loss, changes in the sense of taste, decreased appetite, muscle spasms, and hair loss.  The patient verbalized understanding of the proper use and possible adverse effects of Odomzo.  All of the patient's questions and concerns were addressed. Birth Control Pills Counseling: Birth Control Pill Counseling: I discussed with the patient the potential side effects of OCPs including but not limited to increased risk of stroke, heart attack, thrombophlebitis, deep venous thrombosis, hepatic adenomas, breast changes, GI upset, headaches, and depression.  The patient verbalized understanding of the proper use and possible adverse effects of OCPs. All of the patient's questions and concerns were addressed. Nsaids Counseling: NSAID Counseling: I discussed with the patient that NSAIDs should be taken with food. Prolonged use of NSAIDs can result in the development of stomach ulcers.  Patient advised to stop taking NSAIDs if abdominal pain occurs.  The patient verbalized understanding of the proper use and possible adverse effects of NSAIDs.  All of the patient's questions and concerns were addressed. Sarecycline Pregnancy And Lactation Text: This medication is Pregnancy Category D and not consider safe during pregnancy. It is also excreted in breast milk. Minoxidil Pregnancy And Lactation Text: This medication has not been assigned a Pregnancy Risk Category but animal studies failed to show danger with the topical medication. It is unknown if the medication is excreted in breast milk. Adbry Counseling: I discussed with the patient the risks of tralokinumab including but not limited to eye infection and irritation, cold sores, injection site reactions, worsening of asthma, allergic reactions and increased risk of parasitic infection.  Live vaccines should be avoided while taking tralokinumab. The patient understands that monitoring is required and they must alert us or the primary physician if symptoms of infection or other concerning signs are noted. Cyclophosphamide Counseling:  I discussed with the patient the risks of cyclophosphamide including but not limited to hair loss, hormonal abnormalities, decreased fertility, abdominal pain, diarrhea, nausea and vomiting, bone marrow suppression and infection. The patient understands that monitoring is required while taking this medication. Bexarotene Counseling:  I discussed with the patient the risks of bexarotene including but not limited to hair loss, dry lips/skin/eyes, liver abnormalities, hyperlipidemia, pancreatitis, depression/suicidal ideation, photosensitivity, drug rash/allergic reactions, hypothyroidism, anemia, leukopenia, infection, cataracts, and teratogenicity.  Patient understands that they will need regular blood tests to check lipid profile, liver function tests, white blood cell count, thyroid function tests and pregnancy test if applicable. Rinvoq Counseling: I discussed with the patient the risks of Rinvoq therapy including but not limited to upper respiratory tract infections, shingles, cold sores, bronchitis, nausea, cough, fever, acne, and headache. Live vaccines should be avoided.  This medication has been linked to serious infections; higher rate of mortality; malignancy and lymphoproliferative disorders; major adverse cardiovascular events; thrombosis; thrombocytopenia, anemia, and neutropenia; lipid elevations; liver enzyme elevations; and gastrointestinal perforations. Topical Steroids Applications Pregnancy And Lactation Text: Most topical steroids are considered safe to use during pregnancy and lactation.  Any topical steroid applied to the breast or nipple should be washed off before breastfeeding. Doxepin Pregnancy And Lactation Text: This medication is Pregnancy Category C and it isn't known if it is safe during pregnancy. It is also excreted in breast milk and breast feeding isn't recommended. Metronidazole Pregnancy And Lactation Text: This medication is Pregnancy Category B and considered safe during pregnancy.  It is also excreted in breast milk. Glycopyrrolate Counseling:  I discussed with the patient the risks of glycopyrrolate including but not limited to skin rash, drowsiness, dry mouth, difficulty urinating, and blurred vision. Calcipotriene Pregnancy And Lactation Text: The use of this medication during pregnancy or lactation is not recommended as there is insufficient data. Qbrexza Counseling:  I discussed with the patient the risks of Qbrexza including but not limited to headache, mydriasis, blurred vision, dry eyes, nasal dryness, dry mouth, dry throat, dry skin, urinary hesitation, and constipation.  Local skin reactions including erythema, burning, stinging, and itching can also occur. Xolair Counseling:  Patient informed of potential adverse effects including but not limited to fever, muscle aches, rash and allergic reactions.  The patient verbalized understanding of the proper use and possible adverse effects of Xolair.  All of the patient's questions and concerns were addressed. Zoryve Counseling:  I discussed with the patient that Zoryve is not for use in the eyes, mouth or vagina. The most commonly reported side effects include diarrhea, headache, insomnia, application site pain, upper respiratory tract infections, and urinary tract infections.  All of the patient's questions and concerns were addressed. Nsaids Pregnancy And Lactation Text: These medications are considered safe up to 30 weeks gestation. It is excreted in breast milk. Cephalexin Pregnancy And Lactation Text: This medication is Pregnancy Category B and considered safe during pregnancy.  It is also excreted in breast milk but can be used safely for shorter doses. Cyclophosphamide Pregnancy And Lactation Text: This medication is Pregnancy Category D and it isn't considered safe during pregnancy. This medication is excreted in breast milk. Birth Control Pills Pregnancy And Lactation Text: This medication should be avoided if pregnant and for the first 30 days post-partum. Aklief Pregnancy And Lactation Text: It is unknown if this medication is safe to use during pregnancy.  It is unknown if this medication is excreted in breast milk.  Breastfeeding women should use the topical cream on the smallest area of the skin for the shortest time needed while breastfeeding.  Do not apply to nipple and areola. Tazorac Pregnancy And Lactation Text: This medication is not safe during pregnancy. It is unknown if this medication is excreted in breast milk. Rinvoq Pregnancy And Lactation Text: Based on animal studies, Rinvoq may cause embryo-fetal harm when administered to pregnant women.  The medication should not be used in pregnancy.  Breastfeeding is not recommended during treatment and for 6 days after the last dose. Prednisone Pregnancy And Lactation Text: This medication is Pregnancy Category C and it isn't know if it is safe during pregnancy. This medication is excreted in breast milk. Clofazimine Counseling:  I discussed with the patient the risks of clofazimine including but not limited to skin and eye pigmentation, liver damage, nausea/vomiting, gastrointestinal bleeding and allergy. Bimzelx Pregnancy And Lactation Text: This medication crosses the placenta and the safety is uncertain during pregnancy. It is unknown if this medication is present in breast milk. Spevigo Counseling: I discussed with the patient the risks of Spevigo including but not limited to fatigue, nasuea, vomiting, headache, pruritus, urinary tract infection, an infusion related reactions.  The patient understands that monitoring is required including screening for tuberculosis at baseline and yearly screening thereafter while continuing Spevigo therapy. They should contact us if symptoms of infection or other concerning signs are noted. Tetracycline Counseling: Patient counseled regarding possible photosensitivity and increased risk for sunburn.  Patient instructed to avoid sunlight, if possible.  When exposed to sunlight, patients should wear protective clothing, sunglasses, and sunscreen.  The patient was instructed to call the office immediately if the following severe adverse effects occur:  hearing changes, easy bruising/bleeding, severe headache, or vision changes.  The patient verbalized understanding of the proper use and possible adverse effects of tetracycline.  All of the patient's questions and concerns were addressed. Patient understands to avoid pregnancy while on therapy due to potential birth defects. Mirvaso Counseling: Mirvaso is a topical medication which can decrease superficial blood flow where applied. Side effects are uncommon and include stinging, redness and allergic reactions. Topical Sulfur Applications Counseling: Topical Sulfur Counseling: Patient counseled that this medication may cause skin irritation or allergic reactions.  In the event of skin irritation, the patient was advised to reduce the amount of the drug applied or use it less frequently.   The patient verbalized understanding of the proper use and possible adverse effects of topical sulfur application.  All of the patient's questions and concerns were addressed. Bexarotene Pregnancy And Lactation Text: This medication is Pregnancy Category X and should not be given to women who are pregnant or may become pregnant. This medication should not be used if you are breast feeding. Adbry Pregnancy And Lactation Text: It is unknown if this medication will adversely affect pregnancy or breast feeding. Rituxan Counseling:  I discussed with the patient the risks of Rituxan infusions. Side effects can include infusion reactions, severe drug rashes including mucocutaneous reactions, reactivation of latent hepatitis and other infections and rarely progressive multifocal leukoencephalopathy.  All of the patient's questions and concerns were addressed. Hydroxyzine Counseling: Patient advised that the medication is sedating and not to drive a car after taking this medication.  Patient informed of potential adverse effects including but not limited to dry mouth, urinary retention, and blurry vision.  The patient verbalized understanding of the proper use and possible adverse effects of hydroxyzine.  All of the patient's questions and concerns were addressed. Propranolol Counseling:  I discussed with the patient the risks of propranolol including but not limited to low heart rate, low blood pressure, low blood sugar, restlessness and increased cold sensitivity. They should call the office if they experience any of these side effects. Ketoconazole Counseling:   Patient counseled regarding improving absorption with orange juice.  Adverse effects include but are not limited to breast enlargement, headache, diarrhea, nausea, upset stomach, liver function test abnormalities, taste disturbance, and stomach pain.  There is a rare possibility of liver failure that can occur when taking ketoconazole. The patient understands that monitoring of LFTs may be required, especially at baseline. The patient verbalized understanding of the proper use and possible adverse effects of ketoconazole.  All of the patient's questions and concerns were addressed. Qbrexza Pregnancy And Lactation Text: There is no available data on Qbrexza use in pregnant women.  There is no available data on Qbrexza use in lactation. Minocycline Counseling: Patient advised regarding possible photosensitivity and discoloration of the teeth, skin, lips, tongue and gums.  Patient instructed to avoid sunlight, if possible.  When exposed to sunlight, patients should wear protective clothing, sunglasses, and sunscreen.  The patient was instructed to call the office immediately if the following severe adverse effects occur:  hearing changes, easy bruising/bleeding, severe headache, or vision changes.  The patient verbalized understanding of the proper use and possible adverse effects of minocycline.  All of the patient's questions and concerns were addressed. Cantharidin Counseling:  I discussed with the patient the risks of Cantharidin including but not limited to pain, redness, burning, itching, and blistering. Glycopyrrolate Pregnancy And Lactation Text: This medication is Pregnancy Category B and is considered safe during pregnancy. It is unknown if it is excreted breast milk. Xolair Pregnancy And Lactation Text: This medication is Pregnancy Category B and is considered safe during pregnancy. This medication is excreted in breast milk. Humira Counseling:  I discussed with the patient the risks of adalimumab including but not limited to myelosuppression, immunosuppression, autoimmune hepatitis, demyelinating diseases, lymphoma, and serious infections.  The patient understands that monitoring is required including a PPD at baseline and must alert us or the primary physician if symptoms of infection or other concerning signs are noted. Tranexamic Acid Pregnancy And Lactation Text: It is unknown if this medication is safe during pregnancy or breast feeding. Clindamycin Counseling: I counseled the patient regarding use of clindamycin as an antibiotic for prophylactic and/or therapeutic purposes. Clindamycin is active against numerous classes of bacteria, including skin bacteria. Side effects may include nausea, diarrhea, gastrointestinal upset, rash, hives, yeast infections, and in rare cases, colitis. Hydroquinone Counseling:  Patient advised that medication may result in skin irritation, lightening (hypopigmentation), dryness, and burning.  In the event of skin irritation, the patient was advised to reduce the amount of the drug applied or use it less frequently.  Rarely, spots that are treated with hydroquinone can become darker (pseudoochronosis).  Should this occur, patient instructed to stop medication and call the office. The patient verbalized understanding of the proper use and possible adverse effects of hydroquinone.  All of the patient's questions and concerns were addressed. Olanzapine Counseling- I discussed with the patient the common side effects of olanzapine including but are not limited to: lack of energy, dry mouth, increased appetite, sleepiness, tremor, constipation, dizziness, changes in behavior, or restlessness.  Explained that teenagers are more likely to experience headaches, abdominal pain, pain in the arms or legs, tiredness, and sleepiness.  Serious side effects include but are not limited: increased risk of death in elderly patients who are confused, have memory loss, or dementia-related psychosis; hyperglycemia; increased cholesterol and triglycerides; and weight gain. Topical Clindamycin Counseling: Patient counseled that this medication may cause skin irritation or allergic reactions.  In the event of skin irritation, the patient was advised to reduce the amount of the drug applied or use it less frequently.   The patient verbalized understanding of the proper use and possible adverse effects of clindamycin.  All of the patient's questions and concerns were addressed. Cantharidin Pregnancy And Lactation Text: This medication has not been proven safe during pregnancy. It is unknown if this medication is excreted in breast milk. Azelaic Acid Counseling: Patient counseled that medicine may cause skin irritation and to avoid applying near the eyes.  In the event of skin irritation, the patient was advised to reduce the amount of the drug applied or use it less frequently.   The patient verbalized understanding of the proper use and possible adverse effects of azelaic acid.  All of the patient's questions and concerns were addressed. Spironolactone Counseling: Patient advised regarding risks of diarrhea, abdominal pain, hyperkalemia, birth defects (for female patients), liver toxicity and renal toxicity. The patient may need blood work to monitor liver and kidney function and potassium levels while on therapy. The patient verbalized understanding of the proper use and possible adverse effects of spironolactone.  All of the patient's questions and concerns were addressed. Cyclosporine Counseling:  I discussed with the patient the risks of cyclosporine including but not limited to hypertension, gingival hyperplasia,myelosuppression, immunosuppression, liver damage, kidney damage, neurotoxicity, lymphoma, and serious infections. The patient understands that monitoring is required including baseline blood pressure, CBC, CMP, lipid panel and uric acid, and then 1-2 times monthly CMP and blood pressure. Spevigo Pregnancy And Lactation Text: The risk during pregnancy and breastfeeding is uncertain with this medication. This medication does cross the placenta. It is unknown if this medication is found in breast milk. Cimzia Counseling:  I discussed with the patient the risks of Cimzia including but not limited to immunosuppression, allergic reactions and infections.  The patient understands that monitoring is required including a PPD at baseline and must alert us or the primary physician if symptoms of infection or other concerning signs are noted. Propranolol Pregnancy And Lactation Text: This medication is Pregnancy Category C and it isn't known if it is safe during pregnancy. It is excreted in breast milk. Ketoconazole Pregnancy And Lactation Text: This medication is Pregnancy Category C and it isn't know if it is safe during pregnancy. It is also excreted in breast milk and breast feeding isn't recommended. Mirvaso Pregnancy And Lactation Text: This medication has not been assigned a Pregnancy Risk Category. It is unknown if the medication is excreted in breast milk. Sotyktu Counseling:  I discussed the most common side effects of Sotyktu including: common cold, sore throat, sinus infections, cold sores, canker sores, folliculitis, and acne.? I also discussed more serious side effects of Sotyktu including but not limited to: serious allergic reactions; increased risk for infections such as TB; cancers such as lymphomas; rhabdomyolysis and elevated CPK; and elevated triglycerides and liver enzymes.? Isotretinoin Counseling: Patient should get monthly blood tests, not donate blood, not drive at night if vision affected, not share medication, and not undergo elective surgery for 6 months after tx completed. Side effects reviewed, pt to contact office should one occur. Topical Sulfur Applications Pregnancy And Lactation Text: This medication is Pregnancy Category C and has an unknown safety profile during pregnancy. It is unknown if this topical medication is excreted in breast milk. 5-Fu Counseling: 5-Fluorouracil Counseling:  I discussed with the patient the risks of 5-fluorouracil including but not limited to erythema, scaling, itching, weeping, crusting, and pain. Dutasteride Male Counseling: Dustasteride Counseling:  I discussed with the patient the risks of use of dutasteride including but not limited to decreased libido, decreased ejaculate volume, and gynecomastia. Women who can become pregnant should not handle medication.  All of the patient's questions and concerns were addressed. Bimzelx Counseling:  I discussed with the patient the risks of Bimzelx including but not limited to depression, immunosuppression, allergic reactions and infections.  The patient understands that monitoring is required including a PPD at baseline and must alert us or the primary physician if symptoms of infection or other concerning signs are noted. Hydroxychloroquine Counseling:  I discussed with the patient that a baseline ophthalmologic exam is needed at the start of therapy and every year thereafter while on therapy. A CBC may also be warranted for monitoring.  The side effects of this medication were discussed with the patient, including but not limited to agranulocytosis, aplastic anemia, seizures, rashes, retinopathy, and liver toxicity. Patient instructed to call the office should any adverse effect occur.  The patient verbalized understanding of the proper use and possible adverse effects of Plaquenil.  All the patient's questions and concerns were addressed. Rhofade Counseling: Rhofade is a topical medication which can decrease superficial blood flow where applied. Side effects are uncommon and include stinging, redness and allergic reactions. Zyclara Counseling:  I discussed with the patient the risks of imiquimod including but not limited to erythema, scaling, itching, weeping, crusting, and pain.  Patient understands that the inflammatory response to imiquimod is variable from person to person and was educated regarded proper titration schedule.  If flu-like symptoms develop, patient knows to discontinue the medication and contact us. Rituxan Pregnancy And Lactation Text: This medication is Pregnancy Category C and it isn't know if it is safe during pregnancy. It is unknown if this medication is excreted in breast milk but similar antibodies are known to be excreted. Hydroxyzine Pregnancy And Lactation Text: This medication is not safe during pregnancy and should not be taken. It is also excreted in breast milk and breast feeding isn't recommended. Cibinqo Counseling: I discussed with the patient the risks of Cibinqo therapy including but not limited to common cold, nausea, headache, cold sores, increased blood CPK levels, dizziness, UTIs, fatigue, acne, and vomitting. Live vaccines should be avoided.  This medication has been linked to serious infections; higher rate of mortality; malignancy and lymphoproliferative disorders; major adverse cardiovascular events; thrombosis; thrombocytopenia and lymphopenia; lipid elevations; and retinal detachment. Clindamycin Pregnancy And Lactation Text: This medication can be used in pregnancy if certain situations. Clindamycin is also present in breast milk. Valtrex Counseling: I discussed with the patient the risks of valacyclovir including but not limited to kidney damage, nausea, vomiting and severe allergy.  The patient understands that if the infection seems to be worsening or is not improving, they are to call. Olanzapine Pregnancy And Lactation Text: This medication is pregnancy category C.   There are no adequate and well controlled trials with olanzapine in pregnant females.  Olanzapine should be used during pregnancy only if the potential benefit justifies the potential risk to the fetus.   In a study in lactating healthy women, olanzapine was excreted in breast milk.  It is recommended that women taking olanzapine should not breast feed. Colchicine Counseling:  Patient counseled regarding adverse effects including but not limited to stomach upset (nausea, vomiting, stomach pain, or diarrhea).  Patient instructed to limit alcohol consumption while taking this medication.  Colchicine may reduce blood counts especially with prolonged use.  The patient understands that monitoring of kidney function and blood counts may be required, especially at baseline. The patient verbalized understanding of the proper use and possible adverse effects of colchicine.  All of the patient's questions and concerns were addressed. Spironolactone Pregnancy And Lactation Text: This medication can cause feminization of the male fetus and should be avoided during pregnancy. The active metabolite is also found in breast milk. Stelara Counseling:  I discussed with the patient the risks of ustekinumab including but not limited to immunosuppression, malignancy, posterior leukoencephalopathy syndrome, and serious infections.  The patient understands that monitoring is required including a PPD at baseline and must alert us or the primary physician if symptoms of infection or other concerning signs are noted. Cimzia Pregnancy And Lactation Text: This medication crosses the placenta but can be considered safe in certain situations. Cimzia may be excreted in breast milk. SSKI Counseling:  I discussed with the patient the risks of SSKI including but not limited to thyroid abnormalities, metallic taste, GI upset, fever, headache, acne, arthralgias, paraesthesias, lymphadenopathy, easy bleeding, arrhythmias, and allergic reaction. Wartpeel Counseling:  I discussed with the patient the risks of Wartpeel including but not limited to erythema, scaling, itching, weeping, crusting, and pain. Sotyktu Pregnancy And Lactation Text: There is insufficient data to evaluate whether or not Sotyktu is safe to use during pregnancy.? ?It is not known if Sotyktu passes into breast milk and whether or not it is safe to use when breastfeeding.?? Isotretinoin Pregnancy And Lactation Text: This medication is Pregnancy Category X and is considered extremely dangerous during pregnancy. It is unknown if it is excreted in breast milk. Erivedge Counseling- I discussed with the patient the risks of Erivedge including but not limited to nausea, vomiting, diarrhea, constipation, weight loss, changes in the sense of taste, decreased appetite, muscle spasms, and hair loss.  The patient verbalized understanding of the proper use and possible adverse effects of Erivedge.  All of the patient's questions and concerns were addressed. Dutasteride Female Counseling: Dutasteride Counseling:  I discussed with the patient the risks of use of dutasteride including but not limited to decreased libido and sexual dysfunction. Explained the teratogenic nature of the medication and stressed the importance of not getting pregnant during treatment. All of the patient's questions and concerns were addressed. Cibinqo Pregnancy And Lactation Text: It is unknown if this medication will adversely affect pregnancy or breast feeding.  You should not take this medication if you are currently pregnant or planning a pregnancy or while breastfeeding. Opzelura Counseling:  I discussed with the patient the risks of Opzelura including but not limited to nasopharngitis, bronchitis, ear infection, eosinophila, hives, diarrhea, folliculitis, tonsillitis, and rhinorrhea.  Taken orally, this medication has been linked to serious infections; higher rate of mortality; malignancy and lymphoproliferative disorders; major adverse cardiovascular events; thrombosis; thrombocytopenia, anemia, and neutropenia; and lipid elevations. Siliq Counseling:  I discussed with the patient the risks of Siliq including but not limited to new or worsening depression, suicidal thoughts and behavior, immunosuppression, malignancy, posterior leukoencephalopathy syndrome, and serious infections.  The patient understands that monitoring is required including a PPD at baseline and must alert us or the primary physician if symptoms of infection or other concerning signs are noted. There is also a special program designed to monitor depression which is required with Siliq. Terbinafine Counseling: Patient counseling regarding adverse effects of terbinafine including but not limited to headache, diarrhea, rash, upset stomach, liver function test abnormalities, itching, taste/smell disturbance, nausea, abdominal pain, and flatulence.  There is a rare possibility of liver failure that can occur when taking terbinafine.  The patient understands that a baseline LFT and kidney function test may be required. The patient verbalized understanding of the proper use and possible adverse effects of terbinafine.  All of the patient's questions and concerns were addressed. Quinolones Counseling:  I discussed with the patient the risks of fluoroquinolones including but not limited to GI upset, allergic reaction, drug rash, diarrhea, dizziness, photosensitivity, yeast infections, liver function test abnormalities, tendonitis/tendon rupture. Hydroxychloroquine Pregnancy And Lactation Text: This medication has been shown to cause fetal harm but it isn't assigned a Pregnancy Risk Category. There are small amounts excreted in breast milk. Imiquimod Counseling:  I discussed with the patient the risks of imiquimod including but not limited to erythema, scaling, itching, weeping, crusting, and pain.  Patient understands that the inflammatory response to imiquimod is variable from person to person and was educated regarded proper titration schedule.  If flu-like symptoms develop, patient knows to discontinue the medication and contact us. Valtrex Pregnancy And Lactation Text: this medication is Pregnancy Category B and is considered safe during pregnancy. This medication is not directly found in breast milk but it's metabolite acyclovir is present. Topical Ketoconazole Counseling: Patient counseled that this medication may cause skin irritation or allergic reactions.  In the event of skin irritation, the patient was advised to reduce the amount of the drug applied or use it less frequently.   The patient verbalized understanding of the proper use and possible adverse effects of ketoconazole.  All of the patient's questions and concerns were addressed. Hyrimoz Counseling:  I discussed with the patient the risks of adalimumab including but not limited to myelosuppression, immunosuppression, autoimmune hepatitis, demyelinating diseases, lymphoma, and serious infections.  The patient understands that monitoring is required including a PPD at baseline and must alert us or the primary physician if symptoms of infection or other concerning signs are noted. Fluconazole Counseling:  Patient counseled regarding adverse effects of fluconazole including but not limited to headache, diarrhea, nausea, upset stomach, liver function test abnormalities, taste disturbance, and stomach pain.  There is a rare possibility of liver failure that can occur when taking fluconazole.  The patient understands that monitoring of LFTs and kidney function test may be required, especially at baseline. The patient verbalized understanding of the proper use and possible adverse effects of fluconazole.  All of the patient's questions and concerns were addressed. Azithromycin Counseling:  I discussed with the patient the risks of azithromycin including but not limited to GI upset, allergic reaction, drug rash, diarrhea, and yeast infections. Oral Minoxidil Counseling- I discussed with the patient the risks of oral minoxidil including but not limited to shortness of breath, swelling of the feet or ankles, dizziness, lightheadedness, unwanted hair growth and allergic reaction.  The patient verbalized understanding of the proper use and possible adverse effects of oral minoxidil.  All of the patient's questions and concerns were addressed. Doxycycline Counseling:  Patient counseled regarding possible photosensitivity and increased risk for sunburn.  Patient instructed to avoid sunlight, if possible.  When exposed to sunlight, patients should wear protective clothing, sunglasses, and sunscreen.  The patient was instructed to call the office immediately if the following severe adverse effects occur:  hearing changes, easy bruising/bleeding, severe headache, or vision changes.  The patient verbalized understanding of the proper use and possible adverse effects of doxycycline.  All of the patient's questions and concerns were addressed. Benzoyl Peroxide Counseling: Patient counseled that medicine may cause skin irritation and bleach clothing.  In the event of skin irritation, the patient was advised to reduce the amount of the drug applied or use it less frequently.   The patient verbalized understanding of the proper use and possible adverse effects of benzoyl peroxide.  All of the patient's questions and concerns were addressed.

## 2024-09-05 ENCOUNTER — APPOINTMENT (OUTPATIENT)
Dept: ELECTROPHYSIOLOGY | Facility: CLINIC | Age: 74
End: 2024-09-05
Payer: MEDICARE

## 2024-09-05 ENCOUNTER — NON-APPOINTMENT (OUTPATIENT)
Age: 74
End: 2024-09-05

## 2024-09-05 PROCEDURE — 93296 REM INTERROG EVL PM/IDS: CPT

## 2024-09-05 PROCEDURE — 93294 REM INTERROG EVL PM/LDLS PM: CPT

## 2024-09-06 NOTE — PRE-ANESTHESIA EVALUATION ADULT - NSANTHSUBSTSD_GEN_ALL_CORE
This RN rounded on pt to administer ordered medications to find pt's room empty; no pt belongings spotted in room and pt unable to be found in adjoining bathroom. IV catheter unable to be found in room or bathroom. This RN called twice at number listed in chart; phone line states number is out of service and no response from pt received. Pt did not notify staff upon leaving. Registration confirms visualizing pt leaving through the lobby. Provider and charge RN aware.   No

## 2024-11-09 NOTE — ED ADULT NURSE NOTE - NSIMPLEMENTINTERV_GEN_ALL_ED
Implemented All Universal Safety Interventions:  [place] to call system. Call bell, personal items and telephone within reach. Instruct patient to call for assistance. Room bathroom lighting operational. Non-slip footwear when patient is off stretcher. Physically safe environment: no spills, clutter or unnecessary equipment. Stretcher in lowest position, wheels locked, appropriate side rails in place.
numerical 0-10

## 2024-12-06 ENCOUNTER — APPOINTMENT (OUTPATIENT)
Dept: ELECTROPHYSIOLOGY | Facility: CLINIC | Age: 74
End: 2024-12-06

## 2024-12-06 PROCEDURE — 93296 REM INTERROG EVL PM/IDS: CPT

## 2024-12-06 PROCEDURE — 93294 REM INTERROG EVL PM/LDLS PM: CPT

## 2024-12-15 NOTE — PHYSICAL THERAPY INITIAL EVALUATION ADULT - GAIT TRAINING, PT EVAL
(V5) oriented GOAL: Pt will ambulate 100 feet w/independently, w/use of appropriate assistive device in 3 weeks.

## 2025-01-01 ENCOUNTER — EMERGENCY (EMERGENCY)
Facility: HOSPITAL | Age: 75
LOS: 1 days | End: 2025-01-01
Attending: STUDENT IN AN ORGANIZED HEALTH CARE EDUCATION/TRAINING PROGRAM | Admitting: STUDENT IN AN ORGANIZED HEALTH CARE EDUCATION/TRAINING PROGRAM
Payer: MEDICARE

## 2025-01-01 VITALS — HEART RATE: 157 BPM | SYSTOLIC BLOOD PRESSURE: 67 MMHG | DIASTOLIC BLOOD PRESSURE: 42 MMHG

## 2025-01-01 VITALS — HEART RATE: 89 BPM | SYSTOLIC BLOOD PRESSURE: 134 MMHG | DIASTOLIC BLOOD PRESSURE: 119 MMHG

## 2025-01-01 DIAGNOSIS — Z98.890 OTHER SPECIFIED POSTPROCEDURAL STATES: Chronic | ICD-10-CM

## 2025-01-01 DIAGNOSIS — K81.0 ACUTE CHOLECYSTITIS: Chronic | ICD-10-CM

## 2025-01-01 DIAGNOSIS — Z90.79 ACQUIRED ABSENCE OF OTHER GENITAL ORGAN(S): Chronic | ICD-10-CM

## 2025-01-01 PROCEDURE — 99285 EMERGENCY DEPT VISIT HI MDM: CPT | Mod: 25

## 2025-01-01 PROCEDURE — 92950 HEART/LUNG RESUSCITATION CPR: CPT

## 2025-01-01 RX ORDER — AMIODARONE HYDROCHLORIDE 200 MG/1
1 TABLET ORAL
Qty: 450 | Refills: 0 | Status: DISCONTINUED | OUTPATIENT
Start: 2025-01-01 | End: 2025-01-14

## 2025-01-01 RX ADMIN — AMIODARONE HYDROCHLORIDE 33.3 MG/MIN: 200 TABLET ORAL at 00:49

## 2025-01-10 NOTE — CHART NOTE - NSCHARTNOTEFT_GEN_A_CORE
Patient with extensive cardiac history was found unresponsive in the field, EMS was called and found him in VT/VF arrest, CPR started in the field and he was shocked 4x with ROSC.     Family in the field asked BLS to stop CPR per prior patient/family wishes but due to BLS protocol, CPR was continued and he was brought to the ER. In the ER, CPR was continued for nearly 45 minutes.    Family was eventually reached in person in the waiting room. I and witness Shaun Savage discussed in person with patient's wife Aylin Alberto, who confirms the patient would not want any further measures to sustain life. We them discussed with the ED team and CPR was stopped.    Time of death: 23:52.     Robert Head, PGY-5  Cardiology Fellow

## 2025-01-10 NOTE — ED ADULT TRIAGE NOTE - CHIEF COMPLAINT QUOTE
pt arrives as notification for cardiac arrest. intubated in field. respiratory called prior to patient arrival. charge RN notified

## 2025-01-11 NOTE — ED PROVIDER NOTE - PHYSICAL EXAMINATION
Cardiac arrest, unresponsive  Pupils are fixed and dilated  7.5 ET tube 23 cm of the lips  Breath sounds are coarse but equal bilaterally  Abdomen is minimally distended  2+ pitting edema to the bilateral lower extremities  Carotid or femoral pulse

## 2025-01-11 NOTE — ED ADULT NURSE NOTE - OBJECTIVE STATEMENT
LORY RN: pt received as notification for cardiac arrest. pt arrives intubated prior to arrival by EMS. as per EMS pt hx DM, kidney cancer. see code flowsheet. TOD 23:52

## 2025-01-11 NOTE — ED ADULT NURSE REASSESSMENT NOTE - NS ED NURSE REASSESS COMMENT FT1
[facility] [place] called at 12:03am.  Jez reported pt is not a candidate.  [id number] SANDRA Alla, MRN [id number]  has been verified by DOMINICK Garcia RN and DOMINICK [place] to be wearing tele box # [id number]. Rhythm and correct information is visible on the monitor.

## 2025-01-11 NOTE — ED PROVIDER NOTE - ATTENDING CONTRIBUTION TO CARE
Per EMS report: Patient was had an unwitnessed arrest  Approximate downtime prior to compressions: <10 min. Initial Rhythm: VF, Prior to arrival patient had 5 episodes of defibrillation, 4 epi and 450 mg of amiodarone  ROSC was achieved and patient was transported to hospital but Rearrested in the ED  Cardiac compressions (high-quality CPR) were performed immediately by staff in order to sustain blood flow. The patient was ventilated and oxygenated via 7.5 cm ET tube 23 cm at the lip, equal breath sound. The patient received appropriate ACLS measures and these were repeated as necessary throughout the resuscitation. See nursing note for medications and times given.     When patient presented to the ED patient was pulseless, on the monitor patient was in pulseless V. tach.  Patient was given 100 mg of lidocaine, cardioverted multiple times per ACLS.  Epi, calcium, bicarb.  At 1 point patient did have questionable torsade de pointes, given magnesium.    Patient did achieve ROSC briefly, found to be in sustained V. tach.  Started on amiodarone drip as well as Levophed.  Synchronized cardioversion was attempted, unsuccessful.  Patient coded shortly after.    Cardiology fellow was at bedside.  He spoke with the patient's wife and 3 sons, they wanted to terminate all efforts.    Family members were notified that the patient may pass away soon. Family members requested discontinuation of resuscitation efforts. After discontinuation of resuscitation, I did not observe spontaneous breathing or appreciate heart sounds on auscultation. There was no palpable femoral or carotid pulse. The patient did not respond to nail bed stimuli. I examined the patient and there was no pupillary response to light. Patient was pronounced  at 2352.

## 2025-03-07 ENCOUNTER — APPOINTMENT (OUTPATIENT)
Dept: ELECTROPHYSIOLOGY | Facility: CLINIC | Age: 75
End: 2025-03-07

## 2025-03-26 ENCOUNTER — APPOINTMENT (OUTPATIENT)
Dept: ELECTROPHYSIOLOGY | Facility: CLINIC | Age: 75
End: 2025-03-26